# Patient Record
Sex: FEMALE | Race: WHITE | NOT HISPANIC OR LATINO | Employment: OTHER | URBAN - NONMETROPOLITAN AREA
[De-identification: names, ages, dates, MRNs, and addresses within clinical notes are randomized per-mention and may not be internally consistent; named-entity substitution may affect disease eponyms.]

---

## 2018-08-17 ENCOUNTER — HOSPITAL ENCOUNTER (OUTPATIENT)
Dept: CT IMAGING | Facility: HOSPITAL | Age: 68
Discharge: HOME | End: 2018-08-17
Attending: EMERGENCY MEDICINE | Admitting: EMERGENCY MEDICINE

## 2018-08-17 ENCOUNTER — TRANSCRIBE ORDERS (OUTPATIENT)
Dept: ADMINISTRATIVE | Facility: HOSPITAL | Age: 68
End: 2018-08-17

## 2018-08-17 DIAGNOSIS — R10.9 ABDOMINAL PAIN, UNSPECIFIED ABDOMINAL LOCATION: Primary | ICD-10-CM

## 2018-08-17 DIAGNOSIS — R10.9 ABDOMINAL PAIN, UNSPECIFIED ABDOMINAL LOCATION: ICD-10-CM

## 2018-08-17 LAB — CREAT BLDA-MCNC: 1.3 MG/DL (ref 0.6–1.3)

## 2018-08-17 PROCEDURE — 74177 CT ABD & PELVIS W/CONTRAST: CPT

## 2018-08-17 PROCEDURE — 25010000002 IOPAMIDOL 61 % SOLUTION: Performed by: EMERGENCY MEDICINE

## 2018-08-17 PROCEDURE — 82565 ASSAY OF CREATININE: CPT

## 2018-08-17 PROCEDURE — 0 IOHEXOL 300 MG/ML SOLUTION: Performed by: EMERGENCY MEDICINE

## 2018-08-17 RX ADMIN — IOPAMIDOL 100 ML: 612 INJECTION, SOLUTION INTRAVENOUS at 13:41

## 2018-08-17 RX ADMIN — IOHEXOL 50 ML: 300 INJECTION, SOLUTION INTRAVENOUS at 13:41

## 2019-01-02 ENCOUNTER — TRANSCRIBE ORDERS (OUTPATIENT)
Dept: GENERAL RADIOLOGY | Facility: HOSPITAL | Age: 69
End: 2019-01-02

## 2019-01-02 ENCOUNTER — HOSPITAL ENCOUNTER (OUTPATIENT)
Dept: GENERAL RADIOLOGY | Facility: HOSPITAL | Age: 69
Discharge: HOME | End: 2019-01-02
Admitting: NURSE PRACTITIONER

## 2019-01-02 DIAGNOSIS — Z13.820 SCREENING FOR OSTEOPOROSIS: ICD-10-CM

## 2019-01-02 DIAGNOSIS — Z00.00 ROUTINE GENERAL MEDICAL EXAMINATION AT A HEALTH CARE FACILITY: ICD-10-CM

## 2019-01-02 DIAGNOSIS — Z78.0 POST-MENOPAUSAL: ICD-10-CM

## 2019-01-02 DIAGNOSIS — Z78.0 POST-MENOPAUSAL: Primary | ICD-10-CM

## 2019-01-02 PROCEDURE — 77080 DXA BONE DENSITY AXIAL: CPT

## 2019-07-09 ENCOUNTER — HOSPITAL ENCOUNTER (OUTPATIENT)
Dept: GENERAL RADIOLOGY | Facility: HOSPITAL | Age: 69
Discharge: HOME | End: 2019-07-09
Admitting: NURSE PRACTITIONER

## 2019-07-09 ENCOUNTER — APPOINTMENT (OUTPATIENT)
Dept: GENERAL RADIOLOGY | Facility: HOSPITAL | Age: 69
End: 2019-07-09

## 2019-07-09 ENCOUNTER — TRANSCRIBE ORDERS (OUTPATIENT)
Dept: GENERAL RADIOLOGY | Facility: HOSPITAL | Age: 69
End: 2019-07-09

## 2019-07-09 DIAGNOSIS — M25.531 RIGHT WRIST PAIN: ICD-10-CM

## 2019-07-09 DIAGNOSIS — M25.531 RIGHT WRIST PAIN: Primary | ICD-10-CM

## 2019-07-09 PROCEDURE — 73110 X-RAY EXAM OF WRIST: CPT

## 2019-07-11 ENCOUNTER — TRANSCRIBE ORDERS (OUTPATIENT)
Dept: ADMINISTRATIVE | Facility: HOSPITAL | Age: 69
End: 2019-07-11

## 2019-07-11 ENCOUNTER — HOSPITAL ENCOUNTER (OUTPATIENT)
Dept: ULTRASOUND IMAGING | Facility: HOSPITAL | Age: 69
Discharge: HOME | End: 2019-07-11
Admitting: NURSE PRACTITIONER

## 2019-07-11 DIAGNOSIS — R22.1 NECK NODULE: Primary | ICD-10-CM

## 2019-07-11 DIAGNOSIS — R22.1 NECK NODULE: ICD-10-CM

## 2019-07-11 PROCEDURE — 76536 US EXAM OF HEAD AND NECK: CPT

## 2019-11-26 ENCOUNTER — TRANSCRIBE ORDERS (OUTPATIENT)
Dept: GENERAL RADIOLOGY | Facility: HOSPITAL | Age: 69
End: 2019-11-26

## 2019-11-26 ENCOUNTER — HOSPITAL ENCOUNTER (OUTPATIENT)
Dept: GENERAL RADIOLOGY | Facility: HOSPITAL | Age: 69
Discharge: HOME | End: 2019-11-26
Admitting: NURSE PRACTITIONER

## 2019-11-26 DIAGNOSIS — M54.50 LOW BACK PAIN WITHOUT SCIATICA, UNSPECIFIED BACK PAIN LATERALITY, UNSPECIFIED CHRONICITY: ICD-10-CM

## 2019-11-26 DIAGNOSIS — M54.50 LOW BACK PAIN WITHOUT SCIATICA, UNSPECIFIED BACK PAIN LATERALITY, UNSPECIFIED CHRONICITY: Primary | ICD-10-CM

## 2019-11-26 PROCEDURE — 72110 X-RAY EXAM L-2 SPINE 4/>VWS: CPT

## 2020-01-15 ENCOUNTER — TRANSCRIBE ORDERS (OUTPATIENT)
Dept: ADMINISTRATIVE | Facility: HOSPITAL | Age: 70
End: 2020-01-15

## 2020-01-15 DIAGNOSIS — M54.50 CHRONIC LOW BACK PAIN, UNSPECIFIED BACK PAIN LATERALITY, UNSPECIFIED WHETHER SCIATICA PRESENT: Primary | ICD-10-CM

## 2020-01-15 DIAGNOSIS — G89.29 CHRONIC LOW BACK PAIN, UNSPECIFIED BACK PAIN LATERALITY, UNSPECIFIED WHETHER SCIATICA PRESENT: Primary | ICD-10-CM

## 2020-01-16 ENCOUNTER — TRANSCRIBE ORDERS (OUTPATIENT)
Dept: GENERAL RADIOLOGY | Facility: HOSPITAL | Age: 70
End: 2020-01-16

## 2020-01-16 ENCOUNTER — HOSPITAL ENCOUNTER (OUTPATIENT)
Dept: GENERAL RADIOLOGY | Facility: HOSPITAL | Age: 70
Discharge: HOME | End: 2020-01-16
Admitting: NURSE PRACTITIONER

## 2020-01-16 ENCOUNTER — TRANSCRIBE ORDERS (OUTPATIENT)
Dept: ADMINISTRATIVE | Facility: HOSPITAL | Age: 70
End: 2020-01-16

## 2020-01-16 DIAGNOSIS — M81.0 OSTEOPOROSIS OF VERTEBRA: ICD-10-CM

## 2020-01-16 DIAGNOSIS — F17.200 SMOKING: Primary | ICD-10-CM

## 2020-01-16 DIAGNOSIS — M54.50 CHRONIC LOW BACK PAIN, UNSPECIFIED BACK PAIN LATERALITY, UNSPECIFIED WHETHER SCIATICA PRESENT: ICD-10-CM

## 2020-01-16 DIAGNOSIS — R06.02 SHORTNESS OF BREATH: ICD-10-CM

## 2020-01-16 DIAGNOSIS — R20.2 PARESTHESIA: ICD-10-CM

## 2020-01-16 DIAGNOSIS — R06.02 SHORTNESS OF BREATH: Primary | ICD-10-CM

## 2020-01-16 DIAGNOSIS — Z98.890 HISTORY OF LUMBAR SURGERY: ICD-10-CM

## 2020-01-16 DIAGNOSIS — G89.29 CHRONIC LOW BACK PAIN, UNSPECIFIED BACK PAIN LATERALITY, UNSPECIFIED WHETHER SCIATICA PRESENT: ICD-10-CM

## 2020-01-16 PROCEDURE — 71046 X-RAY EXAM CHEST 2 VIEWS: CPT

## 2020-01-17 ENCOUNTER — APPOINTMENT (OUTPATIENT)
Dept: MRI IMAGING | Facility: HOSPITAL | Age: 70
End: 2020-01-17

## 2020-01-20 ENCOUNTER — HOSPITAL ENCOUNTER (OUTPATIENT)
Dept: MRI IMAGING | Facility: HOSPITAL | Age: 70
Discharge: HOME | End: 2020-01-20

## 2020-01-20 ENCOUNTER — HOSPITAL ENCOUNTER (OUTPATIENT)
Dept: PULMONOLOGY | Facility: HOSPITAL | Age: 70
Discharge: HOME | End: 2020-01-20

## 2020-01-20 ENCOUNTER — HOSPITAL ENCOUNTER (OUTPATIENT)
Dept: MRI IMAGING | Facility: HOSPITAL | Age: 70
Discharge: HOME | End: 2020-01-20
Admitting: NURSE PRACTITIONER

## 2020-01-20 DIAGNOSIS — M54.50 CHRONIC LOW BACK PAIN, UNSPECIFIED BACK PAIN LATERALITY, UNSPECIFIED WHETHER SCIATICA PRESENT: ICD-10-CM

## 2020-01-20 DIAGNOSIS — G89.29 CHRONIC LOW BACK PAIN, UNSPECIFIED BACK PAIN LATERALITY, UNSPECIFIED WHETHER SCIATICA PRESENT: ICD-10-CM

## 2020-01-20 LAB — CREAT BLDA-MCNC: 1.3 MG/DL (ref 0.6–1.3)

## 2020-01-20 PROCEDURE — 72146 MRI CHEST SPINE W/O DYE: CPT

## 2020-01-20 PROCEDURE — 94010 BREATHING CAPACITY TEST: CPT

## 2020-01-20 PROCEDURE — 0 GADOBENATE DIMEGLUMINE 529 MG/ML SOLUTION: Performed by: NURSE PRACTITIONER

## 2020-01-20 PROCEDURE — 94010 BREATHING CAPACITY TEST: CPT | Performed by: INTERNAL MEDICINE

## 2020-01-20 PROCEDURE — 94726 PLETHYSMOGRAPHY LUNG VOLUMES: CPT

## 2020-01-20 PROCEDURE — A9577 INJ MULTIHANCE: HCPCS | Performed by: NURSE PRACTITIONER

## 2020-01-20 PROCEDURE — 94729 DIFFUSING CAPACITY: CPT | Performed by: INTERNAL MEDICINE

## 2020-01-20 PROCEDURE — 94729 DIFFUSING CAPACITY: CPT

## 2020-01-20 PROCEDURE — 72158 MRI LUMBAR SPINE W/O & W/DYE: CPT

## 2020-01-20 PROCEDURE — 82565 ASSAY OF CREATININE: CPT

## 2020-01-20 PROCEDURE — 94726 PLETHYSMOGRAPHY LUNG VOLUMES: CPT | Performed by: INTERNAL MEDICINE

## 2020-01-20 RX ORDER — ALBUTEROL SULFATE 2.5 MG/3ML
2.5 SOLUTION RESPIRATORY (INHALATION) ONCE
Status: DISCONTINUED | OUTPATIENT
Start: 2020-01-20 | End: 2020-01-20

## 2020-01-20 RX ADMIN — GADOBENATE DIMEGLUMINE 17 ML: 529 INJECTION, SOLUTION INTRAVENOUS at 11:47

## 2020-07-17 ENCOUNTER — TRANSCRIBE ORDERS (OUTPATIENT)
Dept: GENERAL RADIOLOGY | Facility: HOSPITAL | Age: 70
End: 2020-07-17

## 2020-07-17 ENCOUNTER — HOSPITAL ENCOUNTER (OUTPATIENT)
Dept: GENERAL RADIOLOGY | Facility: HOSPITAL | Age: 70
Discharge: HOME | End: 2020-07-17
Admitting: NURSE PRACTITIONER

## 2020-07-17 DIAGNOSIS — R06.02 SHORTNESS OF BREATH: Primary | ICD-10-CM

## 2020-07-17 DIAGNOSIS — R06.02 SHORTNESS OF BREATH: ICD-10-CM

## 2020-07-17 PROCEDURE — 71046 X-RAY EXAM CHEST 2 VIEWS: CPT

## 2020-07-29 ENCOUNTER — TRANSCRIBE ORDERS (OUTPATIENT)
Dept: ADMINISTRATIVE | Facility: HOSPITAL | Age: 70
End: 2020-07-29

## 2020-07-29 DIAGNOSIS — D72.829 LEUKOCYTOSIS, UNSPECIFIED TYPE: ICD-10-CM

## 2020-07-29 DIAGNOSIS — E87.1 HYPOSMOLALITY SYNDROME: ICD-10-CM

## 2020-07-29 DIAGNOSIS — N18.1 CHRONIC KIDNEY DISEASE, STAGE I: Primary | ICD-10-CM

## 2020-07-29 DIAGNOSIS — D64.9 ANEMIA, UNSPECIFIED TYPE: ICD-10-CM

## 2020-07-30 ENCOUNTER — LAB (OUTPATIENT)
Dept: LAB | Facility: HOSPITAL | Age: 70
End: 2020-07-30

## 2020-07-30 ENCOUNTER — TRANSCRIBE ORDERS (OUTPATIENT)
Dept: ADMINISTRATIVE | Facility: HOSPITAL | Age: 70
End: 2020-07-30

## 2020-07-30 DIAGNOSIS — E87.1 HYPOSMOLALITY SYNDROME: ICD-10-CM

## 2020-07-30 DIAGNOSIS — D72.829 LEUKOCYTOSIS, UNSPECIFIED TYPE: ICD-10-CM

## 2020-07-30 DIAGNOSIS — D72.829 LEUKOCYTOSIS, UNSPECIFIED TYPE: Primary | ICD-10-CM

## 2020-07-30 DIAGNOSIS — N18.1 CHRONIC KIDNEY DISEASE, STAGE I: ICD-10-CM

## 2020-07-30 DIAGNOSIS — I10 ESSENTIAL HYPERTENSION, MALIGNANT: ICD-10-CM

## 2020-07-30 LAB
ALBUMIN SERPL-MCNC: 3.8 G/DL (ref 3.5–5.2)
ALBUMIN/GLOB SERPL: 1 G/DL
ALP SERPL-CCNC: 96 U/L (ref 39–117)
ALT SERPL W P-5'-P-CCNC: 6 U/L (ref 1–33)
ANION GAP SERPL CALCULATED.3IONS-SCNC: 16 MMOL/L (ref 5–15)
AST SERPL-CCNC: 13 U/L (ref 1–32)
BASOPHILS # BLD AUTO: 0.08 10*3/MM3 (ref 0–0.2)
BASOPHILS NFR BLD AUTO: 0.4 % (ref 0–1.5)
BILIRUB SERPL-MCNC: 0.2 MG/DL (ref 0–1.2)
BUN SERPL-MCNC: 14 MG/DL (ref 8–23)
BUN/CREAT SERPL: 9.9 (ref 7–25)
CALCIUM SPEC-SCNC: 9.5 MG/DL (ref 8.6–10.5)
CHLORIDE SERPL-SCNC: 101 MMOL/L (ref 98–107)
CHOLEST SERPL-MCNC: 135 MG/DL (ref 0–200)
CO2 SERPL-SCNC: 23 MMOL/L (ref 22–29)
CREAT SERPL-MCNC: 1.41 MG/DL (ref 0.57–1)
DEPRECATED RDW RBC AUTO: 47.6 FL (ref 37–54)
EOSINOPHIL # BLD AUTO: 0.13 10*3/MM3 (ref 0–0.4)
EOSINOPHIL NFR BLD AUTO: 0.7 % (ref 0.3–6.2)
ERYTHROCYTE [DISTWIDTH] IN BLOOD BY AUTOMATED COUNT: 15.7 % (ref 12.3–15.4)
GFR SERPL CREATININE-BSD FRML MDRD: 37 ML/MIN/1.73
GLOBULIN UR ELPH-MCNC: 3.8 GM/DL
GLUCOSE SERPL-MCNC: 102 MG/DL (ref 65–99)
HCT VFR BLD AUTO: 33.4 % (ref 34–46.6)
HDLC SERPL-MCNC: 32 MG/DL (ref 40–60)
HGB BLD-MCNC: 9.8 G/DL (ref 12–15.9)
IMM GRANULOCYTES # BLD AUTO: 0.13 10*3/MM3 (ref 0–0.05)
IMM GRANULOCYTES NFR BLD AUTO: 0.7 % (ref 0–0.5)
LDLC SERPL CALC-MCNC: 80 MG/DL (ref 0–100)
LDLC/HDLC SERPL: 2.49 {RATIO}
LYMPHOCYTES # BLD AUTO: 2.65 10*3/MM3 (ref 0.7–3.1)
LYMPHOCYTES NFR BLD AUTO: 14.8 % (ref 19.6–45.3)
MCH RBC QN AUTO: 24.6 PG (ref 26.6–33)
MCHC RBC AUTO-ENTMCNC: 29.3 G/DL (ref 31.5–35.7)
MCV RBC AUTO: 83.7 FL (ref 79–97)
MONOCYTES # BLD AUTO: 1.32 10*3/MM3 (ref 0.1–0.9)
MONOCYTES NFR BLD AUTO: 7.4 % (ref 5–12)
NEUTROPHILS NFR BLD AUTO: 13.57 10*3/MM3 (ref 1.7–7)
NEUTROPHILS NFR BLD AUTO: 76 % (ref 42.7–76)
NRBC BLD AUTO-RTO: 0 /100 WBC (ref 0–0.2)
PLATELET # BLD AUTO: 644 10*3/MM3 (ref 140–450)
PMV BLD AUTO: 10.3 FL (ref 6–12)
POTASSIUM SERPL-SCNC: 4.2 MMOL/L (ref 3.5–5.2)
PROT SERPL-MCNC: 7.6 G/DL (ref 6–8.5)
RBC # BLD AUTO: 3.99 10*6/MM3 (ref 3.77–5.28)
SODIUM SERPL-SCNC: 140 MMOL/L (ref 136–145)
T4 FREE SERPL-MCNC: 1.38 NG/DL (ref 0.93–1.7)
TRIGL SERPL-MCNC: 117 MG/DL (ref 0–150)
TSH SERPL DL<=0.05 MIU/L-ACNC: 0.82 UIU/ML (ref 0.27–4.2)
VLDLC SERPL-MCNC: 23.4 MG/DL
WBC # BLD AUTO: 17.88 10*3/MM3 (ref 3.4–10.8)

## 2020-07-30 PROCEDURE — 36415 COLL VENOUS BLD VENIPUNCTURE: CPT

## 2020-07-30 PROCEDURE — 85025 COMPLETE CBC W/AUTO DIFF WBC: CPT | Performed by: NURSE ANESTHETIST, CERTIFIED REGISTERED

## 2020-07-30 PROCEDURE — 80053 COMPREHEN METABOLIC PANEL: CPT | Performed by: NURSE PRACTITIONER

## 2020-07-30 PROCEDURE — 84443 ASSAY THYROID STIM HORMONE: CPT | Performed by: NURSE PRACTITIONER

## 2020-07-30 PROCEDURE — 84439 ASSAY OF FREE THYROXINE: CPT | Performed by: NURSE PRACTITIONER

## 2020-07-30 PROCEDURE — 80061 LIPID PANEL: CPT | Performed by: NURSE PRACTITIONER

## 2021-04-23 ENCOUNTER — APPOINTMENT (OUTPATIENT)
Dept: CT IMAGING | Facility: HOSPITAL | Age: 71
End: 2021-04-23

## 2021-04-23 ENCOUNTER — HOSPITAL ENCOUNTER (EMERGENCY)
Facility: HOSPITAL | Age: 71
Discharge: ANOTHER HOSPITAL | End: 2021-04-23
Admitting: EMERGENCY MEDICINE

## 2021-04-23 ENCOUNTER — APPOINTMENT (OUTPATIENT)
Dept: GENERAL RADIOLOGY | Facility: HOSPITAL | Age: 71
End: 2021-04-23

## 2021-04-23 VITALS
TEMPERATURE: 99.6 F | OXYGEN SATURATION: 92 % | SYSTOLIC BLOOD PRESSURE: 119 MMHG | HEIGHT: 63 IN | HEART RATE: 90 BPM | DIASTOLIC BLOOD PRESSURE: 76 MMHG | BODY MASS INDEX: 33.31 KG/M2 | RESPIRATION RATE: 18 BRPM | WEIGHT: 188 LBS

## 2021-04-23 DIAGNOSIS — I71.9 PSEUDOANEURYSM OF AORTA (HCC): Primary | ICD-10-CM

## 2021-04-23 DIAGNOSIS — I31.39 PERICARDIAL EFFUSION: ICD-10-CM

## 2021-04-23 LAB
ALBUMIN SERPL-MCNC: 2.8 G/DL (ref 3.5–5.2)
ALBUMIN/GLOB SERPL: 0.6 G/DL
ALP SERPL-CCNC: 139 U/L (ref 39–117)
ALT SERPL W P-5'-P-CCNC: 6 U/L (ref 1–33)
ANION GAP SERPL CALCULATED.3IONS-SCNC: 15 MMOL/L (ref 5–15)
APTT PPP: 34 SECONDS (ref 24.1–35)
AST SERPL-CCNC: 16 U/L (ref 1–32)
BASOPHILS # BLD AUTO: 0.11 10*3/MM3 (ref 0–0.2)
BASOPHILS NFR BLD AUTO: 0.5 % (ref 0–1.5)
BILIRUB SERPL-MCNC: 0.2 MG/DL (ref 0–1.2)
BUN SERPL-MCNC: 21 MG/DL (ref 8–23)
BUN/CREAT SERPL: 13.1 (ref 7–25)
CALCIUM SPEC-SCNC: 9 MG/DL (ref 8.6–10.5)
CHLORIDE SERPL-SCNC: 100 MMOL/L (ref 98–107)
CO2 SERPL-SCNC: 21 MMOL/L (ref 22–29)
CREAT SERPL-MCNC: 1.6 MG/DL (ref 0.57–1)
DEPRECATED RDW RBC AUTO: 54 FL (ref 37–54)
EOSINOPHIL # BLD AUTO: 0.02 10*3/MM3 (ref 0–0.4)
EOSINOPHIL NFR BLD AUTO: 0.1 % (ref 0.3–6.2)
ERYTHROCYTE [DISTWIDTH] IN BLOOD BY AUTOMATED COUNT: 17.6 % (ref 12.3–15.4)
GFR SERPL CREATININE-BSD FRML MDRD: 32 ML/MIN/1.73
GLOBULIN UR ELPH-MCNC: 4.8 GM/DL
GLUCOSE SERPL-MCNC: 97 MG/DL (ref 65–99)
HCT VFR BLD AUTO: 30.3 % (ref 34–46.6)
HGB BLD-MCNC: 8.9 G/DL (ref 12–15.9)
IMM GRANULOCYTES # BLD AUTO: 0.36 10*3/MM3 (ref 0–0.05)
IMM GRANULOCYTES NFR BLD AUTO: 1.8 % (ref 0–0.5)
INR PPP: 1.39 (ref 0.91–1.09)
LYMPHOCYTES # BLD AUTO: 2 10*3/MM3 (ref 0.7–3.1)
LYMPHOCYTES NFR BLD AUTO: 10 % (ref 19.6–45.3)
MCH RBC QN AUTO: 24.9 PG (ref 26.6–33)
MCHC RBC AUTO-ENTMCNC: 29.4 G/DL (ref 31.5–35.7)
MCV RBC AUTO: 84.9 FL (ref 79–97)
MONOCYTES # BLD AUTO: 1.71 10*3/MM3 (ref 0.1–0.9)
MONOCYTES NFR BLD AUTO: 8.5 % (ref 5–12)
NEUTROPHILS NFR BLD AUTO: 15.87 10*3/MM3 (ref 1.7–7)
NEUTROPHILS NFR BLD AUTO: 79.1 % (ref 42.7–76)
NRBC BLD AUTO-RTO: 0.2 /100 WBC (ref 0–0.2)
PLATELET # BLD AUTO: 918 10*3/MM3 (ref 140–450)
PMV BLD AUTO: 9.9 FL (ref 6–12)
POTASSIUM SERPL-SCNC: 4.2 MMOL/L (ref 3.5–5.2)
PROT SERPL-MCNC: 7.6 G/DL (ref 6–8.5)
PROTHROMBIN TIME: 16 SECONDS (ref 11.5–13.4)
RBC # BLD AUTO: 3.57 10*6/MM3 (ref 3.77–5.28)
SARS-COV-2 RDRP RESP QL NAA+PROBE: NORMAL
SODIUM SERPL-SCNC: 136 MMOL/L (ref 136–145)
TROPONIN T SERPL-MCNC: 0.01 NG/ML (ref 0–0.03)
WBC # BLD AUTO: 20.07 10*3/MM3 (ref 3.4–10.8)

## 2021-04-23 PROCEDURE — 96365 THER/PROPH/DIAG IV INF INIT: CPT

## 2021-04-23 PROCEDURE — 85025 COMPLETE CBC W/AUTO DIFF WBC: CPT | Performed by: PHYSICIAN ASSISTANT

## 2021-04-23 PROCEDURE — 85610 PROTHROMBIN TIME: CPT | Performed by: PHYSICIAN ASSISTANT

## 2021-04-23 PROCEDURE — 84484 ASSAY OF TROPONIN QUANT: CPT | Performed by: PHYSICIAN ASSISTANT

## 2021-04-23 PROCEDURE — 71275 CT ANGIOGRAPHY CHEST: CPT

## 2021-04-23 PROCEDURE — 87635 SARS-COV-2 COVID-19 AMP PRB: CPT | Performed by: PHYSICIAN ASSISTANT

## 2021-04-23 PROCEDURE — 71045 X-RAY EXAM CHEST 1 VIEW: CPT

## 2021-04-23 PROCEDURE — 85730 THROMBOPLASTIN TIME PARTIAL: CPT | Performed by: PHYSICIAN ASSISTANT

## 2021-04-23 PROCEDURE — 80053 COMPREHEN METABOLIC PANEL: CPT | Performed by: PHYSICIAN ASSISTANT

## 2021-04-23 PROCEDURE — 99284 EMERGENCY DEPT VISIT MOD MDM: CPT

## 2021-04-23 PROCEDURE — 93005 ELECTROCARDIOGRAM TRACING: CPT | Performed by: PHYSICIAN ASSISTANT

## 2021-04-23 PROCEDURE — 0 IOPAMIDOL PER 1 ML: Performed by: PHYSICIAN ASSISTANT

## 2021-04-23 PROCEDURE — 93010 ELECTROCARDIOGRAM REPORT: CPT | Performed by: EMERGENCY MEDICINE

## 2021-04-23 RX ORDER — CEPHALEXIN 500 MG/1
500 CAPSULE ORAL 4 TIMES DAILY
Status: ON HOLD | COMMUNITY
End: 2021-06-12

## 2021-04-23 RX ORDER — ESMOLOL HYDROCHLORIDE 10 MG/ML
50-300 INJECTION, SOLUTION INTRAVENOUS
Status: DISCONTINUED | OUTPATIENT
Start: 2021-04-23 | End: 2021-04-23 | Stop reason: HOSPADM

## 2021-04-23 RX ORDER — METOPROLOL SUCCINATE 50 MG/1
25 TABLET, EXTENDED RELEASE ORAL DAILY
COMMUNITY
End: 2022-09-27 | Stop reason: DRUGHIGH

## 2021-04-23 RX ORDER — SODIUM CHLORIDE 0.9 % (FLUSH) 0.9 %
10 SYRINGE (ML) INJECTION AS NEEDED
Status: DISCONTINUED | OUTPATIENT
Start: 2021-04-23 | End: 2021-04-23 | Stop reason: HOSPADM

## 2021-04-23 RX ORDER — ALPRAZOLAM 0.5 MG/1
0.5 TABLET ORAL 3 TIMES DAILY PRN
Status: ON HOLD | COMMUNITY
End: 2022-05-05

## 2021-04-23 RX ORDER — CALCIUM CARBONATE 200(500)MG
1 TABLET,CHEWABLE ORAL DAILY
COMMUNITY

## 2021-04-23 RX ORDER — MELATONIN
1000 DAILY
Status: ON HOLD | COMMUNITY
End: 2021-06-12

## 2021-04-23 RX ORDER — ASPIRIN 81 MG/1
81 TABLET, CHEWABLE ORAL DAILY
COMMUNITY

## 2021-04-23 RX ORDER — CYCLOBENZAPRINE HCL 10 MG
10 TABLET ORAL 3 TIMES DAILY PRN
Status: ON HOLD | COMMUNITY
End: 2022-05-05

## 2021-04-23 RX ORDER — OXYCODONE AND ACETAMINOPHEN 7.5; 325 MG/1; MG/1
1 TABLET ORAL EVERY 4 HOURS PRN
Status: ON HOLD | COMMUNITY
End: 2022-05-05

## 2021-04-23 RX ORDER — GABAPENTIN 600 MG/1
600 TABLET ORAL 2 TIMES DAILY
COMMUNITY
End: 2023-03-14

## 2021-04-23 RX ADMIN — IOPAMIDOL 80 ML: 755 INJECTION, SOLUTION INTRAVENOUS at 18:44

## 2021-04-23 RX ADMIN — ESMOLOL HYDROCHLORIDE 50 MCG/KG/MIN: 10 INJECTION INTRAVENOUS at 19:40

## 2021-04-23 NOTE — ED PROVIDER NOTES
"Subjective   History of Present Illness    Patient is a pleasant 71-year-old female who presents to ED with daughter.  Daughter is the predominant historian.  The patient's primary care provider, CALE Haque, also contacted this provider and spoke to me directly at the patient's case.    Chief complaint is the patient has an abnormal CTA chest that was completed at [facility] revealing that she has a \"pseudoaneurysmal formation anteriorly to the arch proximal to the takeoff of the right brachiocephalic artery.\"    The patient and her family members went on vacation 2 weeks ago down to [place].  While in [place], the patient experienced sudden chest pain and worsening shortness of breath.  Patient does have COPD and uses an inhaler on occasion.  She does not wear oxygen on a regular basis.  The pain became so intense that she went to their ER.  She describes pain in the right side of her chest described as \"pain.\"  She is complain worsening shortness of breath with exertion but not with rest.  Patient evaluation showed elevated white blood cell count as well as D-dimer.  She did have a CTA chest completed showing \"abnormal aorta which appears to be aneurysmal at the arch.  Probable pseudoaneurysm formation anteriorly just proximal to the takeoff of the right brachiocephalic artery.  The aorta appears ratty in its course into the descending aorta which areas of ulceration into what appears to be mural thrombus.  These findings could be secondary to vasculitis or possible previous infection.  The patient is also noted to have multiple calcifications in the spleen which are a previous granulomatous disease.\"  In the ED, patient did receive normal saline, Zofran, morphine, and Zofran.  Cardiothoracic surgery was consulted.  Patient did complete an echocardiogram and received heparin subcutaneously twice daily.  On the discharge diagnosis notation, the patient's respiratory failure did improve and " she was on room air prior to discharge.  She was prescribed Keflex.  She does have evidence of chronic elevated leukocytosis with improved white blood cell count.  Patient does have chronic kidney disease stage III.  In regards to her aortic aneurysm, she was advised to follow-up at a tertiary care center upon returning back to [place].  She was taken off her Norvasc and started on Toprol.  CT surgery did clear at this time for no further inpatient work-up.    When the patient left [place] several days later, she was quite sleepy and fatigued.  She slept more frequently.  Her daughter noticed that she has had increased shortness of breath in the past 11 days worse in the past few days.  This is noted with exertion but not with rest.  Patient lie supine with several pillows and that remains unchanged.  She occasionally has been experiencing right-sided chest pain with last episode couple days ago.  She reports her last episode was couple days ago it is more intense than it had been before.  She does complain of occasional nausea without vomiting.  She does complain of diaphoresis.  She does have a history of congestive heart failure, hypertension, but denies any other cardiac abnormality.    Patient did go see her primary care provider today who then spoke with Dr. Franco, cardiothoracic surgeon here in [facility].  He advised her to complete a CTA chest and to further discuss this with him.  Because her primary care provider cannot complete that today, she was advised tp come to the ER and get that completed today for further evaluation since patient has worsening symptoms.    Review of Systems   Constitutional: Positive for activity change, appetite change, chills and fatigue.   HENT: Negative.    Respiratory: Positive for chest tightness and shortness of breath.    Cardiovascular: Positive for chest pain.   Gastrointestinal: Negative.    Genitourinary: Negative.    Musculoskeletal: Negative.    Skin:  "Negative.    Neurological: Negative.    Psychiatric/Behavioral: Negative.        Past Medical History:   Diagnosis Date   • Arthritis    • Asthma    • Chronic kidney disease (CKD)    • COPD (chronic obstructive pulmonary disease) (CMS/HCC)    • Hypertension        Allergies   Allergen Reactions   • Levaquin [Levofloxacin] Hives   • Naprosyn [Naproxen] Hives   • Sulfa Antibiotics Hives       Past Surgical History:   Procedure Laterality Date   • HYSTERECTOMY     • LOWER LEG SOFT TISSUE TUMOR EXCISION     • LYMPH NODE DISSECTION     • WRIST FRACTURE SURGERY Right        No family history on file.    Social History     Socioeconomic History   • Marital status:      Spouse name: Not on file   • Number of children: Not on file   • Years of education: Not on file   • Highest education level: Not on file       Prior to Admission medications    Not on File       Medications   sodium chloride 0.9 % flush 10 mL (has no administration in time range)   esmolol (BREVIBLOC) 2500 mg in 250 mL NS infusion (50 mcg/kg/min × 85.3 kg Intravenous New Bag 4/23/21 1940)   iopamidol (ISOVUE-370) 76 % injection 100 mL (80 mL Intravenous Given 4/23/21 1844)       /76   Pulse 90   Temp 99.6 °F (37.6 °C) (Oral)   Resp 18   Ht 160 cm (63\")   Wt 85.3 kg (188 lb)   SpO2 96%   BMI 33.30 kg/m²       Objective   Physical Exam  Vitals and nursing note reviewed.   Constitutional:       General: She is not in acute distress.     Appearance: She is well-developed. She is not diaphoretic.   HENT:      Head: Normocephalic and atraumatic.   Eyes:      Conjunctiva/sclera: Conjunctivae normal.      Pupils: Pupils are equal, round, and reactive to light.   Neck:      Trachea: No tracheal deviation.   Cardiovascular:      Rate and Rhythm: Normal rate and regular rhythm.      Heart sounds: Normal heart sounds. No murmur heard.     Pulmonary:      Effort: Pulmonary effort is normal.      Breath sounds: No decreased breath sounds, wheezing, " rhonchi or rales.   Abdominal:      General: Bowel sounds are normal. There is no distension.      Palpations: Abdomen is soft. There is no mass.      Tenderness: There is no abdominal tenderness. There is no guarding or rebound.   Musculoskeletal:         General: Normal range of motion.      Cervical back: Normal range of motion and neck supple.   Skin:     General: Skin is warm and dry.      Capillary Refill: Capillary refill takes less than 2 seconds.   Neurological:      Mental Status: She is alert and oriented to person, place, and time.      Deep Tendon Reflexes: Reflexes are normal and symmetric.   Psychiatric:         Mood and Affect: Mood normal.         Behavior: Behavior normal.         Thought Content: Thought content normal.         Judgment: Judgment normal.         Procedures         Lab Results (last 24 hours)     Procedure Component Value Units Date/Time    CBC & Differential [[id number]]  (Abnormal) Collected: 04/23/21 1748    Specimen: Blood Updated: 04/23/21 1825    Narrative:      The following orders were created for panel order CBC & Differential.  Procedure                               Abnormality         Status                     ---------                               -----------         ------                     CBC Auto Differential[[id number]]        Abnormal            Final result                 Please view results for these tests on the individual orders.    Comprehensive Metabolic Panel [[id number]]  (Abnormal) Collected: 04/23/21 1748    Specimen: Blood Updated: 04/23/21 1815     Glucose 97 mg/dL      BUN 21 mg/dL      Creatinine 1.60 mg/dL      Sodium 136 mmol/L      Potassium 4.2 mmol/L      Chloride 100 mmol/L      CO2 21.0 mmol/L      Calcium 9.0 mg/dL      Total Protein 7.6 g/dL      Albumin 2.80 g/dL      ALT (SGPT) 6 U/L      AST (SGOT) 16 U/L      Alkaline Phosphatase 139 U/L      Total Bilirubin 0.2 mg/dL      eGFR Non African Amer 32 mL/min/1.73      Globulin 4.8 gm/dL       A/G Ratio 0.6 g/dL      BUN/Creatinine Ratio 13.1     Anion Gap 15.0 mmol/L     Narrative:      GFR Normal >60  Chronic Kidney Disease <60  Kidney Failure <15      Protime-INR [[id number]]  (Abnormal) Collected: 04/23/21 1748    Specimen: Blood Updated: 04/23/21 1806     Protime 16.0 Seconds      INR 1.39    aPTT [[id number]]  (Normal) Collected: 04/23/21 1748    Specimen: Blood Updated: 04/23/21 1806     PTT 34.0 seconds     CBC Auto Differential [[id number]]  (Abnormal) Collected: 04/23/21 1748    Specimen: Blood Updated: 04/23/21 1825     WBC 20.07 10*3/mm3      RBC 3.57 10*6/mm3      Hemoglobin 8.9 g/dL      Hematocrit 30.3 %      MCV 84.9 fL      MCH 24.9 pg      MCHC 29.4 g/dL      RDW 17.6 %      RDW-SD 54.0 fl      MPV 9.9 fL      Platelets 918 10*3/mm3      Neutrophil % 79.1 %      Lymphocyte % 10.0 %      Monocyte % 8.5 %      Eosinophil % 0.1 %      Basophil % 0.5 %      Immature Grans % 1.8 %      Neutrophils, Absolute 15.87 10*3/mm3      Lymphocytes, Absolute 2.00 10*3/mm3      Monocytes, Absolute 1.71 10*3/mm3      Eosinophils, Absolute 0.02 10*3/mm3      Basophils, Absolute 0.11 10*3/mm3      Immature Grans, Absolute 0.36 10*3/mm3      nRBC 0.2 /100 WBC     Troponin [[id number]]  (Normal) Collected: 04/23/21 1748    Specimen: Blood Updated: 04/23/21 1855     Troponin T 0.010 ng/mL     Narrative:      Troponin T Reference Range:  <= 0.03 ng/mL-   Negative for AMI  >0.03 ng/mL-     Abnormal for myocardial necrosis.  Clinicians would have to utilize clinical acumen, EKG, Troponin and serial changes to determine if it is an Acute Myocardial Infarction or myocardial injury due to an underlying chronic condition.       Results may be falsely decreased if patient taking Biotin.      COVID-19, [facility]OTT IN-HOUSE,NASAL Swab (NO TRANSPORT MEDIA) 2 HR TAT - Swab, Nasopharynx [[id number]]  (Normal) Collected: 04/23/21 1937    Specimen: Swab from Nasopharynx Updated: 04/23/21 2004     COVID19 Presumptive Negative     Narrative:      Fact sheet for providers: https://www.fda.gov/media/314670/download     Fact sheet for patients: https://www.fda.gov/media/757745/download    Test performed by PCR.  If inconsistent with clinical signs and symptoms patient should be tested with different authorized molecular test.          XR Chest 1 View    Result Date: 4/23/2021  Narrative: EXAM: XR CHEST 1 VW-  INDICATION: Chest pain, shortness of breath  COMPARISON: 07/17/2020  FINDINGS:  Cardiac silhouette is enlarged, which is a change from prior exams although patient is rotated to the RIGHT which could account for this finding. A small LEFT pleural effusion and LEFT lower lobe airspace opacity is suspected. RIGHT lung and pleural space appear clear. No visible pneumothorax. No acute osseous finding. Spinal stimulator lead is noted.      Impression:  1.  Enlarged cardiac silhouette. Uncertain whether this is related to patient rotation/positioning. 2.  Suspected small LEFT pleural effusion and LEFT lower lobe consolidation. This report was finalized on 04/23/2021 17:19 by Dr. Jose De Jesus Tucker MD.    CT Angiogram Chest    Result Date: 4/23/2021  Narrative: EXAM: CT ANGIOGRAM CHEST-  INDICATION: Chest pain, shortness of breath, possible pseudoaneurysm  COMPARISON: None  DOSE LENGTH PRODUCT: 365 mGy cm. Automated exposure control was also utilized to decrease patient radiation dose.  FINDINGS:  Large pericardial effusion. Density pericardial fluid is 10-20 Hounsfield units. Aortic root and ascending aorta. Unremarkable other than for mild atherosclerotic calcification. There is extensive atherosclerotic plaque and ulceration the aortic arch extending into the brachiocephalic artery which is ectatic and contains extensive fibrofatty plaque and ulceration. The subclavian artery and RIGHT common carotid artery appear widely patent. The LEFT subclavian artery and LEFT common carotid artery appear widely patent. Along the descending thoracic aorta above  the diaphragmatic hiatus, there is a 3 cm arterial pseudoaneurysm. No pulmonary embolus identified. Main pulmonary artery is upper limits of normal in caliber.  Central airways are clear. Small bilateral pleural effusions. Severe emphysema. Small areas of scarring in the bilateral lung bases. No suspicious pulmonary nodule. No enlarged axillary or supraclavicular lymph nodes. A few borderline prominent mediastinal lymph nodes are present.  Uniform thyroid. No acute chest wall soft tissue normality. Multiple LEFT renal cyst. Partially imaged ectatic abdominal aorta measures 2.5 cm diameter. No aggressive osseous lesion. Spinal stimulator device noted. No acute osseous finding. Large hiatal hernia.      Impression:  1.  Large pericardial effusion, predominantly low density. 2.  Extensive atherosclerotic plaque and ulceration in the aortic arch with extension into the brachiocephalic artery which is ectatic. Arch branch origins remain patent. 3.  3 cm pseudoaneurysm along the distal descending thoracic aorta. 4.  Large hiatal hernia.   --- These findings were discussed with THUTRISH BELL on 4/23/2021 7:00 PM CDT by Dr. Jose De Jesus Tucker. This report was finalized on 04/23/2021 19:03 by Dr. Jose De Jesus Tucker MD.      ED Course  ED Course as of Apr 23 2031 Fri Apr 23, 2021 1939 I have spoken to Dr. Franco, cardiothoracic surgeon, immediately soon as the radiologist discussed the abnormal CTA chest results with me.  Dr. Franco informs me that he had spoken with Dr. Garces, his cardiothoracic surgeon partner and they do not have the equipment or capacity to repair the patient's emergent issues in her aorta.  He recommends immediate transfer to tertiary care facility.  While this was going on, we did call out to Dr. Jain's, vascular surgeon on call.  Dr. Nuñez did speak with Dr. Da Sliva about the abnormal case increases needs tertiary care facility evaluation.Patient's family wants to go to [facility].  [facility]  transfer care facility was contacted and I spoke with Soniya, the nurse associated with the thoracic vascular team.  She is speaking to her physicians for approval.    [TK]   1944 I did speak with , cardiothoracic surgeon at [facility].  He does approve the patient's transfer there but we are waiting for the approval  given that [facility] is full.    [TK]   2001 Soniya did call back with Dr. Haider cardiothoracic surgeon on the line. He is gracious to accept the patient to their care and the patient be transferred to [facility] at this time.  All the conversations have been relayed to the patient and her family members.  They voiced understanding on findings and possibilities of surgery.    [TK]      ED Course User Index  [TK] Piper Muniz PA          [facility]    Final diagnoses:   Pseudoaneurysm of aorta (CMS/HCC)   Pericardial effusion          Piper Muniz PA  04/23/21 2031

## 2021-04-24 NOTE — ED NOTES
Pt has been accepted to [facility] ED by Dr. Massimo Byrnes, RN at transfer center.     Sandra Arce BETHANY  04/23/21 2010

## 2021-04-24 NOTE — ED NOTES
[facility] NOTIFIED FOR TRANSPORT TO [facility], BUT UNABLE TO ACCEPT UNLESS NURSE IS PROVIDED DUE TO ALS REQUIREMENTS.        Lenard Urena, RN  04/23/21 2022

## 2021-04-24 NOTE — ED NOTES
[facility] NOTIFIED AND ACCEPTED TRANSPORT TO [facility].      Lenard Urena, RN  04/23/21 2021       Lenard Urena, KATHERINE  04/23/21 2054

## 2021-04-26 LAB
QT INTERVAL: 388 MS
QTC INTERVAL: 485 MS

## 2021-05-26 ENCOUNTER — TRANSCRIBE ORDERS (OUTPATIENT)
Dept: ADMINISTRATIVE | Facility: HOSPITAL | Age: 71
End: 2021-05-26

## 2021-05-26 DIAGNOSIS — R00.2 PALPITATIONS: Primary | ICD-10-CM

## 2021-05-28 ENCOUNTER — HOSPITAL ENCOUNTER (OUTPATIENT)
Dept: CARDIOLOGY | Facility: HOSPITAL | Age: 71
Discharge: HOME | End: 2021-05-28
Admitting: NURSE PRACTITIONER

## 2021-05-28 DIAGNOSIS — R00.2 PALPITATIONS: ICD-10-CM

## 2021-05-28 PROCEDURE — 93005 ELECTROCARDIOGRAM TRACING: CPT | Performed by: NURSE PRACTITIONER

## 2021-05-28 PROCEDURE — 93010 ELECTROCARDIOGRAM REPORT: CPT | Performed by: INTERNAL MEDICINE

## 2021-05-31 LAB
QT INTERVAL: 440 MS
QTC INTERVAL: 453 MS

## 2021-06-09 ENCOUNTER — APPOINTMENT (OUTPATIENT)
Dept: GENERAL RADIOLOGY | Facility: HOSPITAL | Age: 71
End: 2021-06-09

## 2021-06-09 ENCOUNTER — HOSPITAL ENCOUNTER (EMERGENCY)
Facility: HOSPITAL | Age: 71
Discharge: HOME | End: 2021-06-09
Attending: EMERGENCY MEDICINE | Admitting: EMERGENCY MEDICINE

## 2021-06-09 ENCOUNTER — APPOINTMENT (OUTPATIENT)
Dept: CT IMAGING | Facility: HOSPITAL | Age: 71
End: 2021-06-09

## 2021-06-09 VITALS
HEIGHT: 63 IN | HEART RATE: 77 BPM | OXYGEN SATURATION: 95 % | DIASTOLIC BLOOD PRESSURE: 86 MMHG | SYSTOLIC BLOOD PRESSURE: 131 MMHG | WEIGHT: 185 LBS | TEMPERATURE: 99.3 F | RESPIRATION RATE: 18 BRPM | BODY MASS INDEX: 32.78 KG/M2

## 2021-06-09 DIAGNOSIS — K76.0 FATTY INFILTRATION OF LIVER: ICD-10-CM

## 2021-06-09 DIAGNOSIS — I10 ESSENTIAL HYPERTENSION: ICD-10-CM

## 2021-06-09 DIAGNOSIS — I70.90 ATHEROSCLEROSIS: ICD-10-CM

## 2021-06-09 DIAGNOSIS — I77.9 AORTIC DISEASE (HCC): ICD-10-CM

## 2021-06-09 DIAGNOSIS — I71.20 THORACIC AORTIC ANEURYSM WITHOUT RUPTURE (HCC): ICD-10-CM

## 2021-06-09 DIAGNOSIS — R07.9 CHEST PAIN, UNSPECIFIED TYPE: Primary | ICD-10-CM

## 2021-06-09 DIAGNOSIS — N28.1 RENAL CYST: ICD-10-CM

## 2021-06-09 DIAGNOSIS — I77.1 SUBCLAVIAN ARTERIAL STENOSIS (HCC): ICD-10-CM

## 2021-06-09 DIAGNOSIS — I25.84 CORONARY ARTERY CALCIFICATION: ICD-10-CM

## 2021-06-09 DIAGNOSIS — I25.10 CORONARY ARTERY CALCIFICATION: ICD-10-CM

## 2021-06-09 LAB
ALBUMIN SERPL-MCNC: 3.4 G/DL (ref 3.5–5.2)
ALBUMIN/GLOB SERPL: 0.8 G/DL
ALP SERPL-CCNC: 145 U/L (ref 39–117)
ALT SERPL W P-5'-P-CCNC: 8 U/L (ref 1–33)
ANION GAP SERPL CALCULATED.3IONS-SCNC: 12 MMOL/L (ref 5–15)
AST SERPL-CCNC: 14 U/L (ref 1–32)
BASOPHILS # BLD AUTO: 0.06 10*3/MM3 (ref 0–0.2)
BASOPHILS NFR BLD AUTO: 0.5 % (ref 0–1.5)
BILIRUB SERPL-MCNC: 0.3 MG/DL (ref 0–1.2)
BUN SERPL-MCNC: 13 MG/DL (ref 8–23)
BUN/CREAT SERPL: 10.7 (ref 7–25)
CALCIUM SPEC-SCNC: 9.5 MG/DL (ref 8.6–10.5)
CHLORIDE SERPL-SCNC: 103 MMOL/L (ref 98–107)
CO2 SERPL-SCNC: 24 MMOL/L (ref 22–29)
CREAT SERPL-MCNC: 1.21 MG/DL (ref 0.57–1)
DEPRECATED RDW RBC AUTO: 59.4 FL (ref 37–54)
EOSINOPHIL # BLD AUTO: 0.39 10*3/MM3 (ref 0–0.4)
EOSINOPHIL NFR BLD AUTO: 3.2 % (ref 0.3–6.2)
ERYTHROCYTE [DISTWIDTH] IN BLOOD BY AUTOMATED COUNT: 17.5 % (ref 12.3–15.4)
GFR SERPL CREATININE-BSD FRML MDRD: 44 ML/MIN/1.73
GLOBULIN UR ELPH-MCNC: 4.2 GM/DL
GLUCOSE SERPL-MCNC: 105 MG/DL (ref 65–99)
HCT VFR BLD AUTO: 39.7 % (ref 34–46.6)
HGB BLD-MCNC: 11.8 G/DL (ref 12–15.9)
HOLD SPECIMEN: NORMAL
HOLD SPECIMEN: NORMAL
IMM GRANULOCYTES # BLD AUTO: 0.05 10*3/MM3 (ref 0–0.05)
IMM GRANULOCYTES NFR BLD AUTO: 0.4 % (ref 0–0.5)
LYMPHOCYTES # BLD AUTO: 2.53 10*3/MM3 (ref 0.7–3.1)
LYMPHOCYTES NFR BLD AUTO: 20.7 % (ref 19.6–45.3)
MCH RBC QN AUTO: 27.5 PG (ref 26.6–33)
MCHC RBC AUTO-ENTMCNC: 29.7 G/DL (ref 31.5–35.7)
MCV RBC AUTO: 92.5 FL (ref 79–97)
MONOCYTES # BLD AUTO: 0.92 10*3/MM3 (ref 0.1–0.9)
MONOCYTES NFR BLD AUTO: 7.5 % (ref 5–12)
NEUTROPHILS NFR BLD AUTO: 67.7 % (ref 42.7–76)
NEUTROPHILS NFR BLD AUTO: 8.29 10*3/MM3 (ref 1.7–7)
NRBC BLD AUTO-RTO: 0 /100 WBC (ref 0–0.2)
PLATELET # BLD AUTO: 442 10*3/MM3 (ref 140–450)
PMV BLD AUTO: 11.1 FL (ref 6–12)
POTASSIUM SERPL-SCNC: 3.9 MMOL/L (ref 3.5–5.2)
PROT SERPL-MCNC: 7.6 G/DL (ref 6–8.5)
RBC # BLD AUTO: 4.29 10*6/MM3 (ref 3.77–5.28)
SODIUM SERPL-SCNC: 139 MMOL/L (ref 136–145)
TROPONIN T SERPL-MCNC: 0.02 NG/ML (ref 0–0.03)
TROPONIN T SERPL-MCNC: 0.03 NG/ML (ref 0–0.03)
WBC # BLD AUTO: 12.24 10*3/MM3 (ref 3.4–10.8)
WHOLE BLOOD HOLD SPECIMEN: NORMAL
WHOLE BLOOD HOLD SPECIMEN: NORMAL

## 2021-06-09 PROCEDURE — 96376 TX/PRO/DX INJ SAME DRUG ADON: CPT

## 2021-06-09 PROCEDURE — 93005 ELECTROCARDIOGRAM TRACING: CPT | Performed by: EMERGENCY MEDICINE

## 2021-06-09 PROCEDURE — 25010000002 IOPAMIDOL 61 % SOLUTION: Performed by: EMERGENCY MEDICINE

## 2021-06-09 PROCEDURE — 84484 ASSAY OF TROPONIN QUANT: CPT | Performed by: EMERGENCY MEDICINE

## 2021-06-09 PROCEDURE — 71045 X-RAY EXAM CHEST 1 VIEW: CPT

## 2021-06-09 PROCEDURE — 99283 EMERGENCY DEPT VISIT LOW MDM: CPT

## 2021-06-09 PROCEDURE — 93010 ELECTROCARDIOGRAM REPORT: CPT | Performed by: INTERNAL MEDICINE

## 2021-06-09 PROCEDURE — 96374 THER/PROPH/DIAG INJ IV PUSH: CPT

## 2021-06-09 PROCEDURE — 71270 CT THORAX DX C-/C+: CPT

## 2021-06-09 PROCEDURE — 85025 COMPLETE CBC W/AUTO DIFF WBC: CPT | Performed by: EMERGENCY MEDICINE

## 2021-06-09 PROCEDURE — 80053 COMPREHEN METABOLIC PANEL: CPT | Performed by: EMERGENCY MEDICINE

## 2021-06-09 RX ORDER — ENALAPRILAT 2.5 MG/2ML
1.25 INJECTION INTRAVENOUS ONCE
Status: COMPLETED | OUTPATIENT
Start: 2021-06-09 | End: 2021-06-09

## 2021-06-09 RX ORDER — SODIUM CHLORIDE 0.9 % (FLUSH) 0.9 %
10 SYRINGE (ML) INJECTION AS NEEDED
Status: DISCONTINUED | OUTPATIENT
Start: 2021-06-09 | End: 2021-06-09 | Stop reason: HOSPADM

## 2021-06-09 RX ADMIN — ENALAPRILAT 1.25 MG: 2.5 INJECTION INTRAVENOUS at 21:17

## 2021-06-09 RX ADMIN — IOPAMIDOL 70 ML: 612 INJECTION, SOLUTION INTRAVENOUS at 17:09

## 2021-06-09 RX ADMIN — ENALAPRILAT 1.25 MG: 2.5 INJECTION INTRAVENOUS at 16:45

## 2021-06-09 NOTE — ED PROVIDER NOTES
Subjective   I month ho chest pain       Chest Pain  Pain location:  Substernal area  Pain quality: aching and sharp    Pain radiates to:  Does not radiate  Onset quality:  Gradual  Timing:  Constant  Progression:  Waxing and waning  Chronicity:  Chronic  Context: not breathing, not drug use, not eating, not intercourse, not lifting, not movement, not raising an arm, not at rest and not stress    Relieved by:  Nothing  Worsened by:  Nothing  Ineffective treatments:  None tried  Associated symptoms: no abdominal pain, no AICD problem, no anorexia, no anxiety, no back pain, no claudication, no cough, no dysphagia, no fatigue, no fever, no headache, no lower extremity edema, no nausea, no orthopnea, no palpitations, no shortness of breath, no syncope and no vomiting    Risk factors: aortic disease and smoking    Risk factors: not male and not pregnant        Review of Systems   Constitutional: Negative.  Negative for fatigue and fever.   HENT: Negative.  Negative for trouble swallowing.    Eyes: Negative.    Respiratory: Negative.  Negative for cough and shortness of breath.    Cardiovascular: Positive for chest pain. Negative for palpitations, orthopnea, claudication and syncope.   Gastrointestinal: Negative.  Negative for abdominal pain, anorexia, nausea and vomiting.   Musculoskeletal: Negative.  Negative for back pain and neck pain.   Skin: Negative.    Neurological: Negative.  Negative for headaches.   All other systems reviewed and are negative.      Past Medical History:   Diagnosis Date   • Arthritis    • Asthma    • Chronic kidney disease (CKD)    • COPD (chronic obstructive pulmonary disease) (CMS/HCC)    • Hypertension        Allergies   Allergen Reactions   • Levaquin [Levofloxacin] Hives   • Naprosyn [Naproxen] Hives   • Sulfa Antibiotics Hives       Past Surgical History:   Procedure Laterality Date   • HYSTERECTOMY     • LOWER LEG SOFT TISSUE TUMOR EXCISION     • LYMPH NODE DISSECTION     • WRIST  FRACTURE SURGERY Right        History reviewed. No pertinent family history.    Social History     Socioeconomic History   • Marital status:      Spouse name: Not on file   • Number of children: Not on file   • Years of education: Not on file   • Highest education level: Not on file           Objective   Physical Exam  Vitals and nursing note reviewed. Exam conducted with a chaperone present.   Constitutional:       General: She is not in acute distress.     Appearance: Normal appearance. She is well-developed. She is not toxic-appearing or diaphoretic.   HENT:      Head: Normocephalic and atraumatic.      Right Ear: External ear normal.      Nose: Nose normal.      Mouth/Throat:      Mouth: Mucous membranes are moist.   Eyes:      Conjunctiva/sclera: Conjunctivae normal.      Pupils: Pupils are equal, round, and reactive to light.   Cardiovascular:      Rate and Rhythm: Normal rate and regular rhythm.      Chest Wall: PMI is not displaced.      Pulses: Normal pulses. No decreased pulses.      Heart sounds: Normal heart sounds. No murmur heard.     Pulmonary:      Effort: Pulmonary effort is normal. No tachypnea, accessory muscle usage or respiratory distress.      Breath sounds: Normal breath sounds. No stridor. No decreased breath sounds, wheezing, rhonchi or rales.   Chest:      Chest wall: No tenderness or crepitus.   Abdominal:      General: Bowel sounds are normal. There is no distension.      Palpations: Abdomen is soft.      Tenderness: There is no abdominal tenderness.   Musculoskeletal:         General: No swelling or tenderness. Normal range of motion.      Cervical back: Normal range of motion and neck supple. No rigidity.      Comments: Lower extremity exam bilaterally is unremarkable.  There is no right or left calf tenderness .  There is no palpable venous cord.  No obvious difference in the size of the legs.  No pitting edema.  The dorsalis pedis and posterior tibial femoral and popliteal  pulses are palpable and +2 bilaterally.  Homans sign is negative   Skin:     General: Skin is warm.      Capillary Refill: Capillary refill takes less than 2 seconds.      Coloration: Skin is not jaundiced.      Findings: No erythema or rash.   Neurological:      General: No focal deficit present.      Mental Status: She is alert and oriented to person, place, and time. Mental status is at baseline.      Cranial Nerves: No cranial nerve deficit.      Motor: No weakness.      Coordination: Coordination normal.      Deep Tendon Reflexes: Reflexes are normal and symmetric. Reflexes normal.   Psychiatric:         Mood and Affect: Mood normal.         Procedures           ED Course  ED Course as of Jun 09 2059 Wed Jun 09, 2021   1552    1.  Large pericardial effusion, predominantly low density.  2.  Extensive atherosclerotic plaque and ulceration in the aortic arch  with extension into the brachiocephalic artery which is ectatic. Arch  branch origins remain patent.  3.  3 cm pseudoaneurysm along the distal descending thoracic aorta.   4.  Large hiatal hernia.    [TS]   2054 . Extensive aortic disease, described fully above. No evidence of acute  dissection. No significant change compared to 8/17/2018. There is also  stenosis involving the proximal left subclavian artery. Coronary artery  calcification is also noted.  2. Small pericardial effusion is decreased in size compared to the prior  study.  3. Moderate to large hiatal hernia.  4. Bilateral renal cysts. Fatty infiltration of the liver. Small splenic  cyst with multiple splenic calcified granulomas.  Case discussed this is her CT scan today    [TS]   2055 This patient was transferred to [place] at that time we decided to medically treat and then discharged home with follow-up.  She is over here with a chest pain which is nonspecific going on for the past 1 month.  I have tried to explain to her that we will need to send her back to [place] for her aortic  abnormalities.  The patient absolutely does not want to do that.  I have offered her that we get admitted to our hospital here for chest pain protocol rule out and get an echo in the morning and then decide where to go.  The patient does not want to do that either she had decided she wants to go home the possibility of increased morbidity and mortality has been explained the patient is awake alert understands I completely and wants to go home and follow-up as scheduled with the CT surgeon and cardiologist that were provided to her through [facility].  She had been strongly encouraged to return the ER for any worsening symptoms    [TS]      ED Course User Index  [TS] Immanuel Bentley MD                                           [facility]    Final diagnoses:   Chest pain, unspecified type   Aortic disease (CMS/HCC)   Subclavian arterial stenosis (CMS/HCC)   Atherosclerosis   Thoracic aortic aneurysm without rupture (CMS/HCC)   Coronary artery calcification   Fatty infiltration of liver   Renal cyst   Essential hypertension       ED Disposition  ED Disposition     ED Disposition Condition Comment    Discharge Stable           No follow-up provider specified.       Medication List      No changes were made to your prescriptions during this visit.          Immanuel Bentley MD  06/09/21 3340

## 2021-06-11 ENCOUNTER — APPOINTMENT (OUTPATIENT)
Dept: GENERAL RADIOLOGY | Facility: HOSPITAL | Age: 71
End: 2021-06-11

## 2021-06-11 ENCOUNTER — APPOINTMENT (OUTPATIENT)
Dept: CARDIOLOGY | Facility: HOSPITAL | Age: 71
End: 2021-06-11

## 2021-06-11 ENCOUNTER — HOSPITAL ENCOUNTER (OUTPATIENT)
Facility: HOSPITAL | Age: 71
Setting detail: OBSERVATION
Discharge: HOME | End: 2021-06-13
Attending: FAMILY MEDICINE | Admitting: EMERGENCY MEDICINE

## 2021-06-11 DIAGNOSIS — R07.9 CHEST PAIN, UNSPECIFIED TYPE: Primary | ICD-10-CM

## 2021-06-11 LAB
ALBUMIN SERPL-MCNC: 3.4 G/DL (ref 3.5–5.2)
ALBUMIN/GLOB SERPL: 0.8 G/DL
ALP SERPL-CCNC: 134 U/L (ref 39–117)
ALT SERPL W P-5'-P-CCNC: 8 U/L (ref 1–33)
ANION GAP SERPL CALCULATED.3IONS-SCNC: 11 MMOL/L (ref 5–15)
AST SERPL-CCNC: 15 U/L (ref 1–32)
BASOPHILS # BLD AUTO: 0.08 10*3/MM3 (ref 0–0.2)
BASOPHILS NFR BLD AUTO: 0.7 % (ref 0–1.5)
BH CV ECHO MEAS - AO MAX PG (FULL): 7.5 MMHG
BH CV ECHO MEAS - AO MAX PG: 16 MMHG
BH CV ECHO MEAS - AO MEAN PG (FULL): 2 MMHG
BH CV ECHO MEAS - AO MEAN PG: 7 MMHG
BH CV ECHO MEAS - AO ROOT AREA (BSA CORRECTED): 2
BH CV ECHO MEAS - AO ROOT AREA: 10.2 CM^2
BH CV ECHO MEAS - AO ROOT DIAM: 3.6 CM
BH CV ECHO MEAS - AO V2 MAX: 200 CM/SEC
BH CV ECHO MEAS - AO V2 MEAN: 117 CM/SEC
BH CV ECHO MEAS - AO V2 VTI: 43.8 CM
BH CV ECHO MEAS - ASC AORTA: 3.5 CM
BH CV ECHO MEAS - AVA(I,A): 3.6 CM^2
BH CV ECHO MEAS - AVA(I,D): 3.6 CM^2
BH CV ECHO MEAS - AVA(V,A): 3.3 CM^2
BH CV ECHO MEAS - AVA(V,D): 3.3 CM^2
BH CV ECHO MEAS - BSA(HAYCOCK): 1.9 M^2
BH CV ECHO MEAS - BSA: 1.8 M^2
BH CV ECHO MEAS - BZI_BMI: 30.8 KILOGRAMS/M^2
BH CV ECHO MEAS - BZI_METRIC_HEIGHT: 160 CM
BH CV ECHO MEAS - BZI_METRIC_WEIGHT: 78.9 KG
BH CV ECHO MEAS - EDV(MOD-SP2): 96.6 ML
BH CV ECHO MEAS - EDV(MOD-SP4): 96.9 ML
BH CV ECHO MEAS - EF(MOD-SP2): 62.6 %
BH CV ECHO MEAS - EF(MOD-SP4): 63.5 %
BH CV ECHO MEAS - ESV(MOD-SP2): 36.1 ML
BH CV ECHO MEAS - ESV(MOD-SP4): 35.4 ML
BH CV ECHO MEAS - LAT PEAK E' VEL: 6.6 CM/SEC
BH CV ECHO MEAS - LV DIASTOLIC VOL/BSA (35-75): 53.2 ML/M^2
BH CV ECHO MEAS - LV MAX PG: 8.5 MMHG
BH CV ECHO MEAS - LV MEAN PG: 5 MMHG
BH CV ECHO MEAS - LV SYSTOLIC VOL/BSA (12-30): 19.4 ML/M^2
BH CV ECHO MEAS - LV V1 MAX: 146 CM/SEC
BH CV ECHO MEAS - LV V1 MEAN: 103 CM/SEC
BH CV ECHO MEAS - LV V1 VTI: 34.6 CM
BH CV ECHO MEAS - LVLD AP2: 8.4 CM
BH CV ECHO MEAS - LVLD AP4: 7.9 CM
BH CV ECHO MEAS - LVLS AP2: 6.1 CM
BH CV ECHO MEAS - LVLS AP4: 6 CM
BH CV ECHO MEAS - LVOT AREA (M): 4.5 CM^2
BH CV ECHO MEAS - LVOT AREA: 4.5 CM^2
BH CV ECHO MEAS - LVOT DIAM: 2.4 CM
BH CV ECHO MEAS - MED PEAK E' VEL: 5.3 CM/SEC
BH CV ECHO MEAS - MV A MAX VEL: 132 CM/SEC
BH CV ECHO MEAS - MV DEC SLOPE: 266.5 CM/SEC^2
BH CV ECHO MEAS - MV DEC TIME: 331 SEC
BH CV ECHO MEAS - MV E MAX VEL: 86.8 CM/SEC
BH CV ECHO MEAS - MV E/A: 0.66
BH CV ECHO MEAS - MV MAX PG: 9.5 MMHG
BH CV ECHO MEAS - MV MEAN PG: 3 MMHG
BH CV ECHO MEAS - MV P1/2T MAX VEL: 84.3 CM/SEC
BH CV ECHO MEAS - MV P1/2T: 92.6 MSEC
BH CV ECHO MEAS - MV V2 MAX: 154 CM/SEC
BH CV ECHO MEAS - MV V2 MEAN: 80.1 CM/SEC
BH CV ECHO MEAS - MV V2 VTI: 37.7 CM
BH CV ECHO MEAS - MVA P1/2T LCG: 2.6 CM^2
BH CV ECHO MEAS - MVA(P1/2T): 2.4 CM^2
BH CV ECHO MEAS - MVA(VTI): 4.2 CM^2
BH CV ECHO MEAS - RAP SYSTOLE: 5 MMHG
BH CV ECHO MEAS - RVSP: 24 MMHG
BH CV ECHO MEAS - SI(AO): 244.6 ML/M^2
BH CV ECHO MEAS - SI(LVOT): 85.9 ML/M^2
BH CV ECHO MEAS - SI(MOD-SP2): 33.2 ML/M^2
BH CV ECHO MEAS - SI(MOD-SP4): 33.7 ML/M^2
BH CV ECHO MEAS - SV(AO): 445.8 ML
BH CV ECHO MEAS - SV(LVOT): 156.5 ML
BH CV ECHO MEAS - SV(MOD-SP2): 60.5 ML
BH CV ECHO MEAS - SV(MOD-SP4): 61.5 ML
BH CV ECHO MEAS - TAPSE (>1.6): 1.8 CM
BH CV ECHO MEAS - TR MAX VEL: 222 CM/SEC
BH CV ECHO MEASUREMENTS AVERAGE E/E' RATIO: 14.59
BH CV XLRA - TDI S': 9.4 CM/SEC
BILIRUB SERPL-MCNC: 0.2 MG/DL (ref 0–1.2)
BUN SERPL-MCNC: 15 MG/DL (ref 8–23)
BUN/CREAT SERPL: 11.7 (ref 7–25)
CALCIUM SPEC-SCNC: 10.1 MG/DL (ref 8.6–10.5)
CHLORIDE SERPL-SCNC: 104 MMOL/L (ref 98–107)
CO2 SERPL-SCNC: 23 MMOL/L (ref 22–29)
CREAT SERPL-MCNC: 1.28 MG/DL (ref 0.57–1)
DEPRECATED RDW RBC AUTO: 58.5 FL (ref 37–54)
EOSINOPHIL # BLD AUTO: 0.33 10*3/MM3 (ref 0–0.4)
EOSINOPHIL NFR BLD AUTO: 3 % (ref 0.3–6.2)
ERYTHROCYTE [DISTWIDTH] IN BLOOD BY AUTOMATED COUNT: 17.3 % (ref 12.3–15.4)
GFR SERPL CREATININE-BSD FRML MDRD: 41 ML/MIN/1.73
GLOBULIN UR ELPH-MCNC: 4.1 GM/DL
GLUCOSE SERPL-MCNC: 93 MG/DL (ref 65–99)
HCT VFR BLD AUTO: 39.8 % (ref 34–46.6)
HGB BLD-MCNC: 12 G/DL (ref 12–15.9)
HOLD SPECIMEN: NORMAL
HOLD SPECIMEN: NORMAL
IMM GRANULOCYTES # BLD AUTO: 0.04 10*3/MM3 (ref 0–0.05)
IMM GRANULOCYTES NFR BLD AUTO: 0.4 % (ref 0–0.5)
LYMPHOCYTES # BLD AUTO: 2.27 10*3/MM3 (ref 0.7–3.1)
LYMPHOCYTES NFR BLD AUTO: 20.5 % (ref 19.6–45.3)
MAXIMAL PREDICTED HEART RATE: 151 BPM
MCH RBC QN AUTO: 27.9 PG (ref 26.6–33)
MCHC RBC AUTO-ENTMCNC: 30.2 G/DL (ref 31.5–35.7)
MCV RBC AUTO: 92.6 FL (ref 79–97)
MONOCYTES # BLD AUTO: 0.88 10*3/MM3 (ref 0.1–0.9)
MONOCYTES NFR BLD AUTO: 7.9 % (ref 5–12)
NEUTROPHILS NFR BLD AUTO: 67.5 % (ref 42.7–76)
NEUTROPHILS NFR BLD AUTO: 7.5 10*3/MM3 (ref 1.7–7)
NRBC BLD AUTO-RTO: 0 /100 WBC (ref 0–0.2)
PLATELET # BLD AUTO: 422 10*3/MM3 (ref 140–450)
PMV BLD AUTO: 11 FL (ref 6–12)
POTASSIUM SERPL-SCNC: 4.1 MMOL/L (ref 3.5–5.2)
PROT SERPL-MCNC: 7.5 G/DL (ref 6–8.5)
QT INTERVAL: 402 MS
QT INTERVAL: 438 MS
QTC INTERVAL: 424 MS
QTC INTERVAL: 475 MS
RBC # BLD AUTO: 4.3 10*6/MM3 (ref 3.77–5.28)
SARS-COV-2 RNA PNL SPEC NAA+PROBE: NOT DETECTED
SODIUM SERPL-SCNC: 138 MMOL/L (ref 136–145)
STRESS TARGET HR: 128 BPM
TROPONIN T SERPL-MCNC: 0.03 NG/ML (ref 0–0.03)
TROPONIN T SERPL-MCNC: 0.03 NG/ML (ref 0–0.03)
WBC # BLD AUTO: 11.1 10*3/MM3 (ref 3.4–10.8)
WHOLE BLOOD HOLD SPECIMEN: NORMAL

## 2021-06-11 PROCEDURE — 71045 X-RAY EXAM CHEST 1 VIEW: CPT

## 2021-06-11 PROCEDURE — 93005 ELECTROCARDIOGRAM TRACING: CPT

## 2021-06-11 PROCEDURE — 93005 ELECTROCARDIOGRAM TRACING: CPT | Performed by: FAMILY MEDICINE

## 2021-06-11 PROCEDURE — 87635 SARS-COV-2 COVID-19 AMP PRB: CPT | Performed by: FAMILY MEDICINE

## 2021-06-11 PROCEDURE — C9803 HOPD COVID-19 SPEC COLLECT: HCPCS

## 2021-06-11 PROCEDURE — G0378 HOSPITAL OBSERVATION PER HR: HCPCS

## 2021-06-11 PROCEDURE — 93010 ELECTROCARDIOGRAM REPORT: CPT | Performed by: EMERGENCY MEDICINE

## 2021-06-11 PROCEDURE — 84484 ASSAY OF TROPONIN QUANT: CPT | Performed by: FAMILY MEDICINE

## 2021-06-11 PROCEDURE — 93306 TTE W/DOPPLER COMPLETE: CPT | Performed by: EMERGENCY MEDICINE

## 2021-06-11 PROCEDURE — 25010000002 MORPHINE PER 10 MG: Performed by: FAMILY MEDICINE

## 2021-06-11 PROCEDURE — 85025 COMPLETE CBC W/AUTO DIFF WBC: CPT | Performed by: FAMILY MEDICINE

## 2021-06-11 PROCEDURE — 96374 THER/PROPH/DIAG INJ IV PUSH: CPT

## 2021-06-11 PROCEDURE — 80053 COMPREHEN METABOLIC PANEL: CPT | Performed by: FAMILY MEDICINE

## 2021-06-11 PROCEDURE — 99284 EMERGENCY DEPT VISIT MOD MDM: CPT

## 2021-06-11 PROCEDURE — 93306 TTE W/DOPPLER COMPLETE: CPT

## 2021-06-11 PROCEDURE — 36415 COLL VENOUS BLD VENIPUNCTURE: CPT

## 2021-06-11 RX ORDER — ATORVASTATIN CALCIUM 40 MG/1
80 TABLET, FILM COATED ORAL ONCE
Status: COMPLETED | OUTPATIENT
Start: 2021-06-12 | End: 2021-06-12

## 2021-06-11 RX ORDER — FOLIC ACID 1 MG/1
1 TABLET ORAL DAILY
COMMUNITY

## 2021-06-11 RX ORDER — CLONIDINE HYDROCHLORIDE 0.1 MG/1
0.1 TABLET ORAL EVERY 6 HOURS PRN
Status: DISCONTINUED | OUTPATIENT
Start: 2021-06-11 | End: 2021-06-13 | Stop reason: HOSPADM

## 2021-06-11 RX ORDER — SODIUM CHLORIDE 0.9 % (FLUSH) 0.9 %
10 SYRINGE (ML) INJECTION AS NEEDED
Status: DISCONTINUED | OUTPATIENT
Start: 2021-06-11 | End: 2021-06-13 | Stop reason: HOSPADM

## 2021-06-11 RX ORDER — DICYCLOMINE HYDROCHLORIDE 10 MG/1
10 CAPSULE ORAL 3 TIMES DAILY PRN
COMMUNITY

## 2021-06-11 RX ORDER — GABAPENTIN 300 MG/1
600 CAPSULE ORAL ONCE
Status: COMPLETED | OUTPATIENT
Start: 2021-06-12 | End: 2021-06-12

## 2021-06-11 RX ORDER — HYDRALAZINE HYDROCHLORIDE 20 MG/ML
10 INJECTION INTRAMUSCULAR; INTRAVENOUS ONCE
Status: DISCONTINUED | OUTPATIENT
Start: 2021-06-11 | End: 2021-06-13 | Stop reason: HOSPADM

## 2021-06-11 RX ORDER — LISINOPRIL 5 MG/1
5 TABLET ORAL DAILY
Status: ON HOLD | COMMUNITY
End: 2022-05-05

## 2021-06-11 RX ORDER — LABETALOL HYDROCHLORIDE 5 MG/ML
10 INJECTION, SOLUTION INTRAVENOUS ONCE
Status: DISCONTINUED | OUTPATIENT
Start: 2021-06-11 | End: 2021-06-11

## 2021-06-11 RX ORDER — METOPROLOL TARTRATE 50 MG/1
50 TABLET, FILM COATED ORAL DAILY
Status: ON HOLD | COMMUNITY
End: 2021-06-12

## 2021-06-11 RX ORDER — ATORVASTATIN CALCIUM 80 MG/1
80 TABLET, FILM COATED ORAL DAILY
COMMUNITY

## 2021-06-11 RX ORDER — NITROGLYCERIN 0.4 MG/1
0.4 TABLET SUBLINGUAL
Status: DISCONTINUED | OUTPATIENT
Start: 2021-06-11 | End: 2021-06-13 | Stop reason: HOSPADM

## 2021-06-11 RX ORDER — FAMOTIDINE 20 MG/1
20 TABLET, FILM COATED ORAL NIGHTLY PRN
COMMUNITY

## 2021-06-11 RX ADMIN — MORPHINE SULFATE 4 MG: 4 INJECTION, SOLUTION INTRAMUSCULAR; INTRAVENOUS at 21:02

## 2021-06-11 NOTE — ED TRIAGE NOTES
PATIENT PRESENTS WITH COMPLAINTS OF HYPERTENSION AND CHEST PAIN. PATIENT REPORTS SHE WAS SEEN HERE ON Wednesday FOR THIS SAME COMPLAINT. PATIENT WAS DISCHARGED AFTER BEING OFFERED POSSIBLE ADMISSION BUT REQUESTED TO GO HOME. PATIENT REPORTS SHE WAS SEEN BY HOME HEALTH TODAY AND WHEN THEY TOOK HER BP THEY TOLD HER SHE NEEDED TO THE ER. PATIENT REPORTED SHE DECIDED TO GO TO DR KRAMER INSTEAD. PATIENT WAS TOLD BY DR KRAMER TO COME TO ER FOR FURTHER EVALUATION. PATIENT REPORTS HER CHEST PAIN IS ACTUALLY BETTER THAN WHEN SHE WAS HERE ON Wednesday BUT HER BLOOD PRESSURE IS BACK UP. CHEST PAIN IS LOCATED UNDER HER LEFT BREAST THAT IS DESCRIBED AS ACHING AND NON RADIATING. PATIENT REPORTS TAKING TWO 81 MG ASA PTA BUT DENIES NITRO OR ED MEDICATIONS.

## 2021-06-12 ENCOUNTER — APPOINTMENT (OUTPATIENT)
Dept: CARDIOLOGY | Facility: HOSPITAL | Age: 71
End: 2021-06-12

## 2021-06-12 LAB
ANION GAP SERPL CALCULATED.3IONS-SCNC: 10 MMOL/L (ref 5–15)
BH CV REST NUCLEAR ISOTOPE DOSE: 10.4 MCI
BH CV STRESS BP STAGE 1: NORMAL
BH CV STRESS COMMENTS STAGE 1: NORMAL
BH CV STRESS DOSE REGADENOSON STAGE 1: 0.4
BH CV STRESS DURATION MIN STAGE 1: 0
BH CV STRESS DURATION SEC STAGE 1: 10
BH CV STRESS HR STAGE 1: 81
BH CV STRESS NUCLEAR ISOTOPE DOSE: 33.9 MCI
BH CV STRESS PROTOCOL 1: NORMAL
BH CV STRESS RECOVERY BP: NORMAL MMHG
BH CV STRESS RECOVERY HR: 75 BPM
BH CV STRESS STAGE 1: 1
BUN SERPL-MCNC: 15 MG/DL (ref 8–23)
BUN/CREAT SERPL: 13.8 (ref 7–25)
CALCIUM SPEC-SCNC: 9.2 MG/DL (ref 8.6–10.5)
CHLORIDE SERPL-SCNC: 104 MMOL/L (ref 98–107)
CO2 SERPL-SCNC: 23 MMOL/L (ref 22–29)
CREAT SERPL-MCNC: 1.09 MG/DL (ref 0.57–1)
DEPRECATED RDW RBC AUTO: 58.8 FL (ref 37–54)
ERYTHROCYTE [DISTWIDTH] IN BLOOD BY AUTOMATED COUNT: 17.3 % (ref 12.3–15.4)
GFR SERPL CREATININE-BSD FRML MDRD: 50 ML/MIN/1.73
GLUCOSE SERPL-MCNC: 86 MG/DL (ref 65–99)
HCT VFR BLD AUTO: 37.4 % (ref 34–46.6)
HGB BLD-MCNC: 11.3 G/DL (ref 12–15.9)
MAXIMAL PREDICTED HEART RATE: 151 BPM
MCH RBC QN AUTO: 27.8 PG (ref 26.6–33)
MCHC RBC AUTO-ENTMCNC: 30.2 G/DL (ref 31.5–35.7)
MCV RBC AUTO: 92.1 FL (ref 79–97)
PERCENT MAX PREDICTED HR: 53.64 %
PLATELET # BLD AUTO: 410 10*3/MM3 (ref 140–450)
PMV BLD AUTO: 11.4 FL (ref 6–12)
POTASSIUM SERPL-SCNC: 3.7 MMOL/L (ref 3.5–5.2)
QT INTERVAL: 434 MS
QT INTERVAL: 452 MS
QTC INTERVAL: 461 MS
QTC INTERVAL: 473 MS
RBC # BLD AUTO: 4.06 10*6/MM3 (ref 3.77–5.28)
SODIUM SERPL-SCNC: 137 MMOL/L (ref 136–145)
STRESS BASELINE BP: NORMAL MMHG
STRESS BASELINE HR: 69 BPM
STRESS PERCENT HR: 63 %
STRESS POST PEAK BP: NORMAL MMHG
STRESS POST PEAK HR: 81 BPM
STRESS TARGET HR: 128 BPM
TROPONIN T SERPL-MCNC: 0.03 NG/ML (ref 0–0.03)
WBC # BLD AUTO: 11.05 10*3/MM3 (ref 3.4–10.8)

## 2021-06-12 PROCEDURE — 93017 CV STRESS TEST TRACING ONLY: CPT

## 2021-06-12 PROCEDURE — 0 TECHNETIUM SESTAMIBI: Performed by: EMERGENCY MEDICINE

## 2021-06-12 PROCEDURE — 99220 PR INITIAL OBSERVATION CARE/DAY 70 MINUTES: CPT | Performed by: PHYSICIAN ASSISTANT

## 2021-06-12 PROCEDURE — G0378 HOSPITAL OBSERVATION PER HR: HCPCS

## 2021-06-12 PROCEDURE — 93010 ELECTROCARDIOGRAM REPORT: CPT | Performed by: INTERNAL MEDICINE

## 2021-06-12 PROCEDURE — 25010000002 ENOXAPARIN PER 10 MG: Performed by: PHYSICIAN ASSISTANT

## 2021-06-12 PROCEDURE — 25010000002 REGADENOSON 0.4 MG/5ML SOLUTION: Performed by: EMERGENCY MEDICINE

## 2021-06-12 PROCEDURE — 96376 TX/PRO/DX INJ SAME DRUG ADON: CPT

## 2021-06-12 PROCEDURE — 93018 CV STRESS TEST I&R ONLY: CPT | Performed by: EMERGENCY MEDICINE

## 2021-06-12 PROCEDURE — 93005 ELECTROCARDIOGRAM TRACING: CPT | Performed by: EMERGENCY MEDICINE

## 2021-06-12 PROCEDURE — 80048 BASIC METABOLIC PNL TOTAL CA: CPT | Performed by: EMERGENCY MEDICINE

## 2021-06-12 PROCEDURE — 78452 HT MUSCLE IMAGE SPECT MULT: CPT

## 2021-06-12 PROCEDURE — 96372 THER/PROPH/DIAG INJ SC/IM: CPT

## 2021-06-12 PROCEDURE — 84484 ASSAY OF TROPONIN QUANT: CPT | Performed by: EMERGENCY MEDICINE

## 2021-06-12 PROCEDURE — 25010000002 MORPHINE SULFATE (PF) 2 MG/ML SOLUTION: Performed by: PHYSICIAN ASSISTANT

## 2021-06-12 PROCEDURE — A9500 TC99M SESTAMIBI: HCPCS | Performed by: EMERGENCY MEDICINE

## 2021-06-12 PROCEDURE — 78452 HT MUSCLE IMAGE SPECT MULT: CPT | Performed by: EMERGENCY MEDICINE

## 2021-06-12 PROCEDURE — 85027 COMPLETE CBC AUTOMATED: CPT | Performed by: EMERGENCY MEDICINE

## 2021-06-12 RX ORDER — ACETAMINOPHEN 325 MG/1
650 TABLET ORAL EVERY 6 HOURS PRN
Status: DISCONTINUED | OUTPATIENT
Start: 2021-06-12 | End: 2021-06-13 | Stop reason: HOSPADM

## 2021-06-12 RX ORDER — GABAPENTIN 300 MG/1
300 CAPSULE ORAL 2 TIMES DAILY
Status: DISCONTINUED | OUTPATIENT
Start: 2021-06-12 | End: 2021-06-12

## 2021-06-12 RX ORDER — ALPRAZOLAM 0.5 MG/1
0.5 TABLET ORAL 3 TIMES DAILY PRN
Status: DISCONTINUED | OUTPATIENT
Start: 2021-06-12 | End: 2021-06-13 | Stop reason: HOSPADM

## 2021-06-12 RX ORDER — DICYCLOMINE HYDROCHLORIDE 10 MG/1
10 CAPSULE ORAL 3 TIMES DAILY PRN
Status: DISCONTINUED | OUTPATIENT
Start: 2021-06-12 | End: 2021-06-13 | Stop reason: HOSPADM

## 2021-06-12 RX ORDER — METOPROLOL SUCCINATE 25 MG/1
25 TABLET, EXTENDED RELEASE ORAL DAILY
Status: DISCONTINUED | OUTPATIENT
Start: 2021-06-12 | End: 2021-06-13 | Stop reason: HOSPADM

## 2021-06-12 RX ORDER — ASPIRIN 81 MG/1
81 TABLET, CHEWABLE ORAL DAILY
Status: DISCONTINUED | OUTPATIENT
Start: 2021-06-12 | End: 2021-06-13 | Stop reason: HOSPADM

## 2021-06-12 RX ORDER — LISINOPRIL 5 MG/1
5 TABLET ORAL DAILY
Status: DISCONTINUED | OUTPATIENT
Start: 2021-06-12 | End: 2021-06-13 | Stop reason: HOSPADM

## 2021-06-12 RX ORDER — LORAZEPAM 2 MG/ML
1 INJECTION INTRAMUSCULAR ONCE AS NEEDED
Status: COMPLETED | OUTPATIENT
Start: 2021-06-12 | End: 2021-06-13

## 2021-06-12 RX ORDER — FOLIC ACID 1 MG/1
1 TABLET ORAL DAILY
Status: DISCONTINUED | OUTPATIENT
Start: 2021-06-12 | End: 2021-06-13 | Stop reason: HOSPADM

## 2021-06-12 RX ORDER — FAMOTIDINE 20 MG/1
20 TABLET, FILM COATED ORAL DAILY
Status: DISCONTINUED | OUTPATIENT
Start: 2021-06-12 | End: 2021-06-13 | Stop reason: HOSPADM

## 2021-06-12 RX ORDER — CYCLOBENZAPRINE HCL 10 MG
10 TABLET ORAL 3 TIMES DAILY PRN
Status: DISCONTINUED | OUTPATIENT
Start: 2021-06-12 | End: 2021-06-13 | Stop reason: HOSPADM

## 2021-06-12 RX ORDER — OXYCODONE AND ACETAMINOPHEN 7.5; 325 MG/1; MG/1
1 TABLET ORAL EVERY 4 HOURS PRN
Status: DISCONTINUED | OUTPATIENT
Start: 2021-06-12 | End: 2021-06-13 | Stop reason: HOSPADM

## 2021-06-12 RX ORDER — CALCIUM CARBONATE 200(500)MG
1 TABLET,CHEWABLE ORAL DAILY
Status: DISCONTINUED | OUTPATIENT
Start: 2021-06-12 | End: 2021-06-13 | Stop reason: HOSPADM

## 2021-06-12 RX ORDER — MORPHINE SULFATE 2 MG/ML
1 INJECTION, SOLUTION INTRAMUSCULAR; INTRAVENOUS ONCE
Status: COMPLETED | OUTPATIENT
Start: 2021-06-12 | End: 2021-06-12

## 2021-06-12 RX ORDER — GABAPENTIN 300 MG/1
600 CAPSULE ORAL 2 TIMES DAILY
Status: DISCONTINUED | OUTPATIENT
Start: 2021-06-12 | End: 2021-06-13 | Stop reason: HOSPADM

## 2021-06-12 RX ORDER — ATORVASTATIN CALCIUM 40 MG/1
80 TABLET, FILM COATED ORAL NIGHTLY
Status: DISCONTINUED | OUTPATIENT
Start: 2021-06-12 | End: 2021-06-13 | Stop reason: HOSPADM

## 2021-06-12 RX ADMIN — ASPIRIN 81 MG: 81 TABLET, CHEWABLE ORAL at 11:18

## 2021-06-12 RX ADMIN — METOPROLOL SUCCINATE 25 MG: 25 TABLET, EXTENDED RELEASE ORAL at 11:18

## 2021-06-12 RX ADMIN — OXYCODONE HYDROCHLORIDE AND ACETAMINOPHEN 1 TABLET: 7.5; 325 TABLET ORAL at 20:04

## 2021-06-12 RX ADMIN — ACETAMINOPHEN 650 MG: 325 TABLET, FILM COATED ORAL at 01:25

## 2021-06-12 RX ADMIN — OXYCODONE HYDROCHLORIDE AND ACETAMINOPHEN 1 TABLET: 7.5; 325 TABLET ORAL at 15:17

## 2021-06-12 RX ADMIN — TECHNETIUM TC 99M SESTAMIBI 1 DOSE: 1 INJECTION INTRAVENOUS at 01:15

## 2021-06-12 RX ADMIN — ENOXAPARIN SODIUM 40 MG: 40 INJECTION SUBCUTANEOUS at 11:18

## 2021-06-12 RX ADMIN — ANTACID TABLETS 1 TABLET: 500 TABLET, CHEWABLE ORAL at 11:18

## 2021-06-12 RX ADMIN — CYCLOBENZAPRINE 10 MG: 10 TABLET, FILM COATED ORAL at 11:18

## 2021-06-12 RX ADMIN — MORPHINE SULFATE 1 MG: 2 INJECTION, SOLUTION INTRAMUSCULAR; INTRAVENOUS at 09:33

## 2021-06-12 RX ADMIN — FAMOTIDINE 20 MG: 20 TABLET, FILM COATED ORAL at 11:18

## 2021-06-12 RX ADMIN — ATORVASTATIN CALCIUM 80 MG: 40 TABLET, FILM COATED ORAL at 00:12

## 2021-06-12 RX ADMIN — GABAPENTIN 600 MG: 300 CAPSULE ORAL at 00:12

## 2021-06-12 RX ADMIN — ATORVASTATIN CALCIUM 80 MG: 40 TABLET, FILM COATED ORAL at 21:06

## 2021-06-12 RX ADMIN — FOLIC ACID 1 MG: 1 TABLET ORAL at 11:18

## 2021-06-12 RX ADMIN — OXYCODONE HYDROCHLORIDE AND ACETAMINOPHEN 1 TABLET: 7.5; 325 TABLET ORAL at 11:18

## 2021-06-12 RX ADMIN — TECHNETIUM TC 99M SESTAMIBI 1 DOSE: 1 INJECTION INTRAVENOUS at 11:35

## 2021-06-12 RX ADMIN — LISINOPRIL 5 MG: 5 TABLET ORAL at 11:18

## 2021-06-12 RX ADMIN — REGADENOSON 0.4 MG: 0.08 INJECTION, SOLUTION INTRAVENOUS at 13:18

## 2021-06-12 NOTE — H&P
[facility], [facility] History and Physical      Patient Care Team:  Den James DO as PCP - General  Den James DO as PCP - Family Medicine    Chief complaint CP    Subjective     Patient is a 71 y.o. female without previous heart hx.  She has been experiencing atypical CP since April.  She was originally seen while on vacation in [place].  She was found to have a thoracic aortic aneurysm.  She continued to have pain and was seen by here PCP here and was ultimately transferred to [facility] where she was seen by multiple specialties and had testing.  The details are unclear to me, and I do not have records to review at this time.  She presents to ER last night bc her BP was elevated.  There were no other symptoms then; however, she is now having CP that is somewhat both reproducible and relieved when pressure is applied under left breast.  She denies any radiation of the pain. She denies exertional pain and trauma or injury to left chest.  She denies SOB.  Of note, she had recent CT of her chest with multiple calcifications peripherally and in in coronary arteries.    Review of Systems  A 10-point review of systems is obtained and negative except for otherwise mentioned above.    History  Past Medical History:   Diagnosis Date   • Arthritis    • Asthma    • Chronic kidney disease (CKD)    • COPD (chronic obstructive pulmonary disease) (CMS/[facility])    • Heart murmur    • Hypertension      Past Surgical History:   Procedure Laterality Date   • BACK SURGERY     • EYE SURGERY Bilateral    • FOOT FASCIOTOMY Bilateral    • HYSTERECTOMY     • LOWER LEG SOFT TISSUE TUMOR EXCISION     • LUMBAR NERVE STIMLATOR INSERTION Right    • LYMPH NODE DISSECTION     • WRIST FRACTURE SURGERY Right     x2     Family History   Problem Relation Age of Onset   • Hypertension Mother      Social History     Tobacco Use   • Smoking status: Former Smoker     Quit date:      Years since quittin.4   •  Smokeless tobacco: Never Used   Vaping Use   • Vaping Use: Never used   Substance Use Topics   • Alcohol use: Never   • Drug use: Never     Medications Prior to Admission   Medication Sig Dispense Refill Last Dose   • ALPRAZolam (XANAX) 0.5 MG tablet Take 0.5 mg by mouth 3 (Three) Times a Day As Needed for Anxiety.   Past Month at Unknown time   • aspirin 81 MG chewable tablet Chew 81 mg Daily.   6/11/2021 at Unknown time   • atorvastatin (LIPITOR) 80 MG tablet Take 80 mg by mouth Daily.   6/10/2021 at Unknown time   • calcium carbonate (TUMS) 500 MG chewable tablet Chew 1 tablet Daily.   Past Month at Unknown time   • cyclobenzaprine (FLEXERIL) 10 MG tablet Take 10 mg by mouth 3 (Three) Times a Day As Needed for Muscle Spasms.   Past Month at Unknown time   • dicyclomine (BENTYL) 10 MG capsule Take 10 mg by mouth 3 (Three) Times a Day As Needed.   Past Month at Unknown time   • famotidine (PEPCID) 20 MG tablet Take 20 mg by mouth Daily.   6/11/2021 at Unknown time   • folic acid (FOLVITE) 1 MG tablet Take 1 mg by mouth Daily.   6/11/2021 at Unknown time   • gabapentin (NEURONTIN) 600 MG tablet Take 600 mg by mouth 3 (Three) Times a Day.   6/10/2021 at Unknown time   • lisinopril (PRINIVIL,ZESTRIL) 5 MG tablet Take 5 mg by mouth Daily.   6/11/2021 at Unknown time   • metoprolol succinate XL (TOPROL-XL) 25 MG 24 hr tablet Take 25 mg by mouth Daily.   6/11/2021 at Unknown time   • oxyCODONE-acetaminophen (PERCOCET) 7.5-325 MG per tablet Take 1 tablet by mouth Every 4 (Four) Hours As Needed.   Past Week at Unknown time     Allergies:  Levaquin [levofloxacin], Naprosyn [naproxen], and Sulfa antibiotics    Objective     Vital Signs  Temp:  [97.8 °F (36.6 °C)-98.8 °F (37.1 °C)] 98.2 °F (36.8 °C)  Heart Rate:  [50-77] 64  Resp:  [18] 18  BP: ()/(45-83) 93/45    Physical Exam:     General Appearance:    Alert, cooperative.  She is in apparent pain.   Head:    Normocephalic, without obvious abnormality, atraumatic    Eyes:            Lids and lashes normal, conjunctivae and sclerae normal, no   icterus, PERRLA, EOMI   Throat:   Oral mucosa pink and moist   Neck:   No adenopathy, supple, trachea midline, no thyromegaly, no   carotid bruit, no JVD   Lungs:     Clear to auscultation bilaterally,respirations regular, even     and unlabored    Heart:    Regular rhythm and normal rate, normal S1 and S2, no            murmur, no gallop, no rub, no click   Chest Wall:    No abnormalities observed   Abdomen:     Normal bowel sounds present in all four quadrants, no       masses, no organomegaly, soft non-tender, non-distended    Extremities:   No edema, no cyanosis, no clubbing   Pulses:   Pulses palpable and equal bilaterally   Skin:   No bleeding, bruising or rash   Psychiatric:   Displays appropriate mood and affect     Results Review:    I reviewed the patient's new clinical results.    Results from last 7 days   Lab Units 06/12/21  0348   WBC 10*3/mm3 11.05*   HEMOGLOBIN g/dL 11.3*   HEMATOCRIT % 37.4   PLATELETS 10*3/mm3 410     Results from last 7 days   Lab Units 06/12/21  0348   SODIUM mmol/L 137   POTASSIUM mmol/L 3.7   CHLORIDE mmol/L 104   CO2 mmol/L 23.0   BUN mg/dL 15   CREATININE mg/dL 1.09*   GLUCOSE mg/dL 86   CALCIUM mg/dL 9.2     Results from last 7 days   Lab Units 06/12/21  0348 06/11/21  1732   SODIUM mmol/L 137 138   POTASSIUM mmol/L 3.7 4.1   CHLORIDE mmol/L 104 104   CO2 mmol/L 23.0 23.0   BUN mg/dL 15 15   CREATININE mg/dL 1.09* 1.28*   CALCIUM mg/dL 9.2 10.1   BILIRUBIN mg/dL  --  0.2   ALK PHOS U/L  --  134*   ALT (SGPT) U/L  --  8   AST (SGOT) U/L  --  15   GLUCOSE mg/dL 86 93     Results from last 7 days   Lab Units 06/12/21  0348 06/11/21  1927 06/11/21  1732   TROPONIN T ng/mL 0.028 0.028 0.032*     Assessment/Plan       Chest pain  HTN  Minimally elevated troponin  HL  COPD  Former tobacco    Patient has atypical CP in the setting of elevated BP and slight troponin elevation.  However, she has multiple  calcifications peripherally and in her coronary arteries noted on CT of her chest and has multiple risk factors for CAD.  Because of this, I will check a Lexiscan.  It seems, however, she may have something else causing her CP.  I have requested records from [place] and will review those.  There were no rib fractures on CXR and nothing notable on physical exam.  She will continue her home meds.  Will will provide medicine for pain control.  Further recommendations to follow from Dr. Dunaway.    I discussed the patients findings and my recommendations with patient, family and nursing staff.     Tasia Brennan PA-C  06/12/21  10:46 CDT

## 2021-06-12 NOTE — ED PROVIDER NOTES
Subjective   Ms. Franco is a 71-year-old female without a specific history of coronary artery disease but with a history of abnormal aorta with a transfer to [place] back in April for possible surgery to the aorta.  At that time she also had a moderate pericardial effusion.  In fact no surgery was done and she was simply observed.  She was here just a few days ago and again it was advised that she be transferred to [place] but she declined.  She presents now with left-sided lower anterior chest wall pain.  She describes a pain that is worse with palpation and relieved by putting pressure on with her hand.  The pain has been present for 5 days          Review of Systems   Cardiovascular: Positive for chest pain.   All other systems reviewed and are negative.      Past Medical History:   Diagnosis Date   • Arthritis    • Asthma    • Chronic kidney disease (CKD)    • COPD (chronic obstructive pulmonary disease) (CMS/[facility])    • Hypertension        Allergies   Allergen Reactions   • Levaquin [Levofloxacin] Hives   • Naprosyn [Naproxen] Hives   • Sulfa Antibiotics Hives       Past Surgical History:   Procedure Laterality Date   • HYSTERECTOMY     • LOWER LEG SOFT TISSUE TUMOR EXCISION     • LYMPH NODE DISSECTION     • WRIST FRACTURE SURGERY Right        History reviewed. No pertinent family history.    Social History     Socioeconomic History   • Marital status:      Spouse name: Not on file   • Number of children: Not on file   • Years of education: Not on file   • Highest education level: Not on file           Objective   Physical Exam  Vitals and nursing note reviewed.   Constitutional:       Appearance: She is well-developed.   HENT:      Head: Normocephalic and atraumatic.      Right Ear: External ear normal.      Left Ear: External ear normal.      Nose: Nose normal.      Mouth/Throat:      Mouth: Mucous membranes are moist.      Pharynx: Oropharynx is clear.   Eyes:      Conjunctiva/sclera: Conjunctivae  normal.   Cardiovascular:      Rate and Rhythm: Normal rate and regular rhythm.      Heart sounds: Normal heart sounds.   Pulmonary:      Effort: Pulmonary effort is normal.      Breath sounds: Normal breath sounds.   Abdominal:      General: Bowel sounds are normal.      Palpations: Abdomen is soft.   Musculoskeletal:         General: Normal range of motion.      Cervical back: Normal range of motion and neck supple.   Skin:     General: Skin is warm and dry.      Capillary Refill: Capillary refill takes less than 2 seconds.   Neurological:      Mental Status: She is alert and oriented to person, place, and time.   Psychiatric:         Behavior: Behavior normal.         Thought Content: Thought content normal.         Judgment: Judgment normal.         Procedures           ED Course                                         HEART Score (for prediction of 6-week risk of major adverse cardiac event) reviewed and/or performed as part of the patient evaluation and treatment planning process.  The result associated with this review/performance is: 4       MDM  Number of Diagnoses or Management Options     Amount and/or Complexity of Data Reviewed  Clinical lab tests: ordered and reviewed  Tests in the radiology section of CPT®: ordered and reviewed  Decide to obtain previous medical records or to obtain history from someone other than the patient: yes    Patient Progress  Patient progress: stable      Final diagnoses:   Chest pain, unspecified type       ED Disposition  ED Disposition     ED Disposition Condition Comment    Decision to Admit  Level of Care: Telemetry [5]   Diagnosis: Chest pain, unspecified type [[id number]]   Admitting Physician: DANY DUNAWAY [[id number]]   Attending Physician: DANY DUNAWAY [[id number]]            No follow-up provider specified.       Medication List      No changes were made to your prescriptions during this visit.       Discussed the case with Dr. Dunaway and although  the pain is quite atypical she does have a high heart score and a marginally positive troponin here in the ED.  We agreed that it would be safer to admit her overnight, continue to trend her troponins and be evaluated directly by cardiology.  The patient is agreeable to this and is currently stable in the ED     Thom Lomeli MD  06/11/21 3354

## 2021-06-12 NOTE — PLAN OF CARE
Goal Outcome Evaluation:              Outcome Summary: RD nutrition assessment completed.  Pt's recorded po intake is 75% of 1 snack thus far.  Will offer supplements and follow for acceptance as well as further d/c needs

## 2021-06-12 NOTE — PLAN OF CARE
Goal Outcome Evaluation:  Plan of Care Reviewed With: patient        Progress: no change  Outcome Summary: Patient to be evaluated by cardiology this am.  patient c/o pain, relief with tylenol.  Chest pain left, under breast, intermittent x days.  Patient has NERVE STIMULATOR implanted to right mid back for back pain.  Patient has hx of aortic aneuysm per patient reporting.  Echo WNL.  Blood pressure elevated on arrival floor, but improved once patient more settled.  BP meds available prn if BP elevated.

## 2021-06-13 ENCOUNTER — APPOINTMENT (OUTPATIENT)
Dept: MRI IMAGING | Facility: HOSPITAL | Age: 71
End: 2021-06-13

## 2021-06-13 VITALS
BODY MASS INDEX: 31.08 KG/M2 | TEMPERATURE: 97.3 F | DIASTOLIC BLOOD PRESSURE: 51 MMHG | HEART RATE: 71 BPM | OXYGEN SATURATION: 94 % | RESPIRATION RATE: 16 BRPM | WEIGHT: 175.4 LBS | HEIGHT: 63 IN | SYSTOLIC BLOOD PRESSURE: 109 MMHG

## 2021-06-13 PROCEDURE — A9577 INJ MULTIHANCE: HCPCS | Performed by: EMERGENCY MEDICINE

## 2021-06-13 PROCEDURE — 25010000002 ENOXAPARIN PER 10 MG: Performed by: PHYSICIAN ASSISTANT

## 2021-06-13 PROCEDURE — 25010000002 LORAZEPAM PER 2 MG: Performed by: EMERGENCY MEDICINE

## 2021-06-13 PROCEDURE — 70553 MRI BRAIN STEM W/O & W/DYE: CPT

## 2021-06-13 PROCEDURE — 0 GADOBENATE DIMEGLUMINE 529 MG/ML SOLUTION: Performed by: EMERGENCY MEDICINE

## 2021-06-13 PROCEDURE — 99217 PR OBSERVATION CARE DISCHARGE MANAGEMENT: CPT | Performed by: EMERGENCY MEDICINE

## 2021-06-13 PROCEDURE — G0378 HOSPITAL OBSERVATION PER HR: HCPCS

## 2021-06-13 PROCEDURE — 96372 THER/PROPH/DIAG INJ SC/IM: CPT

## 2021-06-13 RX ORDER — CLONIDINE HYDROCHLORIDE 0.1 MG/1
0.1 TABLET ORAL EVERY 6 HOURS PRN
Qty: 30 TABLET | Refills: 1 | Status: SHIPPED | OUTPATIENT
Start: 2021-06-13 | End: 2022-05-07 | Stop reason: HOSPADM

## 2021-06-13 RX ADMIN — ENOXAPARIN SODIUM 40 MG: 40 INJECTION SUBCUTANEOUS at 12:26

## 2021-06-13 RX ADMIN — LORAZEPAM 1 MG: 2 INJECTION INTRAMUSCULAR; INTRAVENOUS at 15:58

## 2021-06-13 RX ADMIN — GABAPENTIN 600 MG: 300 CAPSULE ORAL at 08:09

## 2021-06-13 RX ADMIN — OXYCODONE HYDROCHLORIDE AND ACETAMINOPHEN 1 TABLET: 7.5; 325 TABLET ORAL at 18:05

## 2021-06-13 RX ADMIN — GABAPENTIN 600 MG: 300 CAPSULE ORAL at 00:34

## 2021-06-13 RX ADMIN — ANTACID TABLETS 1 TABLET: 500 TABLET, CHEWABLE ORAL at 08:10

## 2021-06-13 RX ADMIN — FAMOTIDINE 20 MG: 20 TABLET, FILM COATED ORAL at 08:09

## 2021-06-13 RX ADMIN — OXYCODONE HYDROCHLORIDE AND ACETAMINOPHEN 1 TABLET: 7.5; 325 TABLET ORAL at 00:34

## 2021-06-13 RX ADMIN — OXYCODONE HYDROCHLORIDE AND ACETAMINOPHEN 1 TABLET: 7.5; 325 TABLET ORAL at 08:12

## 2021-06-13 RX ADMIN — FOLIC ACID 1 MG: 1 TABLET ORAL at 08:09

## 2021-06-13 RX ADMIN — LISINOPRIL 5 MG: 5 TABLET ORAL at 08:09

## 2021-06-13 RX ADMIN — ASPIRIN 81 MG: 81 TABLET, CHEWABLE ORAL at 08:09

## 2021-06-13 RX ADMIN — OXYCODONE HYDROCHLORIDE AND ACETAMINOPHEN 1 TABLET: 7.5; 325 TABLET ORAL at 13:03

## 2021-06-13 RX ADMIN — METOPROLOL SUCCINATE 25 MG: 25 TABLET, EXTENDED RELEASE ORAL at 08:09

## 2021-06-13 RX ADMIN — GADOBENATE DIMEGLUMINE 15 ML: 529 INJECTION, SOLUTION INTRAVENOUS at 16:38

## 2021-06-13 NOTE — PLAN OF CARE
Goal Outcome Evaluation:  Plan of Care Reviewed With: patient        Progress: improving  Outcome Summary: Patient to have MRI head today.  No falls noted.  Patient walking better to BR.  Up with asst x 1.  C/o pain covered with prn pain medication.  Good O2 sat overnight. Elevated BP, not up to 170 systolic, no treatment.  BP much improved by 0400.

## 2021-06-13 NOTE — PROGRESS NOTES
"[facility], [facility] Progress Note     LOS: 0 days   Patient Care Team:  Den James DO as PCP - General  Den James DO as PCP - Family Medicine    Chief Complaint:  CP    Subjective   No further CP.  Her back hurts which is chronic.    Review of Systems:   A 10-point review of systems is obtained and negative except for otherwise mentioned above.    Objective     Vital Sign Min/Max for last 24 hours  Temp  Min: 97.3 °F (36.3 °C)  Max: 98.2 °F (36.8 °C)   BP  Min: 104/53  Max: 166/73   Pulse  Min: 71  Max: 81   Resp  Min: 16  Max: 18   SpO2  Min: 93 %  Max: 95 %   No data recorded   Weight  Min: 79.6 kg (175 lb 6.4 oz)  Max: 79.6 kg (175 lb 6.4 oz)     Flowsheet Rows      First Filed Value   Admission Height  160 cm (63\") Documented at 06/11/2021 1549   Admission Weight  78.9 kg (174 lb) Documented at 06/11/2021 1549          Physical Exam:   General Appearance: NAD  Lungs: clear  Heart: RRR     Results Review:     I reviewed the patient's new clinical results.    Results from last 7 days   Lab Units 06/12/21  0348   WBC 10*3/mm3 11.05*   HEMOGLOBIN g/dL 11.3*   HEMATOCRIT % 37.4   PLATELETS 10*3/mm3 410     Results from last 7 days   Lab Units 06/12/21  0348   SODIUM mmol/L 137   POTASSIUM mmol/L 3.7   CHLORIDE mmol/L 104   CO2 mmol/L 23.0   BUN mg/dL 15   CREATININE mg/dL 1.09*   GLUCOSE mg/dL 86   CALCIUM mg/dL 9.2     Results from last 7 days   Lab Units 06/12/21  0348 06/11/21  1732   SODIUM mmol/L 137 138   POTASSIUM mmol/L 3.7 4.1   CHLORIDE mmol/L 104 104   CO2 mmol/L 23.0 23.0   BUN mg/dL 15 15   CREATININE mg/dL 1.09* 1.28*   CALCIUM mg/dL 9.2 10.1   BILIRUBIN mg/dL  --  0.2   ALK PHOS U/L  --  134*   ALT (SGPT) U/L  --  8   AST (SGOT) U/L  --  15   GLUCOSE mg/dL 86 93     Results from last 7 days   Lab Units 06/12/21  0348 06/11/21  1927 06/11/21  1732   TROPONIN T ng/mL 0.028 0.028 0.032*               Medication Review: yes    Assessment/Plan       Chest " pain  HTN  HL  COPD    Reviewed records from [place].  Awaiting MRI.  Continue same rx.    Tasia Brennan PA-C  06/13/21  16:41 CDT

## 2021-06-14 ENCOUNTER — TELEPHONE (OUTPATIENT)
Dept: VASCULAR SURGERY | Facility: CLINIC | Age: 71
End: 2021-06-14

## 2021-06-14 LAB
QT INTERVAL: 394 MS
QTC INTERVAL: 431 MS

## 2021-06-14 NOTE — DISCHARGE SUMMARY
[facility] DISCHARGE    Date of Discharge:  6/16/2021    Discharge Diagnosis: Chest pain, hypertension, hyperlipidemia, COPD    Presenting Problem/History of Present Illness  Chest pain, unspecified type [R07.9]        Hospital Course  Patient is a 71 y.o. female without previous heart hx.  She has been experiencing atypical CP since April.  She was originally seen while on vacation in [place].  She was found to have a thoracic aortic aneurysm.  She continued to have pain and was seen by here PCP here and was ultimately transferred to [facility] where she was seen by multiple specialties and had testing.  The details are unclear to me, and I do not have records to review at this time.  She presents to ER last night bc her BP was elevated.  There were no other symptoms then; however, she is now having CP that is somewhat both reproducible and relieved when pressure is applied under left breast.  She denies any radiation of the pain. She denies exertional pain and trauma or injury to left chest.  She denies SOB.  Of note, she had recent CT of her chest with multiple calcifications peripherally and in in coronary arteries.    She was monitored in the hospital and her symptoms significantly improved on day of discharge.  She underwent an MRI of the brain which did not show any acute pathology.  She said that since she felt so much better she wished to go home and she would follow-up in the clinic for continued work-up regarding her symptoms.    Due to the possibility of a vasculitis based on the review of records from [place], we will refer the patient to vascular surgery, Dr. Nuñez to establish care and further work-up.         Condition on Discharge: Stable    Physical Exam at Discharge    Vital Signs       Physical Exam:  Vitals and nursing note reviewed.   Constitutional:       Appearance: Normal and healthy appearance. Well-developed and not in distress.   Eyes:      Extraocular Movements:  Extraocular movements intact.      Pupils: Pupils are equal, round, and reactive to light.   HENT:      Head: Normocephalic and atraumatic.    Mouth/Throat:      Pharynx: Oropharynx is clear.   Neck:      Vascular: JVD normal.      Trachea: Trachea normal.   Pulmonary:      Effort: Pulmonary effort is normal.      Breath sounds: Normal breath sounds. No wheezing. No rhonchi. No rales.   Cardiovascular:      PMI at left midclavicular line. Normal rate. Regular rhythm. Normal S1. Normal S2.      Murmurs: There is a grade 2/6 systolic murmur.      No gallop. No click. No rub.   Pulses:     Dorsalis pedis: 2+ bilaterally.     Posterior tibial: 2+ bilaterally.  Abdominal:      General: Bowel sounds are normal.      Palpations: Abdomen is soft.      Tenderness: There is no abdominal tenderness.   Musculoskeletal: Normal range of motion.      Cervical back: Normal range of motion and neck supple. Skin:     General: Skin is warm and dry.      Capillary Refill: Capillary refill takes less than 2 seconds.   Feet:      Right foot:      Skin integrity: Skin integrity normal.      Left foot:      Skin integrity: Skin integrity normal.   Neurological:      Mental Status: Alert and oriented to person, place and time.      Cranial Nerves: Cranial nerves are intact.      Sensory: Sensation is intact.      Motor: Motor function is intact.      Coordination: Coordination is intact.   Psychiatric:         Speech: Speech normal.         Behavior: Behavior is cooperative.         Discharge Disposition  Home or Self Care    Discharge Medications     Discharge Medications      New Medications      Instructions Start Date   cloNIDine 0.1 MG tablet  Commonly known as: CATAPRES   0.1 mg, Oral, Every 6 Hours PRN         Continue These Medications      Instructions Start Date   ALPRAZolam 0.5 MG tablet  Commonly known as: XANAX   0.5 mg, Oral, 3 Times Daily PRN      aspirin 81 MG chewable tablet   81 mg, Oral, Daily      atorvastatin 80 MG  tablet  Commonly known as: LIPITOR   80 mg, Oral, Daily      calcium carbonate 500 MG chewable tablet  Commonly known as: TUMS   1 tablet, Oral, Daily      cyclobenzaprine 10 MG tablet  Commonly known as: FLEXERIL   10 mg, Oral, 3 Times Daily PRN      dicyclomine 10 MG capsule  Commonly known as: BENTYL   10 mg, Oral, 3 Times Daily PRN      famotidine 20 MG tablet  Commonly known as: PEPCID   20 mg, Oral, Daily      folic acid 1 MG tablet  Commonly known as: FOLVITE   1 mg, Oral, Daily      gabapentin 600 MG tablet  Commonly known as: NEURONTIN   600 mg, Oral, 3 Times Daily, Takes twice daily      lisinopril 5 MG tablet  Commonly known as: PRINIVIL,ZESTRIL   5 mg, Oral, Daily      metoprolol succinate XL 25 MG 24 hr tablet  Commonly known as: TOPROL-XL   25 mg, Oral, Daily      oxyCODONE-acetaminophen 7.5-325 MG per tablet  Commonly known as: PERCOCET   1 tablet, Oral, Every 4 Hours PRN             Discharge Diet: Heart healthy    Activity at Discharge: As tolerated    Follow-up Appointments  Future Appointments   Date Time Provider Department Center   6/16/2021  3:30 PM Den Nuñez MD [facility]   7/12/2021 11:15 AM Etienne Dunaway DO [facility] CD [facility]                Etienne Dunaway DO   Interventional cardiology  [facility]  06/16/21  14:26 CDT

## 2021-06-16 ENCOUNTER — OFFICE VISIT (OUTPATIENT)
Dept: VASCULAR SURGERY | Facility: CLINIC | Age: 71
End: 2021-06-16

## 2021-06-16 VITALS
SYSTOLIC BLOOD PRESSURE: 124 MMHG | BODY MASS INDEX: 30.79 KG/M2 | DIASTOLIC BLOOD PRESSURE: 60 MMHG | OXYGEN SATURATION: 96 % | HEIGHT: 63 IN | HEART RATE: 83 BPM | WEIGHT: 173.8 LBS

## 2021-06-16 DIAGNOSIS — I71.20 THORACIC AORTIC ANEURYSM WITHOUT RUPTURE (HCC): ICD-10-CM

## 2021-06-16 DIAGNOSIS — E78.2 MIXED HYPERLIPIDEMIA: ICD-10-CM

## 2021-06-16 DIAGNOSIS — I71.40 ABDOMINAL AORTIC ANEURYSM (AAA) WITHOUT RUPTURE (HCC): ICD-10-CM

## 2021-06-16 DIAGNOSIS — I50.9 CHRONIC CONGESTIVE HEART FAILURE, UNSPECIFIED HEART FAILURE TYPE (HCC): ICD-10-CM

## 2021-06-16 DIAGNOSIS — R41.0 CONFUSION: Primary | ICD-10-CM

## 2021-06-16 DIAGNOSIS — I10 ESSENTIAL HYPERTENSION: ICD-10-CM

## 2021-06-16 PROCEDURE — 99204 OFFICE O/P NEW MOD 45 MIN: CPT | Performed by: SURGERY

## 2021-06-16 RX ORDER — DEXAMETHASONE 1 MG
TABLET ORAL
Status: ON HOLD | COMMUNITY
Start: 2021-06-16 | End: 2022-05-05

## 2021-06-16 RX ORDER — COLCHICINE 0.6 MG/1
0.3 TABLET ORAL DAILY
Status: ON HOLD | COMMUNITY
Start: 2021-04-30 | End: 2022-05-05

## 2021-06-17 NOTE — PROGRESS NOTES
06/16/2021      No referring provider defined for this encounter.    Cheryl Taylor  1950    Chief Complaint   Patient presents with   • Establish Care     referring dr guzman/vasculitis- pt states she was in hospital friday night and kept until sunday. Cardiologist referred Could not determine what was actually going on. Just saw Rohan today, he recoomended that she be followed from vasculars point of view- pt denies stroke symptoms- pt pain under left breast and lower back 10/10   • smoking status     quit 2019   • Med Management     verbally reviewed meds with pt        Dear No ref. provider found:      HPI  I had the pleasure of seeing your patient Cheryl Taylor in the office today.  Thank you kindly for this consultation.  As you recall, Cheryl Taylor is a 71 y.o.  female who you are currently following for general medical care.  She is referred to the vascular surgery office today for evaluation of history of thoracic and abdominal aortic aneurysm.  She otherwise has a history of CHF, COPD, hypertension, and hyperlipidemia.  She also has chronic back pain and has a spinal cord stimulator in place due to this.  She is minimally ambulatory and spends most of her time in a chair.  She has a complicated history.  Initially appears to have started earlier this year when she was in [place] in April.  While there she noted to have some chest pain and some shortness of breath.  She was seen by an ER there and a CTA of the chest was done which showed significant atherosclerotic disease of the aortic arch and descending thoracic aorta.  The aortic pathology she was seen by CT surgery while there who recommended conservative management and surveillance.  She subsequently came back here to [place] was seen by her primary care provider who recommended that given the findings of the aortic pathology when in [place] she come to the ER here for further evaluation.  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  CT surgery here was consulted by the ER and they discussed the case and felt she was better suited to be transferred to a higher level of care.  On that CT of the chest again she was also noted to have atherosclerotic disease of the descending thoracic aorta with multiple areas of saccular aneurysmal formation/penetrating ulcers.  Ultimately she was transferred to [place] and seen there by both the cardiac surgery and vascular surgery teams as well as rheumatology.  She was noted to have a moderate to large pericardial effusion.  Both vascular and CT surgery did not feel that surgery was warranted and recommended continued surveillance of her arch and thoracic aorta.  She also had CTA of the abdomen/pelvis also showed evidence of aneurysmal change in the abdominal aorta both in the visceral segment and infrarenally although by report nothing greater than about 3.5 cm.  In some of the notes there is question of possible vasculitis per rheumatology but on further review notes it was felt that this was likely changes due to atherosclerotic disease and so steroids were not initiated.  There was talk of possible temporal artery biopsy although this was deferred.  She was ultimately discharged from [place] and was supposed to follow-up with vascular surgery in 6 months as well as rheumatology.  However she did present here to our emergency room again on 6/11 with left anterior chest wall pain that was worse with palpation of an area under her left breast where with deep inspiration.  Given chest pain she was admitted under the cardiology service and had a stress test which did not show any evidence of any significant ischemia.  During that admission she had also been complaining of some episodes of confusion and forgetfulness so an MRI of the brain was done which  showed no acute findings.  Her chest pain improved and she was really discharge.  Given her vascular issues she was referred here by cardiology.  When seen in the office today she is awake and alert.  She reports some intermittent pain under her left breast which was worse with palpation of that area but otherwise denies any further chest pain.  She does have baseline shortness of breath with exertion.  She denies any headaches or visual changes.  She otherwise has no further significant acute complaints.  She is accompanied by her daughter at today's visit.    Past Medical History:   Diagnosis Date   • AAA (abdominal aortic aneurysm) (CMS/[facility]ly [facility])    • Arthritis    • Asthma    • Chronic kidney disease (CKD)    • COPD (chronic obstructive pulmonary disease) (CMS/[facility]ly [facility])    • Heart murmur    • Hypertension    • Thoracic aortic aneurysm (CMS/Formerly [facility])        Past Surgical History:   Procedure Laterality Date   • BACK SURGERY     • EYE SURGERY Bilateral    • FOOT FASCIOTOMY Bilateral    • HYSTERECTOMY     • LOWER LEG SOFT TISSUE TUMOR EXCISION     • LUMBAR NERVE STIMLATOR INSERTION Right    • LYMPH NODE DISSECTION     • WRIST FRACTURE SURGERY Right     x2       Family History   Problem Relation Age of Onset   • Hypertension Mother        Social History     Socioeconomic History   • Marital status:      Spouse name: Not on file   • Number of children: Not on file   • Years of education: Not on file   • Highest education level: Not on file   Tobacco Use   • Smoking status: Former Smoker     Quit date: 2019     Years since quittin.4   • Smokeless tobacco: Never Used   Vaping Use   • Vaping Use: Never used   Substance and Sexual Activity   • Alcohol use: Never   • Drug use: Never   • Sexual activity: Defer       Allergies   Allergen Reactions   • Levaquin [Levofloxacin] Hives   • Naprosyn [Naproxen] Hives   • Sulfa Antibiotics Hives       Prior to Admission medications    Medication Sig Start Date End Date Taking?  Authorizing Provider   ALPRAZolam (XANAX) 0.5 MG tablet Take 0.5 mg by mouth 3 (Three) Times a Day As Needed for Anxiety.   Yes Minnie Paige MD   aspirin 81 MG chewable tablet Chew 81 mg Daily.   Yes Minnie Paige MD   atorvastatin (LIPITOR) 80 MG tablet Take 80 mg by mouth Daily.   Yes Minnie Paige MD   calcium carbonate (TUMS) 500 MG chewable tablet Chew 1 tablet Daily.   Yes Minnie Paige MD   cloNIDine (CATAPRES) 0.1 MG tablet Take 1 tablet by mouth Every 6 (Six) Hours As Needed for High Blood Pressure (for BP systolic over 170). 6/13/21  Yes Etienne Dunaway DO   colchicine 0.6 MG tablet Take 0.3 mg by mouth Daily. 4/30/21  Yes Minnie Paige MD   cyclobenzaprine (FLEXERIL) 10 MG tablet Take 10 mg by mouth 3 (Three) Times a Day As Needed for Muscle Spasms.   Yes Minnie Paige MD   dexamethasone (DECADRON) 1 MG tablet  6/16/21  Yes Minnie Paige MD   dicyclomine (BENTYL) 10 MG capsule Take 10 mg by mouth 3 (Three) Times a Day As Needed.   Yes Minnie Paige MD   famotidine (PEPCID) 20 MG tablet Take 20 mg by mouth Daily.   Yes Minnie Paige MD   folic acid (FOLVITE) 1 MG tablet Take 1 mg by mouth Daily.   Yes Minnie Paige MD   gabapentin (NEURONTIN) 600 MG tablet Take 600 mg by mouth 3 (Three) Times a Day. Takes twice daily   Yes Minnie Paige MD   lisinopril (PRINIVIL,ZESTRIL) 5 MG tablet Take 5 mg by mouth Daily.   Yes Minnie Paige MD   metoprolol succinate XL (TOPROL-XL) 25 MG 24 hr tablet Take 25 mg by mouth Daily.   Yes Minnie Paige MD   oxyCODONE-acetaminophen (PERCOCET) 7.5-325 MG per tablet Take 1 tablet by mouth Every 4 (Four) Hours As Needed.   Yes Minnie Paige MD       Review of Systems   Constitutional: Positive for fatigue. Negative for activity change, appetite change, chills, diaphoresis and fever.        Intermittent difficulty with memory.   HENT: Negative.  Negative for  "congestion, sneezing, sore throat and trouble swallowing.    Eyes: Negative.  Negative for visual disturbance.   Respiratory: Negative.  Negative for chest tightness and shortness of breath.    Cardiovascular: Negative.  Negative for chest pain, palpitations and leg swelling.   Gastrointestinal: Negative.  Negative for abdominal distention, abdominal pain, nausea and vomiting.   Endocrine: Negative.    Genitourinary: Negative.    Musculoskeletal: Negative.    Skin: Negative.    Allergic/Immunologic: Negative.    Neurological: Negative.    Hematological: Negative.    Psychiatric/Behavioral: Negative.        /60 (BP Location: Right arm, Patient Position: Sitting, Cuff Size: Adult)   Pulse 83   Ht 160 cm (63\")   Wt 78.8 kg (173 lb 12.8 oz)   SpO2 96%   BMI 30.79 kg/m²   Physical Exam  Vitals reviewed.   Constitutional:       Appearance: Normal appearance.   HENT:      Head: Normocephalic and atraumatic.      Nose: Nose normal.      Mouth/Throat:      Mouth: Mucous membranes are moist.   Eyes:      Extraocular Movements: Extraocular movements intact.      Pupils: Pupils are equal, round, and reactive to light.   Cardiovascular:      Rate and Rhythm: Normal rate and regular rhythm.      Pulses:           Carotid pulses are 2+ on the right side and 2+ on the left side.       Radial pulses are 2+ on the right side and 2+ on the left side.        Femoral pulses are 2+ on the right side and 2+ on the left side.       Popliteal pulses are 2+ on the right side and 2+ on the left side.        Dorsalis pedis pulses are 2+ on the right side and 2+ on the left side.        Posterior tibial pulses are 1+ on the right side and 1+ on the left side.      Comments: Does have palpable pulses throughout the bilateral lower extremities.  Capillary refill is maintained.  She has no wounds.  She does have edema of the bilateral lower extremities.  Pulmonary:      Effort: Pulmonary effort is normal. No respiratory distress. "   Abdominal:      General: There is no distension.      Palpations: Abdomen is soft. There is no mass.      Tenderness: There is no abdominal tenderness.   Musculoskeletal:      Cervical back: Normal range of motion and neck supple.      Right lower leg: Edema present.      Left lower leg: Edema present.   Skin:     General: Skin is warm and dry.      Capillary Refill: Capillary refill takes less than 2 seconds.   Neurological:      General: No focal deficit present.      Mental Status: She is alert and oriented to person, place, and time.   Psychiatric:         Mood and Affect: Mood normal.         Behavior: Behavior normal.         Thought Content: Thought content normal.         Judgment: Judgment normal.         Adult Transthoracic Echo Complete w/ Color, Spectral and Contrast if Necessary Per Protocol    Result Date: 6/11/2021  Narrative: · Left ventricular ejection fraction appears to be 61 - 65%. Left ventricular systolic function is normal. · Left ventricular diastolic function is consistent with (grade I) impaired relaxation. · Mild to moderate aortic valve regurgitation is present. Mild aortic valve stenosis is present. · No mitral valve regurgitation or significant stenosis is present. Mitral annular calcification is present. · No evidence of pulmonary hypertension is present.      CT Chest With & Without Contrast Diagnostic    Result Date: 6/9/2021  Narrative: EXAMINATION:  CT CHEST WITHOUT AND WITH CONTRAST DIAGNOSTIC-  6/9/2021 4:52 PM CDT  HISTORY: Aortic disease, nontraumatic.  COMPARISON : 4/23/2021.  DLP: 521 mGy-cm. Automated dosage control was utilized.  TECHNIQUE: CT was performed of the chest without and with contrast. Sagittal and coronal images were reconstructed.  MEDIASTINUM, HEART AND VASCULAR STRUCTURES: There is atheromatous calcification of the thoracic aorta and coronary arteries. There is extensive atherosclerotic plaque and ulcerative plaque involving the aortic arch extending into  an ectatic appearing brachiocephalic artery measuring 4.1 cm in transverse diameter and stable compared to the prior study. The visualized right common carotid and right subclavian arteries are patent. The left common carotid artery is normal in caliber. There is some ulcerative plaque at the origin of the left subclavian artery and there is a fairly high-grade stenosis of the proximal left subclavian artery with luminal diameter through the area of stenosis measuring 4.8 mm in the artery more distally measuring 10.4 mm. Therefore, there is a round 50-60% diameter narrowing. There is a descending thoracic aorta aneurysm or pseudoaneurysm seen best on the coronal images measuring about 2.8 cm cephalocaudal and stable compared to the prior study. There is no evidence of aortic dissection. Heart size is within normal limits. There is a small pericardial effusion, decreased compared to the prior study. There is a moderate to large hiatal hernia.  LUNGS: There is severe emphysema. There is no airspace consolidation. There are linear areas of scarring in the right lung. There is dependent appearing atelectasis in the left lower lobe. There may be a small 6 mm subpleural nodule in the left lower lobe on image 109 of series 3. This area is obscured by pleural effusion and atelectasis on the prior study.  UPPER ABDOMEN: There are bilateral renal cysts noted. There is fatty infiltration of the liver. There is a small probable cyst in the spleen with multiple calcified splenic granulomas.  BONES: There are degenerative changes of the spine. Dorsal column stimulator wires are noted.      Impression: 1. Extensive aortic disease, described fully above. No evidence of acute dissection. No significant change compared to 8/17/2018. There is also stenosis involving the proximal left subclavian artery. Coronary artery calcification is also noted. 2. Small pericardial effusion is decreased in size compared to the prior study. 3.  Moderate to large hiatal hernia. 4. Bilateral renal cysts. Fatty infiltration of the liver. Small splenic cyst with multiple splenic calcified granulomas.  The full report of this exam was immediately signed and available to the emergency room. The patient is currently in the emergency room.    This report was finalized on 06/09/2021 18:10 by Dr. Syed Lam MD.    MRI Brain With & Without Contrast    Result Date: 6/13/2021  Narrative: EXAMINATION:  MRI BRAIN W WO CONTRAST-  6/13/2021 3:55 PM CDT  HISTORY: Transient ischemic attack (TIA)  COMPARISON: Head CT dated 7/8/2016  TECHNIQUE: Multiplanar MR imaging the brain was performed. Gadolinium-enhanced imaging obtained.  FINDINGS:  There is no diffusion signal abnormality indicating acute ischemic event/infarction.  There are punctate and confluent periventricular and subcortical FLAIR signal hyperintensities compatible with chronic ischemic change.  There is no abnormal gadolinium contrast enhancement.  There is no abnormal intra-axial or extra-axial blood products.  Normal flow voids and intravascular gadolinium contrast enhancement observed.  There is central and cortical atrophy.  There is no mass effect or midline shift.  There is no hydrocephalus.  Globes and intraorbital structures are imaged symmetrically.  Pituitary gland is not enlarged.  Cervical spine imaged in part is unremarkable      Impression: 1. No MR evidence of an acute intracranial process. 2. Central and cortical atrophy. Chronic ischemic changes. This report was finalized on 06/13/2021 17:00 by Dr. Michael Markham MD.    XR Chest 1 View    Result Date: 6/11/2021  Narrative: HISTORY: Chest pain  CXR: Frontal view the chest obtained  COMPARISON: 6/9/2021  FINDINGS: Stable cardiomegaly. Moderate size hiatal hernia. Thoracic aortic calcification. Left mid lung granuloma. No acute consolidation or edema. No pleural effusion or pneumothorax. Dorsal column stimulator suspected. No acute bony  pathology.      Impression: 1. Stable enlargement of the cardiac silhouette representing cardiomegaly with a small pericardial effusion based on CT chest 6/9/2021. 2. Moderate size hiatal hernia. 3. Thoracic aortic calcification. This report was finalized on 06/11/2021 17:48 by Dr. Lucy Mccloud MD.    XR Chest 1 View    Result Date: 6/9/2021  Narrative: EXAM: XR CHEST 1 VW- - 6/9/2021 4:00 PM CDT  HISTORY: Chest Pain Triage Protocol   COMPARISON: 4/23/2021.  TECHNIQUE:  1 images.  Frontal view of the chest.  FINDINGS:  No pneumothorax, pleural effusion or focal consolidation. Calcified aortic atherosclerosis. Prominent cardiac silhouette. Upper mediastinum within normal limits. No acute bony finding. Spinal stimulator leads again visible.       Impression: 1. Similar prominent cardiac silhouette. This report was finalized on 06/09/2021 16:31 by Dr Earlene Chatman MD.    Stress Test With Myocardial Perfusion One Day    Addendum Date: 6/12/2021 Addendum:   · No ECG evidence of myocardial ischemia.Negative clinical evidence of myocardial ischemia. Findings consistent with a normal ECG stress test · No evidence of any perfusion defects · No significant wall motion abnormalities · Left ventricle is hyperdynamic, ejection fraction greater than 70% · Transischemic dilatation ratio of 1.2 · Despite not having any significant perfusion defects, with the elevated TID ratio, this is an intermediate risk study.      Result Date: 6/12/2021  Narrative: · No ECG evidence of myocardial ischemia.Negative clinical evidence of myocardial ischemia. Findings consistent with a normal ECG stress test · No evidence of any perfusion defects · No significant wall motion abnormalities · Left ventricle is hyperdynamic, ejection fraction greater than 70% · Overall, this is a low risk test for ischemia.        Patient Active Problem List   Diagnosis   • Chest pain         ICD-10-CM ICD-9-CM   1. Confusion  R41.0 298.9   2. Thoracic aortic  aneurysm without rupture (CMS/HCC)  I71.2 441.2   3. Mixed hyperlipidemia  E78.2 272.2   4. Essential hypertension  I10 401.9   5. Chronic congestive heart failure, unspecified heart failure type (CMS/HCC)  I50.9 428.0   6. Abdominal aortic aneurysm (AAA) without rupture (CMS/HCC)  I71.4 441.4       Lab Frequency Next Occurrence   CT angiogram chest w wo contrast Once 12/13/2021       Plan: After thoroughly evaluating Cheryl Taylor, I believe the best course of action is to actually remain conservative from a vascular standpoint.  Patient has a complex past medical history including COPD, CHF, hypertension, and hyperlipidemia.  She also has significant atherosclerotic disease of the aortic arch, thoracic aorta, and abdominal aorta.  She was recently seen in April for chest pain and ultimately transferred to [place] where she was noted to have pericardial effusion.  Both vascular and cardiac surgery teams are evaluated her and felt that she did not require surgical intervention of her aortic arch or thoracic aortic pathology.  She was also seen by rheumatology and there was question of possible vasculitis although in reviewing their notes it appears they felt it was more likely atherosclerotic disease causing the areas of aneurysm/pseudoaneurysm in the aortic arch and descending thoracic and abdominal aorta.  Steroids were not started.  More recently she reports some episodes of confusion and forgetfulness and possibly some discoordination.  Such at this point I think she would benefit from neurology evaluation and I have made that referral.  I also think she should establish with rheumatology for follow-up as there was plan for rheumatology follow-up through [place] but she reports that those physicians are not in her network.  As such I made a referral for rheumatology evaluation here in [place].  At this point I do not think temporal artery biopsy is indicated she has no headache or vision changes.  She will  need continued surveillance of her thoracic and abdominal aorta and so I have ordered a CTA of the chest as well as abdomen/pelvis to be repeated in 6 months.  We will reexamine her films at that time depending on the results she may also require further evaluation by CT surgery if needed.  The patient can continue taking their current medication regimen as previously planned. I did discuss vascular risk factors as they pertain to the progression of vascular disease including controlling hypertension, and hyperlipidemia. These factors remain stable. Patient's Body mass index is 30.79 kg/m². indicating that she is obese (BMI >30). Obesity-related health conditions include the following: hypertension, dyslipidemias and peripheral vascular disease. Obesity is unchanged. BMI is is above average; BMI management plan is completed. We discussed portion control and increasing exercise. This was all discussed in full with complete understanding.    Thank you for allowing me to participate in the care of your patient.  Please do not hesitate with any questions or concerns.  I will keep you aware of any further encounters with Cheryl Taylor.        Sincerely yours,         Den Nuñez MD

## 2021-06-17 NOTE — PATIENT INSTRUCTIONS
"BMI for Adults  What is BMI?  Body mass index (BMI) is a number that is calculated from a person's weight and height. BMI can help estimate how much of a person's weight is composed of fat. BMI does not measure body fat directly. Rather, it is an alternative to procedures that directly measure body fat, which can be difficult and expensive.  BMI can help identify people who may be at higher risk for certain medical problems.  What are BMI measurements used for?  BMI is used as a screening tool to identify possible weight problems. It helps determine whether a person is obese, overweight, a healthy weight, or underweight.  BMI is useful for:  · Identifying a weight problem that may be related to a medical condition or may increase the risk for medical problems.  · Promoting changes, such as changes in diet and exercise, to help reach a healthy weight. BMI screening can be repeated to see if these changes are working.  How is BMI calculated?  BMI involves measuring your weight in relation to your height. Both height and weight are measured, and the BMI is calculated from those numbers. This can be done either in English ([place] or metric measurements. Note that charts and online BMI calculators are available to help you find your BMI quickly and easily without having to do these calculations yourself.  To calculate your BMI in English ([place] measurements:    1. Measure your weight in pounds (lb).  2. Multiply the number of pounds by 703.  ? For example, for a person who weighs 180 lb, multiply that number by 703, which equals 126,540.  3. Measure your height in inches. Then multiply that number by itself to get a measurement called \"inches squared.\"  ? For example, for a person who is 70 inches tall, the \"inches squared\" measurement is 70 inches x 70 inches, which equals 4,900 inches squared.  4. Divide the total from step 2 (number of lb x 703) by the total from step 3 (inches squared): 126,540 ÷ 4,900 = 25.8. This is " "your BMI.  To calculate your BMI in metric measurements:  1. Measure your weight in kilograms (kg).  2. Measure your height in meters (m). Then multiply that number by itself to get a measurement called \"meters squared.\"  ? For example, for a person who is 1.75 m tall, the \"meters squared\" measurement is 1.75 m x 1.75 m, which is equal to 3.1 meters squared.  3. Divide the number of kilograms (your weight) by the meters squared number. In this example: 70 ÷ 3.1 = 22.6. This is your BMI.  What do the results mean?  BMI charts are used to identify whether you are underweight, normal weight, overweight, or obese. The following guidelines will be used:  · Underweight: BMI less than 18.5.  · Normal weight: BMI between 18.5 and 24.9.  · Overweight: BMI between 25 and 29.9.  · Obese: BMI of 30 or above.  Keep these notes in mind:  · Weight includes both fat and muscle, so someone with a muscular build, such as an athlete, may have a BMI that is higher than 24.9. In cases like these, BMI is not an accurate measure of body fat.  · To determine if excess body fat is the cause of a BMI of 25 or higher, further assessments may need to be done by a health care provider.  · BMI is usually interpreted in the same way for men and women.  Where to find more information  For more information about BMI, including tools to quickly calculate your BMI, go to these websites:  · Centers for Disease Control and Prevention: www.cdc.gov  · American Heart Association: www.heart.org  · National Heart, Lung, and [employer] [place]: www.nhlbi.nih.gov  Summary  · Body mass index (BMI) is a number that is calculated from a person's weight and height.  · BMI may help estimate how much of a person's weight is composed of fat. BMI can help identify those who may be at higher risk for certain medical problems.  · BMI can be measured using English measurements or metric measurements.  · BMI charts are used to identify whether you are underweight, normal " weight, overweight, or obese.  This information is not intended to replace advice given to you by your health care provider. Make sure you discuss any questions you have with your health care provider.  Document Revised: 09/09/2020 Document Reviewed: 07/17/2020  Elsevier Patient Education © 2021 [employer]

## 2021-07-12 ENCOUNTER — OFFICE VISIT (OUTPATIENT)
Dept: CARDIOLOGY | Facility: CLINIC | Age: 71
End: 2021-07-12

## 2021-07-12 VITALS
WEIGHT: 172 LBS | BODY MASS INDEX: 30.48 KG/M2 | HEART RATE: 67 BPM | DIASTOLIC BLOOD PRESSURE: 60 MMHG | OXYGEN SATURATION: 98 % | HEIGHT: 63 IN | SYSTOLIC BLOOD PRESSURE: 102 MMHG

## 2021-07-12 DIAGNOSIS — R41.0 CONFUSION: Primary | ICD-10-CM

## 2021-07-12 DIAGNOSIS — M19.90 ARTHRITIS: ICD-10-CM

## 2021-07-12 DIAGNOSIS — R07.89 OTHER CHEST PAIN: ICD-10-CM

## 2021-07-12 DIAGNOSIS — I77.6 INFLAMMATION OF ARTERIES (HCC): ICD-10-CM

## 2021-07-12 PROCEDURE — 99213 OFFICE O/P EST LOW 20 MIN: CPT | Performed by: EMERGENCY MEDICINE

## 2021-07-13 PROBLEM — I77.6: Status: ACTIVE | Noted: 2021-07-13

## 2021-07-13 PROBLEM — M19.90 ARTHRITIS: Status: ACTIVE | Noted: 2021-07-13

## 2021-07-13 PROBLEM — R41.0 CONFUSION: Status: ACTIVE | Noted: 2021-07-13

## 2021-07-13 NOTE — PROGRESS NOTES
Subjective:     Encounter Date:07/12/2021      Patient ID: Cheryl Taylor is a 71 y.o. female.    Chief Complaint:  History of Present Illness    Patient is a follow-up today from the hospital.  She states that Dr. James has started her on steroids recently which significantly improved her symptoms.    Review of Systems   Constitutional: Negative for diaphoresis, fever and malaise/fatigue.   HENT: Negative for congestion.    Eyes: Negative for vision loss in left eye and vision loss in right eye.   Cardiovascular: Positive for chest pain. Negative for claudication, dyspnea on exertion, irregular heartbeat, leg swelling, orthopnea, palpitations and syncope.   Respiratory: Negative for cough, shortness of breath and wheezing.    Hematologic/Lymphatic: Negative for adenopathy.   Skin: Negative for rash.   Musculoskeletal: Positive for arthritis and joint pain. Negative for joint swelling.   Gastrointestinal: Negative for abdominal pain, diarrhea, nausea and vomiting.   Neurological: Negative for excessive daytime sleepiness, dizziness, focal weakness, light-headedness, numbness and weakness.   Psychiatric/Behavioral: Negative for depression. The patient does not have insomnia.            Current Outpatient Medications:   •  ALPRAZolam (XANAX) 0.5 MG tablet, Take 0.5 mg by mouth 3 (Three) Times a Day As Needed for Anxiety., Disp: , Rfl:   •  aspirin 81 MG chewable tablet, Chew 81 mg Daily., Disp: , Rfl:   •  atorvastatin (LIPITOR) 80 MG tablet, Take 80 mg by mouth Daily., Disp: , Rfl:   •  calcium carbonate (TUMS) 500 MG chewable tablet, Chew 1 tablet Daily., Disp: , Rfl:   •  cloNIDine (CATAPRES) 0.1 MG tablet, Take 1 tablet by mouth Every 6 (Six) Hours As Needed for High Blood Pressure (for BP systolic over 170)., Disp: 30 tablet, Rfl: 1  •  colchicine 0.6 MG tablet, Take 0.3 mg by mouth Daily., Disp: , Rfl:   •  cyclobenzaprine (FLEXERIL) 10 MG tablet, Take 10 mg by mouth 3 (Three) Times a Day As Needed for Muscle  Spasms., Disp: , Rfl:   •  dexamethasone (DECADRON) 1 MG tablet, , Disp: , Rfl:   •  dicyclomine (BENTYL) 10 MG capsule, Take 10 mg by mouth 3 (Three) Times a Day As Needed., Disp: , Rfl:   •  famotidine (PEPCID) 20 MG tablet, Take 20 mg by mouth Daily., Disp: , Rfl:   •  folic acid (FOLVITE) 1 MG tablet, Take 1 mg by mouth Daily., Disp: , Rfl:   •  gabapentin (NEURONTIN) 600 MG tablet, Take 600 mg by mouth 3 (Three) Times a Day. Takes twice daily, Disp: , Rfl:   •  lisinopril (PRINIVIL,ZESTRIL) 5 MG tablet, Take 5 mg by mouth Daily., Disp: , Rfl:   •  metoprolol succinate XL (TOPROL-XL) 25 MG 24 hr tablet, Take 25 mg by mouth Daily., Disp: , Rfl:   •  oxyCODONE-acetaminophen (PERCOCET) 7.5-325 MG per tablet, Take 1 tablet by mouth Every 4 (Four) Hours As Needed., Disp: , Rfl:        Objective:      Vitals:    07/12/21 1122   BP: 102/60   Pulse: 67   SpO2: 98%     Vitals and nursing note reviewed.   Constitutional:       Appearance: Normal and healthy appearance. Well-developed and not in distress.   Eyes:      Extraocular Movements: Extraocular movements intact.      Pupils: Pupils are equal, round, and reactive to light.   HENT:      Head: Normocephalic and atraumatic.    Mouth/Throat:      Pharynx: Oropharynx is clear.   Neck:      Vascular: JVD normal.      Trachea: Trachea normal.   Pulmonary:      Effort: Pulmonary effort is normal.      Breath sounds: Normal breath sounds. No wheezing. No rhonchi. No rales.   Cardiovascular:      PMI at left midclavicular line. Normal rate. Regular rhythm. Normal S1. Normal S2.      Murmurs: There is a grade 2/6 systolic murmur.      No gallop. No click. No rub.   Pulses:     Dorsalis pedis: 2+ bilaterally.     Posterior tibial: 2+ bilaterally.  Abdominal:      General: Bowel sounds are normal.      Palpations: Abdomen is soft.      Tenderness: There is no abdominal tenderness.   Musculoskeletal: Normal range of motion.      Cervical back: Normal range of motion and neck  supple. Skin:     General: Skin is warm and dry.      Capillary Refill: Capillary refill takes less than 2 seconds.   Feet:      Right foot:      Skin integrity: Skin integrity normal.      Left foot:      Skin integrity: Skin integrity normal.   Neurological:      Mental Status: Alert and oriented to person, place and time.      Cranial Nerves: Cranial nerves are intact.      Sensory: Sensation is intact.      Motor: Motor function is intact.      Coordination: Coordination is intact.   Psychiatric:         Speech: Speech normal.         Behavior: Behavior is cooperative.         Lab Review:       Procedures      Assessment/Plan:     Problem List Items Addressed This Visit        Cardiac and Vasculature    Chest pain       Multi-system (Lupus, Sarcoid...)    Inflammation of arteries (CMS/HCC)    Relevant Orders    Ambulatory Referral to Rheumatology    Rheumatoid Factor, Quant    Cyclic Citrul Peptide Antibody, IgG / IgA    Sedimentation Rate    C-reactive Protein       Musculoskeletal and Injuries    Arthritis    Relevant Orders    Rheumatoid Factor, Quant    Cyclic Citrul Peptide Antibody, IgG / IgA    Sedimentation Rate    C-reactive Protein       Neuro    Confusion - Primary    Relevant Orders    Ambulatory Referral to Neurology            Recommendations/plans:    71-year-old female presents for hospital follow-up today.  I am concerned that all of her symptoms may be secondary to inflammation/a rheumatological disease.  She says that Dr. James recently started her on steroid therapy which significantly improved her symptoms.  I am going to start a preliminary rheumatological work-up at this time.  Order ESR, CRP, CCP and RF levels.  Referral to neurology and referral to rheumatology today.    Follow-up with cardiology in approximately 1 month        Etienne Dunaway DO   Interventional cardiology  [facility]  07/12/2021  18:35 CDT

## 2021-08-16 ENCOUNTER — TELEPHONE (OUTPATIENT)
Dept: VASCULAR SURGERY | Facility: CLINIC | Age: 71
End: 2021-08-16

## 2021-08-16 NOTE — TELEPHONE ENCOUNTER
I spoke with patient to see if she had been scheduled with Dr. Blunt. Patient has not. I called Dr. Blunt office and was told they have not been able to get in contact with the patient.    I called patient and gave them their number to call the office.

## 2021-09-15 ENCOUNTER — OFFICE VISIT (OUTPATIENT)
Dept: CARDIOLOGY | Facility: CLINIC | Age: 71
End: 2021-09-15

## 2021-09-15 VITALS
BODY MASS INDEX: 30.83 KG/M2 | OXYGEN SATURATION: 98 % | WEIGHT: 174 LBS | HEIGHT: 63 IN | HEART RATE: 70 BPM | SYSTOLIC BLOOD PRESSURE: 118 MMHG | DIASTOLIC BLOOD PRESSURE: 78 MMHG

## 2021-09-15 DIAGNOSIS — I10 ESSENTIAL HYPERTENSION: ICD-10-CM

## 2021-09-15 DIAGNOSIS — R41.0 CONFUSION: ICD-10-CM

## 2021-09-15 DIAGNOSIS — R07.89 OTHER CHEST PAIN: Primary | ICD-10-CM

## 2021-09-15 DIAGNOSIS — I77.6 INFLAMMATION OF ARTERIES (HCC): ICD-10-CM

## 2021-09-15 DIAGNOSIS — M19.90 ARTHRITIS: ICD-10-CM

## 2021-09-15 PROCEDURE — 99213 OFFICE O/P EST LOW 20 MIN: CPT | Performed by: EMERGENCY MEDICINE

## 2021-09-15 RX ORDER — FUROSEMIDE 20 MG/1
20 TABLET ORAL DAILY
Qty: 30 TABLET | Refills: 11 | Status: SHIPPED | OUTPATIENT
Start: 2021-09-15 | End: 2022-07-11

## 2021-09-15 NOTE — PROGRESS NOTES
Subjective:     Encounter Date:09/15/2021      Patient ID: Cheryl Taylor is a 71 y.o. female.    Chief Complaint:  History of Present Illness    Patient is a 2-month follow-up today. Today she says she is feeling okay. No significant chest pain or shortness of breath episodes recently. She does have increased swelling in her lower extremities. She has been evaluated by neurology and rheumatology. She says that the rheumatologist has a big work-up planned for her but she has not been able to get to the lab to give blood yet.    Review of Systems   Constitutional: Negative for diaphoresis, fever and malaise/fatigue.   HENT: Negative for congestion.    Eyes: Negative for vision loss in left eye and vision loss in right eye.   Cardiovascular: Negative for chest pain, claudication, dyspnea on exertion, irregular heartbeat, leg swelling, orthopnea, palpitations and syncope.   Respiratory: Negative for cough, shortness of breath and wheezing.    Hematologic/Lymphatic: Negative for adenopathy.   Skin: Negative for rash.   Musculoskeletal: Negative for joint pain and joint swelling.   Gastrointestinal: Negative for abdominal pain, diarrhea, nausea and vomiting.   Neurological: Negative for excessive daytime sleepiness, dizziness, focal weakness, light-headedness, numbness and weakness.   Psychiatric/Behavioral: Negative for depression. The patient does not have insomnia.            Current Outpatient Medications:   •  ALPRAZolam (XANAX) 0.5 MG tablet, Take 0.5 mg by mouth 3 (Three) Times a Day As Needed for Anxiety., Disp: , Rfl:   •  aspirin 81 MG chewable tablet, Chew 81 mg Daily., Disp: , Rfl:   •  atorvastatin (LIPITOR) 80 MG tablet, Take 80 mg by mouth Daily., Disp: , Rfl:   •  calcium carbonate (TUMS) 500 MG chewable tablet, Chew 1 tablet Daily., Disp: , Rfl:   •  cloNIDine (CATAPRES) 0.1 MG tablet, Take 1 tablet by mouth Every 6 (Six) Hours As Needed for High Blood Pressure (for BP systolic over 170)., Disp: 30  tablet, Rfl: 1  •  colchicine 0.6 MG tablet, Take 0.3 mg by mouth Daily., Disp: , Rfl:   •  cyclobenzaprine (FLEXERIL) 10 MG tablet, Take 10 mg by mouth 3 (Three) Times a Day As Needed for Muscle Spasms., Disp: , Rfl:   •  dexamethasone (DECADRON) 1 MG tablet, , Disp: , Rfl:   •  dicyclomine (BENTYL) 10 MG capsule, Take 10 mg by mouth 3 (Three) Times a Day As Needed., Disp: , Rfl:   •  famotidine (PEPCID) 20 MG tablet, Take 20 mg by mouth Daily., Disp: , Rfl:   •  folic acid (FOLVITE) 1 MG tablet, Take 1 mg by mouth Daily., Disp: , Rfl:   •  gabapentin (NEURONTIN) 600 MG tablet, Take 600 mg by mouth 3 (Three) Times a Day. Takes twice daily, Disp: , Rfl:   •  lisinopril (PRINIVIL,ZESTRIL) 5 MG tablet, Take 5 mg by mouth Daily., Disp: , Rfl:   •  metoprolol succinate XL (TOPROL-XL) 25 MG 24 hr tablet, Take 25 mg by mouth Daily., Disp: , Rfl:   •  oxyCODONE-acetaminophen (PERCOCET) 7.5-325 MG per tablet, Take 1 tablet by mouth Every 4 (Four) Hours As Needed., Disp: , Rfl:   •  furosemide (LASIX) 20 MG tablet, Take 1 tablet by mouth Daily., Disp: 30 tablet, Rfl: 11       Objective:      Vitals:    09/15/21 1101   BP: 118/78   Pulse: 70   SpO2: 98%     Vitals and nursing note reviewed.   Constitutional:       Appearance: Normal and healthy appearance. Well-developed and not in distress.   Eyes:      Extraocular Movements: Extraocular movements intact.      Pupils: Pupils are equal, round, and reactive to light.   HENT:      Head: Normocephalic and atraumatic.    Mouth/Throat:      Pharynx: Oropharynx is clear.   Neck:      Vascular: JVD normal.      Trachea: Trachea normal.   Pulmonary:      Effort: Pulmonary effort is normal.      Breath sounds: Normal breath sounds. No wheezing. No rhonchi. No rales.   Cardiovascular:      PMI at left midclavicular line. Normal rate. Regular rhythm. Normal S1. Normal S2.      Murmurs: There is a grade 2/6 systolic murmur.      No gallop. No click. No rub.   Pulses:     Dorsalis pedis:  2+ bilaterally.     Posterior tibial: 2+ bilaterally.  Abdominal:      General: Bowel sounds are normal.      Palpations: Abdomen is soft.      Tenderness: There is no abdominal tenderness.   Musculoskeletal: Normal range of motion.      Cervical back: Normal range of motion and neck supple. Skin:     General: Skin is warm and dry.      Capillary Refill: Capillary refill takes less than 2 seconds.   Feet:      Right foot:      Skin integrity: Skin integrity normal.      Left foot:      Skin integrity: Skin integrity normal.   Neurological:      Mental Status: Alert and oriented to person, place and time.      Cranial Nerves: Cranial nerves are intact.      Sensory: Sensation is intact.      Motor: Motor function is intact.      Coordination: Coordination is intact.   Psychiatric:         Speech: Speech normal.         Behavior: Behavior is cooperative.         Lab Review:             Assessment/Plan:     Problem List Items Addressed This Visit        Cardiac and Vasculature    Chest pain - Primary       Multi-system (Lupus, Sarcoid...)    Inflammation of arteries (CMS/HCC)       Musculoskeletal and Injuries    Arthritis       Neuro    Confusion      Other Visit Diagnoses     Essential hypertension        Relevant Medications    furosemide (LASIX) 20 MG tablet            Recommendations/plans:    We will start the patient on low-dose Lasix therapy to help with her lower extremity edema. Her echocardiogram performed a couple of months ago showed normal systolic function but she did have grade 1 diastolic dysfunction.    Follow-up with cardiology in 3 months    Etienne Dunaway DO   Interventional cardiology  [facility]  09/15/2021  15:06 CDT

## 2021-09-21 ENCOUNTER — TELEPHONE (OUTPATIENT)
Dept: GASTROENTEROLOGY | Facility: CLINIC | Age: 71
End: 2021-09-21

## 2021-09-21 NOTE — TELEPHONE ENCOUNTER
SPOKE WITH PT ABOUT BEING DUE FOR A REPEAT COLONOSCOPY. THEY ARE NOT READY TO MAKE AN APPT. AT THIS TIME.     I EXPLAINED TO THE PATIENT THE IMPORTANCE OF HAVING A REPEAT SCOPE DONE FOR THEIR HEALTH.     SHE STATED THAT SHE IS DONE WITH HAVING THEM.    LETTER SENT TO PCP.

## 2022-02-04 ENCOUNTER — APPOINTMENT (OUTPATIENT)
Dept: CT IMAGING | Facility: HOSPITAL | Age: 72
End: 2022-02-04

## 2022-02-04 ENCOUNTER — HOSPITAL ENCOUNTER (OUTPATIENT)
Dept: CT IMAGING | Facility: HOSPITAL | Age: 72
End: 2022-02-04

## 2022-02-24 ENCOUNTER — TELEPHONE (OUTPATIENT)
Dept: VASCULAR SURGERY | Facility: CLINIC | Age: 72
End: 2022-02-24

## 2022-02-24 NOTE — TELEPHONE ENCOUNTER
Tried to call the patient to see if she was ready to reschedule her appts for testing and to see Dr. Nuñez.  Her VM wasn't set up, so I just sent her a letter.

## 2022-03-23 ENCOUNTER — TRANSCRIBE ORDERS (OUTPATIENT)
Dept: GENERAL RADIOLOGY | Facility: HOSPITAL | Age: 72
End: 2022-03-23

## 2022-03-23 ENCOUNTER — HOSPITAL ENCOUNTER (OUTPATIENT)
Dept: BONE DENSITY | Facility: HOSPITAL | Age: 72
Discharge: HOME | End: 2022-03-23
Admitting: INTERNAL MEDICINE

## 2022-03-23 DIAGNOSIS — Z78.0 POST-MENOPAUSAL: ICD-10-CM

## 2022-03-23 DIAGNOSIS — Z78.0 POST-MENOPAUSAL: Primary | ICD-10-CM

## 2022-03-23 PROCEDURE — 77080 DXA BONE DENSITY AXIAL: CPT

## 2022-05-04 ENCOUNTER — APPOINTMENT (OUTPATIENT)
Dept: GENERAL RADIOLOGY | Facility: HOSPITAL | Age: 72
End: 2022-05-04

## 2022-05-04 ENCOUNTER — HOSPITAL ENCOUNTER (OUTPATIENT)
Facility: HOSPITAL | Age: 72
Discharge: HOME | End: 2022-05-07
Attending: FAMILY MEDICINE | Admitting: INTERNAL MEDICINE

## 2022-05-04 ENCOUNTER — APPOINTMENT (OUTPATIENT)
Dept: CT IMAGING | Facility: HOSPITAL | Age: 72
End: 2022-05-04

## 2022-05-04 DIAGNOSIS — I10 PRIMARY HYPERTENSION: ICD-10-CM

## 2022-05-04 DIAGNOSIS — R07.9 CHEST PAIN, UNSPECIFIED TYPE: Primary | ICD-10-CM

## 2022-05-04 DIAGNOSIS — Z86.79 HX OF ANEURYSM: ICD-10-CM

## 2022-05-04 DIAGNOSIS — I21.4 NSTEMI (NON-ST ELEVATED MYOCARDIAL INFARCTION): ICD-10-CM

## 2022-05-04 PROBLEM — R07.89 ATYPICAL CHEST PAIN: Status: ACTIVE | Noted: 2022-05-04

## 2022-05-04 LAB
ALBUMIN SERPL-MCNC: 3.7 G/DL (ref 3.5–5.2)
ALBUMIN/GLOB SERPL: 1 G/DL
ALP SERPL-CCNC: 132 U/L (ref 39–117)
ALT SERPL W P-5'-P-CCNC: 10 U/L (ref 1–33)
ANION GAP SERPL CALCULATED.3IONS-SCNC: 15 MMOL/L (ref 5–15)
AST SERPL-CCNC: 17 U/L (ref 1–32)
BACTERIA UR QL AUTO: ABNORMAL /HPF
BASOPHILS # BLD AUTO: 0.06 10*3/MM3 (ref 0–0.2)
BASOPHILS NFR BLD AUTO: 0.4 % (ref 0–1.5)
BILIRUB SERPL-MCNC: 0.4 MG/DL (ref 0–1.2)
BILIRUB UR QL STRIP: NEGATIVE
BUN SERPL-MCNC: 16 MG/DL (ref 8–23)
BUN/CREAT SERPL: 14.4 (ref 7–25)
CALCIUM SPEC-SCNC: 9.5 MG/DL (ref 8.6–10.5)
CHLORIDE SERPL-SCNC: 101 MMOL/L (ref 98–107)
CLARITY UR: CLEAR
CO2 SERPL-SCNC: 21 MMOL/L (ref 22–29)
COLOR UR: YELLOW
CREAT SERPL-MCNC: 1.11 MG/DL (ref 0.57–1)
DEPRECATED RDW RBC AUTO: 49.5 FL (ref 37–54)
EGFRCR SERPLBLD CKD-EPI 2021: 53.6 ML/MIN/1.73
EOSINOPHIL # BLD AUTO: 0.06 10*3/MM3 (ref 0–0.4)
EOSINOPHIL NFR BLD AUTO: 0.4 % (ref 0.3–6.2)
ERYTHROCYTE [DISTWIDTH] IN BLOOD BY AUTOMATED COUNT: 15.8 % (ref 12.3–15.4)
GLOBULIN UR ELPH-MCNC: 3.8 GM/DL
GLUCOSE SERPL-MCNC: 90 MG/DL (ref 65–99)
GLUCOSE UR STRIP-MCNC: NEGATIVE MG/DL
HCT VFR BLD AUTO: 37.4 % (ref 34–46.6)
HGB BLD-MCNC: 11.1 G/DL (ref 12–15.9)
HGB UR QL STRIP.AUTO: NEGATIVE
HOLD SPECIMEN: NORMAL
HOLD SPECIMEN: NORMAL
HYALINE CASTS UR QL AUTO: ABNORMAL /LPF
IMM GRANULOCYTES # BLD AUTO: 0.06 10*3/MM3 (ref 0–0.05)
IMM GRANULOCYTES NFR BLD AUTO: 0.4 % (ref 0–0.5)
INR PPP: 1.01 (ref 0.91–1.09)
KETONES UR QL STRIP: NEGATIVE
LEUKOCYTE ESTERASE UR QL STRIP.AUTO: ABNORMAL
LIPASE SERPL-CCNC: 25 U/L (ref 13–60)
LYMPHOCYTES # BLD AUTO: 2.58 10*3/MM3 (ref 0.7–3.1)
LYMPHOCYTES NFR BLD AUTO: 17.5 % (ref 19.6–45.3)
MCH RBC QN AUTO: 26.2 PG (ref 26.6–33)
MCHC RBC AUTO-ENTMCNC: 29.7 G/DL (ref 31.5–35.7)
MCV RBC AUTO: 88.2 FL (ref 79–97)
MONOCYTES # BLD AUTO: 1.28 10*3/MM3 (ref 0.1–0.9)
MONOCYTES NFR BLD AUTO: 8.7 % (ref 5–12)
NEUTROPHILS NFR BLD AUTO: 10.73 10*3/MM3 (ref 1.7–7)
NEUTROPHILS NFR BLD AUTO: 72.6 % (ref 42.7–76)
NITRITE UR QL STRIP: NEGATIVE
NRBC BLD AUTO-RTO: 0 /100 WBC (ref 0–0.2)
NT-PROBNP SERPL-MCNC: 2183 PG/ML (ref 0–900)
PH UR STRIP.AUTO: 7 [PH] (ref 5–8)
PLATELET # BLD AUTO: 454 10*3/MM3 (ref 140–450)
PMV BLD AUTO: 10.4 FL (ref 6–12)
POTASSIUM SERPL-SCNC: 3.7 MMOL/L (ref 3.5–5.2)
PROT SERPL-MCNC: 7.5 G/DL (ref 6–8.5)
PROT UR QL STRIP: NEGATIVE
PROTHROMBIN TIME: 12.9 SECONDS (ref 11.9–14.6)
RBC # BLD AUTO: 4.24 10*6/MM3 (ref 3.77–5.28)
RBC # UR STRIP: ABNORMAL /HPF
REF LAB TEST METHOD: ABNORMAL
SARS-COV-2 RNA PNL SPEC NAA+PROBE: NOT DETECTED
SODIUM SERPL-SCNC: 137 MMOL/L (ref 136–145)
SP GR UR STRIP: 1.02 (ref 1–1.03)
SQUAMOUS #/AREA URNS HPF: ABNORMAL /HPF
TROPONIN T SERPL-MCNC: 0.02 NG/ML (ref 0–0.03)
TROPONIN T SERPL-MCNC: 0.02 NG/ML (ref 0–0.03)
UROBILINOGEN UR QL STRIP: ABNORMAL
WBC # UR STRIP: ABNORMAL /HPF
WBC NRBC COR # BLD: 14.77 10*3/MM3 (ref 3.4–10.8)
WHOLE BLOOD HOLD SPECIMEN: NORMAL
WHOLE BLOOD HOLD SPECIMEN: NORMAL

## 2022-05-04 PROCEDURE — 96372 THER/PROPH/DIAG INJ SC/IM: CPT

## 2022-05-04 PROCEDURE — 96374 THER/PROPH/DIAG INJ IV PUSH: CPT

## 2022-05-04 PROCEDURE — 74174 CTA ABD&PLVS W/CONTRAST: CPT

## 2022-05-04 PROCEDURE — 36415 COLL VENOUS BLD VENIPUNCTURE: CPT | Performed by: FAMILY MEDICINE

## 2022-05-04 PROCEDURE — G0378 HOSPITAL OBSERVATION PER HR: HCPCS

## 2022-05-04 PROCEDURE — 85610 PROTHROMBIN TIME: CPT | Performed by: NURSE PRACTITIONER

## 2022-05-04 PROCEDURE — 96375 TX/PRO/DX INJ NEW DRUG ADDON: CPT

## 2022-05-04 PROCEDURE — 99284 EMERGENCY DEPT VISIT MOD MDM: CPT

## 2022-05-04 PROCEDURE — 81001 URINALYSIS AUTO W/SCOPE: CPT | Performed by: NURSE PRACTITIONER

## 2022-05-04 PROCEDURE — 25010000002 ONDANSETRON PER 1 MG: Performed by: NURSE PRACTITIONER

## 2022-05-04 PROCEDURE — 93005 ELECTROCARDIOGRAM TRACING: CPT

## 2022-05-04 PROCEDURE — 84484 ASSAY OF TROPONIN QUANT: CPT

## 2022-05-04 PROCEDURE — 93010 ELECTROCARDIOGRAM REPORT: CPT | Performed by: EMERGENCY MEDICINE

## 2022-05-04 PROCEDURE — 93005 ELECTROCARDIOGRAM TRACING: CPT | Performed by: FAMILY MEDICINE

## 2022-05-04 PROCEDURE — 83690 ASSAY OF LIPASE: CPT | Performed by: NURSE PRACTITIONER

## 2022-05-04 PROCEDURE — 84484 ASSAY OF TROPONIN QUANT: CPT | Performed by: FAMILY MEDICINE

## 2022-05-04 PROCEDURE — 87086 URINE CULTURE/COLONY COUNT: CPT | Performed by: NURSE PRACTITIONER

## 2022-05-04 PROCEDURE — 80053 COMPREHEN METABOLIC PANEL: CPT

## 2022-05-04 PROCEDURE — 83880 ASSAY OF NATRIURETIC PEPTIDE: CPT | Performed by: NURSE PRACTITIONER

## 2022-05-04 PROCEDURE — 87635 SARS-COV-2 COVID-19 AMP PRB: CPT | Performed by: NURSE PRACTITIONER

## 2022-05-04 PROCEDURE — 71275 CT ANGIOGRAPHY CHEST: CPT

## 2022-05-04 PROCEDURE — 71045 X-RAY EXAM CHEST 1 VIEW: CPT

## 2022-05-04 PROCEDURE — 25010000002 ENOXAPARIN PER 10 MG: Performed by: FAMILY MEDICINE

## 2022-05-04 PROCEDURE — 0 IOPAMIDOL PER 1 ML: Performed by: NURSE PRACTITIONER

## 2022-05-04 PROCEDURE — 85025 COMPLETE CBC W/AUTO DIFF WBC: CPT

## 2022-05-04 RX ORDER — ENOXAPARIN SODIUM 100 MG/ML
40 INJECTION SUBCUTANEOUS EVERY 24 HOURS
Status: DISCONTINUED | OUTPATIENT
Start: 2022-05-04 | End: 2022-05-07 | Stop reason: HOSPADM

## 2022-05-04 RX ORDER — ONDANSETRON 2 MG/ML
4 INJECTION INTRAMUSCULAR; INTRAVENOUS EVERY 6 HOURS PRN
Status: DISCONTINUED | OUTPATIENT
Start: 2022-05-04 | End: 2022-05-07 | Stop reason: HOSPADM

## 2022-05-04 RX ORDER — ALUMINA, MAGNESIA, AND SIMETHICONE 2400; 2400; 240 MG/30ML; MG/30ML; MG/30ML
15 SUSPENSION ORAL EVERY 6 HOURS PRN
Status: DISCONTINUED | OUTPATIENT
Start: 2022-05-04 | End: 2022-05-07 | Stop reason: HOSPADM

## 2022-05-04 RX ORDER — GABAPENTIN 300 MG/1
600 CAPSULE ORAL EVERY 8 HOURS SCHEDULED
Status: DISCONTINUED | OUTPATIENT
Start: 2022-05-04 | End: 2022-05-07 | Stop reason: HOSPADM

## 2022-05-04 RX ORDER — ASPIRIN 81 MG/1
324 TABLET, CHEWABLE ORAL ONCE
Status: COMPLETED | OUTPATIENT
Start: 2022-05-04 | End: 2022-05-04

## 2022-05-04 RX ORDER — COLCHICINE 0.6 MG/1
0.3 TABLET ORAL DAILY
Status: DISCONTINUED | OUTPATIENT
Start: 2022-05-05 | End: 2022-05-05

## 2022-05-04 RX ORDER — METOPROLOL SUCCINATE 25 MG/1
25 TABLET, EXTENDED RELEASE ORAL DAILY
Status: DISCONTINUED | OUTPATIENT
Start: 2022-05-05 | End: 2022-05-07 | Stop reason: HOSPADM

## 2022-05-04 RX ORDER — ONDANSETRON 2 MG/ML
4 INJECTION INTRAMUSCULAR; INTRAVENOUS ONCE
Status: COMPLETED | OUTPATIENT
Start: 2022-05-04 | End: 2022-05-04

## 2022-05-04 RX ORDER — SODIUM CHLORIDE 0.9 % (FLUSH) 0.9 %
10 SYRINGE (ML) INJECTION AS NEEDED
Status: DISCONTINUED | OUTPATIENT
Start: 2022-05-04 | End: 2022-05-07 | Stop reason: HOSPADM

## 2022-05-04 RX ORDER — SODIUM CHLORIDE 0.9 % (FLUSH) 0.9 %
10 SYRINGE (ML) INJECTION AS NEEDED
Status: DISCONTINUED | OUTPATIENT
Start: 2022-05-04 | End: 2022-05-05 | Stop reason: SDUPTHER

## 2022-05-04 RX ORDER — FAMOTIDINE 10 MG/ML
20 INJECTION, SOLUTION INTRAVENOUS ONCE
Status: COMPLETED | OUTPATIENT
Start: 2022-05-04 | End: 2022-05-04

## 2022-05-04 RX ORDER — ASPIRIN 81 MG/1
81 TABLET, CHEWABLE ORAL DAILY
Status: DISCONTINUED | OUTPATIENT
Start: 2022-05-05 | End: 2022-05-07 | Stop reason: HOSPADM

## 2022-05-04 RX ORDER — SODIUM CHLORIDE 0.9 % (FLUSH) 0.9 %
10 SYRINGE (ML) INJECTION EVERY 12 HOURS SCHEDULED
Status: DISCONTINUED | OUTPATIENT
Start: 2022-05-04 | End: 2022-05-07 | Stop reason: HOSPADM

## 2022-05-04 RX ORDER — FAMOTIDINE 20 MG/1
20 TABLET, FILM COATED ORAL DAILY
Status: DISCONTINUED | OUTPATIENT
Start: 2022-05-05 | End: 2022-05-07 | Stop reason: HOSPADM

## 2022-05-04 RX ORDER — ATORVASTATIN CALCIUM 40 MG/1
80 TABLET, FILM COATED ORAL NIGHTLY
Status: DISCONTINUED | OUTPATIENT
Start: 2022-05-04 | End: 2022-05-07 | Stop reason: HOSPADM

## 2022-05-04 RX ORDER — ACETAMINOPHEN 325 MG/1
650 TABLET ORAL EVERY 4 HOURS PRN
Status: DISCONTINUED | OUTPATIENT
Start: 2022-05-04 | End: 2022-05-07 | Stop reason: HOSPADM

## 2022-05-04 RX ORDER — ALPRAZOLAM 0.5 MG/1
0.5 TABLET ORAL 3 TIMES DAILY PRN
Status: DISCONTINUED | OUTPATIENT
Start: 2022-05-04 | End: 2022-05-07 | Stop reason: HOSPADM

## 2022-05-04 RX ORDER — FUROSEMIDE 20 MG/1
20 TABLET ORAL DAILY
Status: DISCONTINUED | OUTPATIENT
Start: 2022-05-05 | End: 2022-05-05

## 2022-05-04 RX ORDER — OXYCODONE AND ACETAMINOPHEN 7.5; 325 MG/1; MG/1
1 TABLET ORAL EVERY 4 HOURS PRN
Status: DISCONTINUED | OUTPATIENT
Start: 2022-05-04 | End: 2022-05-05

## 2022-05-04 RX ORDER — LISINOPRIL 5 MG/1
5 TABLET ORAL DAILY
Status: DISCONTINUED | OUTPATIENT
Start: 2022-05-05 | End: 2022-05-07 | Stop reason: HOSPADM

## 2022-05-04 RX ORDER — ATORVASTATIN CALCIUM 40 MG/1
80 TABLET, FILM COATED ORAL DAILY
Status: DISCONTINUED | OUTPATIENT
Start: 2022-05-05 | End: 2022-05-04

## 2022-05-04 RX ADMIN — FAMOTIDINE 20 MG: 10 INJECTION INTRAVENOUS at 19:48

## 2022-05-04 RX ADMIN — ATORVASTATIN CALCIUM 80 MG: 40 TABLET, FILM COATED ORAL at 23:39

## 2022-05-04 RX ADMIN — HYDROMORPHONE HYDROCHLORIDE 1 MG: 1 INJECTION, SOLUTION INTRAMUSCULAR; INTRAVENOUS; SUBCUTANEOUS at 16:49

## 2022-05-04 RX ADMIN — IOPAMIDOL 100 ML: 755 INJECTION, SOLUTION INTRAVENOUS at 16:19

## 2022-05-04 RX ADMIN — ASPIRIN 243 MG: 81 TABLET, CHEWABLE ORAL at 16:51

## 2022-05-04 RX ADMIN — ENOXAPARIN SODIUM 40 MG: 40 INJECTION SUBCUTANEOUS at 20:20

## 2022-05-04 RX ADMIN — GABAPENTIN 600 MG: 300 CAPSULE ORAL at 23:39

## 2022-05-04 RX ADMIN — ONDANSETRON 4 MG: 2 INJECTION INTRAMUSCULAR; INTRAVENOUS at 16:47

## 2022-05-04 NOTE — ED PROVIDER NOTES
"Subjective   Patient is a 72-year-old white female presents the emergency department per private vehicle with complaints of substernal chest pain and shortness of breath that started yesterday.  Asked the patient to elaborate on her symptoms and she keeps saying \"I do not know, I do not know, I do not know.\"  She states she went to see Dr. James for an appointment today and he sent her over here to the emergency department to be evaluated because she was having chest pain.  She is very anxious.  She is a poor historian although she does mention that she was life flighted last year to [employer] because of a AAA.  She denies any nausea.  She states the pain has been constant since last night.  She denies any nausea or vomiting.  She states that \"I cannot get comfortable at all.\"  She is very restless on the bed.      History provided by:  Patient   used: No        Review of Systems   Constitutional: Negative.    HENT: Negative.    Eyes: Negative.    Respiratory: Negative.    Cardiovascular:        Patient is a 72-year-old white female presents the emergency department per private vehicle with complaints of substernal chest pain and shortness of breath that started yesterday.  Asked the patient to elaborate on her symptoms and she keeps saying \"I do not know, I do not know, I do not know.\"  She states she went to see Dr. James for an appointment today and he sent her over here to the emergency department to be evaluated because she was having chest pain.  She is very anxious.  She is a poor historian although she does mention that she was life flighted last year to [employer] because of a AAA.  She denies any nausea.  She states the pain has been constant since last night.  She denies any nausea or vomiting.  She states that \"I cannot get comfortable at all.\"  She is very restless on the bed.     Gastrointestinal: Negative.    Endocrine: Negative.    Genitourinary: Negative.    Musculoskeletal: " Negative.    Skin: Negative.    Allergic/Immunologic: Negative.    Neurological: Negative.    Hematological: Negative.    Psychiatric/Behavioral: Negative.    All other systems reviewed and are negative.      Past Medical History:   Diagnosis Date   • AAA (abdominal aortic aneurysm) (HCC)    • Arthritis    • Asthma    • Chronic kidney disease (CKD)    • Confusion 7/13/2021   • COPD (chronic obstructive pulmonary disease) (HCC)    • Gastroesophageal reflux disease with esophagitis without hemorrhage    • Heart murmur    • Hiatal hernia    • Hypertension    • Inflammation of arteries (HCC) 7/13/2021   • Thoracic aortic aneurysm (HCC)        Allergies   Allergen Reactions   • Levaquin [Levofloxacin] Hives   • Naprosyn [Naproxen] Hives   • Sulfa Antibiotics Hives       Past Surgical History:   Procedure Laterality Date   • BACK SURGERY     • EYE SURGERY Bilateral    • FOOT FASCIOTOMY Bilateral    • HYSTERECTOMY     • LOWER LEG SOFT TISSUE TUMOR EXCISION     • LUMBAR NERVE STIMLATOR INSERTION Right    • LYMPH NODE DISSECTION     • WRIST FRACTURE SURGERY Right     x2       Family History   Problem Relation Age of Onset   • Hypertension Mother        Social History     Socioeconomic History   • Marital status:    Tobacco Use   • Smoking status: Former Smoker     Years: 55.00     Quit date: 2019     Years since quitting: 3.3   • Smokeless tobacco: Never Used   Vaping Use   • Vaping Use: Never used   Substance and Sexual Activity   • Alcohol use: Never   • Drug use: Never   • Sexual activity: Defer       Prior to Admission medications    Medication Sig Start Date End Date Taking? Authorizing Provider   ALPRAZolam (XANAX) 0.5 MG tablet Take 0.5 mg by mouth 3 (Three) Times a Day As Needed for Anxiety.    Minnie Paige MD   aspirin 81 MG chewable tablet Chew 81 mg Daily.    ProviderMinnie MD   atorvastatin (LIPITOR) 80 MG tablet Take 80 mg by mouth Daily.    Minnie Paige MD   calcium carbonate  "(TUMS) 500 MG chewable tablet Chew 1 tablet Daily.    Minnie Paige MD   cloNIDine (CATAPRES) 0.1 MG tablet Take 1 tablet by mouth Every 6 (Six) Hours As Needed for High Blood Pressure (for BP systolic over 170). 6/13/21   Etienne Dunaway DO   colchicine 0.6 MG tablet Take 0.3 mg by mouth Daily. 4/30/21   Minnie Paige MD   cyclobenzaprine (FLEXERIL) 10 MG tablet Take 10 mg by mouth 3 (Three) Times a Day As Needed for Muscle Spasms.    Minnie Paige MD   dexamethasone (DECADRON) 1 MG tablet  6/16/21   Minnie Paige MD   dicyclomine (BENTYL) 10 MG capsule Take 10 mg by mouth 3 (Three) Times a Day As Needed.    Minnie Paige MD   famotidine (PEPCID) 20 MG tablet Take 20 mg by mouth Daily.    Minnie Paige MD   folic acid (FOLVITE) 1 MG tablet Take 1 mg by mouth Daily.    Minnie Paige MD   furosemide (LASIX) 20 MG tablet Take 1 tablet by mouth Daily. 9/15/21   Etienne Dunaway DO   gabapentin (NEURONTIN) 600 MG tablet Take 600 mg by mouth 3 (Three) Times a Day. Takes twice daily    Minnie Paige MD   lisinopril (PRINIVIL,ZESTRIL) 5 MG tablet Take 5 mg by mouth Daily.    Minnie Paige MD   metoprolol succinate XL (TOPROL-XL) 25 MG 24 hr tablet Take 25 mg by mouth Daily.    Minnie Paige MD   oxyCODONE-acetaminophen (PERCOCET) 7.5-325 MG per tablet Take 1 tablet by mouth Every 4 (Four) Hours As Needed.    Minnie Paige MD       /73   Pulse 71   Resp (!) 35   Ht 160 cm (63\")   Wt 85.7 kg (189 lb)   SpO2 100%   BMI 33.48 kg/m²     Objective   Physical Exam  Vitals and nursing note reviewed.   Constitutional:       Appearance: She is well-developed.      Comments: Patient is very anxious gripping her chest.  States \"I have to have something for this pain.\"   HENT:      Head: Normocephalic and atraumatic.   Eyes:      Conjunctiva/sclera: Conjunctivae normal.      Pupils: Pupils are equal, round, and " reactive to light.   Neck:      Thyroid: No thyromegaly.      Trachea: No tracheal deviation.   Cardiovascular:      Rate and Rhythm: Normal rate and regular rhythm.      Heart sounds: Normal heart sounds.   Pulmonary:      Effort: Pulmonary effort is normal. No respiratory distress.      Breath sounds: Normal breath sounds. No wheezing or rales.   Chest:      Chest wall: No tenderness.   Abdominal:      General: Bowel sounds are normal.      Palpations: Abdomen is soft.      Comments: Abd soft, non distended. Tenderness midepigastic area. No guarding or rebound    Musculoskeletal:         General: Normal range of motion.      Cervical back: Normal range of motion and neck supple.      Comments: Pedal pulses palp    Skin:     General: Skin is warm and dry.      Comments: Mild pallor   Neurological:      Mental Status: She is alert and oriented to person, place, and time.      Cranial Nerves: No cranial nerve deficit.      Deep Tendon Reflexes: Reflexes are normal and symmetric.   Psychiatric:         Behavior: Behavior normal.         Thought Content: Thought content normal.         Judgment: Judgment normal.         Procedures         Lab Results (last 24 hours)     Procedure Component Value Units Date/Time    CBC & Differential [[id number]]  (Abnormal) Collected: 05/04/22 1700    Specimen: Blood Updated: 05/04/22 8622    Narrative:      The following orders were created for panel order CBC & Differential.  Procedure                               Abnormality         Status                     ---------                               -----------         ------                     CBC Auto Differential[[id number]]        Abnormal            Final result                 Please view results for these tests on the individual orders.    Comprehensive Metabolic Panel [[id number]]  (Abnormal) Collected: 05/04/22 1700    Specimen: Blood Updated: 05/04/22 1802     Glucose 90 mg/dL      BUN 16 mg/dL      Creatinine 1.11 mg/dL       Sodium 137 mmol/L      Potassium 3.7 mmol/L      Chloride 101 mmol/L      CO2 21.0 mmol/L      Calcium 9.5 mg/dL      Total Protein 7.5 g/dL      Albumin 3.70 g/dL      ALT (SGPT) 10 U/L      AST (SGOT) 17 U/L      Alkaline Phosphatase 132 U/L      Total Bilirubin 0.4 mg/dL      Globulin 3.8 gm/dL      A/G Ratio 1.0 g/dL      BUN/Creatinine Ratio 14.4     Anion Gap 15.0 mmol/L      eGFR 53.6 mL/min/1.73      Comment: National Kidney Foundation and American Society of Nephrology (ASN) Task Force recommended calculation based on the Chronic Kidney Disease Epidemiology Collaboration (CKD-EPI) equation refit without adjustment for race.       Narrative:      GFR Normal >60  Chronic Kidney Disease <60  Kidney Failure <15      Troponin [[id number]]  (Normal) Collected: 05/04/22 1700    Specimen: Blood Updated: 05/04/22 1800     Troponin T 0.020 ng/mL     Narrative:      Troponin T Reference Range:  <= 0.03 ng/mL-   Negative for AMI  >0.03 ng/mL-     Abnormal for myocardial necrosis.  Clinicians would have to utilize clinical acumen, EKG, Troponin and serial changes to determine if it is an Acute Myocardial Infarction or myocardial injury due to an underlying chronic condition.       Results may be falsely decreased if patient taking Biotin.      CBC Auto Differential [[id number]]  (Abnormal) Collected: 05/04/22 1700    Specimen: Blood Updated: 05/04/22 1719     WBC 14.77 10*3/mm3      RBC 4.24 10*6/mm3      Hemoglobin 11.1 g/dL      Hematocrit 37.4 %      MCV 88.2 fL      MCH 26.2 pg      MCHC 29.7 g/dL      RDW 15.8 %      RDW-SD 49.5 fl      MPV 10.4 fL      Platelets 454 10*3/mm3      Neutrophil % 72.6 %      Lymphocyte % 17.5 %      Monocyte % 8.7 %      Eosinophil % 0.4 %      Basophil % 0.4 %      Immature Grans % 0.4 %      Neutrophils, Absolute 10.73 10*3/mm3      Lymphocytes, Absolute 2.58 10*3/mm3      Monocytes, Absolute 1.28 10*3/mm3      Eosinophils, Absolute 0.06 10*3/mm3      Basophils, Absolute 0.06  10*3/mm3      Immature Grans, Absolute 0.06 10*3/mm3      nRBC 0.0 /100 WBC     Protime-INR [[id number]]  (Normal) Collected: 05/04/22 1700    Specimen: Blood from Arm, Left Updated: 05/04/22 1726     Protime 12.9 Seconds      INR 1.01    Lipase [[id number]]  (Normal) Collected: 05/04/22 1700    Specimen: Blood Updated: 05/04/22 1757     Lipase 25 U/L     Urinalysis With Culture If Indicated - Urine, Clean Catch [[id number]]  (Abnormal) Collected: 05/04/22 1700    Specimen: Urine, Clean Catch Updated: 05/04/22 1728     Color, UA Yellow     Appearance, UA Clear     pH, UA 7.0     Specific Gravity, UA 1.016     Glucose, UA Negative     Ketones, UA Negative     Bilirubin, UA Negative     Blood, UA Negative     Protein, UA Negative     Leuk Esterase, UA Moderate (2+)     Nitrite, UA Negative     Urobilinogen, UA 0.2 E.U./dL    BNP [[id number]]  (Abnormal) Collected: 05/04/22 1700    Specimen: Blood Updated: 05/04/22 1759     proBNP 2,183.0 pg/mL     Narrative:      Among patients with dyspnea, NT-proBNP is highly sensitive for the detection of acute congestive heart failure. In addition NT-proBNP of <300 pg/ml effectively rules out acute congestive heart failure with 99% negative predictive value.    Results may be falsely decreased if patient taking Biotin.      Urinalysis, Microscopic Only - Urine, Clean Catch [[id number]]  (Abnormal) Collected: 05/04/22 1700    Specimen: Urine, Clean Catch Updated: 05/04/22 1728     RBC, UA 0-2 /HPF      WBC, UA 6-12 /HPF      Bacteria, UA None Seen /HPF      Squamous Epithelial Cells, UA 3-6 /HPF      Hyaline Casts, UA None Seen /LPF      Methodology Automated Microscopy    Urine Culture - Urine, Urine, Clean Catch [[id number]] Collected: 05/04/22 1700    Specimen: Urine, Clean Catch Updated: 05/04/22 1728          CT Angiogram Chest   Final Result   1. Stable aneurysms involving the common origin of the right   brachiocephalic/Left common carotid arteries, left subclavian, and  "  descending thoracic aorta with scattered regions of ulcerative plaque.   Patient with history of underlying vasculitis. No discrete dissection.   2. No pulmonary emboli.   3. Moderate coronary artery  calcification. Cardiomegaly.   4. Large hiatal hernia   5. Moderate emphysema with no acute consolidation or suspicious   nodularity.    This report was finalized on 05/04/2022 16:47 by Dr. Lucy Mccloud MD.      CT Angiogram Abdomen Pelvis   Final Result      XR Chest 1 View   Final Result          ED Course  ED Course as of 05/04/22 1857   Wed May 04, 2022   1525 Pt has hx of AAA and thoracic aneurysm- pt is here with \" crushing chest pain and shortness of breath\"pt is very restless.  have ordered cta chest and cta abd to r/o dissection.  [CW]   1742 Pt states that her pain is much better at this time. Reviewed results of cta of chest and cta abd/pelvis with pt and pt daughter. Troponin 0.020. will speak with hospitalist at this time for further.  [CW]   1825 Spoke with Dr. Mathew- hospitalist on call- has accepted for admission. Pt in agreement with care plan. No dissection noted on cta chest and cta abd. Pt states that she was supposed to see Dr. Nuñez several months ago but had covid and has not resched.  [CW]      ED Course User Index  [CW] Oneyda Horvath, CALE          MDM  Number of Diagnoses or Management Options  Chest pain, unspecified type: new and requires workup  Hx of aneurysm: minor  Primary hypertension: minor     Amount and/or Complexity of Data Reviewed  Clinical lab tests: ordered and reviewed  Tests in the radiology section of CPT®: ordered and reviewed  Decide to obtain previous medical records or to obtain history from someone other than the patient: yes    Patient Progress  Patient progress: stable      Final diagnoses:   Chest pain, unspecified type   Hx of aneurysm   Primary hypertension          Oneyda Horvath, APRN  05/04/22 1857    "

## 2022-05-04 NOTE — H&P
[facility] Medicine Services  HISTORY AND PHYSICAL    Date of Admission: 5/4/2022  Primary Care Physician: Den James DO    Subjective     Chief Complaint:   Chest pain, nausea, epigastric discomfort    History of Present Illness    This 72-year-old female was sent to the emergency department by her primary care physician because of her complaints of chest pain, nausea and epigastric discomfort.  The patient states that she has a 1 to 2-day history of the symptoms.  Symptoms have been stable without worsening.  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  She also has a large hiatal hernia as well as gastroesophageal reflux disease and COPD.  Chest discomfort was described as very sharp but not tearing in nature and not associated with activity or exertion.  She had a stress test done just last year in June which was negative for ischemia.    Work-up in the emergency department shows stable aneurysms, large hiatal hernia and moderate emphysema.  CT angiogram of the abdomen and pelvis show infrarenal aortic aneurysm which is increased in size compared to August 2018 measuring 35 x 33 mm with a previous measurement of 30 mm.  Chest x-ray showed no acute disease.  Initial troponin in the ED was negative.  Creatinine 1.11, white blood cell count 14,800 with hemoglobin 11.1.  proBNP elevated at 2183.  Labs are otherwise unremarkable.    Review of Systems   Constitutional: Negative.    HENT: Negative.    Eyes: Negative.    Respiratory: Negative.    Cardiovascular: Positive for chest pain.   Gastrointestinal: Positive for abdominal pain and nausea.   Endocrine: Negative.    Genitourinary: Negative.    Musculoskeletal: Positive for arthralgias.   Skin: Negative.    Allergic/Immunologic: Negative.    Neurological: Negative.    Hematological: Negative.    Psychiatric/Behavioral: Negative.          Otherwise complete ROS reviewed and negative except as mentioned in the HPI.      Past Medical History:     Past Medical History:   Diagnosis Date   • AAA (abdominal aortic aneurysm) (HCC)    • Arthritis    • Asthma    • Chronic kidney disease (CKD)    • Confusion 7/13/2021   • COPD (chronic obstructive pulmonary disease) (HCC)    • Gastroesophageal reflux disease with esophagitis without hemorrhage    • Heart murmur    • Hiatal hernia    • Hypertension    • Inflammation of arteries (HCC) 7/13/2021   • Thoracic aortic aneurysm (HCC)        Past Surgical History:  Past Surgical History:   Procedure Laterality Date   • BACK SURGERY     • EYE SURGERY Bilateral    • FOOT FASCIOTOMY Bilateral    • HYSTERECTOMY     • LOWER LEG SOFT TISSUE TUMOR EXCISION     • LUMBAR NERVE STIMLATOR INSERTION Right    • LYMPH NODE DISSECTION     • WRIST FRACTURE SURGERY Right     x2       Family History:   family history includes Hypertension in her mother.    Social History:    reports that she quit smoking about 3 years ago. She quit after 55.00 years of use. She has never used smokeless tobacco. She reports that she does not drink alcohol and does not use drugs.    Allergies:  Allergies   Allergen Reactions   • Levaquin [Levofloxacin] Hives   • Naprosyn [Naproxen] Hives   • Sulfa Antibiotics Hives       Medications:  Prior to Admission medications    Medication Sig Start Date End Date Taking? Authorizing Provider   ALPRAZolam (XANAX) 0.5 MG tablet Take 0.5 mg by mouth 3 (Three) Times a Day As Needed for Anxiety.    ProviderMinnie MD   aspirin 81 MG chewable tablet Chew 81 mg Daily.    ProviderMinnie MD   atorvastatin (LIPITOR) 80 MG tablet Take 80 mg by mouth Daily.    ProviderMinnie MD   calcium carbonate (TUMS) 500 MG chewable tablet Chew 1 tablet Daily.    ProviderMinnie MD   cloNIDine (CATAPRES) 0.1 MG tablet Take 1 tablet by mouth Every 6 (Six) Hours As Needed for High Blood Pressure (for BP systolic  "over 170). 6/13/21   Etienne Dunaway DO   colchicine 0.6 MG tablet Take 0.3 mg by mouth Daily. 4/30/21   Minnie Paige MD   cyclobenzaprine (FLEXERIL) 10 MG tablet Take 10 mg by mouth 3 (Three) Times a Day As Needed for Muscle Spasms.    Minnie Paige MD   dexamethasone (DECADRON) 1 MG tablet  6/16/21   Minnie Paige MD   dicyclomine (BENTYL) 10 MG capsule Take 10 mg by mouth 3 (Three) Times a Day As Needed.    Minnie Paige MD   famotidine (PEPCID) 20 MG tablet Take 20 mg by mouth Daily.    Minnie Paige MD   folic acid (FOLVITE) 1 MG tablet Take 1 mg by mouth Daily.    Minnie Paige MD   furosemide (LASIX) 20 MG tablet Take 1 tablet by mouth Daily. 9/15/21   Etienne Dunaway DO   gabapentin (NEURONTIN) 600 MG tablet Take 600 mg by mouth 3 (Three) Times a Day. Takes twice daily    Minnie Paige MD   lisinopril (PRINIVIL,ZESTRIL) 5 MG tablet Take 5 mg by mouth Daily.    Minnie Paige MD   metoprolol succinate XL (TOPROL-XL) 25 MG 24 hr tablet Take 25 mg by mouth Daily.    Minnie Paige MD   oxyCODONE-acetaminophen (PERCOCET) 7.5-325 MG per tablet Take 1 tablet by mouth Every 4 (Four) Hours As Needed.    Minnie Paige MD I have utilized all available immediate resources to obtain, update, and review the patient's current medications.    Objective     Vital Signs:   /73   Pulse 71   Resp (!) 35   Ht 160 cm (63\")   Wt 85.7 kg (189 lb)   SpO2 100%   BMI 33.48 kg/m²     Physical Exam  Constitutional:       General: She is not in acute distress.     Appearance: Normal appearance. She is normal weight.   HENT:      Head: Normocephalic and atraumatic.      Right Ear: External ear normal.      Left Ear: External ear normal.      Nose: Nose normal.      Mouth/Throat:      Mouth: Mucous membranes are moist.      Pharynx: Oropharynx is clear.   Eyes:      General: No scleral icterus.     Extraocular Movements: " Extraocular movements intact.      Conjunctiva/sclera: Conjunctivae normal.      Pupils: Pupils are equal, round, and reactive to light.   Cardiovascular:      Rate and Rhythm: Normal rate and regular rhythm.      Pulses: Normal pulses.      Heart sounds: Normal heart sounds.   Pulmonary:      Effort: Pulmonary effort is normal.      Breath sounds: Decreased breath sounds present.   Abdominal:      General: Abdomen is flat. Bowel sounds are normal.      Palpations: Abdomen is soft.      Tenderness: There is abdominal tenderness in the epigastric area.   Musculoskeletal:         General: Normal range of motion.      Right lower leg: No edema.      Left lower leg: No edema.   Skin:     General: Skin is warm and dry.      Coloration: Skin is not pale.   Neurological:      General: No focal deficit present.      Mental Status: She is alert and oriented to person, place, and time. Mental status is at baseline.      Cranial Nerves: No cranial nerve deficit.   Psychiatric:         Mood and Affect: Mood normal.         Judgment: Judgment normal.       Results Reviewed:  Lab Results (last 24 hours)     Procedure Component Value Units Date/Time    [place] Draw [[id number]] Collected: 05/04/22 1700    Specimen: Blood Updated: 05/04/22 1802    Narrative:      The following orders were created for panel order [place] Draw.  Procedure                               Abnormality         Status                     ---------                               -----------         ------                     Green Top (Gel)[[id number]]                                  Final result               Lavender Top[[id number]]                                     Final result               Red Top[[id number]]                                          Final result               Light Blue Top[[id number]]                                   Final result                 Please view results for these tests on the individual orders.    Red Top [[id number]]  Collected: 05/04/22 1700    Specimen: Blood Updated: 05/04/22 1802     Extra Tube Hold for add-ons.     Comment: Auto resulted.       Green Top (Gel) [[id number]] Collected: 05/04/22 1700    Specimen: Blood Updated: 05/04/22 1802     Extra Tube Hold for add-ons.     Comment: Auto resulted.       Lavender Top [[id number]] Collected: 05/04/22 1700    Specimen: Blood Updated: 05/04/22 1802     Extra Tube hold for add-on     Comment: Auto resulted       Light Blue Top [[id number]] Collected: 05/04/22 1700    Specimen: Blood from Arm, Left Updated: 05/04/22 1802     Extra Tube hold for add-on     Comment: Auto resulted       Comprehensive Metabolic Panel [[id number]]  (Abnormal) Collected: 05/04/22 1700    Specimen: Blood Updated: 05/04/22 1802     Glucose 90 mg/dL      BUN 16 mg/dL      Creatinine 1.11 mg/dL      Sodium 137 mmol/L      Potassium 3.7 mmol/L      Chloride 101 mmol/L      CO2 21.0 mmol/L      Calcium 9.5 mg/dL      Total Protein 7.5 g/dL      Albumin 3.70 g/dL      ALT (SGPT) 10 U/L      AST (SGOT) 17 U/L      Alkaline Phosphatase 132 U/L      Total Bilirubin 0.4 mg/dL      Globulin 3.8 gm/dL      A/G Ratio 1.0 g/dL      BUN/Creatinine Ratio 14.4     Anion Gap 15.0 mmol/L      eGFR 53.6 mL/min/1.73      Comment: National Kidney Foundation and American Society of Nephrology (ASN) Task Force recommended calculation based on the Chronic Kidney Disease Epidemiology Collaboration (CKD-EPI) equation refit without adjustment for race.       Narrative:      GFR Normal >60  Chronic Kidney Disease <60  Kidney Failure <15      Troponin [[id number]]  (Normal) Collected: 05/04/22 1700    Specimen: Blood Updated: 05/04/22 1800     Troponin T 0.020 ng/mL     Narrative:      Troponin T Reference Range:  <= 0.03 ng/mL-   Negative for AMI  >0.03 ng/mL-     Abnormal for myocardial necrosis.  Clinicians would have to utilize clinical acumen, EKG, Troponin and serial changes to determine if it is an Acute Myocardial Infarction or  myocardial injury due to an underlying chronic condition.       Results may be falsely decreased if patient taking Biotin.      BNP [[id number]]  (Abnormal) Collected: 05/04/22 1700    Specimen: Blood Updated: 05/04/22 1759     proBNP 2,183.0 pg/mL     Narrative:      Among patients with dyspnea, NT-proBNP is highly sensitive for the detection of acute congestive heart failure. In addition NT-proBNP of <300 pg/ml effectively rules out acute congestive heart failure with 99% negative predictive value.    Results may be falsely decreased if patient taking Biotin.      Lipase [[id number]]  (Normal) Collected: 05/04/22 1700    Specimen: Blood Updated: 05/04/22 1757     Lipase 25 U/L     Urinalysis With Culture If Indicated - Urine, Clean Catch [[id number]]  (Abnormal) Collected: 05/04/22 1700    Specimen: Urine, Clean Catch Updated: 05/04/22 1728     Color, UA Yellow     Appearance, UA Clear     pH, UA 7.0     Specific Gravity, UA 1.016     Glucose, UA Negative     Ketones, UA Negative     Bilirubin, UA Negative     Blood, UA Negative     Protein, UA Negative     Leuk Esterase, UA Moderate (2+)     Nitrite, UA Negative     Urobilinogen, UA 0.2 E.U./dL    Urinalysis, Microscopic Only - Urine, Clean Catch [[id number]]  (Abnormal) Collected: 05/04/22 1700    Specimen: Urine, Clean Catch Updated: 05/04/22 1728     RBC, UA 0-2 /HPF      WBC, UA 6-12 /HPF      Bacteria, UA None Seen /HPF      Squamous Epithelial Cells, UA 3-6 /HPF      Hyaline Casts, UA None Seen /LPF      Methodology Automated Microscopy    Urine Culture - Urine, Urine, Clean Catch [[id number]] Collected: 05/04/22 1700    Specimen: Urine, Clean Catch Updated: 05/04/22 1728    Protime-INR [[id number]]  (Normal) Collected: 05/04/22 1700    Specimen: Blood from Arm, Left Updated: 05/04/22 1726     Protime 12.9 Seconds      INR 1.01    CBC & Differential [[id number]]  (Abnormal) Collected: 05/04/22 1700    Specimen: Blood Updated: 05/04/22 1719    Narrative:      The  following orders were created for panel order CBC & Differential.  Procedure                               Abnormality         Status                     ---------                               -----------         ------                     CBC Auto Differential[[id number]]        Abnormal            Final result                 Please view results for these tests on the individual orders.    CBC Auto Differential [[id number]]  (Abnormal) Collected: 05/04/22 1700    Specimen: Blood Updated: 05/04/22 1719     WBC 14.77 10*3/mm3      RBC 4.24 10*6/mm3      Hemoglobin 11.1 g/dL      Hematocrit 37.4 %      MCV 88.2 fL      MCH 26.2 pg      MCHC 29.7 g/dL      RDW 15.8 %      RDW-SD 49.5 fl      MPV 10.4 fL      Platelets 454 10*3/mm3      Neutrophil % 72.6 %      Lymphocyte % 17.5 %      Monocyte % 8.7 %      Eosinophil % 0.4 %      Basophil % 0.4 %      Immature Grans % 0.4 %      Neutrophils, Absolute 10.73 10*3/mm3      Lymphocytes, Absolute 2.58 10*3/mm3      Monocytes, Absolute 1.28 10*3/mm3      Eosinophils, Absolute 0.06 10*3/mm3      Basophils, Absolute 0.06 10*3/mm3      Immature Grans, Absolute 0.06 10*3/mm3      nRBC 0.0 /100 WBC           CT Angiogram Abdomen Pelvis    Result Date: 5/4/2022  Narrative: EXAMINATION: CT ANGIOGRAM ABDOMEN PELVIS-   5/4/2022 3:55 PM CDT  HISTORY: chest pain/ sob/ hx aneurysm/ r/o dissection  In order to have a CT radiation dose as low as reasonably achievable Automated Exposure Control was utilized for adjustment of the mA and/or KV according to patient size.  DLP in mGycm= 467.  CT angiogram abdomen/pelvis. CT angiography protocol. CT imaging with bolus IV contrast injection. Under concurrent supervision axial, sagittal, coronal, and three-dimensional data sets were constructed.  Cardiomegaly. Large hiatal hernia.  Aortic calcification. Infrarenal aneurysm with maximum diameter = 35 x 33 mm. No dissection. No retroperitoneal hemorrhage.  Normal liver, gallbladder, pancreas, and  adrenal glands. The spleen is of normal size. There are multiple calcified granulomas within the spleen. Left greater than right bilateral renal cysts. The largest cyst is at the lower pole of the left kidney and measures 31 mm.  No bowel dilation. No free fluid.  No pelvic mass.  Extensive degenerative lumbar spine change.  Summary: 1. Infrarenal aortic aneurysm has increased in size compared with August 17, 2018 (previously approximately 30 mm). 2. There is no dissection or retroperitoneal hemorrhage.            This report was finalized on 05/04/2022 16:33 by Dr. Bonilla Olvera MD.    XR Chest 1 View    Result Date: 5/4/2022  Narrative: EXAMINATION: XR CHEST 1 VW-  5/4/2022 3:40 PM CDT  HISTORY: Chest Pain Triage Protocol  1 view chest x-ray.  Comparison is made with June 11, 2021.  Cardiomegaly. Aortic arch calcification. Thoracic stimulator leads.  Chronic interstitial lung disease with scattered granulomas.  Mild hyperexpansion.  Hiatal hernia noted.  Summary: 1. Stable chronic lung changes. This report was finalized on 05/04/2022 15:46 by Dr. Bonilla Olvera MD.    CT Angiogram Chest    Result Date: 5/4/2022  Narrative: CT ANGIOGRAM CHEST- 5/4/2022 3:55 PM CDT  HISTORY: chest pain/ hx aortic aneurysm/ r/o dissection  COMPARISON: 6/9/2021  DOSE LENGTH PRODUCT: 467 mGy cm. Automated exposure control was also utilized to decrease patient radiation dose.  TECHNIQUE: Axial images of the chest are obtained following IV contrast. 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 There is aneurysmal distention of the common vessel origin from the arch of the aorta measuring 4 cm containing similar ulcerative plaque. There is aneurysm of the proximal left subclavian artery containing ulcerative plaque measuring 1.8 cm. The ascending root aorta measures 3.7 cm.  Ascending aorta similar in size measuring approximately 4 cm. The ascending thoracic aorta with moderate partially calcified plaque measuring 3.4 cm. There is a small saccular aneurysm from the descending thoracic aorta measuring 3.8 cm in width/2.7 cm in length. No discrete intimal flap of dissection identified. Cardiomegaly. Scattered vascular calcification involving the coronary arteries. Large hiatal hernia. No pericardial or pleural effusion. No pathologic intrathoracic or axillary lymphadenopathy.  Moderate emphysema. Calcified granuloma left lower lobe. No consolidation. No pneumothorax.  Dorsal column stimulator device.  Moderate degenerative change thoracolumbar spine. No focal destructive osseous lesions.      Impression: 1. Stable aneurysms involving the common origin of the right brachiocephalic/Left common carotid arteries, left subclavian, and descending thoracic aorta with scattered regions of ulcerative plaque. Patient with history of underlying vasculitis. No discrete dissection. 2. No pulmonary emboli. 3. Moderate coronary artery  calcification. Cardiomegaly. 4. Large hiatal hernia 5. Moderate emphysema with no acute consolidation or suspicious nodularity. This report was finalized on 05/04/2022 16:47 by Dr. Lucy Mccloud MD.     I have personally reviewed and interpreted the radiology studies and ECG obtained at time of admission.     Assessment / Plan      Assessment & Plan  Active Hospital Problems    Diagnosis    • **Atypical chest pain    • Hypertension    • Hiatal hernia    • Gastroesophageal reflux disease with esophagitis without hemorrhage        PLAN:   Admit to the telemetry floor  Stress echo in a.m.  Serial troponins  Repeat CMP and CBC in a.m.  Restart home medications    Code Status:   Full code  Surrogate decision makers the patient's daughter     I discussed the patient's findings and my recommendations with: The patient and her daughter    Estimated length of stay: 1  day    Electronically signed by Neymar Mathew DO, 05/04/22, 18:42 CDT.

## 2022-05-05 PROBLEM — N18.31 STAGE 3A CHRONIC KIDNEY DISEASE: Status: ACTIVE | Noted: 2022-05-05

## 2022-05-05 PROBLEM — F17.201 TOBACCO ABUSE, IN REMISSION: Status: ACTIVE | Noted: 2022-05-05

## 2022-05-05 PROBLEM — I72.9 ANEURYSM (HCC): Status: ACTIVE | Noted: 2022-05-05

## 2022-05-05 PROBLEM — E66.9 OBESITY (BMI 30-39.9): Status: ACTIVE | Noted: 2022-05-05

## 2022-05-05 PROBLEM — I21.4 NSTEMI (NON-ST ELEVATED MYOCARDIAL INFARCTION): Status: ACTIVE | Noted: 2022-05-05

## 2022-05-05 PROBLEM — I50.32 CHRONIC DIASTOLIC CHF (CONGESTIVE HEART FAILURE): Status: ACTIVE | Noted: 2022-05-05

## 2022-05-05 LAB
ANION GAP SERPL CALCULATED.3IONS-SCNC: 8 MMOL/L (ref 5–15)
ANISOCYTOSIS BLD QL: ABNORMAL
BACTERIA SPEC AEROBE CULT: NO GROWTH
BUN SERPL-MCNC: 17 MG/DL (ref 8–23)
BUN/CREAT SERPL: 13.2 (ref 7–25)
CALCIUM SPEC-SCNC: 9.3 MG/DL (ref 8.6–10.5)
CHLORIDE SERPL-SCNC: 105 MMOL/L (ref 98–107)
CHOLEST SERPL-MCNC: 97 MG/DL (ref 0–200)
CO2 SERPL-SCNC: 27 MMOL/L (ref 22–29)
CREAT SERPL-MCNC: 1.29 MG/DL (ref 0.57–1)
DEPRECATED RDW RBC AUTO: 53 FL (ref 37–54)
EGFRCR SERPLBLD CKD-EPI 2021: 44.7 ML/MIN/1.73
EOSINOPHIL # BLD MANUAL: 0.29 10*3/MM3 (ref 0–0.4)
EOSINOPHIL NFR BLD MANUAL: 2 % (ref 0.3–6.2)
ERYTHROCYTE [DISTWIDTH] IN BLOOD BY AUTOMATED COUNT: 16.1 % (ref 12.3–15.4)
GLUCOSE SERPL-MCNC: 146 MG/DL (ref 65–99)
HCT VFR BLD AUTO: 36.4 % (ref 34–46.6)
HDLC SERPL-MCNC: 40 MG/DL (ref 40–60)
HGB BLD-MCNC: 10.3 G/DL (ref 12–15.9)
LDLC SERPL CALC-MCNC: 40 MG/DL (ref 0–100)
LDLC/HDLC SERPL: 1 {RATIO}
LYMPHOCYTES # BLD MANUAL: 3.84 10*3/MM3 (ref 0.7–3.1)
LYMPHOCYTES NFR BLD MANUAL: 6.1 % (ref 5–12)
MCH RBC QN AUTO: 26.1 PG (ref 26.6–33)
MCHC RBC AUTO-ENTMCNC: 28.3 G/DL (ref 31.5–35.7)
MCV RBC AUTO: 92.2 FL (ref 79–97)
MICROCYTES BLD QL: ABNORMAL
MONOCYTES # BLD: 0.89 10*3/MM3 (ref 0.1–0.9)
NEUTROPHILS # BLD AUTO: 9.6 10*3/MM3 (ref 1.7–7)
NEUTROPHILS NFR BLD MANUAL: 65.7 % (ref 42.7–76)
PLAT MORPH BLD: NORMAL
PLATELET # BLD AUTO: 417 10*3/MM3 (ref 140–450)
PMV BLD AUTO: 10.7 FL (ref 6–12)
POLYCHROMASIA BLD QL SMEAR: ABNORMAL
POTASSIUM SERPL-SCNC: 4 MMOL/L (ref 3.5–5.2)
QT INTERVAL: 404 MS
QT INTERVAL: 430 MS
QTC INTERVAL: 463 MS
QTC INTERVAL: 483 MS
RBC # BLD AUTO: 3.95 10*6/MM3 (ref 3.77–5.28)
SODIUM SERPL-SCNC: 140 MMOL/L (ref 136–145)
TRIGL SERPL-MCNC: 86 MG/DL (ref 0–150)
TROPONIN T SERPL-MCNC: 0.03 NG/ML (ref 0–0.03)
TROPONIN T SERPL-MCNC: 0.05 NG/ML (ref 0–0.03)
TROPONIN T SERPL-MCNC: 0.05 NG/ML (ref 0–0.03)
VARIANT LYMPHS NFR BLD MANUAL: 26.3 % (ref 19.6–45.3)
VLDLC SERPL-MCNC: 17 MG/DL (ref 5–40)
WBC MORPH BLD: NORMAL
WBC NRBC COR # BLD: 14.61 10*3/MM3 (ref 3.4–10.8)

## 2022-05-05 PROCEDURE — 84484 ASSAY OF TROPONIN QUANT: CPT | Performed by: FAMILY MEDICINE

## 2022-05-05 PROCEDURE — 80061 LIPID PANEL: CPT | Performed by: INTERNAL MEDICINE

## 2022-05-05 PROCEDURE — 80048 BASIC METABOLIC PNL TOTAL CA: CPT | Performed by: FAMILY MEDICINE

## 2022-05-05 PROCEDURE — G0378 HOSPITAL OBSERVATION PER HR: HCPCS

## 2022-05-05 PROCEDURE — 96361 HYDRATE IV INFUSION ADD-ON: CPT

## 2022-05-05 PROCEDURE — 85025 COMPLETE CBC W/AUTO DIFF WBC: CPT | Performed by: FAMILY MEDICINE

## 2022-05-05 PROCEDURE — 93005 ELECTROCARDIOGRAM TRACING: CPT | Performed by: INTERNAL MEDICINE

## 2022-05-05 PROCEDURE — 85007 BL SMEAR W/DIFF WBC COUNT: CPT | Performed by: FAMILY MEDICINE

## 2022-05-05 PROCEDURE — 93010 ELECTROCARDIOGRAM REPORT: CPT | Performed by: INTERNAL MEDICINE

## 2022-05-05 PROCEDURE — 96372 THER/PROPH/DIAG INJ SC/IM: CPT

## 2022-05-05 PROCEDURE — 99214 OFFICE O/P EST MOD 30 MIN: CPT | Performed by: INTERNAL MEDICINE

## 2022-05-05 PROCEDURE — 84484 ASSAY OF TROPONIN QUANT: CPT | Performed by: INTERNAL MEDICINE

## 2022-05-05 PROCEDURE — 25010000002 ENOXAPARIN PER 10 MG: Performed by: FAMILY MEDICINE

## 2022-05-05 RX ORDER — DULOXETIN HYDROCHLORIDE 30 MG/1
30 CAPSULE, DELAYED RELEASE ORAL 2 TIMES DAILY
COMMUNITY

## 2022-05-05 RX ORDER — HYDROCODONE BITARTRATE AND ACETAMINOPHEN 10; 325 MG/1; MG/1
1 TABLET ORAL EVERY 6 HOURS PRN
Status: DISCONTINUED | OUTPATIENT
Start: 2022-05-05 | End: 2022-05-07 | Stop reason: HOSPADM

## 2022-05-05 RX ORDER — DULOXETIN HYDROCHLORIDE 30 MG/1
30 CAPSULE, DELAYED RELEASE ORAL 2 TIMES DAILY
Status: DISCONTINUED | OUTPATIENT
Start: 2022-05-05 | End: 2022-05-07 | Stop reason: HOSPADM

## 2022-05-05 RX ORDER — HYDROCODONE BITARTRATE AND ACETAMINOPHEN 10; 325 MG/1; MG/1
1 TABLET ORAL EVERY 6 HOURS PRN
COMMUNITY
End: 2023-03-14

## 2022-05-05 RX ORDER — DICYCLOMINE HYDROCHLORIDE 10 MG/1
10 CAPSULE ORAL 3 TIMES DAILY PRN
Status: DISCONTINUED | OUTPATIENT
Start: 2022-05-05 | End: 2022-05-07 | Stop reason: HOSPADM

## 2022-05-05 RX ORDER — SODIUM CHLORIDE 9 MG/ML
100 INJECTION, SOLUTION INTRAVENOUS CONTINUOUS
Status: DISCONTINUED | OUTPATIENT
Start: 2022-05-05 | End: 2022-05-07 | Stop reason: HOSPADM

## 2022-05-05 RX ADMIN — FAMOTIDINE 20 MG: 20 TABLET, FILM COATED ORAL at 09:15

## 2022-05-05 RX ADMIN — LISINOPRIL 5 MG: 5 TABLET ORAL at 09:15

## 2022-05-05 RX ADMIN — OXYCODONE HYDROCHLORIDE AND ACETAMINOPHEN 1 TABLET: 7.5; 325 TABLET ORAL at 09:22

## 2022-05-05 RX ADMIN — DULOXETINE HYDROCHLORIDE 30 MG: 30 CAPSULE, DELAYED RELEASE ORAL at 11:48

## 2022-05-05 RX ADMIN — Medication 10 ML: at 09:15

## 2022-05-05 RX ADMIN — METOPROLOL SUCCINATE 25 MG: 25 TABLET, EXTENDED RELEASE ORAL at 09:15

## 2022-05-05 RX ADMIN — GABAPENTIN 600 MG: 300 CAPSULE ORAL at 20:08

## 2022-05-05 RX ADMIN — FUROSEMIDE 20 MG: 20 TABLET ORAL at 09:15

## 2022-05-05 RX ADMIN — SODIUM CHLORIDE 100 ML/HR: 9 INJECTION, SOLUTION INTRAVENOUS at 20:15

## 2022-05-05 RX ADMIN — ENOXAPARIN SODIUM 40 MG: 40 INJECTION SUBCUTANEOUS at 20:08

## 2022-05-05 RX ADMIN — ATORVASTATIN CALCIUM 80 MG: 40 TABLET, FILM COATED ORAL at 20:08

## 2022-05-05 RX ADMIN — GABAPENTIN 600 MG: 300 CAPSULE ORAL at 06:36

## 2022-05-05 RX ADMIN — ASPIRIN 81 MG: 81 TABLET, CHEWABLE ORAL at 09:15

## 2022-05-05 RX ADMIN — Medication 10 ML: at 20:09

## 2022-05-05 RX ADMIN — HYDROCODONE BITARTRATE AND ACETAMINOPHEN 1 TABLET: 10; 325 TABLET ORAL at 20:12

## 2022-05-05 RX ADMIN — DICYCLOMINE HYDROCHLORIDE 10 MG: 10 CAPSULE ORAL at 23:22

## 2022-05-05 RX ADMIN — DULOXETINE HYDROCHLORIDE 30 MG: 30 CAPSULE, DELAYED RELEASE ORAL at 20:08

## 2022-05-05 RX ADMIN — GABAPENTIN 600 MG: 300 CAPSULE ORAL at 13:51

## 2022-05-05 NOTE — CONSULTS
Referring Provider: No Known Provider    Reason for Consultation: Chest pain; elevated troponin    Chief complaint:   Chief Complaint   Patient presents with   • Shortness of Breath   • Syncope       Subjective .     History of present illness:  Cheryl Taylor is a 72 y.o. female with hypertension, hyperlipidemia history of vasculitis, known aneurysms of the right brachiocephalic, left common carotid, left subclavian, and descending thoracic aorta as well as the abdominal aorta, chronic kidney disease with baseline 1.3 and COPD.   Patient was admitted 6/2021 with chest pain associated with hypertension. She had mild elevation of troponin during that admission up to 0.032. She had a nuclear stress test done 6/2021 that was read as intermediate risk with no significant perfusion defects. She reports mild chest pain that has occurred chronically that lasts minutes and then resolves on its own. This chest pain occurs randomly.   Patient presented to ER on 5/4/2022 complaining of substernal chest pain and shortness of breath that started the evening prior. ER workup revealed BNP 2,183, WBC 14, Cr 1.29, Hgb 10.3. Initial troponin was 0.020 and has continued to rise slowly; 0.022, 0.034, 0.047, 0.053.   CTA chest revealed stable aneurysms, no pulmonary emboli, large hiatal hernia, and cardiomegaly. CTA abdomen/pelvis revealed infrarenal aortic aneurysm that has increased in size from prior study done in 8/2018. CXR showed stable chronic lung changes.  BP was elevated on presentation 170-190/70's. Has rusty more controlled today.   She reported that the pain had been constant and started the evening prior. Patient doesn't recall what she was doing when chest pain started. She reports that it continued on and off. Pain was located substernally and she denies any radiation. She reports that she did have some nausea with it. She denies any vomiting or diaphoresis. She reports that pain was severe and rated it a 6-7/10. She  reports that the chest pain occurs at rest and sometimes at exertion. She does note when it occurs with exertion it improves with rest. She reports that it continued on and off after admission until she was given some dilaudid. She reports that she is currently chest pain free. She reports that she had some shortness of breath with pain. She states that she was also very anxious due to knowing that she has aneurysms. Patient reports that she monitors her weight on and off and it has been stable; she denies any recent weight gain. She reports that she hasnt needed to take the lasix in months. She denies any leg swelling, orthopnea or PND. She reports some heart racing during her initial episode of chest pain but none since.     History  Past Medical History:   Diagnosis Date   • AAA (abdominal aortic aneurysm) ([facility])    • Arthritis    • Asthma    • Chronic kidney disease (CKD)    • Confusion 7/13/2021   • COPD (chronic obstructive pulmonary disease) ([facility])    • Gastroesophageal reflux disease with esophagitis without hemorrhage    • Heart murmur    • Hiatal hernia    • Hypertension    • Inflammation of arteries ([facility]) 7/13/2021   • Thoracic aortic aneurysm (HCC)    ,   Past Surgical History:   Procedure Laterality Date   • BACK SURGERY     • EYE SURGERY Bilateral    • FOOT FASCIOTOMY Bilateral    • HYSTERECTOMY     • LOWER LEG SOFT TISSUE TUMOR EXCISION     • LUMBAR NERVE STIMLATOR INSERTION Right    • LYMPH NODE DISSECTION     • WRIST FRACTURE SURGERY Right     x2   ,   Family History   Problem Relation Age of Onset   • Hypertension Mother    ,   Social History     Tobacco Use   • Smoking status: Former Smoker     Years: 55.00     Quit date: 2019     Years since quitting: 3.3   • Smokeless tobacco: Never Used   Vaping Use   • Vaping Use: Never used   Substance Use Topics   • Alcohol use: Never   • Drug use: Never   ,     Medications    Prior to Admission medications    Medication Sig Start Date End Date Taking?  Authorizing Provider   aspirin 81 MG chewable tablet Chew 81 mg Daily.   Yes Minnie Paige MD   calcium carbonate (TUMS) 500 MG chewable tablet Chew 1 tablet Daily.   Yes Minnie Paige MD   dicyclomine (BENTYL) 10 MG capsule Take 10 mg by mouth 3 (Three) Times a Day As Needed.   Yes Minnie Paige MD   DULoxetine (CYMBALTA) 30 MG capsule Take 30 mg by mouth 2 (Two) Times a Day.   Yes Minnie Paige MD   famotidine (PEPCID) 20 MG tablet Take 20 mg by mouth At Night As Needed for Indigestion or Heartburn.   Yes Minnie Paige MD   folic acid (FOLVITE) 1 MG tablet Take 1 mg by mouth Daily.   Yes Minnie Paige MD   furosemide (LASIX) 20 MG tablet Take 1 tablet by mouth Daily. 9/15/21  Yes Etienne Dunaway DO   gabapentin (NEURONTIN) 600 MG tablet Take 600 mg by mouth 2 (Two) Times a Day.   Yes Minnie Paige MD   HYDROcodone-acetaminophen (NORCO)  MG per tablet Take 1 tablet by mouth Every 6 (Six) Hours As Needed for Moderate Pain .   Yes Minnie Paige MD   lisinopril (PRINIVIL,ZESTRIL) 5 MG tablet Take 5 mg by mouth Daily.   Yes Minnie Paige MD   metoprolol succinate XL (TOPROL-XL) 50 MG 24 hr tablet Take 50 mg by mouth Daily.   Yes Minnie Paige MD   ALPRAZolam (XANAX) 0.5 MG tablet Take 0.5 mg by mouth 3 (Three) Times a Day As Needed for Anxiety.    Minnie Paige MD   atorvastatin (LIPITOR) 80 MG tablet Take 80 mg by mouth Daily.    Minnie Paige MD   cloNIDine (CATAPRES) 0.1 MG tablet Take 1 tablet by mouth Every 6 (Six) Hours As Needed for High Blood Pressure (for BP systolic over 170). 6/13/21   Etienne Dunaway DO   colchicine 0.6 MG tablet Take 0.3 mg by mouth Daily. 4/30/21   Minnie Paige MD   cyclobenzaprine (FLEXERIL) 10 MG tablet Take 10 mg by mouth 3 (Three) Times a Day As Needed for Muscle Spasms.  5/5/22  Minnie Paige MD   dexamethasone (DECADRON) 1 MG tablet  6/16/21  5/5/22  Minnie Paige MD   oxyCODONE-acetaminophen (PERCOCET) 7.5-325 MG per tablet Take 1 tablet by mouth Every 4 (Four) Hours As Needed.  5/5/22  Minnie Paige MD       Current Facility-Administered Medications   Medication Dose Route Frequency Provider Last Rate Last Admin   • acetaminophen (TYLENOL) tablet 650 mg  650 mg Oral Q4H PRN Neymar Mathew DO       • ALPRAZolam (XANAX) tablet 0.5 mg  0.5 mg Oral TID PRN Neymar Mathew DO       • aluminum-magnesium hydroxide-simethicone (MAALOX MAX) 400-400-40 MG/5ML suspension 15 mL  15 mL Oral Q6H PRN Neymar Mathew DO       • aspirin chewable tablet 81 mg  81 mg Oral Daily Neymar Mathew DO   81 mg at 05/05/22 0915   • atorvastatin (LIPITOR) tablet 80 mg  80 mg Oral Nightly Neymar Mathew DO   80 mg at 05/04/22 2339   • DULoxetine (CYMBALTA) DR capsule 30 mg  30 mg Oral BID Bam Ellis DO   30 mg at 05/05/22 1148   • Enoxaparin Sodium (LOVENOX) syringe 40 mg  40 mg Subcutaneous Q24H Neymar Mathew DO   40 mg at 05/04/22 2020   • famotidine (PEPCID) tablet 20 mg  20 mg Oral Daily Neymar Mathew DO   20 mg at 05/05/22 0915   • furosemide (LASIX) tablet 20 mg  20 mg Oral Daily Neymar Mathew DO   20 mg at 05/05/22 0915   • gabapentin (NEURONTIN) capsule 600 mg  600 mg Oral Q8H Neymar Mathew DO   600 mg at 05/05/22 0636   • HYDROcodone-acetaminophen (NORCO)  MG per tablet 1 tablet  1 tablet Oral Q6H PRN Bam Ellis DO       • lisinopril (PRINIVIL,ZESTRIL) tablet 5 mg  5 mg Oral Daily Neymar Mathew DO   5 mg at 05/05/22 0915   • metoprolol succinate XL (TOPROL-XL) 24 hr tablet 25 mg  25 mg Oral Daily Neymar Mathew DO   25 mg at 05/05/22 0915   • ondansetron (ZOFRAN) injection 4 mg  4 mg Intravenous Q6H PRN Neymar Mathew S, DO       • sodium chloride 0.9 % flush 10 mL  10 mL Intravenous PRN Neymar Mathew, DO       • sodium chloride 0.9 % flush 10 mL  10 mL  "Intravenous Q12H Neymra Mathew DO   10 mL at 05/05/22 0915   • sodium chloride 0.9 % flush 10 mL  10 mL Intravenous PRN Neymar Mathew DO           Allergies:  Levaquin [levofloxacin], Naprosyn [naproxen], and Sulfa antibiotics    Review of Systems  Review of Systems   Cardiovascular: Positive for chest pain. Negative for dyspnea on exertion, irregular heartbeat, leg swelling, near-syncope, orthopnea, palpitations, paroxysmal nocturnal dyspnea and syncope.   Gastrointestinal: Positive for nausea.        Epigastric discomfort     All other systems reviewed and are negative.      Objective     Physical Exam:  Patient Vitals for the past 24 hrs:   BP Temp Temp src Pulse Resp SpO2 Height Weight   05/05/22 1116 96/62 98.8 °F (37.1 °C) Oral 95 16 100 % -- --   05/05/22 0801 123/51 98.8 °F (37.1 °C) Oral 69 16 98 % -- --   05/05/22 0300 142/50 98.2 °F (36.8 °C) Oral 71 18 100 % -- --   05/05/22 0014 154/73 98.5 °F (36.9 °C) Oral 79 18 100 % -- --   05/04/22 2139 152/58 98.2 °F (36.8 °C) Oral 79 22 -- -- --   05/04/22 2122 -- 98.4 °F (36.9 °C) Oral -- -- -- 160 cm (62.99\") 82.3 kg (181 lb 6.4 oz)   05/04/22 2113 -- -- -- 88 -- 97 % -- --   05/04/22 2103 122/72 -- -- 86 -- 93 % -- --   05/04/22 2031 137/67 -- -- 80 20 95 % -- --   05/04/22 1931 171/79 -- -- 85 16 96 % -- --   05/04/22 1546 179/73 -- -- 71 -- 100 % -- --   05/04/22 1545 -- -- -- 75 -- 100 % -- --   05/04/22 1531 -- -- -- 77 -- 100 % -- --   05/04/22 1525 -- -- -- 71 -- 100 % -- --   05/04/22 1516 (!) 196/76 -- -- 73 -- 100 % -- --   05/04/22 1507 -- -- -- 75 -- 100 % -- --   05/04/22 1501 173/67 -- -- 76 (!) 35 100 % 160 cm (63\") 85.7 kg (189 lb)   05/04/22 1459 -- -- -- 76 -- 97 % -- --       Intake/Output Summary (Last 24 hours) at 5/5/2022 1210  Last data filed at 5/5/2022 0600  Gross per 24 hour   Intake --   Output 550 ml   Net -550 ml     Telemetry: sinus rhythm rate of 86    Vitals reviewed.   Constitutional:       General: Not in acute " distress.     Appearance: Normal appearance. Well-developed.   Eyes:      Pupils: Pupils are equal, round, and reactive to light.   HENT:      Head: Normocephalic and atraumatic.      Nose: Nose normal.   Neck:      Vascular: No carotid bruit.   Pulmonary:      Effort: Pulmonary effort is normal. No respiratory distress.      Breath sounds: Normal breath sounds. No wheezing. No rales.   Cardiovascular:      Normal rate. Regular rhythm.      Murmurs: There is no murmur.   Edema:     Peripheral edema absent.   Abdominal:      General: There is no distension.      Palpations: Abdomen is soft.   Musculoskeletal: Normal range of motion.      Cervical back: Normal range of motion and neck supple. Skin:     General: Skin is warm.      Findings: No erythema or rash.   Neurological:      General: No focal deficit present.      Mental Status: Alert and oriented to person, place, and time.   Psychiatric:         Attention and Perception: Attention normal.         Mood and Affect: Mood normal.         Speech: Speech normal.         Behavior: Behavior normal.         Thought Content: Thought content normal.         Judgment: Judgment normal.         Results Review:   I reviewed the patient's new clinical results.    Lab Results (last 72 hours)     Procedure Component Value Units Date/Time    Manual Differential [[id number]]  (Abnormal) Collected: 05/05/22 0559    Specimen: Blood Updated: 05/05/22 0638     Neutrophil % 65.7 %      Lymphocyte % 26.3 %      Monocyte % 6.1 %      Eosinophil % 2.0 %      Neutrophils Absolute 9.60 10*3/mm3      Lymphocytes Absolute 3.84 10*3/mm3      Monocytes Absolute 0.89 10*3/mm3      Eosinophils Absolute 0.29 10*3/mm3      Anisocytosis Slight/1+     Microcytes Slight/1+     Polychromasia Slight/1+     WBC Morphology Normal     Platelet Morphology Normal    CBC Auto Differential [[id number]]  (Abnormal) Collected: 05/05/22 0559    Specimen: Blood Updated: 05/05/22 0638     WBC 14.61 10*3/mm3      RBC  3.95 10*6/mm3      Hemoglobin 10.3 g/dL      Hematocrit 36.4 %      MCV 92.2 fL      MCH 26.1 pg      MCHC 28.3 g/dL      RDW 16.1 %      RDW-SD 53.0 fl      MPV 10.7 fL      Platelets 417 10*3/mm3     Narrative:      The previously reported component NRBC is no longer being reported. Previous result was 0.1 /100 WBC (Reference Range: 0.0-0.2 /100 WBC) on 5/5/2022 at 0616 T.    Troponin [[id number]]  (Abnormal) Collected: 05/05/22 0559    Specimen: Blood Updated: 05/05/22 0634     Troponin T 0.047 ng/mL     Narrative:      Troponin T Reference Range:  <= 0.03 ng/mL-   Negative for AMI  >0.03 ng/mL-     Abnormal for myocardial necrosis.  Clinicians would have to utilize clinical acumen, EKG, Troponin and serial changes to determine if it is an Acute Myocardial Infarction or myocardial injury due to an underlying chronic condition.       Results may be falsely decreased if patient taking Biotin.      Basic Metabolic Panel [[id number]]  (Abnormal) Collected: 05/05/22 0559    Specimen: Blood Updated: 05/05/22 0629     Glucose 146 mg/dL      BUN 17 mg/dL      Creatinine 1.29 mg/dL      Sodium 140 mmol/L      Potassium 4.0 mmol/L      Chloride 105 mmol/L      CO2 27.0 mmol/L      Calcium 9.3 mg/dL      BUN/Creatinine Ratio 13.2     Anion Gap 8.0 mmol/L      eGFR 44.7 mL/min/1.73      Comment: National Kidney Foundation and American Society of Nephrology (ASN) Task Force recommended calculation based on the Chronic Kidney Disease Epidemiology Collaboration (CKD-EPI) equation refit without adjustment for race.       Narrative:      GFR Normal >60  Chronic Kidney Disease <60  Kidney Failure <15      Troponin [[id number]]  (Abnormal) Collected: 05/04/22 2340    Specimen: Blood Updated: 05/05/22 0008     Troponin T 0.034 ng/mL     Narrative:      Troponin T Reference Range:  <= 0.03 ng/mL-   Negative for AMI  >0.03 ng/mL-     Abnormal for myocardial necrosis.  Clinicians would have to utilize clinical acumen, EKG, Troponin and  serial changes to determine if it is an Acute Myocardial Infarction or myocardial injury due to an underlying chronic condition.       Results may be falsely decreased if patient taking Biotin.      Troponin [[id number]]  (Normal) Collected: 05/04/22 1958    Specimen: Blood Updated: 05/04/22 2026     Troponin T 0.022 ng/mL     Narrative:      Troponin T Reference Range:  <= 0.03 ng/mL-   Negative for AMI  >0.03 ng/mL-     Abnormal for myocardial necrosis.  Clinicians would have to utilize clinical acumen, EKG, Troponin and serial changes to determine if it is an Acute Myocardial Infarction or myocardial injury due to an underlying chronic condition.       Results may be falsely decreased if patient taking Biotin.      COVID PRE-OP / PRE-PROCEDURE SCREENING ORDER (NO ISOLATION) - Swab, Nasal Cavity [[id number]]  (Normal) Collected: 05/04/22 1940    Specimen: Swab from Nasal Cavity Updated: 05/04/22 2021    Narrative:      The following orders were created for panel order COVID PRE-OP / PRE-PROCEDURE SCREENING ORDER (NO ISOLATION) - Swab, Nasal Cavity.  Procedure                               Abnormality         Status                     ---------                               -----------         ------                     COVID-19,[facility] IN-REE...[[id number]]  Normal              Final result                 Please view results for these tests on the individual orders.    COVID-19,[facility] IN-HOUSE,Nasal Swab No Transport Media 3-4 HR TAT - Swab, Nasal Cavity [[id number]]  (Normal) Collected: 05/04/22 1940    Specimen: Swab from Nasal Cavity Updated: 05/04/22 2021     COVID19 Not Detected    Narrative:      Fact sheet for providers: https://www.fda.gov/media/113236/download     Fact sheet for patients: https://www.fda.gov/media/468975/download    Test performed by PCR.    Consider negative results in combination with clinical observations, patient history, and epidemiological information.    [place] Draw [[id number]]  Collected: 05/04/22 1700    Specimen: Blood Updated: 05/04/22 1802    Narrative:      The following orders were created for panel order [place] Draw.  Procedure                               Abnormality         Status                     ---------                               -----------         ------                     Green Top (Gel)[[id number]]                                  Final result               Lavender Top[[id number]]                                     Final result               Red Top[[id number]]                                          Final result               Light Blue Top[[id number]]                                   Final result                 Please view results for these tests on the individual orders.    Red Top [[id number]] Collected: 05/04/22 1700    Specimen: Blood Updated: 05/04/22 1802     Extra Tube Hold for add-ons.     Comment: Auto resulted.       Green Top (Gel) [[id number]] Collected: 05/04/22 1700    Specimen: Blood Updated: 05/04/22 1802     Extra Tube Hold for add-ons.     Comment: Auto resulted.       Lavender Top [[id number]] Collected: 05/04/22 1700    Specimen: Blood Updated: 05/04/22 1802     Extra Tube hold for add-on     Comment: Auto resulted       Light Blue Top [[id number]] Collected: 05/04/22 1700    Specimen: Blood from Arm, Left Updated: 05/04/22 1802     Extra Tube hold for add-on     Comment: Auto resulted       Comprehensive Metabolic Panel [[id number]]  (Abnormal) Collected: 05/04/22 1700    Specimen: Blood Updated: 05/04/22 1802     Glucose 90 mg/dL      BUN 16 mg/dL      Creatinine 1.11 mg/dL      Sodium 137 mmol/L      Potassium 3.7 mmol/L      Chloride 101 mmol/L      CO2 21.0 mmol/L      Calcium 9.5 mg/dL      Total Protein 7.5 g/dL      Albumin 3.70 g/dL      ALT (SGPT) 10 U/L      AST (SGOT) 17 U/L      Alkaline Phosphatase 132 U/L      Total Bilirubin 0.4 mg/dL      Globulin 3.8 gm/dL      A/G Ratio 1.0 g/dL      BUN/Creatinine Ratio 14.4     Anion Gap 15.0  mmol/L      eGFR 53.6 mL/min/1.73      Comment: National Kidney Foundation and American Society of Nephrology (ASN) Task Force recommended calculation based on the Chronic Kidney Disease Epidemiology Collaboration (CKD-EPI) equation refit without adjustment for race.       Narrative:      GFR Normal >60  Chronic Kidney Disease <60  Kidney Failure <15      Troponin [[id number]]  (Normal) Collected: 05/04/22 1700    Specimen: Blood Updated: 05/04/22 1800     Troponin T 0.020 ng/mL     Narrative:      Troponin T Reference Range:  <= 0.03 ng/mL-   Negative for AMI  >0.03 ng/mL-     Abnormal for myocardial necrosis.  Clinicians would have to utilize clinical acumen, EKG, Troponin and serial changes to determine if it is an Acute Myocardial Infarction or myocardial injury due to an underlying chronic condition.       Results may be falsely decreased if patient taking Biotin.      BNP [[id number]]  (Abnormal) Collected: 05/04/22 1700    Specimen: Blood Updated: 05/04/22 1759     proBNP 2,183.0 pg/mL     Narrative:      Among patients with dyspnea, NT-proBNP is highly sensitive for the detection of acute congestive heart failure. In addition NT-proBNP of <300 pg/ml effectively rules out acute congestive heart failure with 99% negative predictive value.    Results may be falsely decreased if patient taking Biotin.      Lipase [[id number]]  (Normal) Collected: 05/04/22 1700    Specimen: Blood Updated: 05/04/22 1757     Lipase 25 U/L     Urinalysis With Culture If Indicated - Urine, Clean Catch [[id number]]  (Abnormal) Collected: 05/04/22 1700    Specimen: Urine, Clean Catch Updated: 05/04/22 1728     Color, UA Yellow     Appearance, UA Clear     pH, UA 7.0     Specific Gravity, UA 1.016     Glucose, UA Negative     Ketones, UA Negative     Bilirubin, UA Negative     Blood, UA Negative     Protein, UA Negative     Leuk Esterase, UA Moderate (2+)     Nitrite, UA Negative     Urobilinogen, UA 0.2 E.U./dL    Urinalysis, Microscopic  Only - Urine, Clean Catch [[id number]]  (Abnormal) Collected: 05/04/22 1700    Specimen: Urine, Clean Catch Updated: 05/04/22 1728     RBC, UA 0-2 /HPF      WBC, UA 6-12 /HPF      Bacteria, UA None Seen /HPF      Squamous Epithelial Cells, UA 3-6 /HPF      Hyaline Casts, UA None Seen /LPF      Methodology Automated Microscopy    Urine Culture - Urine, Urine, Clean Catch [[id number]] Collected: 05/04/22 1700    Specimen: Urine, Clean Catch Updated: 05/04/22 1728    Protime-INR [[id number]]  (Normal) Collected: 05/04/22 1700    Specimen: Blood from Arm, Left Updated: 05/04/22 1726     Protime 12.9 Seconds      INR 1.01    CBC & Differential [[id number]]  (Abnormal) Collected: 05/04/22 1700    Specimen: Blood Updated: 05/04/22 1719    Narrative:      The following orders were created for panel order CBC & Differential.  Procedure                               Abnormality         Status                     ---------                               -----------         ------                     CBC Auto Differential[[id number]]        Abnormal            Final result                 Please view results for these tests on the individual orders.    CBC Auto Differential [[id number]]  (Abnormal) Collected: 05/04/22 1700    Specimen: Blood Updated: 05/04/22 1719     WBC 14.77 10*3/mm3      RBC 4.24 10*6/mm3      Hemoglobin 11.1 g/dL      Hematocrit 37.4 %      MCV 88.2 fL      MCH 26.2 pg      MCHC 29.7 g/dL      RDW 15.8 %      RDW-SD 49.5 fl      MPV 10.4 fL      Platelets 454 10*3/mm3      Neutrophil % 72.6 %      Lymphocyte % 17.5 %      Monocyte % 8.7 %      Eosinophil % 0.4 %      Basophil % 0.4 %      Immature Grans % 0.4 %      Neutrophils, Absolute 10.73 10*3/mm3      Lymphocytes, Absolute 2.58 10*3/mm3      Monocytes, Absolute 1.28 10*3/mm3      Eosinophils, Absolute 0.06 10*3/mm3      Basophils, Absolute 0.06 10*3/mm3      Immature Grans, Absolute 0.06 10*3/mm3      nRBC 0.0 /100 WBC           No results found for:  ECHOEFEST    Imaging Results (Last 72 Hours)     Procedure Component Value Units Date/Time    CT Angiogram Chest [[id number]] Collected: 05/04/22 1628     Updated: 05/04/22 1650    Narrative:      CT ANGIOGRAM CHEST- 5/4/2022 3:55 PM CDT     HISTORY: chest pain/ hx aortic aneurysm/ r/o dissection      COMPARISON: 6/9/2021     DOSE LENGTH PRODUCT: 467 mGy cm. Automated exposure control was also  utilized to decrease patient radiation dose.     TECHNIQUE: Axial images of the chest are obtained following IV contrast.  2-D and maximal intensity projection images reconstructed and reviewed     FINDINGS:  There is a bovine variant to the arch of aorta with a common  origin of the left common carotid right brachiocephalic arteries. There  is aneurysmal distention of the common vessel origin from the arch of  the aorta measuring 4 cm containing similar ulcerative plaque. 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 The ascending root aorta measures 3.7 cm.  Ascending aorta similar in size measuring approximately 4 cm. The  ascending thoracic aorta with moderate partially calcified plaque  measuring 3.4 cm. There is a small saccular aneurysm from the descending  thoracic aorta measuring 3.8 cm in width/2.7 cm in length. No discrete  intimal flap of dissection identified. Cardiomegaly. Scattered vascular  calcification involving the coronary arteries. Large hiatal hernia. No  pericardial or pleural effusion. No pathologic intrathoracic or axillary  lymphadenopathy.     Moderate emphysema. Calcified granuloma left lower lobe. No  consolidation. No pneumothorax.     Dorsal column stimulator device.     Moderate degenerative change thoracolumbar spine. No focal destructive  osseous lesions.       Impression:      1. Stable aneurysms involving the common origin of the right  brachiocephalic/Left common carotid arteries, left subclavian, and  descending thoracic aorta  with scattered regions of ulcerative plaque.  Patient with history of underlying vasculitis. No discrete dissection.  2. No pulmonary emboli.  3. Moderate coronary artery  calcification. Cardiomegaly.  4. Large hiatal hernia  5. Moderate emphysema with no acute consolidation or suspicious  nodularity.   This report was finalized on 05/04/2022 16:47 by Dr. Lucy Mccloud MD.    CT Angiogram Abdomen Pelvis [[id number]] Collected: 05/04/22 1629     Updated: 05/04/22 1636    Narrative:      EXAMINATION: CT ANGIOGRAM ABDOMEN PELVIS-      5/4/2022 3:55 PM CDT     HISTORY: chest pain/ sob/ hx aneurysm/ r/o dissection     In order to have a CT radiation dose as low as reasonably achievable  Automated Exposure Control was utilized for adjustment of the mA and/or  KV according to patient size.     DLP in mGycm= 467.     CT angiogram abdomen/pelvis.  CT angiography protocol.   CT imaging with bolus IV contrast injection.   Under concurrent supervision axial, sagittal, coronal, and  three-dimensional data sets were constructed.     Cardiomegaly.  Large hiatal hernia.     Aortic calcification.  Infrarenal aneurysm with maximum diameter = 35 x 33 mm.  No dissection.  No retroperitoneal hemorrhage.     Normal liver, gallbladder, pancreas, and adrenal glands.  The spleen is of normal size. There are multiple calcified granulomas  within the spleen.  Left greater than right bilateral renal cysts. The largest cyst is at  the lower pole of the left kidney and measures 31 mm.     No bowel dilation.  No free fluid.     No pelvic mass.     Extensive degenerative lumbar spine change.     Summary:  1. Infrarenal aortic aneurysm has increased in size compared with August 17, 2018 (previously approximately 30 mm).  2. There is no dissection or retroperitoneal hemorrhage.                                   This report was finalized on 05/04/2022 16:33 by Dr. Bonilla Olvera MD.    XR Chest 1 View [[id number]] Collected: 05/04/22 1549      Updated: 05/04/22 1549    Narrative:      EXAMINATION: XR CHEST 1 VW-     5/4/2022 3:40 PM CDT     HISTORY: Chest Pain Triage Protocol     1 view chest x-ray.     Comparison is made with June 11, 2021.     Cardiomegaly.  Aortic arch calcification.  Thoracic stimulator leads.     Chronic interstitial lung disease with scattered granulomas.     Mild hyperexpansion.     Hiatal hernia noted.     Summary:  1. Stable chronic lung changes.  This report was finalized on 05/04/2022 15:46 by Dr. Bonilla Olvera MD.        Assessment/Plan     Chest pain: Patient has had chronic chest pain but reports this episode has been more severe. She reports that the chest pain occurs at rest and sometimes at exertion. She does note when it occurs with exertion it improves with rest. She had some on and off yesterday after admission but denies any chest pain this am.     Elevated Troponin: Initial troponin was 0.020 and has continued to rise slowly; 0.022, 0.034, 0.047, 0.053    Hypertension: was elevated on presentation to ER yesterday but has been more controlled today     Hyperlipidemia: lipid panel demonstrates good control. Continue atorvastatin    Vasculitis/known aneurysms of the right brachiocephalic, left common carotid, left subclavian, and descending thoracic aorta as well as the abdominal aorta: All stable on CTA this admission. Has seen Dr Nuñez in the past     CKD: stable. Baseline Cr 1.3. Today 1.29    Plan:  -will keep NPO and discuss possible LHC potentially today. Patient has requested [facility]  -continue atorvastatin, aspirin, lisinopril and Toprol XL  -continue Telemetry    Further orders per Dr Eastman    Thank you for asking us to follow this patient with you.     Please note this cardiology consultation note is the result of a face to face consultation with the patient, in addition to reviewing medical records at length by myself, CALE Alfaro.    Electronically signed by CALE Alfaro,  05/05/22, 10:16 AM CDT.

## 2022-05-05 NOTE — PROGRESS NOTES
[facility]  INPATIENT PROGRESS NOTE    Patient Name: Cheryl Taylor  Date of Admission: 5/4/2022  Today's Date: 05/05/22  Length of Stay: 0  Primary Care Physician: Den James DO    Subjective   Chief Complaint: Chest pain, elevated troponin.   HPI   Troponin has gone from normal to abnormal and a new value is pending results with currently being active in the lab.  Lab Results   Lab Value Date/Time    TROPONINT 0.047 (C) 05/05/2022 0559    TROPONINT 0.034 (C) 05/04/2022 2340    TROPONINT 0.022 05/04/2022 1958    TROPONINT 0.020 05/04/2022 1700     Repeat EKG.  Consult cardiology.    She states that she is not having any pain at present, but has had some episodes on and off since she has been admitted.  Her worst episode was yesterday and was rated a 6-7/10.     She denies problems with orthopnea or increased lower extremity edema.  She states that she typically only takes her Lasix on an as-needed basis as an outpatient.  She was given this today.    Review of Systems   All pertinent negatives and positives are as above. All other systems have been reviewed and are negative unless otherwise stated.     Objective    Temp:  [98.2 °F (36.8 °C)-98.8 °F (37.1 °C)] 98.8 °F (37.1 °C)  Heart Rate:  [69-88] 69  Resp:  [16-35] 16  BP: (122-196)/(50-79) 123/51  Physical Exam  Constitutional:       Appearance: She is well-developed.      Comments: Up in bed, no distress. Discussed with her nurse, Mayra.    HENT:      Head: Normocephalic and atraumatic.   Eyes:      Conjunctiva/sclera: Conjunctivae normal.      Pupils: Pupils are equal, round, and reactive to light.   Neck:      Vascular: No JVD.   Cardiovascular:      Rate and Rhythm: Normal rate and regular rhythm.      Heart sounds: Normal heart sounds. No murmur heard.    No friction rub. No gallop.   Pulmonary:      Effort: Pulmonary effort is normal. No respiratory distress.      Breath sounds: Normal breath  sounds. No wheezing or rales.   Chest:      Chest wall: No tenderness.   Abdominal:      General: Bowel sounds are normal. There is no distension.      Palpations: Abdomen is soft.      Tenderness: There is no abdominal tenderness. There is no guarding or rebound.   Musculoskeletal:         General: No tenderness or deformity. Normal range of motion.      Cervical back: Neck supple.   Skin:     General: Skin is warm and dry.      Findings: No rash.   Neurological:      Mental Status: She is alert and oriented to person, place, and time.      Cranial Nerves: No cranial nerve deficit.      Motor: No abnormal muscle tone.      Deep Tendon Reflexes: Reflexes normal.   Psychiatric:         Behavior: Behavior normal.         Thought Content: Thought content normal.         Judgment: Judgment normal.       Results Review:  I have reviewed the labs, radiology results, and diagnostic studies.    Laboratory Data:   Results from last 7 days   Lab Units 05/05/22  0559 05/04/22  1700   WBC 10*3/mm3 14.61* 14.77*   HEMOGLOBIN g/dL 10.3* 11.1*   HEMATOCRIT % 36.4 37.4   PLATELETS 10*3/mm3 417 454*     Results from last 7 days   Lab Units 05/05/22  0559 05/04/22  1700   SODIUM mmol/L 140 137   POTASSIUM mmol/L 4.0 3.7   CHLORIDE mmol/L 105 101   CO2 mmol/L 27.0 21.0*   BUN mg/dL 17 16   CREATININE mg/dL 1.29* 1.11*   CALCIUM mg/dL 9.3 9.5   BILIRUBIN mg/dL  --  0.4   ALK PHOS U/L  --  132*   ALT (SGPT) U/L  --  10   AST (SGOT) U/L  --  17   GLUCOSE mg/dL 146* 90     Radiology Data:   Imaging Results (Last 24 Hours)     Procedure Component Value Units Date/Time    CT Angiogram Chest [[id number]] Collected: 05/04/22 1628     Updated: 05/04/22 1650    Narrative:      CT ANGIOGRAM CHEST- 5/4/2022 3:55 PM CDT     HISTORY: chest pain/ hx aortic aneurysm/ r/o dissection      COMPARISON: 6/9/2021     DOSE LENGTH PRODUCT: 467 mGy cm. Automated exposure control was also  utilized to decrease patient radiation dose.     TECHNIQUE: Axial images  of the chest are obtained following IV contrast.  2-D and maximal intensity projection images reconstructed and reviewed     FINDINGS:  There is a bovine variant to the arch of aorta with a common  origin of the left common carotid right brachiocephalic arteries. There  is aneurysmal distention of the common vessel origin from the arch of  the aorta measuring 4 cm containing similar ulcerative plaque. 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 The ascending root aorta measures 3.7 cm.  Ascending aorta similar in size measuring approximately 4 cm. The  ascending thoracic aorta with moderate partially calcified plaque  measuring 3.4 cm. There is a small saccular aneurysm from the descending  thoracic aorta measuring 3.8 cm in width/2.7 cm in length. No discrete  intimal flap of dissection identified. Cardiomegaly. Scattered vascular  calcification involving the coronary arteries. Large hiatal hernia. No  pericardial or pleural effusion. No pathologic intrathoracic or axillary  lymphadenopathy.     Moderate emphysema. Calcified granuloma left lower lobe. No  consolidation. No pneumothorax.     Dorsal column stimulator device.     Moderate degenerative change thoracolumbar spine. No focal destructive  osseous lesions.       Impression:      1. Stable aneurysms involving the common origin of the right  brachiocephalic/Left common carotid arteries, left subclavian, and  descending thoracic aorta with scattered regions of ulcerative plaque.  Patient with history of underlying vasculitis. No discrete dissection.  2. No pulmonary emboli.  3. Moderate coronary artery  calcification. Cardiomegaly.  4. Large hiatal hernia  5. Moderate emphysema with no acute consolidation or suspicious  nodularity.   This report was finalized on 05/04/2022 16:47 by Dr. Lucy Mccloud MD.    CT Angiogram Abdomen Pelvis [[id number]] Collected: 05/04/22 1629     Updated: 05/04/22 1636     Narrative:      EXAMINATION: CT ANGIOGRAM ABDOMEN PELVIS-      5/4/2022 3:55 PM CDT     HISTORY: chest pain/ sob/ hx aneurysm/ r/o dissection     In order to have a CT radiation dose as low as reasonably achievable  Automated Exposure Control was utilized for adjustment of the mA and/or  KV according to patient size.     DLP in mGycm= 467.     CT angiogram abdomen/pelvis.  CT angiography protocol.   CT imaging with bolus IV contrast injection.   Under concurrent supervision axial, sagittal, coronal, and  three-dimensional data sets were constructed.     Cardiomegaly.  Large hiatal hernia.     Aortic calcification.  Infrarenal aneurysm with maximum diameter = 35 x 33 mm.  No dissection.  No retroperitoneal hemorrhage.     Normal liver, gallbladder, pancreas, and adrenal glands.  The spleen is of normal size. There are multiple calcified granulomas  within the spleen.  Left greater than right bilateral renal cysts. The largest cyst is at  the lower pole of the left kidney and measures 31 mm.     No bowel dilation.  No free fluid.     No pelvic mass.     Extensive degenerative lumbar spine change.     Summary:  1. Infrarenal aortic aneurysm has increased in size compared with August 17, 2018 (previously approximately 30 mm).  2. There is no dissection or retroperitoneal hemorrhage.                                   This report was finalized on 05/04/2022 16:33 by Dr. Bonilla Olvera MD.    XR Chest 1 View [[id number]] Collected: 05/04/22 1546     Updated: 05/04/22 1549    Narrative:      EXAMINATION: XR CHEST 1 VW-     5/4/2022 3:40 PM CDT     HISTORY: Chest Pain Triage Protocol     1 view chest x-ray.     Comparison is made with June 11, 2021.     Cardiomegaly.  Aortic arch calcification.  Thoracic stimulator leads.     Chronic interstitial lung disease with scattered granulomas.     Mild hyperexpansion.     Hiatal hernia noted.     Summary:  1. Stable chronic lung changes.  This report was finalized on 05/04/2022  15:46 by Dr. Bonilla Olvera MD.        I have reviewed the patient's current medications.     Assessment/Plan     Active Hospital Problems    Diagnosis    • **NSTEMI (non-ST elevated myocardial infarction) ([facility])    • Atypical chest pain    • Chronic diastolic CHF (congestive heart failure) ([facility])    • Hypertensive urgency    • 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     • Tobacco abuse, in remission    • Stage 3a chronic kidney disease (HCC)    • Obesity (BMI 30-39.9)    • Hiatal hernia    • Gastroesophageal reflux disease with esophagitis without hemorrhage      Plan:  The patient was admitted on 5/4 by Dr. Mathew.  She presented to the emergency department with complaints of chest discomfort accompanied by nausea and also some degree of epigastric discomfort.  She can develop this discomfort at any time; rest/exertion.  She was admitted for rule out.    She does have a history of vasculitis and has known aneurysms of the right brachiocephalic, left common carotid, left subclavian, and descending thoracic aorta as well as the abdominal aorta.  These are all stable on CTA.  She has been evaluated for this in the past by Dr. Nuñez.    Her initial troponin was normal.  Subsequent troponins have increased. She underwent Lexiscan in June 2021 which was read as an intermediate risk study by Dr. Dunaway.  She followed up with him again in the office in September 2021, but has canceled appointments in December 2021, January 2022. Consult cardiology for an opinion. Does she just need a catheterization at this point?  She last had a 2D echocardiogram in June 2021 that showed preserved ejection fraction and diastolic dysfunction for which she is felt to have chronic diastolic heart failure.    Continue aspirin.    Need to assess a lipid panel.  Continue atorvastatin.    She was very hypertensive at presentation yesterday.  This has improved.  Most recent blood pressure 123/51.    She has a history of CKD, stage  IIIa.  Baseline serum creatinine on the order of 1.3.    Reconciled and resumed other home medications as appropriate.    Lovenox for DVT prophylaxis.    Discharge Planning: Indeterminate at present.  Pending cardiology evaluation.    Electronically signed by Bam Ellis DO, 05/05/22, 10:28 CDT.    Lab Results   Lab Value Date/Time    TROPONINT 0.053 (C) 05/05/2022 1005    TROPONINT 0.047 (C) 05/05/2022 0559    TROPONINT 0.034 (C) 05/04/2022 2340    TROPONINT 0.022 05/04/2022 1958    TROPONINT 0.020 05/04/2022 1700       Electronically signed by Bam Ellis DO, 05/05/22, 10:52 AM CDT.

## 2022-05-05 NOTE — CASE MANAGEMENT/SOCIAL WORK
Discharge Planning Assessment  [facility]     Patient Name: Cheryl Taylor  MRN: [id number]  Today's Date: 5/5/2022    Admit Date: 5/4/2022     Discharge Needs Assessment     Row Name 05/05/22 0956       Living Environment    People in Home alone    Current Living Arrangements home    Primary Care Provided by self    Provides Primary Care For no one    Quality of Family Relationships helpful;involved;supportive    Able to Return to Prior Arrangements yes       Resource/Environmental Concerns    Resource/Environmental Concerns none       Transition Planning    Patient/Family Anticipates Transition to home    Transportation Anticipated family or friend will provide       Discharge Needs Assessment    Readmission Within the Last 30 Days no previous admission in last 30 days    Equipment Currently Used at Home cane, quad;walker, rolling;wheelchair;shower chair;oxygen  Pt has home 02 that she wears at night from [employer].    Concerns to be Addressed no discharge needs identified    Anticipated Changes Related to Illness none    Equipment Needed After Discharge none    Current Discharge Risk lives alone    Discharge Coordination/Progress Pt resides alone.  Pt has PCP, RX coverage and can afford medications.  Pt states she is independent and plans to return home.  She denies any dc needs and does not want  services.               Discharge Plan    No documentation.               Continued Care and Services - Admitted Since 5/4/2022    Coordination has not been started for this encounter.          Demographic Summary    No documentation.                Functional Status    No documentation.                Psychosocial    No documentation.                Abuse/Neglect    No documentation.                Legal    No documentation.                Substance Abuse    No documentation.                Patient Forms    No documentation.                   Keya Hanley, [email or web address]

## 2022-05-05 NOTE — PLAN OF CARE
Goal Outcome Evaluation:  Plan of Care Reviewed With: patient, daughter, family           Outcome Evaluation: Dr Ellis discontinued stress test orders this morning and consulted cardiology related to elevated troponins. Dr Eastman will perform cardiac cath in a.m unless patient becomes symptomatic earlier. NPO at midnight. Sinus 69-99, PACs on tele. Pharmacy completed med rec today.

## 2022-05-05 NOTE — PLAN OF CARE
Goal Outcome Evaluation:  Plan of Care Reviewed With: patient, daughter        Progress: improving  Outcome Evaluation: Patient came to 4b from ED last night. Upon arrival to floor patient did not c/o cp nor soa. Sinus 69-90 with pvc/pac per telemetry. Patient's trop went up to 0.034 with no symptoms, Dr. Cox notified. VSS. Oxygen on 2L at night time (home dose.), and on room air during the day. Npo for stress today. Continue monitoring.

## 2022-05-06 ENCOUNTER — APPOINTMENT (OUTPATIENT)
Dept: CARDIOLOGY | Facility: HOSPITAL | Age: 72
End: 2022-05-06

## 2022-05-06 PROBLEM — I21.A1 TYPE 2 MYOCARDIAL INFARCTION DUE TO HYPERTENSION: Status: ACTIVE | Noted: 2022-05-05

## 2022-05-06 PROBLEM — I10 TYPE 2 MYOCARDIAL INFARCTION DUE TO HYPERTENSION: Status: ACTIVE | Noted: 2022-05-05

## 2022-05-06 LAB
ANION GAP SERPL CALCULATED.3IONS-SCNC: 12 MMOL/L (ref 5–15)
BH CV ECHO MEAS - AO MAX PG: 37.7 MMHG
BH CV ECHO MEAS - AO MEAN PG: 13.5 MMHG
BH CV ECHO MEAS - AO ROOT DIAM: 3.7 CM
BH CV ECHO MEAS - AO V2 MAX: 307 CM/SEC
BH CV ECHO MEAS - AO V2 VTI: 55.5 CM
BH CV ECHO MEAS - AVA(I,D): 2.37 CM2
BH CV ECHO MEAS - EDV(CUBED): 65.5 ML
BH CV ECHO MEAS - EDV(MOD-SP4): 99.1 ML
BH CV ECHO MEAS - EF(MOD-SP4): 64.8 %
BH CV ECHO MEAS - ESV(CUBED): 18.8 ML
BH CV ECHO MEAS - ESV(MOD-SP4): 34.9 ML
BH CV ECHO MEAS - FS: 34 %
BH CV ECHO MEAS - IVS/LVPW: 1.01 CM
BH CV ECHO MEAS - IVSD: 1.08 CM
BH CV ECHO MEAS - LA DIMENSION: 3.9 CM
BH CV ECHO MEAS - LAT PEAK E' VEL: 5.4 CM/SEC
BH CV ECHO MEAS - LV MASS(C)D: 142.5 GRAMS
BH CV ECHO MEAS - LV MAX PG: 13 MMHG
BH CV ECHO MEAS - LV MEAN PG: 7 MMHG
BH CV ECHO MEAS - LV V1 MAX: 180 CM/SEC
BH CV ECHO MEAS - LV V1 VTI: 41.8 CM
BH CV ECHO MEAS - LVIDD: 4 CM
BH CV ECHO MEAS - LVIDS: 2.7 CM
BH CV ECHO MEAS - LVOT AREA: 3.1 CM2
BH CV ECHO MEAS - LVOT DIAM: 2 CM
BH CV ECHO MEAS - LVPWD: 1.07 CM
BH CV ECHO MEAS - MED PEAK E' VEL: 3.6 CM/SEC
BH CV ECHO MEAS - MR MAX PG: 106.1 MMHG
BH CV ECHO MEAS - MR MAX VEL: 515 CM/SEC
BH CV ECHO MEAS - MR MEAN PG: 62 MMHG
BH CV ECHO MEAS - MR MEAN VEL: 373 CM/SEC
BH CV ECHO MEAS - MR VTI: 147 CM
BH CV ECHO MEAS - MV A MAX VEL: 146 CM/SEC
BH CV ECHO MEAS - MV DEC SLOPE: 549 CM/SEC2
BH CV ECHO MEAS - MV DEC TIME: 0.21 MSEC
BH CV ECHO MEAS - MV E MAX VEL: 123 CM/SEC
BH CV ECHO MEAS - MV E/A: 0.84
BH CV ECHO MEAS - MV P1/2T: 74.7 MSEC
BH CV ECHO MEAS - MVA(P1/2T): 2.9 CM2
BH CV ECHO MEAS - PA V2 MAX: 115 CM/SEC
BH CV ECHO MEAS - RAP SYSTOLE: 5 MMHG
BH CV ECHO MEAS - RVSP: 48.8 MMHG
BH CV ECHO MEAS - SV(LVOT): 131.3 ML
BH CV ECHO MEAS - SV(MOD-SP4): 64.2 ML
BH CV ECHO MEAS - TR MAX PG: 43.8 MMHG
BH CV ECHO MEAS - TR MAX VEL: 331 CM/SEC
BH CV ECHO MEASUREMENTS AVERAGE E/E' RATIO: 27.33
BUN SERPL-MCNC: 19 MG/DL (ref 8–23)
BUN/CREAT SERPL: 15 (ref 7–25)
CALCIUM SPEC-SCNC: 8.7 MG/DL (ref 8.6–10.5)
CHLORIDE SERPL-SCNC: 105 MMOL/L (ref 98–107)
CO2 SERPL-SCNC: 24 MMOL/L (ref 22–29)
CREAT SERPL-MCNC: 1.27 MG/DL (ref 0.57–1)
EGFRCR SERPLBLD CKD-EPI 2021: 45.6 ML/MIN/1.73
GLUCOSE SERPL-MCNC: 110 MG/DL (ref 65–99)
LEFT ATRIUM VOLUME INDEX: 62.2 ML/M2
LEFT ATRIUM VOLUME: 154 CM3
MAXIMAL PREDICTED HEART RATE: 150 BPM
POTASSIUM SERPL-SCNC: 4.1 MMOL/L (ref 3.5–5.2)
QT INTERVAL: 418 MS
QTC INTERVAL: 470 MS
SODIUM SERPL-SCNC: 141 MMOL/L (ref 136–145)
STRESS TARGET HR: 128 BPM
TROPONIN T SERPL-MCNC: 0.03 NG/ML (ref 0–0.03)

## 2022-05-06 PROCEDURE — 93306 TTE W/DOPPLER COMPLETE: CPT

## 2022-05-06 PROCEDURE — 25010000002 FENTANYL CITRATE (PF) 100 MCG/2ML SOLUTION: Performed by: INTERNAL MEDICINE

## 2022-05-06 PROCEDURE — 63710000001 DULOXETINE 30 MG CAPSULE DELAYED-RELEASE PARTICLES: Performed by: INTERNAL MEDICINE

## 2022-05-06 PROCEDURE — G0378 HOSPITAL OBSERVATION PER HR: HCPCS

## 2022-05-06 PROCEDURE — 25010000002 IOPAMIDOL 61 % SOLUTION: Performed by: INTERNAL MEDICINE

## 2022-05-06 PROCEDURE — 25010000002 DIPHENHYDRAMINE PER 50 MG: Performed by: INTERNAL MEDICINE

## 2022-05-06 PROCEDURE — 80048 BASIC METABOLIC PNL TOTAL CA: CPT | Performed by: INTERNAL MEDICINE

## 2022-05-06 PROCEDURE — C1894 INTRO/SHEATH, NON-LASER: HCPCS | Performed by: INTERNAL MEDICINE

## 2022-05-06 PROCEDURE — 93306 TTE W/DOPPLER COMPLETE: CPT | Performed by: INTERNAL MEDICINE

## 2022-05-06 PROCEDURE — A9270 NON-COVERED ITEM OR SERVICE: HCPCS | Performed by: INTERNAL MEDICINE

## 2022-05-06 PROCEDURE — 93454 CORONARY ARTERY ANGIO S&I: CPT | Performed by: INTERNAL MEDICINE

## 2022-05-06 PROCEDURE — 96361 HYDRATE IV INFUSION ADD-ON: CPT

## 2022-05-06 PROCEDURE — 84484 ASSAY OF TROPONIN QUANT: CPT | Performed by: INTERNAL MEDICINE

## 2022-05-06 PROCEDURE — 99152 MOD SED SAME PHYS/QHP 5/>YRS: CPT | Performed by: INTERNAL MEDICINE

## 2022-05-06 PROCEDURE — 25010000002 HEPARIN (PORCINE) PER 1000 UNITS: Performed by: INTERNAL MEDICINE

## 2022-05-06 PROCEDURE — 25010000002 HEPARIN (PORCINE) 2000-0.9 UNIT/L-% SOLUTION: Performed by: INTERNAL MEDICINE

## 2022-05-06 PROCEDURE — 63710000001 ATORVASTATIN 40 MG TABLET: Performed by: INTERNAL MEDICINE

## 2022-05-06 PROCEDURE — 63710000001 HYDROCODONE-ACETAMINOPHEN 10-325 MG TABLET: Performed by: INTERNAL MEDICINE

## 2022-05-06 PROCEDURE — 63710000001 GABAPENTIN 300 MG CAPSULE: Performed by: INTERNAL MEDICINE

## 2022-05-06 PROCEDURE — 25010000002 ENOXAPARIN PER 10 MG: Performed by: INTERNAL MEDICINE

## 2022-05-06 PROCEDURE — 25010000002 HEPARIN (PORCINE) 1000-0.9 UT/500ML-% SOLUTION: Performed by: INTERNAL MEDICINE

## 2022-05-06 PROCEDURE — 25010000002 MIDAZOLAM PER 1 MG: Performed by: INTERNAL MEDICINE

## 2022-05-06 PROCEDURE — C1769 GUIDE WIRE: HCPCS | Performed by: INTERNAL MEDICINE

## 2022-05-06 RX ORDER — MIDAZOLAM HYDROCHLORIDE 1 MG/ML
INJECTION INTRAMUSCULAR; INTRAVENOUS AS NEEDED
Status: DISCONTINUED | OUTPATIENT
Start: 2022-05-06 | End: 2022-05-06 | Stop reason: HOSPADM

## 2022-05-06 RX ORDER — FENTANYL CITRATE 50 UG/ML
INJECTION, SOLUTION INTRAMUSCULAR; INTRAVENOUS AS NEEDED
Status: DISCONTINUED | OUTPATIENT
Start: 2022-05-06 | End: 2022-05-06 | Stop reason: HOSPADM

## 2022-05-06 RX ORDER — HEPARIN SODIUM 200 [USP'U]/100ML
INJECTION, SOLUTION INTRAVENOUS AS NEEDED
Status: DISCONTINUED | OUTPATIENT
Start: 2022-05-06 | End: 2022-05-06 | Stop reason: HOSPADM

## 2022-05-06 RX ORDER — DIPHENHYDRAMINE HYDROCHLORIDE 50 MG/ML
INJECTION INTRAMUSCULAR; INTRAVENOUS AS NEEDED
Status: DISCONTINUED | OUTPATIENT
Start: 2022-05-06 | End: 2022-05-06 | Stop reason: HOSPADM

## 2022-05-06 RX ADMIN — HYDROCODONE BITARTRATE AND ACETAMINOPHEN 1 TABLET: 10; 325 TABLET ORAL at 06:00

## 2022-05-06 RX ADMIN — ATORVASTATIN CALCIUM 80 MG: 40 TABLET, FILM COATED ORAL at 22:57

## 2022-05-06 RX ADMIN — DULOXETINE HYDROCHLORIDE 30 MG: 30 CAPSULE, DELAYED RELEASE ORAL at 09:39

## 2022-05-06 RX ADMIN — SODIUM CHLORIDE 100 ML/HR: 9 INJECTION, SOLUTION INTRAVENOUS at 05:56

## 2022-05-06 RX ADMIN — LISINOPRIL 5 MG: 5 TABLET ORAL at 09:40

## 2022-05-06 RX ADMIN — ASPIRIN 81 MG: 81 TABLET, CHEWABLE ORAL at 09:40

## 2022-05-06 RX ADMIN — ENOXAPARIN SODIUM 40 MG: 40 INJECTION SUBCUTANEOUS at 22:57

## 2022-05-06 RX ADMIN — Medication 10 ML: at 22:58

## 2022-05-06 RX ADMIN — HYDROCODONE BITARTRATE AND ACETAMINOPHEN 1 TABLET: 10; 325 TABLET ORAL at 22:58

## 2022-05-06 RX ADMIN — FAMOTIDINE 20 MG: 20 TABLET, FILM COATED ORAL at 09:40

## 2022-05-06 RX ADMIN — GABAPENTIN 600 MG: 300 CAPSULE ORAL at 06:00

## 2022-05-06 RX ADMIN — GABAPENTIN 600 MG: 300 CAPSULE ORAL at 22:57

## 2022-05-06 RX ADMIN — DULOXETINE HYDROCHLORIDE 30 MG: 30 CAPSULE, DELAYED RELEASE ORAL at 22:57

## 2022-05-06 RX ADMIN — METOPROLOL SUCCINATE 25 MG: 25 TABLET, EXTENDED RELEASE ORAL at 09:40

## 2022-05-06 NOTE — PLAN OF CARE
Goal Outcome Evaluation:  Plan of Care Reviewed With: patient, family           Outcome Evaluation: Back from cath lab at 1300, no interventions. Right radial site intact, pulses +2, palpable. TR band removed per policy and patient educated on site care. Drowsy from cath lab, probable dc in the a.m., family agreeable. Sinus 68-79 on tele.

## 2022-05-06 NOTE — PROGRESS NOTES
[facility] Services  INPATIENT PROGRESS NOTE    Patient Name: Cheryl Taylor  Date of Admission: 5/4/2022  Today's Date: 05/06/22  Length of Stay: 0  Primary Care Physician: Den James DO    Subjective   Chief Complaint: Chest pain, elevated troponin.   HPI   Troponin declined this morning.  She had another similar episode of discomfort late yesterday afternoon.  She is planned for a left heart catheterization with Dr. Eastman today.  She is not very excited about having this done.  She is hoping that things will be normal and she can go home later today.    She does not have any other new complaints other than being hungry and thirsty since being n.p.o. after midnight.    Review of Systems  All pertinent negatives and positives are as above. All other systems have been reviewed and are negative unless otherwise stated.     Objective    Temp:  [97.8 °F (36.6 °C)-99.2 °F (37.3 °C)] 98 °F (36.7 °C)  Heart Rate:  [66-95] 73  Resp:  [16] 16  BP: ()/(49-85) 107/85  Physical Exam  Constitutional:       Appearance: She is well-developed.      Comments: Up in bed, no distress.  Seen and discussed with her daughters.  Discussed with her nurse, Mayra.    HENT:      Head: Normocephalic and atraumatic.   Eyes:      Conjunctiva/sclera: Conjunctivae normal.      Pupils: Pupils are equal, round, and reactive to light.   Neck:      Vascular: No JVD.   Cardiovascular:      Rate and Rhythm: Normal rate and regular rhythm.      Heart sounds: Normal heart sounds. No murmur heard.    No friction rub. No gallop.   Pulmonary:      Effort: Pulmonary effort is normal. No respiratory distress.      Breath sounds: Normal breath sounds. No wheezing or rales.   Chest:      Chest wall: No tenderness.   Abdominal:      General: Bowel sounds are normal.      Palpations: Abdomen is soft.      Tenderness: There is no abdominal tenderness.   Musculoskeletal:         General: No tenderness or  deformity. Normal range of motion.      Cervical back: Neck supple.   Skin:     General: Skin is warm and dry.      Findings: No rash.   Neurological:      Mental Status: She is alert and oriented to person, place, and time.      Cranial Nerves: No cranial nerve deficit.      Motor: No abnormal muscle tone.      Deep Tendon Reflexes: Reflexes normal.   Psychiatric:         Behavior: Behavior normal.         Thought Content: Thought content normal.         Judgment: Judgment normal.      Comments: Conversational, witty.       Results Review:  I have reviewed the labs, radiology results, and diagnostic studies.    Laboratory Data:   Results from last 7 days   Lab Units 05/06/22  0450 05/05/22  0559 05/04/22  1700   SODIUM mmol/L 141 140 137   POTASSIUM mmol/L 4.1 4.0 3.7   CHLORIDE mmol/L 105 105 101   CO2 mmol/L 24.0 27.0 21.0*   BUN mg/dL 19 17 16   CREATININE mg/dL 1.27* 1.29* 1.11*   CALCIUM mg/dL 8.7 9.3 9.5   BILIRUBIN mg/dL  --   --  0.4   ALK PHOS U/L  --   --  132*   ALT (SGPT) U/L  --   --  10   AST (SGOT) U/L  --   --  17   GLUCOSE mg/dL 110* 146* 90     Lab Results   Lab Value Date/Time    TROPONINT 0.031 (C) 05/06/2022 0450    TROPONINT 0.053 (C) 05/05/2022 1005    TROPONINT 0.047 (C) 05/05/2022 0559    TROPONINT 0.034 (C) 05/04/2022 2340    TROPONINT 0.022 05/04/2022 1958    TROPONINT 0.020 05/04/2022 1700     I have reviewed the patient's current medications.     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 Mathew.  She  presented to the emergency department with complaints of chest discomfort accompanied by nausea and also some degree of epigastric discomfort.  She can develop this discomfort at any time; rest/exertion.  She was admitted for rule out.    She does have a history of vasculitis and has known aneurysms of the right brachiocephalic, left common carotid, left subclavian, and descending thoracic aorta as well as the abdominal aorta.  These are all stable on CTA.  She has been evaluated for this in the past by Dr. Nuñez.    Her initial troponin was normal.  Subsequent troponins increased. She underwent Lexiscan in June 2021 which was read as an intermediate risk study by Dr. Dunaway.  She followed up with him again in the office in September 2021, but has canceled appointments in December 2021, January 2022. Consulted cardiology for an opinion. For left heart catheterization today.     She last had a 2D echocardiogram in June 2021 that showed preserved ejection fraction and diastolic dysfunction for which she is felt to have chronic diastolic heart failure.    Continue aspirin.    LDL cholesterol 40.  Continue atorvastatin; already takes 80 mg.    She was very hypertensive at presentation.  This has improved.  Most recent blood pressure 107/85.    She has a history of CKD, stage IIIa.  Baseline serum creatinine on the order of 1.3.  Creatinine remained stable at 1.2 today.    Reconciled and resumed other home medications as appropriate.    Lovenox for DVT prophylaxis.    Discharge Planning: Indeterminate at present.  Pending cardiology evaluation.    Electronically signed by Bam Ellis DO, 05/06/22, 10:34 CDT.

## 2022-05-06 NOTE — PLAN OF CARE
Goal Outcome Evaluation:  Plan of Care Reviewed With: patient        Progress: no change  Outcome Evaluation: C/o back pain last night, relief from Norco 10 mg.  No c/o chest pain overnight.  No falls noted.  Up ad satish to BR.  Patient to have heart cath this am.  Educated patient on labs, cath, troponin, NSTEMI, patient verbalized understanding.  NPO since MN. Call light in reach.

## 2022-05-07 ENCOUNTER — READMISSION MANAGEMENT (OUTPATIENT)
Dept: CALL CENTER | Facility: HOSPITAL | Age: 72
End: 2022-05-07

## 2022-05-07 VITALS
SYSTOLIC BLOOD PRESSURE: 137 MMHG | RESPIRATION RATE: 18 BRPM | OXYGEN SATURATION: 92 % | HEART RATE: 75 BPM | DIASTOLIC BLOOD PRESSURE: 52 MMHG | TEMPERATURE: 98 F | HEIGHT: 63 IN | WEIGHT: 181.13 LBS | BODY MASS INDEX: 32.09 KG/M2

## 2022-05-07 PROCEDURE — 63710000001 ASPIRIN 81 MG CHEWABLE TABLET: Performed by: INTERNAL MEDICINE

## 2022-05-07 PROCEDURE — A9270 NON-COVERED ITEM OR SERVICE: HCPCS | Performed by: INTERNAL MEDICINE

## 2022-05-07 PROCEDURE — 63710000001 HYDROCODONE-ACETAMINOPHEN 10-325 MG TABLET: Performed by: INTERNAL MEDICINE

## 2022-05-07 PROCEDURE — 63710000001 METOPROLOL SUCCINATE XL 25 MG TABLET SUSTAINED-RELEASE 24 HOUR: Performed by: INTERNAL MEDICINE

## 2022-05-07 PROCEDURE — 63710000001 FAMOTIDINE 20 MG TABLET: Performed by: INTERNAL MEDICINE

## 2022-05-07 PROCEDURE — 63710000001 DICYCLOMINE 10 MG CAPSULE: Performed by: INTERNAL MEDICINE

## 2022-05-07 PROCEDURE — 63710000001 DULOXETINE 30 MG CAPSULE DELAYED-RELEASE PARTICLES: Performed by: INTERNAL MEDICINE

## 2022-05-07 PROCEDURE — 63710000001 GABAPENTIN 300 MG CAPSULE: Performed by: INTERNAL MEDICINE

## 2022-05-07 PROCEDURE — 99213 OFFICE O/P EST LOW 20 MIN: CPT | Performed by: INTERNAL MEDICINE

## 2022-05-07 PROCEDURE — G0378 HOSPITAL OBSERVATION PER HR: HCPCS

## 2022-05-07 PROCEDURE — 63710000001 LISINOPRIL 5 MG TABLET: Performed by: INTERNAL MEDICINE

## 2022-05-07 RX ORDER — METOPROLOL SUCCINATE 25 MG/1
25 TABLET, EXTENDED RELEASE ORAL DAILY
Qty: 30 TABLET | Refills: 1 | Status: SHIPPED | OUTPATIENT
Start: 2022-05-07 | End: 2022-05-07 | Stop reason: HOSPADM

## 2022-05-07 RX ORDER — PANTOPRAZOLE SODIUM 40 MG/1
40 TABLET, DELAYED RELEASE ORAL DAILY
Qty: 30 TABLET | Refills: 1 | Status: SHIPPED | OUTPATIENT
Start: 2022-05-07

## 2022-05-07 RX ORDER — LISINOPRIL 10 MG/1
10 TABLET ORAL DAILY
Qty: 30 TABLET | Refills: 1 | Status: SHIPPED | OUTPATIENT
Start: 2022-05-07 | End: 2023-03-14

## 2022-05-07 RX ORDER — LISINOPRIL 5 MG/1
2.5 TABLET ORAL DAILY
Qty: 30 TABLET | Refills: 1 | Status: SHIPPED | OUTPATIENT
Start: 2022-05-07 | End: 2022-05-07 | Stop reason: SDUPTHER

## 2022-05-07 RX ADMIN — GABAPENTIN 600 MG: 300 CAPSULE ORAL at 06:14

## 2022-05-07 RX ADMIN — FAMOTIDINE 20 MG: 20 TABLET, FILM COATED ORAL at 09:46

## 2022-05-07 RX ADMIN — METOPROLOL SUCCINATE 25 MG: 25 TABLET, EXTENDED RELEASE ORAL at 09:46

## 2022-05-07 RX ADMIN — DICYCLOMINE HYDROCHLORIDE 10 MG: 10 CAPSULE ORAL at 01:33

## 2022-05-07 RX ADMIN — DICYCLOMINE HYDROCHLORIDE 10 MG: 10 CAPSULE ORAL at 10:42

## 2022-05-07 RX ADMIN — ASPIRIN 81 MG: 81 TABLET, CHEWABLE ORAL at 10:42

## 2022-05-07 RX ADMIN — DULOXETINE HYDROCHLORIDE 30 MG: 30 CAPSULE, DELAYED RELEASE ORAL at 09:45

## 2022-05-07 RX ADMIN — LISINOPRIL 5 MG: 5 TABLET ORAL at 09:46

## 2022-05-07 RX ADMIN — HYDROCODONE BITARTRATE AND ACETAMINOPHEN 1 TABLET: 10; 325 TABLET ORAL at 10:42

## 2022-05-07 NOTE — PLAN OF CARE
Goal Outcome Evaluation:  Plan of Care Reviewed With: patient        Progress: improving  Outcome Evaluation: Patient had no c/o pain overnight.  UP with asst x 1 to BR.  Good UOP noted.  Left wrist CDI, tender.  No bleeding noted.  Good pulses palpable. No falls noted.  Patient needs walker, states she has one at home.  No SOA reported.  Patient ready to go home later today.     NSR 76-86 PAC

## 2022-05-07 NOTE — DISCHARGE SUMMARY
[facility] Medicine Services  DISCHARGE SUMMARY       Date of Admission: 5/4/2022  Date of Discharge:  5/7/2022  Primary Care Physician: Den James DO    Presenting Problem/History of Present Illness:  Chest pain.     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 Eastman with cardiology.     Procedures Performed: LHC on 5/6 showed mild nonobstructive disease only.     Pertinent Test Results:   Results for orders placed during the hospital encounter of 05/04/22    Adult Transthoracic Echo Complete W/ Cont if Necessary Per Protocol    Interpretation Summary  · Left ventricular ejection fraction appears to be 61 - 65%. Left ventricular systolic function is normal.  · Left ventricular diastolic function is consistent with (grade II w/high LAP) pseudonormalization.  · Normal right ventricular cavity size and systolic function noted  · Mild aortic valve stenosis is present.  · Mild aortic valve regurgitation is present.  · Estimated right ventricular systolic pressure from tricuspid regurgitation is normal (<35 mmHg).      Imaging Results (All)     Procedure Component Value Units Date/Time    CT Angiogram Chest [[id number]] Collected: 05/04/22 1628     Updated: 05/04/22 1650    Narrative:      CT ANGIOGRAM CHEST- 5/4/2022 3:55 PM CDT     HISTORY: chest pain/ hx aortic aneurysm/ r/o dissection      COMPARISON: 6/9/2021     DOSE LENGTH PRODUCT: 467 mGy cm. Automated exposure control was also  utilized to decrease patient radiation  dose.     TECHNIQUE: Axial images of the chest are obtained following IV contrast.  2-D and maximal intensity projection images reconstructed and reviewed     FINDINGS:  There is a bovine variant to the arch of aorta with a common  origin of the left common carotid right brachiocephalic arteries. There  is aneurysmal distention of the common vessel origin from the arch of  the aorta measuring 4 cm containing similar ulcerative plaque. 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 The ascending root aorta measures 3.7 cm.  Ascending aorta similar in size measuring approximately 4 cm. The  ascending thoracic aorta with moderate partially calcified plaque  measuring 3.4 cm. There is a small saccular aneurysm from the descending  thoracic aorta measuring 3.8 cm in width/2.7 cm in length. No discrete  intimal flap of dissection identified. Cardiomegaly. Scattered vascular  calcification involving the coronary arteries. Large hiatal hernia. No  pericardial or pleural effusion. No pathologic intrathoracic or axillary  lymphadenopathy.     Moderate emphysema. Calcified granuloma left lower lobe. No  consolidation. No pneumothorax.     Dorsal column stimulator device.     Moderate degenerative change thoracolumbar spine. No focal destructive  osseous lesions.       Impression:      1. Stable aneurysms involving the common origin of the right  brachiocephalic/Left common carotid arteries, left subclavian, and  descending thoracic aorta with scattered regions of ulcerative plaque.  Patient with history of underlying vasculitis. No discrete dissection.  2. No pulmonary emboli.  3. Moderate coronary artery  calcification. Cardiomegaly.  4. Large hiatal hernia  5. Moderate emphysema with no acute consolidation or suspicious  nodularity.   This report was finalized on 05/04/2022 16:47 by Dr. Lucy Mccloud MD.    CT Angiogram Abdomen Pelvis [[id number]] Collected:  05/04/22 1629     Updated: 05/04/22 1636    Narrative:      EXAMINATION: CT ANGIOGRAM ABDOMEN PELVIS-      5/4/2022 3:55 PM CDT     HISTORY: chest pain/ sob/ hx aneurysm/ r/o dissection     In order to have a CT radiation dose as low as reasonably achievable  Automated Exposure Control was utilized for adjustment of the mA and/or  KV according to patient size.     DLP in mGycm= 467.     CT angiogram abdomen/pelvis.  CT angiography protocol.   CT imaging with bolus IV contrast injection.   Under concurrent supervision axial, sagittal, coronal, and  three-dimensional data sets were constructed.     Cardiomegaly.  Large hiatal hernia.     Aortic calcification.  Infrarenal aneurysm with maximum diameter = 35 x 33 mm.  No dissection.  No retroperitoneal hemorrhage.     Normal liver, gallbladder, pancreas, and adrenal glands.  The spleen is of normal size. There are multiple calcified granulomas  within the spleen.  Left greater than right bilateral renal cysts. The largest cyst is at  the lower pole of the left kidney and measures 31 mm.     No bowel dilation.  No free fluid.     No pelvic mass.     Extensive degenerative lumbar spine change.     Summary:  1. Infrarenal aortic aneurysm has increased in size compared with August 17, 2018 (previously approximately 30 mm).  2. There is no dissection or retroperitoneal hemorrhage.         This report was finalized on 05/04/2022 16:33 by Dr. Bonilla Olvera MD.    XR Chest 1 View [[id number]] Collected: 05/04/22 1546     Updated: 05/04/22 1549    Narrative:      EXAMINATION: XR CHEST 1 VW-     5/4/2022 3:40 PM CDT     HISTORY: Chest Pain Triage Protocol     1 view chest x-ray.     Comparison is made with June 11, 2021.     Cardiomegaly.  Aortic arch calcification.  Thoracic stimulator leads.     Chronic interstitial lung disease with scattered granulomas.     Mild hyperexpansion.     Hiatal hernia noted.     Summary:  1. Stable chronic lung changes.  This report was finalized  on 05/04/2022 15:46 by Dr. Bonilla Olvera MD.        LAB RESULTS:      Lab 05/05/22  0559 05/04/22  1700   WBC 14.61* 14.77*   HEMOGLOBIN 10.3* 11.1*   HEMATOCRIT 36.4 37.4   PLATELETS 417 454*   NEUTROS ABS 9.60* 10.73*   IMMATURE GRANS (ABS)  --  0.06*   LYMPHS ABS  --  2.58   MONOS ABS  --  1.28*   EOS ABS 0.29 0.06   MCV 92.2 88.2   PROTIME  --  12.9         Lab 05/06/22  0450 05/05/22  0559 05/04/22  1700   SODIUM 141 140 137   POTASSIUM 4.1 4.0 3.7   CHLORIDE 105 105 101   CO2 24.0 27.0 21.0*   ANION GAP 12.0 8.0 15.0   BUN 19 17 16   CREATININE 1.27* 1.29* 1.11*   EGFR 45.6* 44.7* 53.6*   GLUCOSE 110* 146* 90   CALCIUM 8.7 9.3 9.5         Lab 05/04/22  1700   TOTAL PROTEIN 7.5   ALBUMIN 3.70   GLOBULIN 3.8   ALT (SGPT) 10   AST (SGOT) 17   BILIRUBIN 0.4   ALK PHOS 132*   LIPASE 25         Lab 05/06/22  0450 05/05/22  1005 05/05/22  0559 05/04/22  2340 05/04/22 1958 05/04/22  1700   PROBNP  --   --   --   --   --  2,183.0*   TROPONIN T 0.031* 0.053* 0.047* 0.034* 0.022 0.020   PROTIME  --   --   --   --   --  12.9   INR  --   --   --   --   --  1.01         Lab 05/05/22  1005   CHOLESTEROL 97   LDL CHOL 40   HDL CHOL 40   TRIGLYCERIDES 86             Brief Urine Lab Results  (Last result in the past 365 days)      Color   Clarity   Blood   Leuk Est   Nitrite   Protein   CREAT   Urine HCG        05/04/22 1700 Yellow   Clear   Negative   Moderate (2+)   Negative   Negative               Microbiology Results (last 10 days)     Procedure Component Value - Date/Time    COVID PRE-OP / PRE-PROCEDURE SCREENING ORDER (NO ISOLATION) - Swab, Nasal Cavity [[id number]]  (Normal) Collected: 05/04/22 1940    Lab Status: Final result Specimen: Swab from Nasal Cavity Updated: 05/04/22 2021    Narrative:      The following orders were created for panel order COVID PRE-OP / PRE-PROCEDURE SCREENING ORDER (NO ISOLATION) - Swab, Nasal Cavity.  Procedure                               Abnormality         Status                      ---------                               -----------         ------                     COVID-19,[facility] IN-REE...[[id number]]  Normal              Final result                 Please view results for these tests on the individual orders.    COVID-19,[facility] IN-HOUSE,Nasal Swab No Transport Media 3-4 HR TAT - Swab, Nasal Cavity [[id number]]  (Normal) Collected: 05/04/22 1940    Lab Status: Final result Specimen: Swab from Nasal Cavity Updated: 05/04/22 2021     COVID19 Not Detected    Narrative:      Fact sheet for providers: https://www.fda.gov/media/849544/download     Fact sheet for patients: https://www.[place].gov/media/038040/download    Test performed by PCR.    Consider negative results in combination with clinical observations, patient history, and epidemiological information.    Urine Culture - Urine, Urine, Clean Catch [[id number]]  (Normal) Collected: 05/04/22 1700    Lab Status: Final result Specimen: Urine, Clean Catch Updated: 05/05/22 1738     Urine Culture No growth        Hospital Course:   The patient was admitted on 5/4 by Dr. Mathew.  She presented to the emergency department with complaints of chest discomfort accompanied by nausea and also some degree of epigastric discomfort.  She can develop this discomfort at any time; rest/exertion. She was admitted for rule out.     She does have a history of vasculitis and has known aneurysms of the right brachiocephalic, left common carotid, left subclavian, and descending thoracic aorta as well as the abdominal aorta.  These are all stable on CTA.  She has been evaluated for this in the past by Dr. Nuñez.     Her initial troponin was normal.  Subsequent troponins increased. She underwent Lexiscan in June 2021 which was read as an intermediate risk study by Dr. Dunaway.  She followed up with him again in the office in September 2021, but has canceled appointments in December 2021, January 2022. Consulted cardiology for an opinion. Left heart  "catheterization  on 5/6 showed mild nonobstructive disease.      She last had a 2D echocardiogram in June 2021 that showed preserved ejection fraction and diastolic dysfunction for which she is felt to have chronic diastolic heart failure.  This was repeated and her echocardiogram is largely unchanged.  I am     Continue aspirin.     LDL cholesterol 40.  Continue atorvastatin; already takes 80 mg.     She was very hypertensive at presentation.  This has improved.  Most recent blood pressure 137/52.  She will be on 10 mg of lisinopril and 50 mg of Toprol-XL at discharge.     She has a history of CKD, stage IIIa.  Baseline serum creatinine on the order of 1.3.  Creatinine remained stable at 1.2 yesterday.     Reconciled and resumed other home medications as appropriate.     Lovenox was used for DVT prophylaxis.    She is felt suitable for discharge today.  She can follow with Dr. James in 1 week.  She may need outpatient GI investigation of her noncardiac chest pain.  She does have a history of a hiatal hernia and gastroesophageal reflux disease.  She takes Tums occasionally at home and I have prescribed Protonix for now as well.    Physical Exam on Discharge:  /52 (BP Location: Left arm, Patient Position: Lying)   Pulse 75   Temp 98 °F (36.7 °C) (Oral)   Resp 18   Ht 160 cm (62.99\")   Wt 82.2 kg (181 lb 2 oz)   SpO2 92%   BMI 32.09 kg/m²   Physical Exam  Constitutional:       Appearance: She is well-developed.      Comments: Up in bed, no distress. No family currently present.  Discussed with her nurse, Cindy.    HENT:      Head: Normocephalic and atraumatic.   Eyes:      Conjunctiva/sclera: Conjunctivae normal.      Pupils: Pupils are equal, round, and reactive to light.   Neck:      Vascular: No JVD.   Cardiovascular:      Rate and Rhythm: Normal rate and regular rhythm.      Heart sounds: Normal heart sounds. No murmur heard.    No friction rub. No gallop.   Pulmonary:      Effort: Pulmonary effort " is normal. No respiratory distress.      Breath sounds: Normal breath sounds. No wheezing or rales.   Chest:      Chest wall: No tenderness.   Abdominal:      General: Bowel sounds are normal.      Palpations: Abdomen is soft.      Tenderness: There is no abdominal tenderness.   Musculoskeletal:         General: No tenderness or deformity. Normal range of motion.      Cervical back: Neck supple.   Skin:     General: Skin is warm and dry.      Findings: No rash.   Neurological:      Mental Status: She is alert and oriented to person, place, and time.      Cranial Nerves: No cranial nerve deficit.      Motor: No abnormal muscle tone.      Deep Tendon Reflexes: Reflexes normal.   Psychiatric:         Behavior: Behavior normal.         Thought Content: Thought content normal.         Judgment: Judgment normal.      Comments: Conversational. Anxious to go home.     Condition on Discharge: Good.    Discharge Disposition:  Home or Self Care    Discharge Medications:     Discharge Medications      Changes to Medications      Instructions Start Date   lisinopril 10 MG tablet  Commonly known as: ALEXX OWENSSTRIL  What changed:   · medication strength  · how much to take   10 mg, Oral, Daily         Continue These Medications      Instructions Start Date   aspirin 81 MG chewable tablet   81 mg, Oral, Daily      atorvastatin 80 MG tablet  Commonly known as: LIPITOR   80 mg, Oral, Daily      calcium carbonate 500 MG chewable tablet  Commonly known as: TUMS   1 tablet, Oral, Daily      dicyclomine 10 MG capsule  Commonly known as: BENTYL   10 mg, Oral, 3 Times Daily PRN      DULoxetine 30 MG capsule  Commonly known as: CYMBALTA   30 mg, Oral, 2 Times Daily      famotidine 20 MG tablet  Commonly known as: PEPCID   20 mg, Oral, Nightly PRN      folic acid 1 MG tablet  Commonly known as: FOLVITE   1 mg, Oral, Daily      furosemide 20 MG tablet  Commonly known as: LASIX   20 mg, Oral, Daily      gabapentin 600 MG tablet  Commonly  known as: NEURONTIN   600 mg, Oral, 2 Times Daily      HYDROcodone-acetaminophen  MG per tablet  Commonly known as: NORCO   1 tablet, Oral, Every 6 Hours PRN      metoprolol succinate XL 50 MG 24 hr tablet  Commonly known as: TOPROL-XL   50 mg, Oral, Daily         Stop These Medications    cloNIDine 0.1 MG tablet  Commonly known as: CATAPRES          Discharge Diet:   Diet Instructions     Diet: Cardiac; Thin      Discharge Diet: Cardiac    Fluid Consistency: Thin        Activity at Discharge:   Activity Instructions     Activity as Tolerated          Follow-up Appointments:   Dr. James in 1 week.     Test Results Pending at Discharge: None.     Electronically signed by Bam Ellis DO, 05/07/22, 07:45 CDT.    Time: 25 minutes.

## 2022-05-07 NOTE — PROGRESS NOTES
"    RE:  Cheryl Taylor  :  1950    CC: Chest pain         Subjective: Patient reports she is feeling well today.  She denies any current chest pain.  She does not remember having a heart catheterization yesterday.  She feels ready to go home.    ROS: Pertinent positives and negatives listed above.  All other systems reviewed and negative.    Objective:   /52 (BP Location: Left arm, Patient Position: Lying)   Pulse 75   Temp 98 °F (36.7 °C) (Oral)   Resp 18   Ht 160 cm (62.99\")   Wt 82.2 kg (181 lb 2 oz)   SpO2 92%   BMI 32.09 kg/m²   Temp:  [97.2 °F (36.2 °C)-98.1 °F (36.7 °C)] 98 °F (36.7 °C)  Heart Rate:  [67-75] 75  Resp:  [16-18] 18  BP: (137-156)/(52-70) 137/52    Intake/Output Summary (Last 24 hours) at 2022 1149  Last data filed at 2022 0900  Gross per 24 hour   Intake 3090 ml   Output 1000 ml   Net 2090 ml       Current Facility-Administered Medications:   •  acetaminophen (TYLENOL) tablet 650 mg, 650 mg, Oral, Q4H PRN, Lenard Eastman MD  •  ALPRAZolam (XANAX) tablet 0.5 mg, 0.5 mg, Oral, TID PRN, Lenard Eastman MD  •  aluminum-magnesium hydroxide-simethicone (MAALOX MAX) 400-400-40 MG/5ML suspension 15 mL, 15 mL, Oral, Q6H PRN, Lenard Eastman MD  •  aspirin chewable tablet 81 mg, 81 mg, Oral, Daily, Lenard Eastman MD, 81 mg at 22 104  •  atorvastatin (LIPITOR) tablet 80 mg, 80 mg, Oral, Nightly, Lenard Eastman MD, 80 mg at 22  •  dicyclomine (BENTYL) capsule 10 mg, 10 mg, Oral, TID PRN, Lenard Eastman MD, 10 mg at 22 104  •  DULoxetine (CYMBALTA) DR capsule 30 mg, 30 mg, Oral, BID, Lenard Eastman MD, 30 mg at 22 0945  •  Enoxaparin Sodium (LOVENOX) syringe 40 mg, 40 mg, Subcutaneous, Q24H, Lenard Eastman MD, 40 mg at 22 2253  •  famotidine (PEPCID) tablet 20 mg, 20 mg, Oral, Daily, Lenard Eastman MD, 20 mg at 22 0946  •  gabapentin (NEURONTIN) capsule 600 mg, 600 mg, Oral, Q8H, Lenard Eastman MD, 600 mg at 22 " 0614  •  HYDROcodone-acetaminophen (NORCO)  MG per tablet 1 tablet, 1 tablet, Oral, Q6H PRN, Lenard Eastman MD, 1 tablet at 05/07/22 1042  •  lisinopril (PRINIVIL,ZESTRIL) tablet 5 mg, 5 mg, Oral, Daily, Lenard Eastman MD, 5 mg at 05/07/22 0946  •  metoprolol succinate XL (TOPROL-XL) 24 hr tablet 25 mg, 25 mg, Oral, Daily, Lenard Eastman MD, 25 mg at 05/07/22 0946  •  ondansetron (ZOFRAN) injection 4 mg, 4 mg, Intravenous, Q6H PRN, Lenard Eastman MD  •  sodium chloride 0.9 % flush 10 mL, 10 mL, Intravenous, Q12H, Lenard Eastman MD, 10 mL at 05/06/22 2258  •  sodium chloride 0.9 % flush 10 mL, 10 mL, Intravenous, PRN, Lenard Eastman MD  •  sodium chloride 0.9 % infusion, 100 mL/hr, Intravenous, Continuous, Lenard Eastman MD, Stopped at 05/07/22 0100    Physical Exam:   Gen: Alert and oriented x3, no acute distress  Heart: Regular rate and rhythm, grade 1 systolic murmur  Chest: Lungs clear to auscultation bilaterally, normal respiratory effort  Abd: Soft, non tender, obese bowel sounds are positive  Ext: No clubbing, cyanosis, or edema;h     Lab Results (last 24 hours)     ** No results found for the last 24 hours. **        Imaging Results (Last 24 Hours)     ** No results found for the last 24 hours. **        -Nuclear stress (6/12/2021) intermediate restudy, 3 times daily of 1.2, EF 70%, normal perfusion with no defects, normal ECG   -Echo (6/11/2021) EF 61-65%, grade 1 diastolic dysfunction, mild to moderate AI, mild aortic stenosis,   -LHC (5/6/2022) mild nonobstructive disease only up to 10-20% RCA  -Echo (5/4/2022) EF 61-65%, grade 2 diastolic dysfunction, normal RV size and function, mild aortic stenosis, mild AI, RVSP normal      Assessment:   1.  Troponin elevation: Mild elevation up to 0.053.  Likely a type II elevation due to myocardial strain.  Only mild nonobstructive disease on LHC.  2.  Essential hypertension  3.  Hyperlipidemia  4.  Peripheral vascular disease  5.  History of  vasculitis  6.  Chronic kidney disease stage III   7.  Mild aortic stenosis  8.  Mild aortic regurgitation      Plan:   -Patient being discharged home today.  -Continue aspirin, atorvastatin, lisinopril, and metoprolol  -Follow-up in cardiology clinic in 2 months after discharge.

## 2022-05-08 NOTE — OUTREACH NOTE
Prep Survey    Flowsheet Row Responses   [facility] facility patient discharged from? [place]   Is LACE score < 7 ? No   Emergency Room discharge w/ pulse ox? No   Eligibility Readm Mgmt   Discharge diagnosis Atypical chest pain   Does the patient have one of the following disease processes/diagnoses(primary or secondary)? Other   Does the patient have Home health ordered? No   Is there a DME ordered? No   Prep survey completed? Yes          BRADEN BURLESON - Registered Nurse

## 2022-05-09 ENCOUNTER — TELEPHONE (OUTPATIENT)
Dept: CARDIOLOGY | Facility: CLINIC | Age: 72
End: 2022-05-09

## 2022-05-09 NOTE — TELEPHONE ENCOUNTER
Dr Eastman seen pt in hospital. Her daughter and patient felt more comfortable with  discussing her care.

## 2022-05-09 NOTE — TELEPHONE ENCOUNTER
This pt daughter Soniya returned my call. Her mother wants to switch to Dr Eastman from Dr Dunaway. Please advise

## 2022-05-09 NOTE — TELEPHONE ENCOUNTER
----- Message from Lenard Eastman MD sent at 5/7/2022 11:57 AM CDT -----  Patient being discharged home this weekend.  She will need 2-month cardiology follow-up.  Thank you.

## 2022-05-11 ENCOUNTER — READMISSION MANAGEMENT (OUTPATIENT)
Dept: CALL CENTER | Facility: HOSPITAL | Age: 72
End: 2022-05-11

## 2022-05-11 NOTE — OUTREACH NOTE
Medical Week 1 Survey    Flowsheet Row Responses   [facility] patient discharged from? [place]   Does the patient have one of the following disease processes/diagnoses(primary or secondary)? Other   Week 1 attempt successful? Yes   Call start time 1324   Call end time 1326   Discharge diagnosis Atypical chest pain   Meds reviewed with patient/caregiver? Yes   Is the patient having any side effects they believe may be caused by any medication additions or changes? No   Does the patient have all medications ordered at discharge? Yes   Is the patient taking all medications as directed (includes completed medication regime)? Yes   Does the patient have a primary care provider?  Yes   Does the patient have an appointment with their PCP within 7 days of discharge? Yes   Comments regarding PCP 5/11/22   Has the patient kept scheduled appointments due by today? N/A   Has home health visited the patient within 72 hours of discharge? N/A   Psychosocial issues? No   Did the patient receive a copy of their discharge instructions? Yes   Nursing interventions Reviewed instructions with patient   What is the patient's perception of their health status since discharge? Improving   Is the patient/caregiver able to teach back signs and symptoms related to disease process for when to call PCP? Yes   Is the patient/caregiver able to teach back signs and symptoms related to disease process for when to call 911? Yes   Is the patient/caregiver able to teach back the hierarchy of who to call/visit for symptoms/problems? PCP, Specialist, Home health nurse, Urgent Care, ED, 911 Yes   Week 1 call completed? Yes   Wrap up additional comments Pt reports she is doing well. No chest pain. Pt has and appt with PCP today.           ELIZABETH GONZALEZ - Registered Nurse

## 2022-05-20 ENCOUNTER — READMISSION MANAGEMENT (OUTPATIENT)
Dept: CALL CENTER | Facility: HOSPITAL | Age: 72
End: 2022-05-20

## 2022-05-20 NOTE — OUTREACH NOTE
Medical Week 2 Survey    Flowsheet Row Responses   [facility] facility patient discharged from? [place]   Does the patient have one of the following disease processes/diagnoses(primary or secondary)? Other   Week 2 attempt successful? No   Unsuccessful attempts Attempt 1          JOSE E Eduardo Registered Nurse

## 2022-05-25 ENCOUNTER — READMISSION MANAGEMENT (OUTPATIENT)
Dept: CALL CENTER | Facility: HOSPITAL | Age: 72
End: 2022-05-25

## 2022-05-25 NOTE — OUTREACH NOTE
"Medical Week 2 Survey    Flowsheet Row Responses   [facility] patient discharged from? [place]   Does the patient have one of the following disease processes/diagnoses(primary or secondary)? Other   Week 2 attempt successful? Yes   Call start time 1431   Discharge diagnosis Atypical chest pain   Call end time 1433   Meds reviewed with patient/caregiver? Yes   Is the patient taking all medications as directed (includes completed medication regime)? Yes   Has the patient kept scheduled appointments due by today? Yes   What is the patient's perception of their health status since discharge? Improving   If the patient is a current smoker, are they able to teach back resources for cessation? --  [Pt states, \"I'm inbetween\". She's started smoking a pack a week. ]   Week 2 Call Completed? Yes   Is the patient interested in additional calls from an ambulatory ?  NOTE:  applies to high risk patients requiring additional follow-up. No   Revoked No further contact(revokes)-requires comment   Graduated/Revoked comments Four weeks post discharge          NORTH BRUMFIELD - Registered Nurse  "

## 2022-07-11 ENCOUNTER — OFFICE VISIT (OUTPATIENT)
Dept: CARDIOLOGY | Facility: CLINIC | Age: 72
End: 2022-07-11

## 2022-07-11 VITALS
BODY MASS INDEX: 32.78 KG/M2 | SYSTOLIC BLOOD PRESSURE: 116 MMHG | DIASTOLIC BLOOD PRESSURE: 58 MMHG | WEIGHT: 185 LBS | OXYGEN SATURATION: 98 % | HEART RATE: 80 BPM | HEIGHT: 63 IN

## 2022-07-11 DIAGNOSIS — I50.32 CHRONIC DIASTOLIC CHF (CONGESTIVE HEART FAILURE): ICD-10-CM

## 2022-07-11 DIAGNOSIS — I10 ESSENTIAL HYPERTENSION: ICD-10-CM

## 2022-07-11 DIAGNOSIS — I35.0 AORTIC STENOSIS, MILD: ICD-10-CM

## 2022-07-11 DIAGNOSIS — I35.1 NONRHEUMATIC AORTIC VALVE INSUFFICIENCY: ICD-10-CM

## 2022-07-11 DIAGNOSIS — R07.9 CHEST PAIN, UNSPECIFIED TYPE: Primary | ICD-10-CM

## 2022-07-11 DIAGNOSIS — I72.9 ANEURYSM: ICD-10-CM

## 2022-07-11 PROCEDURE — 93000 ELECTROCARDIOGRAM COMPLETE: CPT | Performed by: INTERNAL MEDICINE

## 2022-07-11 PROCEDURE — 99214 OFFICE O/P EST MOD 30 MIN: CPT | Performed by: INTERNAL MEDICINE

## 2022-07-11 RX ORDER — CYCLOBENZAPRINE HCL 10 MG
10 TABLET ORAL 3 TIMES DAILY PRN
COMMUNITY
Start: 2022-06-29

## 2022-07-11 NOTE — PROGRESS NOTES
Reason for Visit: cardiovascular follow up.    HPI:  Cheryl Taylor is a 72 y.o. female is here today for hospital follow-up.  She was admitted in June with an elevated troponin was felt to be a type II elevation due to myocardial strain as she had only mild nonobstructive disease on cardiac catheterization from 5/6/2022.  She is doing well from a cardiac standpoint.  She denies any chest pain, palpitations, syncope, PND, or orthopnea.  She walks with a walker but gets weak easily and gets dizzy at times.     Previous Cardiac Testing and Procedures:  -Nuclear stress (6/12/2021) intermediate restudy, 3 times daily of 1.2, EF 70%, normal perfusion with no defects, normal ECG   -Echo (6/11/2021) EF 61-65%, grade 1 diastolic dysfunction, mild to moderate AI, mild aortic stenosis  -Echo (5/4/2022) EF 61-65%, grade 2 diastolic dysfunction, normal RV size and function, mild aortic stenosis, mild AI, RVSP normal  -Lipid panel (5/5/2022) total cholesterol 97, HDL 40, LDL 40, triglycerides 86  -LHC (5/6/2022) mild nonobstructive disease only up to 10-20% RCA      Patient Active Problem List   Diagnosis   • Atypical chest pain   • Hiatal hernia   • Gastroesophageal reflux disease with esophagitis without hemorrhage   • Essential hypertension   • 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    • Tobacco abuse, in remission   • Stage 3a chronic kidney disease (HCC)   • Obesity (BMI 30-39.9)   • Chronic diastolic CHF (congestive heart failure) ([facility])   • Aortic stenosis, mild   • Nonrheumatic aortic valve insufficiency       Social History     Tobacco Use   • Smoking status: Former Smoker     Years: 55.00     Quit date: 2019     Years since quitting: 3.5   • Smokeless tobacco: Never Used   Vaping Use   • Vaping Use: Never used   Substance Use Topics   • Alcohol use: Never   • Drug use: Never       Family History   Problem Relation Age of Onset   • Hypertension Mother        The following portions of the patient's history were  reviewed and updated as appropriate: allergies, current medications, past family history, past medical history, past social history, past surgical history and problem list.      Current Outpatient Medications:   •  aspirin 81 MG chewable tablet, Chew 81 mg Daily., Disp: , Rfl:   •  atorvastatin (LIPITOR) 80 MG tablet, Take 80 mg by mouth Daily., Disp: , Rfl:   •  calcium carbonate (TUMS) 500 MG chewable tablet, Chew 1 tablet Daily., Disp: , Rfl:   •  cyclobenzaprine (FLEXERIL) 10 MG tablet, Take 10 mg by mouth 3 (Three) Times a Day., Disp: , Rfl:   •  dicyclomine (BENTYL) 10 MG capsule, Take 10 mg by mouth 3 (Three) Times a Day As Needed., Disp: , Rfl:   •  DULoxetine (CYMBALTA) 30 MG capsule, Take 30 mg by mouth 2 (Two) Times a Day., Disp: , Rfl:   •  famotidine (PEPCID) 20 MG tablet, Take 20 mg by mouth At Night As Needed for Indigestion or Heartburn., Disp: , Rfl:   •  folic acid (FOLVITE) 1 MG tablet, Take 1 mg by mouth Daily., Disp: , Rfl:   •  gabapentin (NEURONTIN) 600 MG tablet, Take 600 mg by mouth 2 (Two) Times a Day., Disp: , Rfl:   •  HYDROcodone-acetaminophen (NORCO)  MG per tablet, Take 1 tablet by mouth Every 6 (Six) Hours As Needed for Moderate Pain ., Disp: , Rfl:   •  lisinopril (PRINIVIL,ZESTRIL) 10 MG tablet, Take 1 tablet by mouth Daily., Disp: 30 tablet, Rfl: 1  •  metoprolol succinate XL (TOPROL-XL) 50 MG 24 hr tablet, Take 50 mg by mouth Daily., Disp: , Rfl:   •  pantoprazole (PROTONIX) 40 MG EC tablet, Take 1 tablet by mouth Daily., Disp: 30 tablet, Rfl: 1    Review of Systems   Constitutional: Negative for chills and fever.   Cardiovascular: Negative for chest pain and paroxysmal nocturnal dyspnea.   Respiratory: Negative for cough and shortness of breath.    Skin: Negative for rash.   Musculoskeletal: Positive for muscle weakness.   Gastrointestinal: Positive for heartburn. Negative for abdominal pain.   Neurological: Negative for dizziness and numbness.       Objective   /58  "(BP Location: Right arm, Patient Position: Sitting, Cuff Size: Adult)   Pulse 80   Ht 160 cm (62.99\")   Wt 83.9 kg (185 lb)   SpO2 98%   BMI 32.78 kg/m²   Constitutional:       Appearance: Well-developed.   HENT:      Head: Normocephalic and atraumatic.   Pulmonary:      Effort: Pulmonary effort is normal.      Breath sounds: Normal breath sounds.   Cardiovascular:      Normal rate. Irregular rhythm.      No gallop. No click.   Skin:     General: Skin is warm and dry.   Neurological:      Mental Status: Alert and oriented to person, place, and time.         ECG 12 Lead    Date/Time: 7/11/2022 2:27 PM  Performed by: Lenard Eastman MD  Authorized by: Lenrad Eastman MD   Comparison: compared with previous ECG from 5/5/2022  Similar to previous ECG  Rhythm: sinus rhythm  Ectopy: atrial premature contractions  Rate: normal              ICD-10-CM ICD-9-CM   1. Chest pain, unspecified type  R07.9 786.50   2. Aortic stenosis, mild  I35.0 424.1   3. Nonrheumatic aortic valve insufficiency  I35.1 424.1   4. Chronic diastolic CHF (congestive heart failure) (HCC)  I50.32 428.32     428.0   5. 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 Essential hypertension  I10 401.9         Assessment/Plan:  1.  Chest pain: Occasional atypical chest pain symptoms.  LHC from 5/6/2022 showed mild nonobstructive disease only.  Offer reassurance.    2.  Aortic stenosis: Mild on echo from 5/4/2022.  Continue routine surveillance.    3.  Aortic regurgitation: Mild on echo from 5/4/2022.  Continue routine surveillance.    4.  Chronic diastolic CHF: Appears euvolemic and stable today.  No longer requiring Lasix.  Low-sodium diet.    5.  Peripheral vascular disease: History of vasculitis with peripheral aneurysms.  Continue aspirin and atorvastatin.    6.  Essential hypertension: Blood pressures well controlled today.  Continue lisinopril and metoprolol.  "

## 2022-08-25 ENCOUNTER — TELEPHONE (OUTPATIENT)
Dept: VASCULAR SURGERY | Facility: CLINIC | Age: 72
End: 2022-08-25

## 2022-08-26 ENCOUNTER — OFFICE VISIT (OUTPATIENT)
Dept: VASCULAR SURGERY | Facility: CLINIC | Age: 72
End: 2022-08-26

## 2022-08-26 VITALS
RESPIRATION RATE: 18 BRPM | OXYGEN SATURATION: 95 % | BODY MASS INDEX: 32.78 KG/M2 | HEIGHT: 63 IN | HEART RATE: 69 BPM | WEIGHT: 185 LBS | DIASTOLIC BLOOD PRESSURE: 62 MMHG | SYSTOLIC BLOOD PRESSURE: 86 MMHG

## 2022-08-26 DIAGNOSIS — E78.2 MIXED HYPERLIPIDEMIA: ICD-10-CM

## 2022-08-26 DIAGNOSIS — I71.22 AORTIC ARCH ANEURYSM: Primary | ICD-10-CM

## 2022-08-26 DIAGNOSIS — I10 ESSENTIAL HYPERTENSION: ICD-10-CM

## 2022-08-26 DIAGNOSIS — I65.23 BILATERAL CAROTID ARTERY STENOSIS: ICD-10-CM

## 2022-08-26 PROCEDURE — 99214 OFFICE O/P EST MOD 30 MIN: CPT | Performed by: SURGERY

## 2022-08-31 NOTE — PROGRESS NOTES
08/26/2022      Den James DO  [address] [place] [address]        Cheryl C Brandon  1950    Chief Complaint   Patient presents with   • Confusion     Follow-up.  Pt states that she has dizziness off and on.         Dear Den James DO:    HPI     I had the pleasure of seeing your patient in the office today for follow up.  As you recall, the patient is a 72 y.o. female who we are currently following for history of significant aortic atherosclerotic disease.  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  She also has a small juxtarenal and infrarenal abdominal aortic aneurysm up to about 3.5 cm.  She had previously been seen here back in June 2021 after being referred from cardiology given her aortic pathology.  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  She had been seen in [place] at that time with both the CT surgery and vascular surgery team seeing her and not recommending any intervention and only follow-up.  However her insurance did not cover the team at [place] and so she has not followed there but wishes to try and follow here.  Here today overall she denies any chest pain, abdominal pain, or back pain.  She recently had CT scans done in May of this year for follow-up with both CTA of the chest and abdomen/pelvis and I was able to review these.  She continues to have the extensive atherosclerotic disease throughout the aortic arch and descending thoracic aorta as well as the abdominal aorta.  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  She also continues to have a saccular  "aneurysm of the descending thoracic aorta.  On discussion of her aortic pathology today she reports that she would not want any surgical intervention for this and understands that if these aneurysms increase in size they carry increased risk of rupture but would not want anything done.  Her other complaint today is that she has been having intermittent dizziness.  She had a previous carotid duplex done in April 2021 that showed some atherosclerotic disease with less than 50% stenosis of the bilateral internal carotid arteries but has not had a repeat since then.  She is currently maintained on 81 mg aspirin daily as well as Lipitor.  She has no other acute complaints today.  She has difficulty with ambulation and so was in a wheelchair at the time of exam.  She has a family member present with her.    Review of Systems   Constitutional: Positive for fatigue. Negative for activity change, appetite change, chills, diaphoresis and fever.        Intermittent difficulty with memory.   HENT: Negative.  Negative for congestion, sneezing, sore throat and trouble swallowing.    Eyes: Negative.  Negative for visual disturbance.   Respiratory: Negative.  Negative for chest tightness and shortness of breath.    Cardiovascular: Negative.  Negative for chest pain, palpitations and leg swelling.   Gastrointestinal: Negative.  Negative for abdominal distention, abdominal pain, nausea and vomiting.   Endocrine: Negative.    Genitourinary: Negative.    Musculoskeletal: Negative.    Skin: Negative.    Allergic/Immunologic: Negative.    Neurological: Negative.    Hematological: Negative.    Psychiatric/Behavioral: Negative.        BP (!) 86/62 (BP Location: Left arm, Patient Position: Sitting, Cuff Size: Adult)   Pulse 69   Resp 18   Ht 160 cm (63\")   Wt 83.9 kg (185 lb)   SpO2 95%   BMI 32.77 kg/m²   Physical Exam  Vitals reviewed.   Constitutional:       Appearance: Normal appearance.   HENT:      Head: Normocephalic and " atraumatic.      Nose: Nose normal.      Mouth/Throat:      Mouth: Mucous membranes are moist.   Eyes:      Extraocular Movements: Extraocular movements intact.      Pupils: Pupils are equal, round, and reactive to light.   Cardiovascular:      Rate and Rhythm: Normal rate and regular rhythm.      Pulses:           Carotid pulses are 2+ on the right side and 2+ on the left side.       Radial pulses are 2+ on the right side and 2+ on the left side.        Femoral pulses are 2+ on the right side and 2+ on the left side.       Popliteal pulses are 2+ on the right side and 2+ on the left side.        Dorsalis pedis pulses are 2+ on the right side and 2+ on the left side.        Posterior tibial pulses are 1+ on the right side and 1+ on the left side.      Comments: Does have palpable pulses throughout the bilateral lower extremities.  Capillary refill is maintained.  She has no wounds.  She does have edema of the bilateral lower extremities.  Pulmonary:      Effort: Pulmonary effort is normal. No respiratory distress.   Abdominal:      General: There is no distension.      Palpations: Abdomen is soft. There is no mass.      Tenderness: There is no abdominal tenderness.   Musculoskeletal:      Cervical back: Normal range of motion and neck supple.      Right lower leg: Edema present.      Left lower leg: Edema present.   Skin:     General: Skin is warm and dry.      Capillary Refill: Capillary refill takes less than 2 seconds.   Neurological:      General: No focal deficit present.      Mental Status: She is alert and oriented to person, place, and time.   Psychiatric:         Mood and Affect: Mood normal.         Behavior: Behavior normal.         Thought Content: Thought content normal.         Judgment: Judgment normal.         DIAGNOSTIC DATA:    No results found.    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 Aortic arch aneurysm (HCC)  I71.2 441.2   2. Bilateral carotid artery stenosis  I65.23 433.10     433.30   3. Essential hypertension  I10 401.9   4. Mixed hyperlipidemia  E78.2 272.2       Lab Frequency Next Occurrence   CT Angiogram Abdomen Pelvis Once 12/21/2022   US Carotid Bilateral Once 09/09/2022       PLAN: After thoroughly evaluating Cheryl Taylor, I believe the best course of action is to initially remain conservative from a vascular standpoint.  She has known significant aortic atherosclerotic disease and some aneurysmal changes of the innominate artery, as well as a saccular aneurysm of the distal descending thoracic aorta.  Finally she also has this [place] segment small aortic aneurysm as well as infrarenal aortic aneurysm measuring up to about 3.5 cm.  She does have palpable pulses in the bilateral lower extremities.  On discussion today she notes she likely would not want surgical intervention on her thoracic or abdominal aortic areas but given the rather sizable aneurysm of that innominate takeoff/bovine arch common trunk going to refer her for follow-up with cardiothoracic surgery as her case had been reviewed with Dr. Franco here before.  Also given her history of some carotid atherosclerotic disease and the symptoms of dizziness recently and going to repeat a carotid duplex for further evaluation and I will see her back here in the office in 2 weeks with that completed to review it and make recommendations as needed.  The patient is to continue taking their medications as previously discussed.   I did discuss vascular risk factors as they pertain to the  progression of vascular disease including controlling pretension, and hyperlipidemia. These factors are controlled on her current regimen. BMI is >= 30 and <35. (Class 1 Obesity). The following options were offered after discussion;: exercise counseling/recommendations and nutrition counseling/recommendations. This was all discussed in full with complete understanding.  Thank you for allowing me to participate in the care of your patient.  Please do not hesitate to call with any questions or concerns.  We will keep you aware of any further encounters with Cheryl Taylor.      Sincerely Yours,      Den Nuñez MD

## 2022-09-08 ENCOUNTER — TELEPHONE (OUTPATIENT)
Dept: VASCULAR SURGERY | Facility: CLINIC | Age: 72
End: 2022-09-08

## 2022-09-08 ENCOUNTER — HOSPITAL ENCOUNTER (OUTPATIENT)
Dept: ULTRASOUND IMAGING | Facility: HOSPITAL | Age: 72
Discharge: HOME | End: 2022-09-08
Admitting: SURGERY

## 2022-09-08 DIAGNOSIS — I65.23 BILATERAL CAROTID ARTERY STENOSIS: ICD-10-CM

## 2022-09-08 PROCEDURE — 93880 EXTRACRANIAL BILAT STUDY: CPT | Performed by: SURGERY

## 2022-09-08 PROCEDURE — 93880 EXTRACRANIAL BILAT STUDY: CPT

## 2022-09-09 ENCOUNTER — OFFICE VISIT (OUTPATIENT)
Dept: VASCULAR SURGERY | Facility: CLINIC | Age: 72
End: 2022-09-09

## 2022-09-09 VITALS
OXYGEN SATURATION: 95 % | DIASTOLIC BLOOD PRESSURE: 62 MMHG | RESPIRATION RATE: 18 BRPM | WEIGHT: 185 LBS | SYSTOLIC BLOOD PRESSURE: 80 MMHG | HEIGHT: 63 IN | BODY MASS INDEX: 32.78 KG/M2 | HEART RATE: 86 BPM

## 2022-09-09 DIAGNOSIS — E78.2 MIXED HYPERLIPIDEMIA: ICD-10-CM

## 2022-09-09 DIAGNOSIS — I10 ESSENTIAL HYPERTENSION: ICD-10-CM

## 2022-09-09 DIAGNOSIS — I71.20 THORACIC AORTIC ANEURYSM WITHOUT RUPTURE: ICD-10-CM

## 2022-09-09 DIAGNOSIS — I71.40 AAA (ABDOMINAL AORTIC ANEURYSM) WITHOUT RUPTURE: Primary | ICD-10-CM

## 2022-09-09 PROBLEM — M54.16 LUMBAR RADICULOPATHY: Status: ACTIVE | Noted: 2022-09-09

## 2022-09-09 PROBLEM — R06.02 SHORTNESS OF BREATH: Status: ACTIVE | Noted: 2021-04-24

## 2022-09-09 PROBLEM — K21.9 GASTROESOPHAGEAL REFLUX DISEASE: Status: ACTIVE | Noted: 2022-09-09

## 2022-09-09 PROBLEM — G89.29 CHRONIC PAIN: Status: ACTIVE | Noted: 2022-09-09

## 2022-09-09 PROBLEM — D50.9 IRON DEFICIENCY ANEMIA: Status: ACTIVE | Noted: 2021-04-30

## 2022-09-09 PROBLEM — I77.6 VASCULITIS: Status: ACTIVE | Noted: 2021-04-30

## 2022-09-09 PROCEDURE — 99214 OFFICE O/P EST MOD 30 MIN: CPT | Performed by: SURGERY

## 2022-09-10 NOTE — PROGRESS NOTES
09/09/2022      Den James DO  [address] [place] [address]        Cheryl Taylor  1950    Chief Complaint   Patient presents with   • Carotid Artery Disease     2 week follow-up with US carotid bilat. Pt states she is having daily dizziness. Pt denies any stroke like symptoms.        Dear Den James DO:    HPI     I had the pleasure of seeing your patient in the office today for follow up.  As you recall, the patient is a 72 y.o. female who we are currently following for history of significant aortic atherosclerotic disease.  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  She also has a small juxtarenal and infrarenal abdominal aortic aneurysm up to about 3.5 cm.  She had previously been seen here back in June 2021 after being referred from cardiology given her aortic pathology.  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  She had been seen in [place] at that time with both the CT surgery and vascular surgery team seeing her and not recommending any intervention and only follow-up.  However her insurance did not cover the team at [place] and so she has not followed there but wishes to try and follow here. She recently had CT scans done in May of this year for follow-up with both CTA of the chest and abdomen/pelvis and I was able to review these.  She continues to have the extensive atherosclerotic disease throughout the aortic arch and descending thoracic aorta as well as the abdominal aorta.  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  She also continues to have a saccular  "aneurysm of the descending thoracic aorta. Her other complaint at our recent visit was that she has been having intermittent dizziness.  She had a previous carotid duplex done in April 2021 that showed some atherosclerotic disease with less than 50% stenosis of the bilateral internal carotid arteries but has not had a repeat since then.  Sh she returns today after having had repeat carotid duplex which I reviewed.  It shows evidence of less than 50% stenosis of the bilateral internal carotid arteries.  She is currently maintained on 81 mg aspirin daily as well as Lipitor.  She does have upcoming evaluation with Dr. Franco of CT surgery on Monday given the aortic arch and innominate pathology/aneurysm.    Review of Systems   Constitutional: Positive for fatigue. Negative for activity change, appetite change, chills, diaphoresis and fever.        Intermittent difficulty with memory.   HENT: Negative.  Negative for congestion, sneezing, sore throat and trouble swallowing.    Eyes: Negative.  Negative for visual disturbance.   Respiratory: Negative.  Negative for chest tightness and shortness of breath.    Cardiovascular: Negative.  Negative for chest pain, palpitations and leg swelling.   Gastrointestinal: Negative.  Negative for abdominal distention, abdominal pain, nausea and vomiting.   Endocrine: Negative.    Genitourinary: Negative.    Musculoskeletal: Negative.    Skin: Negative.    Allergic/Immunologic: Negative.    Neurological: Negative.    Hematological: Negative.    Psychiatric/Behavioral: Negative.        BP (!) 80/62 (BP Location: Left arm, Patient Position: Sitting, Cuff Size: Adult)   Pulse 86   Resp 18   Ht 160 cm (62.99\")   Wt 83.9 kg (185 lb)   SpO2 95%   BMI 32.78 kg/m²   Physical Exam  Vitals reviewed.   Constitutional:       Appearance: Normal appearance.   HENT:      Head: Normocephalic and atraumatic.      Nose: Nose normal.      Mouth/Throat:      Mouth: Mucous membranes are moist.   Eyes: "      Extraocular Movements: Extraocular movements intact.      Pupils: Pupils are equal, round, and reactive to light.   Cardiovascular:      Rate and Rhythm: Normal rate and regular rhythm.      Pulses:           Carotid pulses are 2+ on the right side and 2+ on the left side.       Radial pulses are 2+ on the right side and 2+ on the left side.        Femoral pulses are 2+ on the right side and 2+ on the left side.       Popliteal pulses are 2+ on the right side and 2+ on the left side.        Dorsalis pedis pulses are 2+ on the right side and 2+ on the left side.        Posterior tibial pulses are 1+ on the right side and 1+ on the left side.      Comments: Does have palpable pulses throughout the bilateral lower extremities.  Capillary refill is maintained.  She has no wounds.  She does have edema of the bilateral lower extremities.  Pulmonary:      Effort: Pulmonary effort is normal. No respiratory distress.   Abdominal:      General: There is no distension.      Palpations: Abdomen is soft. There is no mass.      Tenderness: There is no abdominal tenderness.   Musculoskeletal:      Cervical back: Normal range of motion and neck supple.      Right lower leg: Edema present.      Left lower leg: Edema present.   Skin:     General: Skin is warm and dry.      Capillary Refill: Capillary refill takes less than 2 seconds.   Neurological:      General: No focal deficit present.      Mental Status: She is alert and oriented to person, place, and time.   Psychiatric:         Mood and Affect: Mood normal.         Behavior: Behavior normal.         Thought Content: Thought content normal.         Judgment: Judgment normal.         DIAGNOSTIC DATA:    US Carotid Bilateral    Result Date: 9/8/2022  Narrative: History: Carotid occlusive disease      Impression: Impression: 1. There is less than 50% stenosis of the right internal carotid artery. 2. There is less than 50% stenosis of the left internal carotid artery. 3.  Antegrade flow is demonstrated in bilateral vertebral arteries.  Comments: Bilateral carotid vertebral arterial duplex scan was performed.  Grayscale imaging shows intimal thickening and calcified elements at the carotid bifurcation. The right internal carotid artery peak systolic velocity is 121 cm/sec. The end-diastolic velocity is 23.3 cm/sec. The right ICA/CCA ratio is approximately 1.9 . These findings correlate with less than 50% stenosis of the right internal carotid artery.  Grayscale imaging shows intimal thickening and calcified elements at the carotid bifurcation. The left internal carotid artery peak systolic velocity is 107 cm/sec. The end-diastolic velocity is 17.2 cm/sec. The left ICA/CCA ratio is approximately 1.5 . These findings correlate with less than 50% stenosis of the left internal carotid artery.  Antegrade flow is demonstrated in bilateral vertebral arteries.  This report was finalized on 09/08/2022 16:12 by Dr. Den Nuñez MD.      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 AAA (abdominal aortic aneurysm) without rupture ([facility])  I71.4 441.4   2. Thoracic aortic aneurysm without rupture (HCC)  I71.2 441.2   3. Essential hypertension  I10 401.9   4. Mixed hyperlipidemia  E78.2 272.2       Lab Frequency Next  Occurrence   CT Angiogram Abdomen Pelvis Once 12/21/2022       PLAN: After thoroughly evaluating Cheryl Taylor, I believe the best course of action is to remain conservative from a vascular standpoint.  She returns today after having undergone carotid duplex for further evaluation of atherosclerotic disease.  It showed less than 50% stenosis of the bilateral internal carotid arteries.  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  At her previous visit recently she had expressed that she would not want to undergo any surgical intervention of her aneurysm and understood the risk that if these aneurysms increased in size it would increase the risk of rupture which would likely be fatal.  As such moving forward she wishes to be seen on an as-needed basis.  Given that innominate pathology she is going to be evaluated by Dr. Franco of CT surgery on Monday.  Otherwise moving forward I will see her in the office on an as-needed basis should any further vascular issues arise.  The patient is to continue taking their medications as previously discussed.   I did discuss vascular risk factors as they pertain to the progression of vascular disease including controlling hypertension, and hyperlipidemia. These factors are well controlled on her current regimen. BMI is >= 30 and <35. (Class 1 Obesity). The following options were offered after discussion;: exercise counseling/recommendations and nutrition counseling/recommendations. I did  extensively on smoking cessation, and the patient was advised of the continued risks of smoking.  I provided over 10 minutes counseling on this matter. This was all discussed in full with complete understanding. Thank you for allowing me to participate in the care of your  patient.  Please do not hesitate to call with any questions or concerns.  We will keep you aware of any further encounters with Cheryl Taylor.      Sincerely Yours,      Den Nuñez MD

## 2022-09-12 ENCOUNTER — OFFICE VISIT (OUTPATIENT)
Dept: CARDIAC SURGERY | Facility: CLINIC | Age: 72
End: 2022-09-12

## 2022-09-12 VITALS
HEART RATE: 92 BPM | WEIGHT: 185 LBS | BODY MASS INDEX: 32.78 KG/M2 | SYSTOLIC BLOOD PRESSURE: 118 MMHG | DIASTOLIC BLOOD PRESSURE: 62 MMHG | HEIGHT: 63 IN | OXYGEN SATURATION: 98 %

## 2022-09-12 DIAGNOSIS — I71.20 THORACIC AORTIC ANEURYSM WITHOUT RUPTURE: Primary | ICD-10-CM

## 2022-09-12 DIAGNOSIS — I72.9 ANEURYSM: Primary | ICD-10-CM

## 2022-09-12 PROCEDURE — 99214 OFFICE O/P EST MOD 30 MIN: CPT | Performed by: SURGERY

## 2022-09-12 RX ORDER — METOPROLOL SUCCINATE 25 MG/1
25 TABLET, EXTENDED RELEASE ORAL DAILY
COMMUNITY
Start: 2022-09-08

## 2022-09-15 ENCOUNTER — PATIENT ROUNDING (BHMG ONLY) (OUTPATIENT)
Dept: CARDIAC SURGERY | Facility: CLINIC | Age: 72
End: 2022-09-15

## 2022-09-15 NOTE — PROGRESS NOTES
A [employer] message has been sent to the patient for PATIENT ROUNDING with [facility] [place] [facility].

## 2022-09-27 NOTE — PROGRESS NOTES
Cardiothoracic Surgery Consultation    Referring Physician: Dr. Den Nuñez    Primary Care Physician: Dr. Den James    Chief Complaint   Patient presents with   • Aortic Arch Aneurysm     New pt from [facility]         Subjective     History of Present Illness  Ms. 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  In April 2021 she had some chest pain and could not catch her breath.  She has been in the ED since then with chest pain, she saw Dr. Eastman while in the hospital for this chest pain.  She continues to occasionally have some chest pain.  She had an incidental finding on the CT scan at that time that showed this aneurysm at the base of her bovine common trunk to innominate artery and left carotid artery.  She smokes 1 pack/day and has 110-pack-year smoking history.  She has a low-grade temperature on and off for years.  Her chest pain is in her lower chest and she occasionally has it now much less frequently than before and it radiates around her chest.  She has never had surgery on her chest although she did have a right chest tube for pneumothorax in 1968.  She has multiple medical problems, she uses a wheelchair all the time to get around, she has back problems and has had a back pain stimulator placed by Dr. Chacko.  She has trouble walking.  With her history she was referred to Dr. Nuñez, he referred her to me for this aneurysm at the base of her aortic arch vessels.        Review of Systems   Constitutional: Positive for fatigue. Negative for activity change and unexpected weight change.   Respiratory: Positive for chest tightness and shortness of breath.    Cardiovascular: Positive for chest pain and leg swelling. Negative for palpitations.   Musculoskeletal: Positive for back pain.        A complete review of systems was performed, is negative except stated above.    Past Medical History:    Diagnosis Date   • AAA (abdominal aortic aneurysm) (HCC)    • Arthritis    • Asthma    • Chronic kidney disease (CKD)    • Confusion 7/13/2021   • COPD (chronic obstructive pulmonary disease) (HCC)    • Gastroesophageal reflux disease with esophagitis without hemorrhage    • Heart murmur    • Hiatal hernia    • Hypertension    • Inflammation of arteries (HCC) 7/13/2021   • Thoracic aortic aneurysm (HCC)      Past Surgical History:   Procedure Laterality Date   • BACK SURGERY     • CARDIAC CATHETERIZATION N/A 5/6/2022    Procedure: Left Heart Cath;  Surgeon: Lenard Eastman MD;  Location:  [facility];  Service: Cardiology;  Laterality: N/A;   • EYE SURGERY Bilateral    • FOOT FASCIOTOMY Bilateral    • HYSTERECTOMY     • LOWER LEG SOFT TISSUE TUMOR EXCISION     • LUMBAR NERVE STIMLATOR INSERTION Right    • LYMPH NODE DISSECTION     • WRIST FRACTURE SURGERY Right     x2     Family History   Problem Relation Age of Onset   • Hypertension Mother      Social History     Tobacco Use   • Smoking status: Current Every Day Smoker     Packs/day: 1.00     Years: 55.00     Pack years: 55.00   • Smokeless tobacco: Never Used   • Tobacco comment: .   Vaping Use   • Vaping Use: Never used   Substance Use Topics   • Alcohol use: Never   • Drug use: Never     Current Outpatient Medications   Medication Sig Dispense Refill   • aspirin 81 MG chewable tablet Chew 81 mg Daily.     • atorvastatin (LIPITOR) 80 MG tablet Take 80 mg by mouth Daily.     • calcium carbonate (TUMS) 500 MG chewable tablet Chew 1 tablet Daily.     • cyclobenzaprine (FLEXERIL) 10 MG tablet Take 10 mg by mouth 3 (Three) Times a Day As Needed.     • dicyclomine (BENTYL) 10 MG capsule Take 10 mg by mouth 3 (Three) Times a Day As Needed.     • DULoxetine (CYMBALTA) 30 MG capsule Take 30 mg by mouth 2 (Two) Times a Day.     • famotidine (PEPCID) 20 MG tablet Take 20 mg by mouth At Night As Needed for Indigestion or Heartburn.     • folic acid  "(FOLVITE) 1 MG tablet Take 1 mg by mouth Daily.     • gabapentin (NEURONTIN) 600 MG tablet Take 600 mg by mouth 2 (Two) Times a Day.     • HYDROcodone-acetaminophen (NORCO)  MG per tablet Take 1 tablet by mouth Every 6 (Six) Hours As Needed for Moderate Pain .     • lisinopril (PRINIVIL,ZESTRIL) 10 MG tablet Take 1 tablet by mouth Daily. 30 tablet 1   • metoprolol succinate XL (TOPROL-XL) 25 MG 24 hr tablet Take 25 mg by mouth Daily.     • pantoprazole (PROTONIX) 40 MG EC tablet Take 1 tablet by mouth Daily. 30 tablet 1   • metoprolol succinate XL (TOPROL-XL) 50 MG 24 hr tablet Take 25 mg by mouth Daily.       No current facility-administered medications for this visit.     Allergies:  Levaquin [levofloxacin], Naprosyn [naproxen], and Sulfa antibiotics    Objective      Vital Signs  Visit Vitals  /62 (BP Location: Right arm, Patient Position: Sitting, Cuff Size: Adult)   Pulse 92   Ht 160 cm (62.99\")   Wt 83.9 kg (185 lb)   SpO2 98%   BMI 32.78 kg/m²         Physical Exam  Vitals reviewed.   Constitutional:       General: She is not in acute distress.     Appearance: She is well-developed. She is not diaphoretic.      Comments: Clear ill-appearing in wheelchair   HENT:      Head: Normocephalic and atraumatic.   Eyes:      General: No scleral icterus.     Pupils: Pupils are equal, round, and reactive to light.   Neck:      Vascular: No JVD.      Trachea: No tracheal deviation.   Cardiovascular:      Rate and Rhythm: Normal rate and regular rhythm.      Heart sounds: Normal heart sounds. No murmur heard.  Pulmonary:      Effort: Pulmonary effort is normal. No respiratory distress.      Breath sounds: No stridor. Wheezing present.      Comments: Takes breath with each sentence phone  Abdominal:      General: There is no distension.      Palpations: Abdomen is soft.      Tenderness: There is no abdominal tenderness.   Musculoskeletal:         General: No deformity. Normal range of motion.      Cervical back: " Normal range of motion and neck supple.   Skin:     General: Skin is warm and dry.      Capillary Refill: Capillary refill takes less than 2 seconds.      Coloration: Skin is not pale.      Findings: No erythema or rash.   Neurological:      Mental Status: She is alert and oriented to person, place, and time.      Cranial Nerves: No cranial nerve deficit.   Psychiatric:         Behavior: Behavior normal.         Thought Content: Thought content normal.         Judgment: Judgment normal.         Results Review:   WBC   Date Value Ref Range Status   05/05/2022 14.61 (H) 3.40 - 10.80 10*3/mm3 Final     RBC   Date Value Ref Range Status   05/05/2022 3.95 3.77 - 5.28 10*6/mm3 Final     Hemoglobin   Date Value Ref Range Status   05/05/2022 10.3 (L) 12.0 - 15.9 g/dL Final     Hematocrit   Date Value Ref Range Status   05/05/2022 36.4 34.0 - 46.6 % Final     MCV   Date Value Ref Range Status   05/05/2022 92.2 79.0 - 97.0 fL Final     MCH   Date Value Ref Range Status   05/05/2022 26.1 (L) 26.6 - 33.0 pg Final     MCHC   Date Value Ref Range Status   05/05/2022 28.3 (L) 31.5 - 35.7 g/dL Final     RDW   Date Value Ref Range Status   05/05/2022 16.1 (H) 12.3 - 15.4 % Final     RDW-SD   Date Value Ref Range Status   05/05/2022 53.0 37.0 - 54.0 fl Final     MPV   Date Value Ref Range Status   05/05/2022 10.7 6.0 - 12.0 fL Final     Platelets   Date Value Ref Range Status   05/05/2022 417 140 - 450 10*3/mm3 Final     Neutrophil Rel %   Date Value Ref Range Status   04/30/2021 71.0 % Final     Comment:     Per CAP, reference ranges should not be reported for percent cell counts  when reporting reference ranges for absolute number counts to prevent   misinterpretation of WBC differential data.     Neutrophil %   Date Value Ref Range Status   05/04/2022 72.6 42.7 - 76.0 % Final     Lymphocyte %   Date Value Ref Range Status   05/04/2022 17.5 (L) 19.6 - 45.3 % Final     Monocyte %   Date Value Ref Range Status   05/04/2022 8.7 5.0 -  12.0 % Final     Eosinophil %   Date Value Ref Range Status   05/04/2022 0.4 0.3 - 6.2 % Final     Basophil %   Date Value Ref Range Status   05/04/2022 0.4 0.0 - 1.5 % Final     Immature Grans %   Date Value Ref Range Status   05/04/2022 0.4 0.0 - 0.5 % Final     Neutrophils Absolute   Date Value Ref Range Status   05/05/2022 9.60 (H) 1.70 - 7.00 10*3/mm3 Final   04/30/2021 10.06 (H) 1.60 - 8.10 x10(3)/mcL Final     Neutrophils, Absolute   Date Value Ref Range Status   05/04/2022 10.73 (H) 1.70 - 7.00 10*3/mm3 Final     Lymphocytes Absolute   Date Value Ref Range Status   04/30/2021 1.87 1.10 - 3.50 x10(3)/mcL Final     Lymphocytes, Absolute   Date Value Ref Range Status   05/04/2022 2.58 0.70 - 3.10 10*3/mm3 Final     Monocytes Absolute   Date Value Ref Range Status   04/30/2021 1.36 (H) 0.30 - 1.10 x10(3)/mcL Final     Monocytes, Absolute   Date Value Ref Range Status   05/04/2022 1.28 (H) 0.10 - 0.90 10*3/mm3 Final     Eosinophils Absolute   Date Value Ref Range Status   05/05/2022 0.29 0.00 - 0.40 10*3/mm3 Final   04/30/2021 0.26 0.03 - 0.51 x10(3)/mcL Final     Eosinophil Abs   Date Value Ref Range Status   04/30/2021 1.8 % Final     Comment:     Per CAP, reference ranges should not be reported for percent cell counts  when reporting reference ranges for absolute number counts to prevent   misinterpretation of WBC differential data.     Eosinophils, Absolute   Date Value Ref Range Status   05/04/2022 0.06 0.00 - 0.40 10*3/mm3 Final     Basophils Absolute   Date Value Ref Range Status   04/30/2021 0.37 (H) 0.01 - 0.08 x10(3)/mcL Final     Basophils, Absolute   Date Value Ref Range Status   05/04/2022 0.06 0.00 - 0.20 10*3/mm3 Final     Immature Grans, Absolute   Date Value Ref Range Status   05/04/2022 0.06 (H) 0.00 - 0.05 10*3/mm3 Final     nRBC   Date Value Ref Range Status   05/04/2022 0.0 0.0 - 0.2 /100 WBC Final     Glucose   Date Value Ref Range Status   05/06/2022 110 (H) 65 - 99 mg/dL Final     Sodium    Date Value Ref Range Status   05/06/2022 141 136 - 145 mmol/L Final   04/30/2021 139 136 - 145 mmol/L Final     Potassium   Date Value Ref Range Status   05/06/2022 4.1 3.5 - 5.2 mmol/L Final     Comment:     Slight hemolysis detected by analyzer. Results may be affected.   04/30/2021 4.4 3.3 - 4.8 mmol/L Final     CO2   Date Value Ref Range Status   05/06/2022 24.0 22.0 - 29.0 mmol/L Final     Total CO2   Date Value Ref Range Status   04/30/2021 22 (L) 23 - 31 mmol/L Final     Chloride   Date Value Ref Range Status   05/06/2022 105 98 - 107 mmol/L Final   04/30/2021 104 98 - 107 mmol/L Final     Anion Gap   Date Value Ref Range Status   05/06/2022 12.0 5.0 - 15.0 mmol/L Final   04/30/2021 13  Final     Creatinine   Date Value Ref Range Status   05/06/2022 1.27 (H) 0.57 - 1.00 mg/dL Final   04/30/2021 1.13 (H) 0.57 - 1.11 mg/dL Final     BUN   Date Value Ref Range Status   05/06/2022 19 8 - 23 mg/dL Final   04/30/2021 9 8 - 26 mg/dL Final     BUN/Creatinine Ratio   Date Value Ref Range Status   05/06/2022 15.0 7.0 - 25.0 Final     Calcium   Date Value Ref Range Status   05/06/2022 8.7 8.6 - 10.5 mg/dL Final   04/30/2021 8.7 8.4 - 10.5 mg/dL Final     eGFR Non  Amer   Date Value Ref Range Status   06/12/2021 50 (L) >60 mL/min/1.73 Final     Alkaline Phosphatase   Date Value Ref Range Status   05/04/2022 132 (H) 39 - 117 U/L Final     Total Protein   Date Value Ref Range Status   05/04/2022 7.5 6.0 - 8.5 g/dL Final     ALT (SGPT)   Date Value Ref Range Status   05/04/2022 10 1 - 33 U/L Final     AST (SGOT)   Date Value Ref Range Status   05/04/2022 17 1 - 32 U/L Final     Total Bilirubin   Date Value Ref Range Status   05/04/2022 0.4 0.0 - 1.2 mg/dL Final     Albumin   Date Value Ref Range Status   05/04/2022 3.70 3.50 - 5.20 g/dL Final     Globulin   Date Value Ref Range Status   05/04/2022 3.8 gm/dL Final        I reviewed the patient's clinical results and discussed with patient.    CT Chest:     FINDINGS:   There is a bovine variant to the arch of aorta with a common  origin of the left common carotid right brachiocephalic arteries. There  is aneurysmal distention of the common vessel origin from the arch of  the aorta measuring 4 cm containing similar ulcerative plaque. 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 The ascending root aorta measures 3.7 cm.  Ascending aorta similar in size measuring approximately 4 cm. The  ascending thoracic aorta with moderate partially calcified plaque  measuring 3.4 cm. There is a small saccular aneurysm from the descending  thoracic aorta measuring 3.8 cm in width/2.7 cm in length. No discrete  intimal flap of dissection identified. Cardiomegaly. Scattered vascular  calcification involving the coronary arteries. Large hiatal hernia. No  pericardial or pleural effusion. No pathologic intrathoracic or axillary  lymphadenopathy.     Moderate emphysema. Calcified granuloma left lower lobe. No  consolidation. No pneumothorax.     Dorsal column stimulator device.     Moderate degenerative change thoracolumbar spine. No focal destructive  osseous lesions.     IMPRESSION:  1. Stable aneurysms involving the common origin of the right  brachiocephalic/Left common carotid arteries, left subclavian, and  descending thoracic aorta with scattered regions of ulcerative plaque.  Patient with history of underlying vasculitis. No discrete dissection.  2. No pulmonary emboli.  3. Moderate coronary artery  calcification. Cardiomegaly.  4. Large hiatal hernia  5. Moderate emphysema with no acute consolidation or suspicious  nodularity.   This report was finalized on 05/04/2022 16:47 by Dr. Lucy Mccloud MD.      I personally reviewed images of following exams, the following is my interpretation:    CT Chest: Bovine aortic arch, the common trunk has degenerative aneurysm that measures 4 cm in maximum dimension there is also aneurysmal  degeneration at the base of the celiac artery and a saccular aneurysm in the mid ascending that measures 3.8 cm in maximum dimension        Assessment & Plan     Ms. Taylor is a 72-year-old female who presents with aneurysm of the base of the common trunk of innominate and left carotid arteries of her bovine variant aortic arch.  She is asymptomatic from this.  Aneurysm looks degenerative with ulcerative plaques.  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  Today I discussed with her the natural history of aneurysms and treatment options.  Treatment of her aortic arch aneurysm would involve deep branching and possible arch replacement.  She is adamant she does not plan on having any surgical procedures such as this and she understands the consequences of not having surgery to treat her aneurysm.  We discussed today that aneurysm size usually has an effect on risk of acute aortic emergency, she understands and wants to continue watching this without surgical intervention.  I also discussed with her that we do not know the exact size we should intervene on an aneurysm in this location as it is not a typical arch or a sending aneurysm, she understands.    We discussed at length today the surgical treatment of this.  She does not wish to have this.  I also discussed with her that if she presents with an acute emergency due to dissection or rupture of this aneurysm and we decide that we are not going to proceed electively with surgery she does not want to proceed on an emergent basis with surgical intervention.  She agrees with this.  If she presents emergently with acute aortic problem we will proceed with palliative care and not intervene in accordance with her wishes.  She would like to see me back in 6 months to see if the aneurysm is changing I have offered her this appointment.    Thank you for trusting  me with the care of Ms. Taylor.  Please do not hesitate to call with questions or concerns.    Nolan Franco MD  Cardiothoracic Surgeon

## 2022-12-01 ENCOUNTER — TRANSCRIBE ORDERS (OUTPATIENT)
Dept: ADMINISTRATIVE | Facility: HOSPITAL | Age: 72
End: 2022-12-01

## 2022-12-01 ENCOUNTER — HOSPITAL ENCOUNTER (OUTPATIENT)
Dept: GENERAL RADIOLOGY | Facility: HOSPITAL | Age: 72
Discharge: HOME | End: 2022-12-01
Admitting: INTERNAL MEDICINE

## 2022-12-01 DIAGNOSIS — M54.40 LUMBAGO OF MULTIPLE SITES IN SPINE WITH SCIATICA: ICD-10-CM

## 2022-12-01 DIAGNOSIS — M54.40 LUMBAGO OF MULTIPLE SITES IN SPINE WITH SCIATICA: Primary | ICD-10-CM

## 2022-12-01 PROCEDURE — 72110 X-RAY EXAM L-2 SPINE 4/>VWS: CPT

## 2023-02-17 ENCOUNTER — TELEPHONE (OUTPATIENT)
Dept: CARDIAC SURGERY | Facility: CLINIC | Age: 73
End: 2023-02-17
Payer: MEDICARE

## 2023-02-17 NOTE — TELEPHONE ENCOUNTER
Called re: appts scheduled for 03/13/2023.    If pt returns call, please inform her of:    CT appointment 03/13/2023. She must check in at main registration at 3:00pm. Bring photo ID and insurance card. Appointment will begin at 3:30pm. She will needs to be fasting (nothing to eat or drink) for 6 hours (so, after 09:30am). Morning medications are okay to take. If she wears a diabetic monitoring device, she will be asked to remove it prior to the scan.    Pt will then have office visit with Dr. Franco at 4:00pm. Please inform pt to check in [place] [facility] [place] Dr. Franco and discuss CT results, following the CT scan.    Please document in this encounter.

## 2023-03-13 ENCOUNTER — OFFICE VISIT (OUTPATIENT)
Dept: CARDIAC SURGERY | Facility: CLINIC | Age: 73
End: 2023-03-13
Payer: MEDICARE

## 2023-03-13 ENCOUNTER — HOSPITAL ENCOUNTER (OUTPATIENT)
Dept: CT IMAGING | Facility: HOSPITAL | Age: 73
Discharge: HOME | End: 2023-03-13
Admitting: NURSE PRACTITIONER
Payer: MEDICARE

## 2023-03-13 VITALS
SYSTOLIC BLOOD PRESSURE: 130 MMHG | HEART RATE: 90 BPM | BODY MASS INDEX: 32.78 KG/M2 | HEIGHT: 63 IN | OXYGEN SATURATION: 98 % | DIASTOLIC BLOOD PRESSURE: 82 MMHG | WEIGHT: 185 LBS

## 2023-03-13 DIAGNOSIS — I71.20 THORACIC AORTIC ANEURYSM WITHOUT RUPTURE, UNSPECIFIED PART: Primary | ICD-10-CM

## 2023-03-13 DIAGNOSIS — I72.9 ANEURYSM: ICD-10-CM

## 2023-03-13 DIAGNOSIS — I71.20 THORACIC AORTIC ANEURYSM WITHOUT RUPTURE: ICD-10-CM

## 2023-03-13 DIAGNOSIS — E16.2 HYPOGLYCEMIA: ICD-10-CM

## 2023-03-13 DIAGNOSIS — I79.0 ANEURYSM OF AORTA IN DISEASES CLASSIFIED ELSEWHERE: ICD-10-CM

## 2023-03-13 LAB — CREAT BLDA-MCNC: 1.6 MG/DL (ref 0.6–1.3)

## 2023-03-13 PROCEDURE — 71275 CT ANGIOGRAPHY CHEST: CPT

## 2023-03-13 PROCEDURE — 99214 OFFICE O/P EST MOD 30 MIN: CPT | Performed by: SURGERY

## 2023-03-13 PROCEDURE — 3079F DIAST BP 80-89 MM HG: CPT | Performed by: SURGERY

## 2023-03-13 PROCEDURE — 82565 ASSAY OF CREATININE: CPT

## 2023-03-13 PROCEDURE — 3075F SYST BP GE 130 - 139MM HG: CPT | Performed by: SURGERY

## 2023-03-13 PROCEDURE — 25510000001 IOPAMIDOL PER 1 ML: Performed by: NURSE PRACTITIONER

## 2023-03-13 RX ORDER — OXYCODONE AND ACETAMINOPHEN 10; 325 MG/1; MG/1
TABLET ORAL
COMMUNITY
Start: 2023-02-17

## 2023-03-13 RX ADMIN — IOPAMIDOL 100 ML: 755 INJECTION, SOLUTION INTRAVENOUS at 16:02

## 2023-03-14 DIAGNOSIS — I71.20 THORACIC AORTIC ANEURYSM WITHOUT RUPTURE, UNSPECIFIED PART: Primary | ICD-10-CM

## 2023-03-15 ENCOUNTER — TELEPHONE (OUTPATIENT)
Dept: CARDIAC SURGERY | Facility: CLINIC | Age: 73
End: 2023-03-15
Payer: MEDICARE

## 2023-03-15 NOTE — TELEPHONE ENCOUNTER
This has been scheduled for first available on 3/29 & 3/30 - spoke with patient's daughter (Vivian) and informed of appt date/time. I also advised her to go ahead and get labs and blood cultures at other appts scheduled for 3/17. She voiced understanding to all.

## 2023-03-15 NOTE — TELEPHONE ENCOUNTER
NM scan ordered.  Please schedule and notify patient of appointment time.  Please advise her she will also need to have labs drawn when she comes for this appointment, this includes blood cultures.

## 2023-03-17 ENCOUNTER — LAB (OUTPATIENT)
Dept: LAB | Facility: HOSPITAL | Age: 73
End: 2023-03-17
Payer: MEDICARE

## 2023-03-17 ENCOUNTER — HOSPITAL ENCOUNTER (OUTPATIENT)
Dept: PULMONOLOGY | Facility: HOSPITAL | Age: 73
Discharge: HOME | End: 2023-03-17
Payer: MEDICARE

## 2023-03-17 ENCOUNTER — HOSPITAL ENCOUNTER (OUTPATIENT)
Dept: CARDIOLOGY | Facility: HOSPITAL | Age: 73
Discharge: HOME | End: 2023-03-17
Payer: MEDICARE

## 2023-03-17 VITALS
DIASTOLIC BLOOD PRESSURE: 82 MMHG | HEIGHT: 63 IN | WEIGHT: 185 LBS | BODY MASS INDEX: 32.78 KG/M2 | SYSTOLIC BLOOD PRESSURE: 130 MMHG

## 2023-03-17 DIAGNOSIS — E16.2 HYPOGLYCEMIA: ICD-10-CM

## 2023-03-17 DIAGNOSIS — I71.20 THORACIC AORTIC ANEURYSM WITHOUT RUPTURE, UNSPECIFIED PART: ICD-10-CM

## 2023-03-17 DIAGNOSIS — I79.0 ANEURYSM OF AORTA IN DISEASES CLASSIFIED ELSEWHERE: ICD-10-CM

## 2023-03-17 LAB
ALBUMIN SERPL-MCNC: 4.2 G/DL (ref 3.5–5.2)
ALBUMIN/GLOB SERPL: 1.2 G/DL
ALP SERPL-CCNC: 125 U/L (ref 39–117)
ALT SERPL W P-5'-P-CCNC: 10 U/L (ref 1–33)
ANION GAP SERPL CALCULATED.3IONS-SCNC: 14 MMOL/L (ref 5–15)
ARTERIAL PATENCY WRIST A: POSITIVE
AST SERPL-CCNC: 20 U/L (ref 1–32)
ATMOSPHERIC PRESS: 749 MMHG
BASE EXCESS BLDA CALC-SCNC: -2 MMOL/L (ref 0–2)
BASOPHILS # BLD AUTO: 0.09 10*3/MM3 (ref 0–0.2)
BASOPHILS NFR BLD AUTO: 0.6 % (ref 0–1.5)
BDY SITE: ABNORMAL
BILIRUB SERPL-MCNC: 0.3 MG/DL (ref 0–1.2)
BODY TEMPERATURE: 37 C
BUN SERPL-MCNC: 21 MG/DL (ref 8–23)
BUN/CREAT SERPL: 15.4 (ref 7–25)
CALCIUM SPEC-SCNC: 9.8 MG/DL (ref 8.6–10.5)
CHLORIDE SERPL-SCNC: 102 MMOL/L (ref 98–107)
CO2 SERPL-SCNC: 22 MMOL/L (ref 22–29)
CREAT SERPL-MCNC: 1.36 MG/DL (ref 0.57–1)
CRP SERPL-MCNC: <0.3 MG/DL (ref 0–0.5)
DEPRECATED RDW RBC AUTO: 63.6 FL (ref 37–54)
EGFRCR SERPLBLD CKD-EPI 2021: 42 ML/MIN/1.73
EOSINOPHIL # BLD AUTO: 0.29 10*3/MM3 (ref 0–0.4)
EOSINOPHIL NFR BLD AUTO: 2.1 % (ref 0.3–6.2)
ERYTHROCYTE [DISTWIDTH] IN BLOOD BY AUTOMATED COUNT: 20.1 % (ref 12.3–15.4)
GLOBULIN UR ELPH-MCNC: 3.4 GM/DL
GLUCOSE SERPL-MCNC: 99 MG/DL (ref 65–99)
HBA1C MFR BLD: 5.5 % (ref 4.8–5.6)
HCO3 BLDA-SCNC: 22.4 MMOL/L (ref 20–26)
HCT VFR BLD AUTO: 36.7 % (ref 34–46.6)
HGB BLD-MCNC: 10.4 G/DL (ref 12–15.9)
IMM GRANULOCYTES # BLD AUTO: 0.05 10*3/MM3 (ref 0–0.05)
IMM GRANULOCYTES NFR BLD AUTO: 0.4 % (ref 0–0.5)
LYMPHOCYTES # BLD AUTO: 2.14 10*3/MM3 (ref 0.7–3.1)
LYMPHOCYTES NFR BLD AUTO: 15.3 % (ref 19.6–45.3)
Lab: ABNORMAL
MCH RBC QN AUTO: 24.6 PG (ref 26.6–33)
MCHC RBC AUTO-ENTMCNC: 28.3 G/DL (ref 31.5–35.7)
MCV RBC AUTO: 86.8 FL (ref 79–97)
MODALITY: ABNORMAL
MONOCYTES # BLD AUTO: 0.96 10*3/MM3 (ref 0.1–0.9)
MONOCYTES NFR BLD AUTO: 6.9 % (ref 5–12)
NEUTROPHILS NFR BLD AUTO: 10.46 10*3/MM3 (ref 1.7–7)
NEUTROPHILS NFR BLD AUTO: 74.7 % (ref 42.7–76)
NRBC BLD AUTO-RTO: 0 /100 WBC (ref 0–0.2)
PCO2 BLDA: 36 MM HG (ref 35–45)
PCO2 TEMP ADJ BLD: 36 MM HG (ref 35–45)
PH BLDA: 7.4 PH UNITS (ref 7.35–7.45)
PH, TEMP CORRECTED: 7.4 PH UNITS (ref 7.35–7.45)
PLATELET # BLD AUTO: 468 10*3/MM3 (ref 140–450)
PMV BLD AUTO: 10.5 FL (ref 6–12)
PO2 BLDA: 80.8 MM HG (ref 83–108)
PO2 TEMP ADJ BLD: 80.8 MM HG (ref 83–108)
POTASSIUM SERPL-SCNC: 4.2 MMOL/L (ref 3.5–5.2)
PROT SERPL-MCNC: 7.6 G/DL (ref 6–8.5)
RBC # BLD AUTO: 4.23 10*6/MM3 (ref 3.77–5.28)
SAO2 % BLDCOA: 96 % (ref 94–99)
SODIUM SERPL-SCNC: 138 MMOL/L (ref 136–145)
VENTILATOR MODE: ABNORMAL
WBC NRBC COR # BLD: 13.99 10*3/MM3 (ref 3.4–10.8)

## 2023-03-17 PROCEDURE — 83036 HEMOGLOBIN GLYCOSYLATED A1C: CPT

## 2023-03-17 PROCEDURE — 94060 EVALUATION OF WHEEZING: CPT

## 2023-03-17 PROCEDURE — 94729 DIFFUSING CAPACITY: CPT | Performed by: INTERNAL MEDICINE

## 2023-03-17 PROCEDURE — 94726 PLETHYSMOGRAPHY LUNG VOLUMES: CPT | Performed by: INTERNAL MEDICINE

## 2023-03-17 PROCEDURE — 36600 WITHDRAWAL OF ARTERIAL BLOOD: CPT

## 2023-03-17 PROCEDURE — 94726 PLETHYSMOGRAPHY LUNG VOLUMES: CPT

## 2023-03-17 PROCEDURE — 82803 BLOOD GASES ANY COMBINATION: CPT

## 2023-03-17 PROCEDURE — 94060 EVALUATION OF WHEEZING: CPT | Performed by: INTERNAL MEDICINE

## 2023-03-17 PROCEDURE — 93306 TTE W/DOPPLER COMPLETE: CPT | Performed by: EMERGENCY MEDICINE

## 2023-03-17 PROCEDURE — 85025 COMPLETE CBC W/AUTO DIFF WBC: CPT

## 2023-03-17 PROCEDURE — 86140 C-REACTIVE PROTEIN: CPT

## 2023-03-17 PROCEDURE — 94729 DIFFUSING CAPACITY: CPT

## 2023-03-17 PROCEDURE — 93306 TTE W/DOPPLER COMPLETE: CPT

## 2023-03-17 PROCEDURE — 80053 COMPREHEN METABOLIC PANEL: CPT

## 2023-03-17 PROCEDURE — 87040 BLOOD CULTURE FOR BACTERIA: CPT

## 2023-03-17 PROCEDURE — 36415 COLL VENOUS BLD VENIPUNCTURE: CPT

## 2023-03-17 RX ORDER — ALBUTEROL SULFATE 2.5 MG/3ML
2.5 SOLUTION RESPIRATORY (INHALATION) ONCE
Status: COMPLETED | OUTPATIENT
Start: 2023-03-17 | End: 2023-03-17

## 2023-03-17 RX ADMIN — ALBUTEROL SULFATE 2.5 MG: 2.5 SOLUTION RESPIRATORY (INHALATION) at 13:42

## 2023-03-19 LAB
BH CV ECHO MEAS - AO MAX PG: 10.8 MMHG
BH CV ECHO MEAS - AO MEAN PG: 6 MMHG
BH CV ECHO MEAS - AO ROOT DIAM: 3.4 CM
BH CV ECHO MEAS - AO V2 MAX: 164 CM/SEC
BH CV ECHO MEAS - AO V2 VTI: 34.7 CM
BH CV ECHO MEAS - AVA(I,D): 2.7 CM2
BH CV ECHO MEAS - EDV(CUBED): 75.2 ML
BH CV ECHO MEAS - EDV(MOD-SP2): 75.9 ML
BH CV ECHO MEAS - EDV(MOD-SP4): 56.1 ML
BH CV ECHO MEAS - EF(MOD-BP): 55.7 %
BH CV ECHO MEAS - EF(MOD-SP2): 59 %
BH CV ECHO MEAS - EF(MOD-SP4): 56 %
BH CV ECHO MEAS - ESV(CUBED): 28.9 ML
BH CV ECHO MEAS - ESV(MOD-SP2): 31.1 ML
BH CV ECHO MEAS - ESV(MOD-SP4): 24.7 ML
BH CV ECHO MEAS - FS: 27.3 %
BH CV ECHO MEAS - IVS/LVPW: 1.03 CM
BH CV ECHO MEAS - IVSD: 1.44 CM
BH CV ECHO MEAS - LA DIMENSION: 4.6 CM
BH CV ECHO MEAS - LAT PEAK E' VEL: 4.8 CM/SEC
BH CV ECHO MEAS - LV DIASTOLIC VOL/BSA (35-75): 30 CM2
BH CV ECHO MEAS - LV MASS(C)D: 230.9 GRAMS
BH CV ECHO MEAS - LV MAX PG: 10.8 MMHG
BH CV ECHO MEAS - LV MEAN PG: 6 MMHG
BH CV ECHO MEAS - LV SYSTOLIC VOL/BSA (12-30): 13.2 CM2
BH CV ECHO MEAS - LV V1 MAX: 164 CM/SEC
BH CV ECHO MEAS - LV V1 VTI: 36.4 CM
BH CV ECHO MEAS - LVIDD: 4.2 CM
BH CV ECHO MEAS - LVIDS: 3.1 CM
BH CV ECHO MEAS - LVOT AREA: 2.5 CM2
BH CV ECHO MEAS - LVOT DIAM: 1.8 CM
BH CV ECHO MEAS - LVPWD: 1.4 CM
BH CV ECHO MEAS - MED PEAK E' VEL: 4.9 CM/SEC
BH CV ECHO MEAS - MV A MAX VEL: 169 CM/SEC
BH CV ECHO MEAS - MV DEC SLOPE: 266 CM/SEC2
BH CV ECHO MEAS - MV DEC TIME: 0.36 MSEC
BH CV ECHO MEAS - MV E MAX VEL: 94.5 CM/SEC
BH CV ECHO MEAS - MV E/A: 0.56
BH CV ECHO MEAS - MV MAX PG: 12.1 MMHG
BH CV ECHO MEAS - MV MEAN PG: 4 MMHG
BH CV ECHO MEAS - MV V2 VTI: 37.5 CM
BH CV ECHO MEAS - MVA(VTI): 2.47 CM2
BH CV ECHO MEAS - PA V2 MAX: 110 CM/SEC
BH CV ECHO MEAS - RAP SYSTOLE: 5 MMHG
BH CV ECHO MEAS - RV MAX PG: 3.3 MMHG
BH CV ECHO MEAS - RV V1 MAX: 90.2 CM/SEC
BH CV ECHO MEAS - RV V1 VTI: 15.3 CM
BH CV ECHO MEAS - RVDD: 3.3 CM
BH CV ECHO MEAS - RVSP: 30.4 MMHG
BH CV ECHO MEAS - SI(MOD-SP2): 23.9 ML/M2
BH CV ECHO MEAS - SI(MOD-SP4): 16.8 ML/M2
BH CV ECHO MEAS - SV(LVOT): 92.6 ML
BH CV ECHO MEAS - SV(MOD-SP2): 44.8 ML
BH CV ECHO MEAS - SV(MOD-SP4): 31.4 ML
BH CV ECHO MEAS - TR MAX PG: 25.4 MMHG
BH CV ECHO MEAS - TR MAX VEL: 252 CM/SEC
BH CV ECHO MEASUREMENTS AVERAGE E/E' RATIO: 19.48
BH CV XLRA - TDI S': 10.1 CM/SEC
LEFT ATRIUM VOLUME INDEX: 38.7 ML/M2
LEFT ATRIUM VOLUME: 72.3 ML
MAXIMAL PREDICTED HEART RATE: 150 BPM
STRESS TARGET HR: 128 BPM

## 2023-03-21 ENCOUNTER — TELEPHONE (OUTPATIENT)
Dept: CARDIAC SURGERY | Facility: CLINIC | Age: 73
End: 2023-03-21
Payer: MEDICARE

## 2023-03-21 NOTE — TELEPHONE ENCOUNTER
Please call patient and make sure she is working on dental clearance.  Schedule follow up with Dr. Franco on 4/6 to discuss testing and plan moving forward.

## 2023-03-22 LAB
BACTERIA SPEC AEROBE CULT: NORMAL
BACTERIA SPEC AEROBE CULT: NORMAL

## 2023-03-22 NOTE — TELEPHONE ENCOUNTER
Called and spoke with pt's daughter.  Appt sched for 4-6-23 at 9:45am with Dr Franco.  Asked about dental clearance status and daughter stated pt had not given her any info re: this.  Advised daughter of need for dental clearance prior to surgery and she stated she would work on this.  Verified testing appts with her at her request and she voiced understanding to all/kahm

## 2023-03-29 ENCOUNTER — HOSPITAL ENCOUNTER (OUTPATIENT)
Dept: NUCLEAR MEDICINE | Facility: HOSPITAL | Age: 73
Discharge: HOME | End: 2023-03-29
Payer: MEDICARE

## 2023-03-29 DIAGNOSIS — I71.20 THORACIC AORTIC ANEURYSM WITHOUT RUPTURE, UNSPECIFIED PART: ICD-10-CM

## 2023-03-29 PROCEDURE — 0 TECHNETIUM EXAMETAZIME KIT: Performed by: NURSE PRACTITIONER

## 2023-03-29 PROCEDURE — A9521 TC99M EXAMETAZIME: HCPCS | Performed by: NURSE PRACTITIONER

## 2023-03-29 PROCEDURE — 78803 RP LOCLZJ TUM SPECT 1 AREA: CPT

## 2023-03-29 RX ADMIN — EXAMETAZIME 1 DOSE: KIT at 10:50

## 2023-03-30 ENCOUNTER — APPOINTMENT (OUTPATIENT)
Dept: NUCLEAR MEDICINE | Facility: HOSPITAL | Age: 73
End: 2023-03-30
Payer: MEDICARE

## 2023-04-06 ENCOUNTER — TELEPHONE (OUTPATIENT)
Dept: CARDIAC SURGERY | Facility: CLINIC | Age: 73
End: 2023-04-06

## 2023-04-06 ENCOUNTER — OFFICE VISIT (OUTPATIENT)
Dept: CARDIAC SURGERY | Facility: CLINIC | Age: 73
End: 2023-04-06
Payer: MEDICARE

## 2023-04-06 VITALS
WEIGHT: 165.2 LBS | HEIGHT: 63 IN | OXYGEN SATURATION: 97 % | SYSTOLIC BLOOD PRESSURE: 119 MMHG | HEART RATE: 86 BPM | DIASTOLIC BLOOD PRESSURE: 68 MMHG | BODY MASS INDEX: 29.27 KG/M2

## 2023-04-06 DIAGNOSIS — I71.20 THORACIC AORTIC ANEURYSM WITHOUT RUPTURE, UNSPECIFIED PART: Primary | ICD-10-CM

## 2023-04-06 PROCEDURE — 3078F DIAST BP <80 MM HG: CPT | Performed by: SURGERY

## 2023-04-06 PROCEDURE — 99215 OFFICE O/P EST HI 40 MIN: CPT | Performed by: SURGERY

## 2023-04-06 PROCEDURE — 3074F SYST BP LT 130 MM HG: CPT | Performed by: SURGERY

## 2023-04-06 NOTE — TELEPHONE ENCOUNTER
Pt asking at checkout if Dr Franco would rx Chantix for pt to get her started on this.  Informed pt this is not something we typically rx but that I would put a message through to ask.  Can reach pt at #[phone]/jorge

## 2023-04-06 NOTE — PROGRESS NOTES
"    [facility] Cardiothoracic Surgery  PROGRESS NOTE   CC: Aortic arch aneurysm.    Subjective: Ms. 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 This has rapidly enlarged. I last evaluated her 3 weeks ago. Subsequently, she underwent repeat echocardiogram, tagged white blood cell scan, and pulmonary function tests. She follows up today to further discuss surgical treatment of her aneurysmal disease. She is accompanied by family members.    The patient reports that she experiences good and bad days. Her blood pressure is normal today. At her last followup, she had quit smoking, and her ambulation had improved. Currently, she smokes occasionally. A family member states that the patient has chronic back issues. She ambulates with a walker, and her ambulation has increased since she started utilizing her walker. The patient notes that her breathing \"hasn't been good\" and reports occasional orthopnea. She notes soreness of her chest and abdomen and states that her \"bones hurt.\" She reports stress related to medical issues. A family member states that the appearance of the patient's chest is \"different\" and \"puffy.\" She reports that at times, the patient \"can feel\" her aneurysm when lying down.    Her daughters report that the patient has \"brittle\" and \"weak\" bones. The patient was informed that her bones were \"porous,\" and referral to an orthopedic center was initially recommended; however, repeat bone density testing was performed, and the patient states that osteoporosis was not demonstrated. The patient has a history of right distal radius fracture. Initial surgical repair failed, and she required re-operation with a hand specialist in [place]. Her history includes back and ocular surgeries.    ROS:   No fevers, chills or recent illness.    Objective:      /68 (BP Location: " "Right arm, Patient Position: Sitting, Cuff Size: Adult)   Pulse 86   Ht 160 cm (63\")   Wt 74.9 kg (165 lb 3.2 oz)   SpO2 97%   BMI 29.26 kg/m²       PE:  Vitals:    04/06/23 1018   BP: 119/68   Pulse: 86   SpO2: 97%     GENERAL: No acute distress, resting comfortably, normal color, frail appearing.  CARDIOVASCULAR: Regular, regular rate, sinus.  PULMONARY: Normal bilateral breath sounds, no labored breathing.  ABDOMEN: Soft, nontender/nondistended.  EXTREMITIES: Mild peripheral edema, normal pulses, normal range of motion.        Lab Results (last 72 hours)     ** No results found for the last 72 hours. **                  Assessment & Plan     Ms. Taylor is a 73-year-old female who presents to me with a very complex aortic arch. 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 She is overall asymptomatic. She has multiple medical comorbidities including chronic back problems and inability to ambulate well without assistive devices. The patient continues to smoke currently. She has moderate obstructive lung disease with borderline pulmonary function tests. She has brittle bones and has suffered fractures in the past. At her last visit, we discussed treatment options for her aneurysm. 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     We discussed treatment options, and she was willing to reconsider surgery at that time. She would be very high risk for surgical intervention on her aortic arch, given her comorbidities. I am very concerned about the progression of her aneurysm of 5 mm over 6 months, though. Definitive treatment would be an aortic arch replacement, possibly to zone 2 versus zone " 3, with debranching. I am concerned about a potential mycotic aneurysm; however, her tagged white blood cell scan was negative, demonstrating no signs of infection. Another option would be attempted debranching with thoracic endovascular aortic repair; however, if her aortic arch was infected or mycotic, this would be a very poor option and likely lead to future graft infection. Her left ventricular ejection fraction is approximately 55 percent. She has moderate aortic valve insufficiency. We would consider replacing her aortic valve at the time of surgery.    I discussed with Ms. Taylor and her family members at length today surgical treatment of her aneurysm and options. Ms. Taylor is undecided on treatment, and she resumed smoking since her last visit. I discussed the importance of smoking cessation with her for greater than 8 minutes. We discussed preoperative, operative, and postoperative expectations as well as risk of problems.    I am concerned that her risk of mortality or postoperative complications is significantly higher than normal, and her risk of mortality is in the range of approximately 10 to 15 percent. I am concerned about distal sewing with the calcium that is present, and this might require total aortic arch replacement. I discussed this with them again today. She remains unsure if she is willing to proceed with surgery or choose a more palliative route of no intervention and continued surveillance. We will plan on seeing her back in mid-May with a repeat CT angiogram of the chest. If stability is demonstrated, surveillance will likely be continued. If her aneurysm is enlarging, she will reconsider surgical intervention versus a palliative approach without surgical intervention. I discussed with them that if she presented in acute distress with rupture or dissection, the outcome would be very poor with emergency surgery, and we would be unlikely to offer this. They understand.    Thank you for  trusting me with the care of Ms. Taylor. Please do not hesitate to call with questions or concerns. The plan is for the patient to follow up in mid-May.         Nolan Franco MD   Cardiothoracic Surgeon  Transcribed from ambient dictation for Nolan Franco MD by Amaris Torres.  04/06/23   15:01 CDT    I spent a total of 65 minutes today before during and after visit reviewing records educating the patient about their disease and ordering test.    Patient or patient representative verbalized consent to the visit recording.  I have personally performed the services described in this document as transcribed by the above individual, and it is both accurate and complete.

## 2023-04-06 NOTE — LETTER
April 8, 2023       No Recipients    Patient: Cheryl Taylor   YOB: 1950   Date of Visit: 4/6/2023       Dear Den James DO,    Cheryl Taylor was in my office today. Below are the relevant portions of my assessment and plan of care.    Ms. Taylor is a 73-year-old female who presents to me with a very complex aortic arch. 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 She is overall asymptomatic. She has multiple medical comorbidities including chronic back problems and inability to ambulate well without assistive devices. The patient continues to smoke currently. She has moderate obstructive lung disease with borderline pulmonary function tests. She has brittle bones and has suffered fractures in the past. At her last visit, we discussed treatment options for her aneurysm. 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     We discussed treatment options, and she was willing to reconsider surgery at that time. She would be very high risk for surgical intervention on her aortic arch, given her comorbidities. I am very concerned about the progression of her aneurysm of 5 mm over 6 months, though. Definitive treatment would be an aortic arch replacement, possibly to zone 2 versus zone 3, with debranching. I am concerned about a potential mycotic aneurysm; however, her tagged white blood cell scan was negative, demonstrating no signs of infection. Another option would be attempted debranching with thoracic endovascular aortic repair; however, if her aortic arch was infected or mycotic, this would be a very poor option and likely lead to future graft infection. Her left ventricular  ejection fraction is approximately 55 percent. She has moderate aortic valve insufficiency. We would consider replacing her aortic valve at the time of surgery.    I discussed with Ms. Taylor and her family members at length today surgical treatment of her aneurysm and options. Ms. Taylor is undecided on treatment, and she resumed smoking since her last visit. I discussed the importance of smoking cessation with her for greater than 8 minutes. We discussed preoperative, operative, and postoperative expectations as well as risk of problems.    I am concerned that her risk of mortality or postoperative complications is significantly higher than normal, and her risk of mortality is in the range of approximately 10 to 15 percent. I am concerned about distal sewing with the calcium that is present, and this might require total aortic arch replacement. I discussed this with them again today. She remains unsure if she is willing to proceed with surgery or choose a more palliative route of no intervention and continued surveillance. We will plan on seeing her back in mid-May with a repeat CT angiogram of the chest. If stability is demonstrated, surveillance will likely be continued. If her aneurysm is enlarging, she will reconsider surgical intervention versus a palliative approach without surgical intervention. I discussed with them that if she presented in acute distress with rupture or dissection, the outcome would be very poor with emergency surgery, and we would be unlikely to offer this. They understand.    Thank you for trusting me with the care of Ms. Taylor. Please do not hesitate to call with questions or concerns. The plan is for the patient to follow up in mid-May.          Sincerely,        Nolan Franco MD        CC:   No Recipients

## 2023-04-08 PROBLEM — I71.20 THORACIC AORTIC ANEURYSM WITHOUT RUPTURE: Status: ACTIVE | Noted: 2023-04-08

## 2023-05-09 ENCOUNTER — TELEPHONE (OUTPATIENT)
Dept: CARDIAC SURGERY | Facility: CLINIC | Age: 73
End: 2023-05-09
Payer: MEDICARE

## 2023-05-09 NOTE — TELEPHONE ENCOUNTER
HUB TO READ:  Attempted to call pt because we had to resched her appt with Dr Franco from 5-15-23 to 5-18-23 along with her CT scan.  No answer.  Detailed mess left for pt with new appt info and requesting pt to call back to verify she rec'd this message.  If pt calls back, please document in this message/jorge

## 2023-05-10 NOTE — TELEPHONE ENCOUNTER
Called and spoke with pt's daughter.  She had already called back this morning and confirmed with the HUB, it was just not documented in the message/kahm

## 2023-05-18 ENCOUNTER — HOSPITAL ENCOUNTER (OUTPATIENT)
Dept: CT IMAGING | Facility: HOSPITAL | Age: 73
Discharge: HOME | End: 2023-05-18
Payer: MEDICARE

## 2023-05-18 ENCOUNTER — TELEPHONE (OUTPATIENT)
Dept: CARDIAC SURGERY | Facility: CLINIC | Age: 73
End: 2023-05-18
Payer: MEDICARE

## 2023-05-18 ENCOUNTER — OFFICE VISIT (OUTPATIENT)
Dept: CARDIAC SURGERY | Facility: CLINIC | Age: 73
End: 2023-05-18
Payer: MEDICARE

## 2023-05-18 VITALS
BODY MASS INDEX: 28.88 KG/M2 | WEIGHT: 163 LBS | OXYGEN SATURATION: 96 % | HEART RATE: 73 BPM | HEIGHT: 63 IN | SYSTOLIC BLOOD PRESSURE: 118 MMHG | DIASTOLIC BLOOD PRESSURE: 64 MMHG

## 2023-05-18 DIAGNOSIS — I71.20 THORACIC AORTIC ANEURYSM WITHOUT RUPTURE, UNSPECIFIED PART: ICD-10-CM

## 2023-05-18 DIAGNOSIS — I71.20 THORACIC AORTIC ANEURYSM WITHOUT RUPTURE, UNSPECIFIED PART: Primary | ICD-10-CM

## 2023-05-18 LAB — CREAT BLDA-MCNC: 1.8 MG/DL (ref 0.6–1.3)

## 2023-05-18 PROCEDURE — 71250 CT THORAX DX C-: CPT

## 2023-05-18 PROCEDURE — 3078F DIAST BP <80 MM HG: CPT | Performed by: SURGERY

## 2023-05-18 PROCEDURE — 99214 OFFICE O/P EST MOD 30 MIN: CPT | Performed by: SURGERY

## 2023-05-18 PROCEDURE — 3074F SYST BP LT 130 MM HG: CPT | Performed by: SURGERY

## 2023-05-18 PROCEDURE — 82565 ASSAY OF CREATININE: CPT

## 2023-05-18 RX ORDER — HYDROCODONE BITARTRATE AND ACETAMINOPHEN 10; 325 MG/1; MG/1
1 TABLET ORAL EVERY 6 HOURS PRN
COMMUNITY
Start: 2023-04-12

## 2023-05-18 RX ORDER — VARENICLINE TARTRATE 25 MG
KIT ORAL SEE ADMIN INSTRUCTIONS
COMMUNITY
Start: 2023-04-12

## 2023-05-18 RX ORDER — AMLODIPINE BESYLATE 2.5 MG/1
1 TABLET ORAL DAILY
COMMUNITY
Start: 2023-04-12

## 2023-05-18 NOTE — LETTER
May 24, 2023       No Recipients    Patient: Cheryl Taylor   YOB: 1950   Date of Visit: 5/18/2023       Dear Den James DO,    Cheryl Taylor was in my office today. Below are the relevant portions of my assessment and plan of care.    Ms. Taylor is a 73-year-old female who presents with a very complex aortic arch. She has an aneurysm at the base of a common trunk of innominate and left carotid artery from bovine varied of her aortic arch. She also has extensive calcification throughout her aortic arch. She has comorbidities of very poor mobility, chronic back problems, chronic kidney disease stage III A at least, moderate COPD, continued smoking and very brittle bones. She has stopped smoking. I congratulated her on this last month. She is doing this with the assistance of [employer]. She returns today with a repeat CT scan. This was performed without contrast due to her decreased GFR on point of care testing. I compared it to the last CT scan that was 2 months ago, which was a CTA. It is stable to very slightly enlarged by maybe 1 or 2 mm, although difficult to say as comparing noncontrasted to contrasted images.    We discussed again treatment options; she is very high risk for aortic arch replacement. She understands this. If it continues to enlarge, that might be our only option. Over the first 6 months of observation, we saw a 5 mm increase in the size. If this continues, we need to proceed with surgery. I discussed with her today some of the risks of surgery including possible renal failure and the need for dialysis afterwards, and she very adamantly told me she would not undergo dialysis afterwards. If she is unwilling to do dialysis, then surgery is not an option, at least at our facility. I would like to see her back in 3 months with a repeat CTA if her GFR will tolerate. This will give us a better idea and more points in time to see progression of her aneurysm.     Thank you for trusting me with  the care of Ms. Taylor. Please do not hesitate to call with questions or concerns.    Sincerely,        Nolan Franco MD        CC:   No Recipients

## 2023-05-18 NOTE — TELEPHONE ENCOUNTER
Patient needs 3-month follow-up with Dr. Franco with CTA chest.  I have ordered the CTA.  Please call patient to schedule appointment.

## 2023-05-18 NOTE — PROGRESS NOTES
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 Taylor is a 73-year-old female who presented to me with an aneurysm of the bovine aortic arch. This appears to be enlarging on last visit. I last saw her approximately 6 weeks ago and the last CT scan was 2 months ago. She was at first deemed not to be a candidate for surgery but had improved her overall status by stopping smoking. She does not walk well without assistive devices. She also has stage III a chronic kidney disease. At last visit, she had resumed smoking and was not crazy about proceeding with surgery. We are seeing her in a short-term follow-up again today. She is accompanied by an adult female to this visit.     She quit smoking in 04/2023. She has days where she can walk better than others. She has angina that she describes as \"on the insides of her sides.\" The female present states she has stage 4 kidney disease.     ROS:   No fevers, chills or recent illness.    Objective:      /64 (BP Location: Right arm, Patient Position: Sitting, Cuff Size: Adult)   Pulse 73   Ht 160 cm (62.99\")   Wt 73.9 kg (163 lb)   SpO2 96%   BMI 28.88 kg/m²     PE:  Vitals:    05/18/23 1110   BP: 118/64   Pulse: 73   SpO2: 96%     GENERAL: NAD, resting comfortably, normal palor  CARDIOVASCULAR: regular, regular rate, sinus  PULMONARY: Normal bilateral breath sounds, no labored breathing  ABDOMEN: soft, nt/nd  EXTREMITIES: mild peripheral edema, normal pulses, normal ROM        Lab Results (last 72 hours)       ** No results found for the last 72 hours. **            Assessment & Plan     Ms. Taylor is a 73-year-old female who presents with a very complex aortic arch. She has an aneurysm at the base of a common trunk of innominate and left carotid artery from bovine varied of her aortic arch. She also has extensive calcification throughout her aortic arch. She has comorbidities of very " poor mobility, chronic back problems, chronic kidney disease stage III A at least, moderate COPD, continued smoking and very brittle bones. She has stopped smoking. I congratulated her on this last month. She is doing this with the assistance of [employer]. She returns today with a repeat CT scan. This was performed without contrast due to her decreased GFR on point of care testing. I compared it to the last CT scan that was 2 months ago, which was a CTA. It is stable to very slightly enlarged by maybe 1 or 2 mm, although difficult to say as comparing noncontrasted to contrasted images.    We discussed again treatment options; she is very high risk for aortic arch replacement. She understands this. If it continues to enlarge, that might be our only option. Over the first 6 months of observation, we saw a 5 mm increase in the size. If this continues, we need to proceed with surgery. I discussed with her today some of the risks of surgery including possible renal failure and the need for dialysis afterwards, and she very adamantly told me she would not undergo dialysis afterwards. If she is unwilling to do dialysis, then surgery is not an option, at least at our facility. I would like to see her back in 3 months with a repeat CTA if her GFR will tolerate. This will give us a better idea and more points in time to see progression of her aneurysm.     Thank you for trusting me with the care of Ms. Taylor. Please do not hesitate to call with questions or concerns.        Nolan Franco MD   Cardiothoracic Surgeon    Transcribed from ambient dictation for Nolan Franco MD by Jennifer Delcid, Quality .  05/18/23   13:50 CDT    Patient or patient representative verbalized consent to the visit recording.  I have personally performed the services described in this document as transcribed by the above individual, and it is both accurate and complete.

## 2023-07-25 DIAGNOSIS — D50.8 OTHER IRON DEFICIENCY ANEMIA: Primary | ICD-10-CM

## 2023-07-28 ENCOUNTER — LAB (OUTPATIENT)
Dept: LAB | Facility: HOSPITAL | Age: 73
End: 2023-07-28
Payer: MEDICARE

## 2023-07-28 ENCOUNTER — TRANSCRIBE ORDERS (OUTPATIENT)
Dept: ADMINISTRATIVE | Facility: HOSPITAL | Age: 73
End: 2023-07-28
Payer: MEDICARE

## 2023-07-28 ENCOUNTER — CONSULT (OUTPATIENT)
Dept: ONCOLOGY | Facility: CLINIC | Age: 73
End: 2023-07-28
Payer: MEDICARE

## 2023-07-28 VITALS
DIASTOLIC BLOOD PRESSURE: 76 MMHG | RESPIRATION RATE: 16 BRPM | HEART RATE: 78 BPM | WEIGHT: 171 LBS | BODY MASS INDEX: 30.3 KG/M2 | HEIGHT: 63 IN | OXYGEN SATURATION: 97 % | TEMPERATURE: 97.9 F | SYSTOLIC BLOOD PRESSURE: 128 MMHG

## 2023-07-28 DIAGNOSIS — D47.2 MGUS (MONOCLONAL GAMMOPATHY OF UNKNOWN SIGNIFICANCE): ICD-10-CM

## 2023-07-28 DIAGNOSIS — E61.1 IRON DEFICIENCY: ICD-10-CM

## 2023-07-28 DIAGNOSIS — M85.89 OSTEOPENIA OF MULTIPLE SITES: ICD-10-CM

## 2023-07-28 DIAGNOSIS — D75.838 OTHER THROMBOCYTOSIS: ICD-10-CM

## 2023-07-28 DIAGNOSIS — N18.4 ANEMIA DUE TO STAGE 4 CHRONIC KIDNEY DISEASE: Primary | ICD-10-CM

## 2023-07-28 DIAGNOSIS — D72.829 LEUKOCYTOSIS, UNSPECIFIED TYPE: ICD-10-CM

## 2023-07-28 DIAGNOSIS — N18.4 ANEMIA DUE TO STAGE 4 CHRONIC KIDNEY DISEASE: ICD-10-CM

## 2023-07-28 DIAGNOSIS — D63.1 ANEMIA DUE TO STAGE 4 CHRONIC KIDNEY DISEASE: Primary | ICD-10-CM

## 2023-07-28 DIAGNOSIS — I71.20 THORACIC AORTIC ANEURYSM WITHOUT RUPTURE, UNSPECIFIED PART: Primary | ICD-10-CM

## 2023-07-28 DIAGNOSIS — D50.8 OTHER IRON DEFICIENCY ANEMIA: ICD-10-CM

## 2023-07-28 DIAGNOSIS — D63.1 ANEMIA DUE TO STAGE 4 CHRONIC KIDNEY DISEASE: ICD-10-CM

## 2023-07-28 LAB
ALBUMIN SERPL-MCNC: 3.7 G/DL (ref 3.5–5.2)
ALBUMIN/GLOB SERPL: 1.2 G/DL
ALP SERPL-CCNC: 143 U/L (ref 39–117)
ALT SERPL W P-5'-P-CCNC: 9 U/L (ref 1–33)
ANION GAP SERPL CALCULATED.3IONS-SCNC: 13 MMOL/L (ref 5–15)
AST SERPL-CCNC: 17 U/L (ref 1–32)
B2 MICROGLOB SERPL-MCNC: 10.9 MG/L (ref 0.8–2.2)
BASOPHILS # BLD AUTO: 0.08 10*3/MM3 (ref 0–0.2)
BASOPHILS NFR BLD AUTO: 0.7 % (ref 0–1.5)
BILIRUB SERPL-MCNC: <0.2 MG/DL (ref 0–1.2)
BUN SERPL-MCNC: 25 MG/DL (ref 8–23)
BUN/CREAT SERPL: 13.4 (ref 7–25)
CALCIUM SPEC-SCNC: 9.2 MG/DL (ref 8.6–10.5)
CHLORIDE SERPL-SCNC: 103 MMOL/L (ref 98–107)
CO2 SERPL-SCNC: 22 MMOL/L (ref 22–29)
CREAT SERPL-MCNC: 1.87 MG/DL (ref 0.57–1)
DEPRECATED RDW RBC AUTO: 65.1 FL (ref 37–54)
EGFRCR SERPLBLD CKD-EPI 2021: 28.5 ML/MIN/1.73
EOSINOPHIL # BLD AUTO: 0.69 10*3/MM3 (ref 0–0.4)
EOSINOPHIL NFR BLD AUTO: 5.7 % (ref 0.3–6.2)
ERYTHROCYTE [DISTWIDTH] IN BLOOD BY AUTOMATED COUNT: 19.8 % (ref 12.3–15.4)
FERRITIN SERPL-MCNC: 73.87 NG/ML (ref 13–150)
FOLATE SERPL-MCNC: 12 NG/ML (ref 4.78–24.2)
GLOBULIN UR ELPH-MCNC: 3.1 GM/DL
GLUCOSE SERPL-MCNC: 116 MG/DL (ref 65–99)
HCT VFR BLD AUTO: 33.9 % (ref 34–46.6)
HGB BLD-MCNC: 9.1 G/DL (ref 12–15.9)
HOLD SPECIMEN: NORMAL
IMM GRANULOCYTES # BLD AUTO: 0.06 10*3/MM3 (ref 0–0.05)
IMM GRANULOCYTES NFR BLD AUTO: 0.5 % (ref 0–0.5)
IRON 24H UR-MRATE: 41 MCG/DL (ref 37–145)
IRON SATN MFR SERPL: 10 % (ref 20–50)
LYMPHOCYTES # BLD AUTO: 2.84 10*3/MM3 (ref 0.7–3.1)
LYMPHOCYTES NFR BLD AUTO: 23.4 % (ref 19.6–45.3)
MCH RBC QN AUTO: 24.3 PG (ref 26.6–33)
MCHC RBC AUTO-ENTMCNC: 26.8 G/DL (ref 31.5–35.7)
MCV RBC AUTO: 90.6 FL (ref 79–97)
MONOCYTES # BLD AUTO: 0.88 10*3/MM3 (ref 0.1–0.9)
MONOCYTES NFR BLD AUTO: 7.2 % (ref 5–12)
NEUTROPHILS NFR BLD AUTO: 62.5 % (ref 42.7–76)
NEUTROPHILS NFR BLD AUTO: 7.61 10*3/MM3 (ref 1.7–7)
NRBC BLD AUTO-RTO: 0 /100 WBC (ref 0–0.2)
PLATELET # BLD AUTO: 468 10*3/MM3 (ref 140–450)
PMV BLD AUTO: 9.5 FL (ref 6–12)
POTASSIUM SERPL-SCNC: 4.8 MMOL/L (ref 3.5–5.2)
PROT SERPL-MCNC: 6.8 G/DL (ref 6–8.5)
RBC # BLD AUTO: 3.74 10*6/MM3 (ref 3.77–5.28)
SODIUM SERPL-SCNC: 138 MMOL/L (ref 136–145)
TIBC SERPL-MCNC: 414 MCG/DL (ref 298–536)
TRANSFERRIN SERPL-MCNC: 278 MG/DL (ref 200–360)
VIT B12 BLD-MCNC: 286 PG/ML (ref 211–946)
WBC NRBC COR # BLD: 12.16 10*3/MM3 (ref 3.4–10.8)
WHOLE BLOOD HOLD SPECIMEN: NORMAL

## 2023-07-28 PROCEDURE — 82746 ASSAY OF FOLIC ACID SERUM: CPT

## 2023-07-28 PROCEDURE — 83521 IG LIGHT CHAINS FREE EACH: CPT

## 2023-07-28 PROCEDURE — 82784 ASSAY IGA/IGD/IGG/IGM EACH: CPT

## 2023-07-28 PROCEDURE — 85025 COMPLETE CBC W/AUTO DIFF WBC: CPT

## 2023-07-28 PROCEDURE — 82232 ASSAY OF BETA-2 PROTEIN: CPT

## 2023-07-28 PROCEDURE — 36415 COLL VENOUS BLD VENIPUNCTURE: CPT

## 2023-07-28 PROCEDURE — 80053 COMPREHEN METABOLIC PANEL: CPT

## 2023-07-28 PROCEDURE — 83540 ASSAY OF IRON: CPT

## 2023-07-28 PROCEDURE — 82728 ASSAY OF FERRITIN: CPT

## 2023-07-28 PROCEDURE — 84165 PROTEIN E-PHORESIS SERUM: CPT

## 2023-07-28 PROCEDURE — 84466 ASSAY OF TRANSFERRIN: CPT

## 2023-07-28 PROCEDURE — 82607 VITAMIN B-12: CPT

## 2023-07-28 PROCEDURE — 82668 ASSAY OF ERYTHROPOIETIN: CPT

## 2023-07-28 PROCEDURE — 86334 IMMUNOFIX E-PHORESIS SERUM: CPT

## 2023-07-28 RX ORDER — DIPHENHYDRAMINE HYDROCHLORIDE 50 MG/ML
50 INJECTION INTRAMUSCULAR; INTRAVENOUS AS NEEDED
OUTPATIENT
Start: 2023-08-24

## 2023-07-28 RX ORDER — ACETAMINOPHEN 325 MG/1
650 TABLET ORAL ONCE
OUTPATIENT
Start: 2023-08-24

## 2023-07-28 RX ORDER — FAMOTIDINE 10 MG/ML
20 INJECTION, SOLUTION INTRAVENOUS ONCE
OUTPATIENT
Start: 2023-08-17

## 2023-07-28 RX ORDER — FERROUS SULFATE 325(65) MG
325 TABLET ORAL DAILY
COMMUNITY

## 2023-07-28 RX ORDER — SODIUM CHLORIDE 9 MG/ML
250 INJECTION, SOLUTION INTRAVENOUS ONCE
OUTPATIENT
Start: 2023-08-24

## 2023-07-28 RX ORDER — FAMOTIDINE 10 MG/ML
20 INJECTION, SOLUTION INTRAVENOUS AS NEEDED
OUTPATIENT
Start: 2023-08-03

## 2023-07-28 RX ORDER — DULOXETIN HYDROCHLORIDE 60 MG/1
60 CAPSULE, DELAYED RELEASE ORAL DAILY
COMMUNITY

## 2023-07-28 RX ORDER — ACETAMINOPHEN 325 MG/1
650 TABLET ORAL ONCE
OUTPATIENT
Start: 2023-08-03

## 2023-07-28 RX ORDER — DIPHENHYDRAMINE HYDROCHLORIDE 50 MG/ML
50 INJECTION INTRAMUSCULAR; INTRAVENOUS AS NEEDED
OUTPATIENT
Start: 2023-08-17

## 2023-07-28 RX ORDER — FAMOTIDINE 10 MG/ML
20 INJECTION, SOLUTION INTRAVENOUS ONCE
OUTPATIENT
Start: 2023-08-03

## 2023-07-28 RX ORDER — MULTIVIT-MIN/IRON/FOLIC ACID/K 18-600-40
CAPSULE ORAL
COMMUNITY

## 2023-07-28 RX ORDER — SODIUM CHLORIDE 9 MG/ML
250 INJECTION, SOLUTION INTRAVENOUS ONCE
OUTPATIENT
Start: 2023-08-10

## 2023-07-28 RX ORDER — FAMOTIDINE 10 MG/ML
20 INJECTION, SOLUTION INTRAVENOUS AS NEEDED
OUTPATIENT
Start: 2023-08-24

## 2023-07-28 RX ORDER — SODIUM CHLORIDE 9 MG/ML
250 INJECTION, SOLUTION INTRAVENOUS ONCE
OUTPATIENT
Start: 2023-08-17

## 2023-07-28 RX ORDER — FAMOTIDINE 10 MG/ML
20 INJECTION, SOLUTION INTRAVENOUS ONCE
OUTPATIENT
Start: 2023-08-24

## 2023-07-28 RX ORDER — FAMOTIDINE 10 MG/ML
20 INJECTION, SOLUTION INTRAVENOUS AS NEEDED
OUTPATIENT
Start: 2023-08-17

## 2023-07-28 RX ORDER — SODIUM CHLORIDE 9 MG/ML
250 INJECTION, SOLUTION INTRAVENOUS ONCE
OUTPATIENT
Start: 2023-08-03

## 2023-07-28 RX ORDER — FAMOTIDINE 10 MG/ML
20 INJECTION, SOLUTION INTRAVENOUS AS NEEDED
OUTPATIENT
Start: 2023-08-10

## 2023-07-28 RX ORDER — ACETAMINOPHEN 325 MG/1
650 TABLET ORAL ONCE
OUTPATIENT
Start: 2023-08-17

## 2023-07-28 RX ORDER — DIPHENHYDRAMINE HYDROCHLORIDE 50 MG/ML
50 INJECTION INTRAMUSCULAR; INTRAVENOUS AS NEEDED
OUTPATIENT
Start: 2023-08-10

## 2023-07-28 RX ORDER — ACETAMINOPHEN 325 MG/1
650 TABLET ORAL ONCE
OUTPATIENT
Start: 2023-08-10

## 2023-07-28 RX ORDER — MELATONIN
1000 DAILY
COMMUNITY
End: 2023-07-28 | Stop reason: DRUGHIGH

## 2023-07-28 RX ORDER — DIPHENHYDRAMINE HYDROCHLORIDE 50 MG/ML
50 INJECTION INTRAMUSCULAR; INTRAVENOUS AS NEEDED
OUTPATIENT
Start: 2023-08-03

## 2023-07-28 RX ORDER — FAMOTIDINE 10 MG/ML
20 INJECTION, SOLUTION INTRAVENOUS ONCE
OUTPATIENT
Start: 2023-08-10

## 2023-07-28 NOTE — PROGRESS NOTES
"[facility] HEMATOLOGY & ONCOLOGY  [address]  [place] [facility] [address] [phone]    Patient Name: Cheryl Taylor  Encounter Date: 07/28/2023   YOB: 1950  Patient Number: [id number]    Initial Note    REASON FOR CONSULTATION: Patient states \" maybe because of my dizziness \".    HISTORY OF PRESENT ILLNESS: Cheryl Taylor is a 73 y.o. female referred by No ref. provider found for diagnostic and management recommendations for anemia. History is obtained from patient and daughter , Cinthya. History is considered to be accurate.    She has health history significant for anemia, HTN, CKD, COPD, depression, GERD, chronic back pain seeing pain management, stable aortic aneurysm seen by cardiology in [place] and is followed by cardiothoracic surgery here, leukocytosis, MGUS she has received Venofer and Injectafer in the past.    He was followed by Dr. Car on in the past and it appears as though her last visit with him was in 2017.    She had bone marrow biopsy on November 27, 2009.  Per previous office notes it was unremarkable.  Leukocytosis was felt to be reactive in etiology.  She had additional bone marrow biopsy on September 3, 2014.  Results include normocellular bone marrow.  Mild absolute erythroid hyperplasia in the marrow.  Normal myelopoiesis in the marrow.  No increase in lymphocytes or plasma cells in the marrow.  Slight increase in megakaryocytes and aspirate smears.  No marrow fibrosis.  Absence of stainable iron.  No evidence of metastatic carcinoma or granulomatosis disease in the bone marrow.  Flow cytometry showed no evidence of abnormal myeloid maturation or an increased blast population.  There was no evidence of a lymphoproliferative disorder.  FISH for MPN was negative.      Patient has history of MGUS with IgM monoclonal protein.  She has seen Dr. Duron previously       Recently quit smoking approx one months ago.  " She smoked for approx 50+ years 2-3 PPD.      LABS    Lab Results - Last 18 Months   Lab Units 07/28/23  1012 03/17/23  1057 05/05/22  0559 05/04/22  1700   HEMOGLOBIN g/dL 9.1* 10.4* 10.3* 11.1*   HEMATOCRIT % 33.9* 36.7 36.4 37.4   MCV fL 90.6 86.8 92.2 88.2   WBC 10*3/mm3 12.16* 13.99* 14.61* 14.77*   RDW % 19.8* 20.1* 16.1* 15.8*   MPV fL 9.5 10.5 10.7 10.4   PLATELETS 10*3/mm3 468* 468* 417 454*   IMM GRAN % % 0.5 0.4  --  0.4   NEUTROS ABS 10*3/mm3 7.61* 10.46* 9.60* 10.73*   LYMPHS ABS 10*3/mm3 2.84 2.14  --  2.58   MONOS ABS 10*3/mm3 0.88 0.96*  --  1.28*   EOS ABS 10*3/mm3 0.69* 0.29 0.29 0.06   BASOS ABS 10*3/mm3 0.08 0.09  --  0.06   IMMATURE GRANS (ABS) 10*3/mm3 0.06* 0.05  --  0.06*   NRBC /100 WBC 0.0 0.0  --  0.0   NEUTROPHIL % %  --   --  65.7  --    MONOCYTES % %  --   --  6.1  --    ANISOCYTOSIS   --   --  Slight/1+  --        Lab Results - Last 18 Months   Lab Units 07/28/23  1012 05/18/23  1047 03/17/23  1057 03/13/23  1547 05/06/22  0450 05/05/22  0559 05/04/22  1700   GLUCOSE mg/dL 116*  --  99  --  110* 146* 90   SODIUM mmol/L 138  --  138  --  141 140 137   POTASSIUM mmol/L 4.8  --  4.2  --  4.1 4.0 3.7   CO2 mmol/L 22.0  --  22.0  --  24.0 27.0 21.0*   CHLORIDE mmol/L 103  --  102  --  105 105 101   ANION GAP mmol/L 13.0  --  14.0  --  12.0 8.0 15.0   CREATININE mg/dL 1.87* 1.80* 1.36* 1.60* 1.27* 1.29* 1.11*   BUN mg/dL 25*  --  21  --  19 17 16   BUN / CREAT RATIO  13.4  --  15.4  --  15.0 13.2 14.4   CALCIUM mg/dL 9.2  --  9.8  --  8.7 9.3 9.5   ALK PHOS U/L 143*  --  125*  --   --   --  132*   TOTAL PROTEIN g/dL 6.8  --  7.6  --   --   --  7.5   ALT (SGPT) U/L 9  --  10  --   --   --  10   AST (SGOT) U/L 17  --  20  --   --   --  17   BILIRUBIN mg/dL <0.2  --  0.3  --   --   --  0.4   ALBUMIN g/dL 3.7  --  4.2  --   --   --  3.70   GLOBULIN gm/dL 3.1  --  3.4  --   --   --  3.8       No results for input(s): MSPIKE, KAPPALAMB, IGLFLC, URICACID, FREEKAPPAL, CEA, LDH, REFLABREPO in the  last 21417 hours.    Lab Results - Last 18 Months   Lab Units 07/28/23  1012   IRON mcg/dL 41   TIBC mcg/dL 414   IRON SATURATION (TSAT) % 10*   FERRITIN ng/mL 73.87         PAST MEDICAL HISTORY:  ALLERGIES:  Allergies   Allergen Reactions    Levaquin [Levofloxacin] Hives    Naprosyn [Naproxen] Hives    Sulfa Antibiotics Hives     CURRENT MEDICATIONS:  Outpatient Encounter Medications as of 7/28/2023   Medication Sig Dispense Refill    amLODIPine (NORVASC) 2.5 MG tablet Take 1 tablet by mouth Daily.      aspirin 81 MG chewable tablet Chew 1 tablet Daily.      atorvastatin (LIPITOR) 80 MG tablet Take 1 tablet by mouth Daily.      calcium carbonate (TUMS) 500 MG chewable tablet Chew 1 tablet Daily.      Cholecalciferol (Vitamin D) 50 MCG (2000 UT) capsule Take  by mouth.      cyclobenzaprine (FLEXERIL) 10 MG tablet Take 1 tablet by mouth 3 (Three) Times a Day As Needed.      DULoxetine (CYMBALTA) 60 MG capsule Take 1 capsule by mouth Daily.      famotidine (PEPCID) 20 MG tablet Take 1 tablet by mouth At Night As Needed for Indigestion or Heartburn.      HYDROcodone-acetaminophen (NORCO)  MG per tablet Take 1 tablet by mouth Every 6 (Six) Hours As Needed. for pain      metoprolol succinate XL (TOPROL-XL) 25 MG 24 hr tablet Take 1 tablet by mouth Daily.      ferrous sulfate 325 (65 FE) MG tablet Take 1 tablet by mouth Daily. OTC      [DISCONTINUED] cholecalciferol (VITAMIN D3) 25 MCG (1000 UT) tablet Take 1 tablet by mouth Daily. (Patient not taking: Reported on 7/28/2023)      [DISCONTINUED] DULoxetine (CYMBALTA) 30 MG capsule Take 1 capsule by mouth 2 (Two) Times a Day. (Patient not taking: Reported on 7/28/2023)      [DISCONTINUED] oxyCODONE-acetaminophen (PERCOCET)  MG per tablet TAKE (1) TABLET BY MOUTH EVERY EIGHT HOURS AS NEEDED. (Patient not taking: Reported on 7/28/2023)      [DISCONTINUED] Varenicline Tartrate 0.5 MG X 11 & 1 MG X 42 tablet Take  by mouth See Admin Instructions. Chantix  see  package (Patient not taking: Reported on 7/28/2023)       No facility-administered encounter medications on file as of 7/28/2023.     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    Stage 3a chronic kidney disease N18.31    Obesity (BMI 30-39.9) E66.9    Chronic diastolic CHF (congestive heart failure) I50.32    Aortic stenosis, mild I35.0    Nonrheumatic aortic valve insufficiency I35.1    Chronic pain G89.29    Degeneration of lumbar intervertebral disc M51.36    Gastroesophageal reflux disease K21.9    Vasculitis I77.6    Iron deficiency anemia D50.9    Lumbar radiculopathy M54.16    Lumbosacral radiculitis M54.17    Shortness of breath R06.02    Spondylosis M47.9    Thoracic aortic aneurysm without rupture I71.20       HEALTH MAINTENANCE ITEMS:  Health Maintenance Due   Topic Date Due    MAMMOGRAM  Never done    Pneumococcal Vaccine 65+ (1 - PCV) Never done    TDAP/TD VACCINES (1 - Tdap) Never done    ZOSTER VACCINE (1 of 2) Never done    HEPATITIS C SCREENING  Never done    ANNUAL WELLNESS VISIT  Never done    COVID-19 Vaccine (6 - Moderna series) 10/05/2022    LIPID PANEL  05/05/2023       <no information>  Last Completed Colonoscopy            COLORECTAL CANCER SCREENING (COLONOSCOPY - Every 10 Years) Next due on 4/20/2025 04/20/2015  Outside Claim: DE COLSC FLX W/RMVL OF TUMOR POLYP LESION SNARE TQ                  Immunization History   Administered Date(s) Administered    COVID-19 (MODERNA) 1st,2nd,3rd Dose Monovalent 01/19/2022, 03/02/2022    COVID-19 (MODERNA) Monovalent Original Booster 04/20/2022, 08/10/2022     Last Completed Mammogram       This patient has no relevant Health Maintenance data.              FAMILY  "HISTORY:  Family History   Problem Relation Age of Onset    Hypertension Mother      SOCIAL HISTORY:  Social History     Socioeconomic History    Marital status:    Tobacco Use    Smoking status: Former     Packs/day: 3.00     Years: 59.00     Pack years: 177.00     Types: Cigarettes     Start date:      Quit date: 3/31/2023     Years since quittin.3    Smokeless tobacco: Never    Tobacco comments:     Pt was smoking 2-3 ppd and got down to 0.5 ppd before quitting at the end of 2023   Vaping Use    Vaping Use: Never used   Substance and Sexual Activity    Alcohol use: Never    Drug use: Never    Sexual activity: Defer       REVIEW OF SYSTEMS:  Review of Systems   Constitutional:  Positive for fatigue. Negative for fever and unexpected weight loss.   HENT:  Positive for hearing loss. Negative for trouble swallowing.    Eyes:         Wears glasses     Respiratory:  Positive for shortness of breath (with exertion). Negative for cough.         COPD managed by PCP     Cardiovascular:  Positive for chest pain (Reports chest pain at rest.  States has seen PCP.  Sees Cardiology Dr. Franco). Negative for palpitations and leg swelling.        Followed by Cardiology .  Aortic Aneurysm    Gastrointestinal:  Negative for nausea and vomiting.   Endocrine:        Increased nocturia    Genitourinary:  Negative for hematuria, pelvic pain and pelvic pressure.   Musculoskeletal:  Positive for gait problem. Negative for arthralgias and myalgias.        Uses walker to assist with ambulation     Skin:  Negative for rash, skin lesions and wound.   Neurological:  Positive for dizziness (\"my dizziness is so bad I wont drive\") and tremors (Interminttent \"shakes\". pt states \"it looks like I am having a seizure\"  This has been occuring for the past 6-12 months.). Negative for syncope, headache and confusion.        Intermittently loses control of right hand   Psychiatric/Behavioral:  Positive for depressed mood. Negative " "for suicidal ideas. The patient is nervous/anxious.         On Cymbalta.  Recently increased.  Managed by Dr. James, PCP       /76   Pulse 78   Temp 97.9 °F (36.6 °C)   Resp 16   Ht 160 cm (63\")   Wt 77.6 kg (171 lb)   SpO2 97%   BMI 30.29 kg/m²  Body surface area is 1.81 meters squared.    Pain Score    07/28/23 1020   PainSc:   7   PainLoc: Back       Physical Exam:  Physical Exam    Cheryl Taylor reports a pain score of 7.  Given her pain assessment as noted, treatment options were discussed and the following options were decided upon as a follow-up plan to address the patient's pain: continuation of current treatment plan for pain. Sees pain management      ASSESSMENT / PLAN:    1. Anemia due to stage 4 chronic kidney disease    2. MGUS (monoclonal gammopathy of unknown significance)    3. Osteopenia of multiple sites    4. Leukocytosis, unspecified type    5. Other thrombocytosis    6. Iron deficiency        Anemia in Stage IV CKD  Iron Deficiency  MGUS  -Bone marrow biopsy September 3, 2014.    Results include normocellular bone marrow.  Mild absolute erythroid hyperplasia in the marrow.  Normal myelopoiesis in the marrow.  No increase in lymphocytes or plasma cells in the marrow.  Slight increase in megakaryocytes and aspirate smears.  No marrow fibrosis.  Absence of stainable iron.  No evidence of metastatic carcinoma or granulomatosis disease in the bone marrow.  Flow cytometry showed no evidence of abnormal myeloid maturation or an increased blast population.  There was no evidence of a lymphoproliferative disorder.  FISH for MPN was negative.  -CT Chest 5/18/23 showed no mediastinal or hilar lymphadenopathy by CT size criteria.  No acute or suspicious bony finding.   -SPEP May 30, 2023:  M Servando 0.4 in the gamma region   -Labs today: Hgb 9.1, HCT 33.9, iron 41, ferritin 73, saturation 10%, TIBC 414, BUN 25, creatinine 1.87, GFR 28.5  -Taking Ferous sulfate once daily  -Will order Injectafer " 750 mg IV weekly x2  -We will check CBC with each infusion.  If Hgb less than 7g will transfuse 1 unit PRBC  -Once ferritin > 100 and / or saturation > 20, if Hgb remains < 10, will start JARRETT therapy with Retacrit 40,000 units.  We will continue biweekly for Hgb < 11 or HCT < 33.  -SPEP, MILI, light chains, flow cytometry and B2M, erythropoietin, B12, folate pending  -Patient has seen Dr. Duron in the past for nephrology      4.  Osteopenia   -DEXA scan 3/23/2022  Femoral neck T score -2.3  Lumbar spine T score -1.7  -Followed by PCP    5.  Leukocytosis   6.  Thrombocytosis   WBC today 12.16, platelets 468  -Previous bone marrow showed no evidence of lymphoproliferative disorder  -Leukocytosis likely secondary to smoking and thrombocytosis likely secondary to iron deficiency  -Labs pending today for BCR ABL, JAK2, CALR, FISH for CLL      PLAN:   1.   regarding the reason for the referral.   2.  We will order Injectafer 750 mg IV weekly x2.    Once ferritin > 100 and / or saturation > 20, if Hgb remains < 10, will start JARRETT therapy with Retacrit 40,000 units.  We will continue biweekly for Hgb < 11 or HCT < 33.   3.  Labs pending for SPEP, MILI, light chains, flow cytometry and B2M, erythropoietin, B12, folate   4.  Continue current medications, follow up, treatment plans per PCP and any other provider.   5.  Return to office 1 month with pre-office labs for CBC, CMP, iron profile and ferritin   6.  Care discussed with patient.  Understanding expressed.  Patient agreeable with plan.          ACP discussion was held with the patient during this visit. Patient does not have an advance directive, information provided.  Via AVS    Thank you for the referral.    I spent 50 minutes caring for Cheryl on this date of service (approximately 35 minutes face-to-face) . This time includes time spent by me in the following activities: preparing for the visit, reviewing tests, performing a medically appropriate examination  and/or evaluation, counseling and educating the patient/family/caregiver, ordering medications, tests, or procedures, documenting information in the medical record, and care coordination.        Racheal Ruiz, APRN  07/28/2023

## 2023-07-28 NOTE — LETTER
"July 28, 2023       No Recipients    Patient: Cheryl Taylor   YOB: 1950   Date of Visit: 7/28/2023     Dear Den James, DO:       Thank you for referring Cheryl Taylor to me for evaluation. Below are the relevant portions of my assessment and plan of care.    If you have questions, please do not hesitate to call me. I look forward to following Cheryl along with you.         Sincerely,        CALE Zhang        CC:   No Recipients    Racheal Ruiz APRN  07/28/23 1153  Sign when Signing Visit  [facility] HEMATOLOGY & ONCOLOGY  [address]  [facility] [place] [facility] [place] [facility] [id number] [phone]    Patient Name: Cheryl Taylor  Encounter Date: 07/28/2023   YOB: 1950  Patient Number: [id number]    Initial Note    REASON FOR CONSULTATION: Patient states \" maybe because of my dizziness \".    HISTORY OF PRESENT ILLNESS: Cheryl Taylor is a 73 y.o. female referred by No ref. provider found for diagnostic and management recommendations for anemia. History is obtained from patient and daughter , Cinthya. History is considered to be accurate.    She has health history significant for anemia, HTN, CKD, COPD, depression, GERD, chronic back pain seeing pain management, stable aortic aneurysm seen by cardiology in [place] and is followed by cardiothoracic surgery here, leukocytosis, MGUS she has received Venofer and Injectafer in the past.    He was followed by Dr. Car on in the past and it appears as though her last visit with him was in 2017.    She had bone marrow biopsy on November 27, 2009.  Per previous office notes it was unremarkable.  Leukocytosis was felt to be reactive in etiology.  She had additional bone marrow biopsy on September 3, 2014.  Results include normocellular bone marrow.  Mild absolute erythroid hyperplasia in the marrow.  Normal myelopoiesis in the marrow.  No increase in lymphocytes or plasma cells in the marrow. "  Slight increase in megakaryocytes and aspirate smears.  No marrow fibrosis.  Absence of stainable iron.  No evidence of metastatic carcinoma or granulomatosis disease in the bone marrow.  Flow cytometry showed no evidence of abnormal myeloid maturation or an increased blast population.  There was no evidence of a lymphoproliferative disorder.  FISH for MPN was negative.      Patient has history of MGUS with IgM monoclonal protein.  She has seen Dr. Duron previously       Recently quit smoking approx one months ago.  She smoked for approx 50+ years 2-3 PPD.      LABS    Lab Results - Last 18 Months   Lab Units 07/28/23  1012 03/17/23  1057 05/05/22  0559 05/04/22  1700   HEMOGLOBIN g/dL 9.1* 10.4* 10.3* 11.1*   HEMATOCRIT % 33.9* 36.7 36.4 37.4   MCV fL 90.6 86.8 92.2 88.2   WBC 10*3/mm3 12.16* 13.99* 14.61* 14.77*   RDW % 19.8* 20.1* 16.1* 15.8*   MPV fL 9.5 10.5 10.7 10.4   PLATELETS 10*3/mm3 468* 468* 417 454*   IMM GRAN % % 0.5 0.4  --  0.4   NEUTROS ABS 10*3/mm3 7.61* 10.46* 9.60* 10.73*   LYMPHS ABS 10*3/mm3 2.84 2.14  --  2.58   MONOS ABS 10*3/mm3 0.88 0.96*  --  1.28*   EOS ABS 10*3/mm3 0.69* 0.29 0.29 0.06   BASOS ABS 10*3/mm3 0.08 0.09  --  0.06   IMMATURE GRANS (ABS) 10*3/mm3 0.06* 0.05  --  0.06*   NRBC /100 WBC 0.0 0.0  --  0.0   NEUTROPHIL % %  --   --  65.7  --    MONOCYTES % %  --   --  6.1  --    ANISOCYTOSIS   --   --  Slight/1+  --        Lab Results - Last 18 Months   Lab Units 07/28/23  1012 05/18/23  1047 03/17/23  1057 03/13/23  1547 05/06/22  0450 05/05/22  0559 05/04/22  1700   GLUCOSE mg/dL 116*  --  99  --  110* 146* 90   SODIUM mmol/L 138  --  138  --  141 140 137   POTASSIUM mmol/L 4.8  --  4.2  --  4.1 4.0 3.7   CO2 mmol/L 22.0  --  22.0  --  24.0 27.0 21.0*   CHLORIDE mmol/L 103  --  102  --  105 105 101   ANION GAP mmol/L 13.0  --  14.0  --  12.0 8.0 15.0   CREATININE mg/dL 1.87* 1.80* 1.36* 1.60* 1.27* 1.29* 1.11*   BUN mg/dL 25*  --  21  --  19 17 16   BUN / CREAT RATIO  13.4  --   15.4  --  15.0 13.2 14.4   CALCIUM mg/dL 9.2  --  9.8  --  8.7 9.3 9.5   ALK PHOS U/L 143*  --  125*  --   --   --  132*   TOTAL PROTEIN g/dL 6.8  --  7.6  --   --   --  7.5   ALT (SGPT) U/L 9  --  10  --   --   --  10   AST (SGOT) U/L 17  --  20  --   --   --  17   BILIRUBIN mg/dL <0.2  --  0.3  --   --   --  0.4   ALBUMIN g/dL 3.7  --  4.2  --   --   --  3.70   GLOBULIN gm/dL 3.1  --  3.4  --   --   --  3.8       No results for input(s): MSPIKE, KAPPALAMB, IGLFLC, URICACID, FREEKAPPAL, CEA, LDH, REFLABREPO in the last 28622 hours.    Lab Results - Last 18 Months   Lab Units 07/28/23  1012   IRON mcg/dL 41   TIBC mcg/dL 414   IRON SATURATION (TSAT) % 10*   FERRITIN ng/mL 73.87         PAST MEDICAL HISTORY:  ALLERGIES:  Allergies   Allergen Reactions   • Levaquin [Levofloxacin] Hives   • Naprosyn [Naproxen] Hives   • Sulfa Antibiotics Hives     CURRENT MEDICATIONS:  Outpatient Encounter Medications as of 7/28/2023   Medication Sig Dispense Refill   • amLODIPine (NORVASC) 2.5 MG tablet Take 1 tablet by mouth Daily.     • aspirin 81 MG chewable tablet Chew 1 tablet Daily.     • atorvastatin (LIPITOR) 80 MG tablet Take 1 tablet by mouth Daily.     • calcium carbonate (TUMS) 500 MG chewable tablet Chew 1 tablet Daily.     • Cholecalciferol (Vitamin D) 50 MCG (2000 UT) capsule Take  by mouth.     • cyclobenzaprine (FLEXERIL) 10 MG tablet Take 1 tablet by mouth 3 (Three) Times a Day As Needed.     • DULoxetine (CYMBALTA) 60 MG capsule Take 1 capsule by mouth Daily.     • famotidine (PEPCID) 20 MG tablet Take 1 tablet by mouth At Night As Needed for Indigestion or Heartburn.     • HYDROcodone-acetaminophen (NORCO)  MG per tablet Take 1 tablet by mouth Every 6 (Six) Hours As Needed. for pain     • metoprolol succinate XL (TOPROL-XL) 25 MG 24 hr tablet Take 1 tablet by mouth Daily.     • ferrous sulfate 325 (65 FE) MG tablet Take 1 tablet by mouth Daily. OTC     • [DISCONTINUED] cholecalciferol (VITAMIN D3) 25 MCG  (1000 UT) tablet Take 1 tablet by mouth Daily. (Patient not taking: Reported on 7/28/2023)     • [DISCONTINUED] DULoxetine (CYMBALTA) 30 MG capsule Take 1 capsule by mouth 2 (Two) Times a Day. (Patient not taking: Reported on 7/28/2023)     • [DISCONTINUED] oxyCODONE-acetaminophen (PERCOCET)  MG per tablet TAKE (1) TABLET BY MOUTH EVERY EIGHT HOURS AS NEEDED. (Patient not taking: Reported on 7/28/2023)     • [DISCONTINUED] Varenicline Tartrate 0.5 MG X 11 & 1 MG X 42 tablet Take  by mouth See Admin Instructions. Chantix  see package (Patient not taking: Reported on 7/28/2023)       No facility-administered encounter medications on file as of 7/28/2023.     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   • Stage 3a chronic kidney disease N18.31   • Obesity (BMI 30-39.9) E66.9   • Chronic diastolic CHF (congestive heart failure) I50.32   • Aortic stenosis, mild I35.0   • Nonrheumatic aortic valve insufficiency I35.1   • Chronic pain G89.29   • Degeneration of lumbar intervertebral disc M51.36   • Gastroesophageal reflux disease K21.9   • Vasculitis I77.6   • Iron deficiency anemia D50.9   • Lumbar radiculopathy M54.16   • Lumbosacral radiculitis M54.17   • Shortness of breath R06.02   • Spondylosis M47.9   • Thoracic aortic aneurysm without rupture I71.20       HEALTH MAINTENANCE ITEMS:  Health Maintenance Due   Topic Date Due   • MAMMOGRAM  Never done   • Pneumococcal Vaccine 65+ (1 - PCV) Never done   • TDAP/TD VACCINES (1 - Tdap) Never done   • ZOSTER VACCINE (1 of 2) Never done   • HEPATITIS C SCREENING  Never done   • ANNUAL WELLNESS VISIT  Never done   • COVID-19 Vaccine (6 - Moderna series) 10/05/2022   • LIPID PANEL  05/05/2023        <no information>  Last Completed Colonoscopy            COLORECTAL CANCER SCREENING (COLONOSCOPY - Every 10 Years) Next due on 2015  Outside Claim: [place] COLSC FLX W/RMVL OF TUMOR POLYP LESION SNARE TQ                  Immunization History   Administered Date(s) Administered   • COVID-19 (MODERNA) 1st,2nd,3rd Dose Monovalent 2022, 2022   • COVID-19 (MODERNA) Monovalent Original Booster 2022, 08/10/2022     Last Completed Mammogram       This patient has no relevant Health Maintenance data.              FAMILY HISTORY:  Family History   Problem Relation Age of Onset   • Hypertension Mother      SOCIAL HISTORY:  Social History     Socioeconomic History   • Marital status:    Tobacco Use   • Smoking status: Former     Packs/day: 3.00     Years: 59.00     Pack years: 177.00     Types: Cigarettes     Start date:      Quit date: 3/31/2023     Years since quittin.3   • Smokeless tobacco: Never   • Tobacco comments:     Pt was smoking 2-3 ppd and got down to 0.5 ppd before quitting at the end of 2023   Vaping Use   • Vaping Use: Never used   Substance and Sexual Activity   • Alcohol use: Never   • Drug use: Never   • Sexual activity: Defer       REVIEW OF SYSTEMS:  Review of Systems   Constitutional:  Positive for fatigue. Negative for fever and unexpected weight loss.   HENT:  Positive for hearing loss. Negative for trouble swallowing.    Eyes:         Wears glasses     Respiratory:  Positive for shortness of breath (with exertion). Negative for cough.         COPD managed by PCP     Cardiovascular:  Positive for chest pain (Reports chest pain at rest.  States has seen PCP.  Sees Cardiology Dr. Franco). Negative for palpitations and leg swelling.        Followed by Cardiology .  Aortic Aneurysm    Gastrointestinal:  Negative for nausea and vomiting.   Endocrine:        Increased nocturia    Genitourinary:  Negative for hematuria, pelvic pain and pelvic  "pressure.   Musculoskeletal:  Positive for gait problem. Negative for arthralgias and myalgias.        Uses walker to assist with ambulation     Skin:  Negative for rash, skin lesions and wound.   Neurological:  Positive for dizziness (\"my dizziness is so bad I wont drive\") and tremors (Interminttent \"shakes\". pt states \"it looks like I am having a seizure\"  This has been occuring for the past 6-12 months.). Negative for syncope, headache and confusion.        Intermittently loses control of right hand   Psychiatric/Behavioral:  Positive for depressed mood. Negative for suicidal ideas. The patient is nervous/anxious.         On Cymbalta.  Recently increased.  Managed by Dr. James, PCP       /76   Pulse 78   Temp 97.9 °F (36.6 °C)   Resp 16   Ht 160 cm (63\")   Wt 77.6 kg (171 lb)   SpO2 97%   BMI 30.29 kg/m²  Body surface area is 1.81 meters squared.    Pain Score    07/28/23 1020   PainSc:   7   PainLoc: Back       Physical Exam:  Physical Exam    Cheryl C Brandon reports a pain score of 7.  Given her pain assessment as noted, treatment options were discussed and the following options were decided upon as a follow-up plan to address the patient's pain: continuation of current treatment plan for pain. Sees pain management      ASSESSMENT / PLAN:    1. Anemia due to stage 4 chronic kidney disease    2. MGUS (monoclonal gammopathy of unknown significance)    3. Osteopenia of multiple sites    4. Leukocytosis, unspecified type    5. Other thrombocytosis    6. Iron deficiency        Anemia in Stage IV CKD  Iron Deficiency  MGUS  -Bone marrow biopsy September 3, 2014.    Results include normocellular bone marrow.  Mild absolute erythroid hyperplasia in the marrow.  Normal myelopoiesis in the marrow.  No increase in lymphocytes or plasma cells in the marrow.  Slight increase in megakaryocytes and aspirate smears.  No marrow fibrosis.  Absence of stainable iron.  No evidence of metastatic carcinoma or " granulomatosis disease in the bone marrow.  Flow cytometry showed no evidence of abnormal myeloid maturation or an increased blast population.  There was no evidence of a lymphoproliferative disorder.  FISH for MPN was negative.  -CT Chest 5/18/23 showed no mediastinal or hilar lymphadenopathy by CT size criteria.  No acute or suspicious bony finding.   -SPEP May 30, 2023:  M Servando 0.4 in the gamma region   -Labs today: Hgb 9.1, HCT 33.9, iron 41, ferritin 73, saturation 10%, TIBC 414, BUN 25, creatinine 1.87, GFR 28.5  -Taking Ferous sulfate once daily  -Will order Injectafer 750 mg IV weekly x2  -We will check CBC with each infusion.  If Hgb less than 7g will transfuse 1 unit PRBC  -Once ferritin > 100 and / or saturation > 20, if Hgb remains < 10, will start JARRETT therapy with Retacrit 40,000 units.  We will continue biweekly for Hgb < 11 or HCT < 33.  -SPEP, MILI, light chains, flow cytometry and B2M, erythropoietin, B12, folate pending  -Patient has seen Dr. Duron in the past for nephrology      4.  Osteopenia   -DEXA scan 3/23/2022  Femoral neck T score -2.3  Lumbar spine T score -1.7  -Followed by PCP    5.  Leukocytosis   6.  Thrombocytosis   WBC today 12.16, platelets 468  -Previous bone marrow showed no evidence of lymphoproliferative disorder  -Leukocytosis likely secondary to smoking and thrombocytosis likely secondary to iron deficiency  -Labs pending today for BCR ABL, JAK2, CALR, FISH for CLL      PLAN:   1.   regarding the reason for the referral.   2.  We will order Injectafer 750 mg IV weekly x2.    Once ferritin > 100 and / or saturation > 20, if Hgb remains < 10, will start JARRETT therapy with Retacrit 40,000 units.  We will continue biweekly for Hgb < 11 or HCT < 33.   3.  Labs pending for SPEP, MILI, light chains, flow cytometry and B2M, erythropoietin, B12, folate   4.  Continue current medications, follow up, treatment plans per PCP and any other provider.   5.  Return to office 1 month with  pre-office labs for CBC, CMP, iron profile and ferritin   6.  Care discussed with patient.  Understanding expressed.  Patient agreeable with plan.          ACP discussion was held with the patient during this visit. Patient does not have an advance directive, information provided.  Via AVS    Thank you for the referral.    I spent 50 minutes caring for Cheryl on this date of service (approximately 35 minutes face-to-face) . This time includes time spent by me in the following activities: preparing for the visit, reviewing tests, performing a medically appropriate examination and/or evaluation, counseling and educating the patient/family/caregiver, ordering medications, tests, or procedures, documenting information in the medical record, and care coordination.        Racheal Ruiz, APRN  07/28/2023

## 2023-07-31 LAB
ALBUMIN SERPL ELPH-MCNC: 3.2 G/DL (ref 2.9–4.4)
ALBUMIN/GLOB SERPL: 1 {RATIO} (ref 0.7–1.7)
ALPHA1 GLOB SERPL ELPH-MCNC: 0.3 G/DL (ref 0–0.4)
ALPHA2 GLOB SERPL ELPH-MCNC: 0.9 G/DL (ref 0.4–1)
B-GLOBULIN SERPL ELPH-MCNC: 1.2 G/DL (ref 0.7–1.3)
EPO SERPL-ACNC: 27.3 MIU/ML (ref 2.6–18.5)
GAMMA GLOB SERPL ELPH-MCNC: 1 G/DL (ref 0.4–1.8)
GLOBULIN SER-MCNC: 3.4 G/DL (ref 2.2–3.9)
IGA SERPL-MCNC: 554 MG/DL (ref 64–422)
IGG SERPL-MCNC: 954 MG/DL (ref 586–1602)
IGM SERPL-MCNC: 315 MG/DL (ref 26–217)
INTERPRETATION SERPL IEP-IMP: ABNORMAL
KAPPA LC FREE SER-MCNC: 116.8 MG/L (ref 3.3–19.4)
KAPPA LC FREE/LAMBDA FREE SER: 1.92 {RATIO} (ref 0.26–1.65)
LABORATORY COMMENT REPORT: ABNORMAL
LAMBDA LC FREE SERPL-MCNC: 60.8 MG/L (ref 5.7–26.3)
M PROTEIN SERPL ELPH-MCNC: 0.3 G/DL
PROT SERPL-MCNC: 6.6 G/DL (ref 6–8.5)
REF LAB TEST METHOD: NORMAL

## 2023-08-03 LAB — REF LAB TEST METHOD: NORMAL

## 2023-08-04 ENCOUNTER — HOSPITAL ENCOUNTER (EMERGENCY)
Facility: HOSPITAL | Age: 73
Discharge: HOME | End: 2023-08-04
Attending: EMERGENCY MEDICINE
Payer: MEDICARE

## 2023-08-04 ENCOUNTER — APPOINTMENT (OUTPATIENT)
Dept: GENERAL RADIOLOGY | Facility: HOSPITAL | Age: 73
End: 2023-08-04
Payer: MEDICARE

## 2023-08-04 ENCOUNTER — PATIENT ROUNDING (BHMG ONLY) (OUTPATIENT)
Dept: ONCOLOGY | Facility: CLINIC | Age: 73
End: 2023-08-04
Payer: MEDICARE

## 2023-08-04 VITALS
WEIGHT: 170 LBS | RESPIRATION RATE: 18 BRPM | TEMPERATURE: 97.4 F | BODY MASS INDEX: 30.12 KG/M2 | OXYGEN SATURATION: 100 % | HEIGHT: 63 IN | DIASTOLIC BLOOD PRESSURE: 67 MMHG | SYSTOLIC BLOOD PRESSURE: 132 MMHG | HEART RATE: 100 BPM

## 2023-08-04 DIAGNOSIS — S22.31XA CLOSED FRACTURE OF ONE RIB OF RIGHT SIDE, INITIAL ENCOUNTER: Primary | ICD-10-CM

## 2023-08-04 DIAGNOSIS — S80.01XA CONTUSION OF RIGHT KNEE, INITIAL ENCOUNTER: ICD-10-CM

## 2023-08-04 LAB
CALR EXON 9 MUT ANL BLD/T: NORMAL
CITATION REF LAB TEST: NORMAL
JAK2 P.V617F BLD/T QL: NORMAL
LAB DIRECTOR NAME PROVIDER: NORMAL
LAB DIRECTOR NAME PROVIDER: NORMAL
LABORATORY COMMENT REPORT: NORMAL
Lab: NORMAL
REF LAB TEST METHOD: NORMAL

## 2023-08-04 PROCEDURE — 73562 X-RAY EXAM OF KNEE 3: CPT

## 2023-08-04 PROCEDURE — 96372 THER/PROPH/DIAG INJ SC/IM: CPT

## 2023-08-04 PROCEDURE — 99282 EMERGENCY DEPT VISIT SF MDM: CPT

## 2023-08-04 PROCEDURE — 0 HYDROMORPHONE 1 MG/ML SOLUTION: Performed by: EMERGENCY MEDICINE

## 2023-08-04 PROCEDURE — 71101 X-RAY EXAM UNILAT RIBS/CHEST: CPT

## 2023-08-04 RX ADMIN — HYDROMORPHONE HYDROCHLORIDE 1 MG: 1 INJECTION, SOLUTION INTRAMUSCULAR; INTRAVENOUS; SUBCUTANEOUS at 16:32

## 2023-08-04 NOTE — ED PROVIDER NOTES
Subjective   History of Present Illness  73-year-old female presents to the ER with complaint of right anterior chest wall pain, left knee pain after mechanical fall.  She fell about 6 days ago.  No loss of consciousness, did not hit her head.  Complaining of right-sided chest pain is worse with deep breathing, worse palpation.  Left knee pain is worse with range of motion and weightbearing.  No shortness of breath but does complain of pain with breathing.  Is in pain management been taking hydrocodone at home with minimal relief.  Patient presents to the ED today because symptoms have not gotten any better.    History provided by:  Patient    Review of Systems   All other systems reviewed and are negative.    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    Thoracic aortic aneurysm        Allergies   Allergen Reactions    Levaquin [Levofloxacin] Hives    Naprosyn [Naproxen] Hives    Sulfa Antibiotics Hives       Past Surgical History:   Procedure Laterality Date    BACK SURGERY      CARDIAC CATHETERIZATION N/A 5/6/2022    Procedure: Left Heart Cath;  Surgeon: Lenard Eastman MD;  Location:  [facility];  Service: Cardiology;  Laterality: N/A;    EYE SURGERY Bilateral     FOOT FASCIOTOMY Bilateral     HYSTERECTOMY      LOWER LEG SOFT TISSUE TUMOR EXCISION      LUMBAR NERVE STIMLATOR INSERTION Right     LYMPH NODE DISSECTION      WRIST FRACTURE SURGERY Right     x2       Family History   Problem Relation Age of Onset    Hypertension Mother        Social History     Socioeconomic History    Marital status:    Tobacco Use    Smoking status: Former     Packs/day: 3.00     Years:  59.00     Pack years: 177.00     Types: Cigarettes     Start date:      Quit date: 3/31/2023     Years since quittin.3    Smokeless tobacco: Never    Tobacco comments:     Pt was smoking 2-3 ppd and got down to 0.5 ppd before quitting at the end of 2023   Vaping Use    Vaping Use: Never used   Substance and Sexual Activity    Alcohol use: Never    Drug use: Never    Sexual activity: Defer           Objective   Physical Exam  Vitals and nursing note reviewed.   Constitutional:       Appearance: Normal appearance. She is normal weight.   HENT:      Head: Normocephalic and atraumatic.      Nose: Nose normal. No congestion or rhinorrhea.      Mouth/Throat:      Mouth: Mucous membranes are moist.   Eyes:      Extraocular Movements: Extraocular movements intact.      Conjunctiva/sclera: Conjunctivae normal.      Pupils: Pupils are equal, round, and reactive to light.   Cardiovascular:      Rate and Rhythm: Normal rate and regular rhythm.      Pulses: Normal pulses.      Heart sounds: Normal heart sounds. No murmur heard.  Pulmonary:      Effort: Pulmonary effort is normal.      Breath sounds: Normal breath sounds. No wheezing.      Comments: Right anterior chest wall tenderness  Chest:      Chest wall: Tenderness present.   Abdominal:      General: Abdomen is flat. Bowel sounds are normal.      Palpations: Abdomen is soft.   Musculoskeletal:         General: Swelling, tenderness and signs of injury present. No deformity.      Cervical back: Normal range of motion and neck supple.      Comments: Left knee tender to palpation in the inferomedial region, pain with range of motion but full range of motion.  Mild swelling   Skin:     General: Skin is warm.      Capillary Refill: Capillary refill takes less than 2 seconds.      Comments: Bruising to right anterior chest wall just above the breast.  Bruising to medial and inferior aspect left knee.   Neurological:      General: No focal deficit present.      Mental  Status: She is alert and oriented to person, place, and time.   Psychiatric:         Mood and Affect: Mood normal.       Procedures       Lab Results (last 24 hours)       ** No results found for the last 24 hours. **         XR Ribs Right With PA Chest    Result Date: 8/4/2023  EXAMINATION: XR RIBS RIGHT W PA CHEST- 8/4/2023 5:08 PM CDT  HISTORY: fall, right side rib pain  REPORT: A frontal view of the chest was obtained, as well as 4 separate views of the right ribs.  COMPARISON: Chest x-ray 05/04/2022.  There is mild volume loss in the lung bases with central and basilar vascular crowding, no focal infiltrate or lung consolidation is identified. There appears to be in moderate-sized hiatal hernia. There is borderline cardiomegaly without evidence of CHF. No pneumothorax or pleural effusion is identified. Dedicated views of the right ribs demonstrate a mildly displaced acute fracture at the anterolateral aspect of the right fourth rib. There is a healed fracture at the lateral aspect of the right third rib.. A dorsal stimulator is present, with the lead tip at the T7 level.      Mildly displaced acute right fourth rib fracture. No pneumothorax or pleural effusion. The lungs are hypoaerated but clear. This report was finalized on 08/04/2023 17:11 by Dr. Garrison Patton MD.    XR Knee 3 View Left    Result Date: 8/4/2023  EXAMINATION: XR KNEE 3 VW LEFT- 8/4/2023 5:07 PM CDT  HISTORY: left knee injury, pain.  REPORT: 3 views of the left knee were obtained.  COMPARISON: There are no correlative imaging studies for comparison.  Osseous alignment is normal, no fracture is identified. There is mild chondrocalcinosis within the medial and lateral compartments. Small joint line spurs are present both medially and laterally, the joint spaces are relatively well-preserved. There is a small suprapatellar joint effusion. There is anterior suprapatellar and prepatellar soft tissue swelling. Vascular calcifications are noted.       No fracture, no acute osseous abnormality. Small joint effusion. Degenerative changes of the medial and lateral compartments as described. Anterior suprapatellar and prepatellar soft tissue swelling is present. This report was finalized on 08/04/2023 17:08 by Dr. Garrison Patton MD.      ED Course  ED Course as of 08/04/23 1811   Fri Aug 04, 2023   1809 Mechanical fall 6 days ago, sustained a right nondisplaced fourth rib fracture.  No pneumothorax or pneumonia.  Also has suspected left knee strain.  We will place knee in an Ace wrap, have her weight-bear as tolerated.  Will give incentive spirometer for rib fracture, she can take her previously prescribed opiate pain meds and follow-up with her pain management doctor if she needs additional medication [AW]      ED Course User Index  [AW] Terrance Da Silva MD                                           Medical Decision Making  Amount and/or Complexity of Data Reviewed  Radiology: ordered.        Final diagnoses:   Closed fracture of one rib of right side, initial encounter   Contusion of right knee, initial encounter       ED Disposition  ED Disposition       ED Disposition   Discharge    Condition   Stable    Comment   --               Den James,   [address] [place] [address]  [phone]    Schedule an appointment as soon as possible for a visit   As needed         Medication List      No changes were made to your prescriptions during this visit.            Terrance Da Silva MD  08/04/23 1811

## 2023-08-09 DIAGNOSIS — D63.1 ANEMIA DUE TO STAGE 4 CHRONIC KIDNEY DISEASE: Primary | ICD-10-CM

## 2023-08-09 DIAGNOSIS — N18.4 ANEMIA DUE TO STAGE 4 CHRONIC KIDNEY DISEASE: Primary | ICD-10-CM

## 2023-08-10 ENCOUNTER — INFUSION (OUTPATIENT)
Dept: ONCOLOGY | Facility: HOSPITAL | Age: 73
End: 2023-08-10
Payer: MEDICARE

## 2023-08-10 ENCOUNTER — LAB (OUTPATIENT)
Dept: LAB | Facility: HOSPITAL | Age: 73
End: 2023-08-10
Payer: MEDICARE

## 2023-08-10 VITALS
WEIGHT: 173 LBS | BODY MASS INDEX: 30.65 KG/M2 | TEMPERATURE: 97.4 F | HEIGHT: 63 IN | HEART RATE: 72 BPM | RESPIRATION RATE: 16 BRPM | SYSTOLIC BLOOD PRESSURE: 147 MMHG | OXYGEN SATURATION: 98 % | DIASTOLIC BLOOD PRESSURE: 63 MMHG

## 2023-08-10 DIAGNOSIS — D63.1 ANEMIA DUE TO STAGE 4 CHRONIC KIDNEY DISEASE: Primary | ICD-10-CM

## 2023-08-10 DIAGNOSIS — D50.9 IRON DEFICIENCY ANEMIA, UNSPECIFIED IRON DEFICIENCY ANEMIA TYPE: Primary | ICD-10-CM

## 2023-08-10 DIAGNOSIS — N18.4 ANEMIA DUE TO STAGE 4 CHRONIC KIDNEY DISEASE: Primary | ICD-10-CM

## 2023-08-10 LAB
BASOPHILS # BLD AUTO: 0.03 10*3/MM3 (ref 0–0.2)
BASOPHILS NFR BLD AUTO: 0.2 % (ref 0–1.5)
DEPRECATED RDW RBC AUTO: 72.6 FL (ref 37–54)
EOSINOPHIL # BLD AUTO: 0 10*3/MM3 (ref 0–0.4)
EOSINOPHIL NFR BLD AUTO: 0 % (ref 0.3–6.2)
ERYTHROCYTE [DISTWIDTH] IN BLOOD BY AUTOMATED COUNT: 21.7 % (ref 12.3–15.4)
HCT VFR BLD AUTO: 34.4 % (ref 34–46.6)
HGB BLD-MCNC: 9.6 G/DL (ref 12–15.9)
HOLD SPECIMEN: NORMAL
IMM GRANULOCYTES # BLD AUTO: 0.1 10*3/MM3 (ref 0–0.05)
IMM GRANULOCYTES NFR BLD AUTO: 0.7 % (ref 0–0.5)
LYMPHOCYTES # BLD AUTO: 1.24 10*3/MM3 (ref 0.7–3.1)
LYMPHOCYTES NFR BLD AUTO: 8.4 % (ref 19.6–45.3)
MCH RBC QN AUTO: 25.6 PG (ref 26.6–33)
MCHC RBC AUTO-ENTMCNC: 27.9 G/DL (ref 31.5–35.7)
MCV RBC AUTO: 91.7 FL (ref 79–97)
MONOCYTES # BLD AUTO: 0.48 10*3/MM3 (ref 0.1–0.9)
MONOCYTES NFR BLD AUTO: 3.2 % (ref 5–12)
NEUTROPHILS NFR BLD AUTO: 12.95 10*3/MM3 (ref 1.7–7)
NEUTROPHILS NFR BLD AUTO: 87.5 % (ref 42.7–76)
NRBC BLD AUTO-RTO: 0.2 /100 WBC (ref 0–0.2)
PLATELET # BLD AUTO: 511 10*3/MM3 (ref 140–450)
PMV BLD AUTO: 10.3 FL (ref 6–12)
RBC # BLD AUTO: 3.75 10*6/MM3 (ref 3.77–5.28)
WBC NRBC COR # BLD: 14.8 10*3/MM3 (ref 3.4–10.8)

## 2023-08-10 PROCEDURE — 36415 COLL VENOUS BLD VENIPUNCTURE: CPT

## 2023-08-10 PROCEDURE — 96375 TX/PRO/DX INJ NEW DRUG ADDON: CPT

## 2023-08-10 PROCEDURE — 25010000002 IRON SUCROSE PER 1 MG: Performed by: NURSE PRACTITIONER

## 2023-08-10 PROCEDURE — 96365 THER/PROPH/DIAG IV INF INIT: CPT

## 2023-08-10 PROCEDURE — 85025 COMPLETE CBC W/AUTO DIFF WBC: CPT

## 2023-08-10 PROCEDURE — 96374 THER/PROPH/DIAG INJ IV PUSH: CPT

## 2023-08-10 RX ORDER — FAMOTIDINE 10 MG/ML
20 INJECTION, SOLUTION INTRAVENOUS AS NEEDED
Status: DISCONTINUED | OUTPATIENT
Start: 2023-08-10 | End: 2023-08-10 | Stop reason: HOSPADM

## 2023-08-10 RX ORDER — DIPHENHYDRAMINE HYDROCHLORIDE 50 MG/ML
50 INJECTION INTRAMUSCULAR; INTRAVENOUS AS NEEDED
Status: DISCONTINUED | OUTPATIENT
Start: 2023-08-10 | End: 2023-08-10 | Stop reason: HOSPADM

## 2023-08-10 RX ORDER — ACETAMINOPHEN 325 MG/1
650 TABLET ORAL ONCE
Status: DISCONTINUED | OUTPATIENT
Start: 2023-08-10 | End: 2023-08-10 | Stop reason: HOSPADM

## 2023-08-10 RX ORDER — FAMOTIDINE 10 MG/ML
20 INJECTION, SOLUTION INTRAVENOUS ONCE
Status: COMPLETED | OUTPATIENT
Start: 2023-08-10 | End: 2023-08-10

## 2023-08-10 RX ORDER — SODIUM CHLORIDE 9 MG/ML
250 INJECTION, SOLUTION INTRAVENOUS ONCE
Status: COMPLETED | OUTPATIENT
Start: 2023-08-10 | End: 2023-08-10

## 2023-08-10 RX ADMIN — FAMOTIDINE 20 MG: 10 INJECTION INTRAVENOUS at 15:21

## 2023-08-10 RX ADMIN — IRON SUCROSE 200 MG: 20 INJECTION, SOLUTION INTRAVENOUS at 15:44

## 2023-08-10 RX ADMIN — SODIUM CHLORIDE 250 ML: 9 INJECTION, SOLUTION INTRAVENOUS at 15:21

## 2023-08-17 ENCOUNTER — INFUSION (OUTPATIENT)
Dept: ONCOLOGY | Facility: HOSPITAL | Age: 73
End: 2023-08-17
Payer: MEDICARE

## 2023-08-17 ENCOUNTER — LAB (OUTPATIENT)
Dept: LAB | Facility: HOSPITAL | Age: 73
End: 2023-08-17
Payer: MEDICARE

## 2023-08-17 VITALS
TEMPERATURE: 96.9 F | OXYGEN SATURATION: 97 % | WEIGHT: 169 LBS | DIASTOLIC BLOOD PRESSURE: 46 MMHG | HEART RATE: 54 BPM | RESPIRATION RATE: 22 BRPM | SYSTOLIC BLOOD PRESSURE: 110 MMHG | HEIGHT: 63 IN | BODY MASS INDEX: 29.95 KG/M2

## 2023-08-17 DIAGNOSIS — D50.9 IRON DEFICIENCY ANEMIA, UNSPECIFIED IRON DEFICIENCY ANEMIA TYPE: Primary | ICD-10-CM

## 2023-08-17 DIAGNOSIS — N18.4 ANEMIA DUE TO STAGE 4 CHRONIC KIDNEY DISEASE: Primary | ICD-10-CM

## 2023-08-17 DIAGNOSIS — D63.1 ANEMIA DUE TO STAGE 4 CHRONIC KIDNEY DISEASE: Primary | ICD-10-CM

## 2023-08-17 LAB
BASOPHILS # BLD AUTO: 0.06 10*3/MM3 (ref 0–0.2)
BASOPHILS NFR BLD AUTO: 0.4 % (ref 0–1.5)
DEPRECATED RDW RBC AUTO: 75.5 FL (ref 37–54)
EOSINOPHIL # BLD AUTO: 0.24 10*3/MM3 (ref 0–0.4)
EOSINOPHIL NFR BLD AUTO: 1.7 % (ref 0.3–6.2)
ERYTHROCYTE [DISTWIDTH] IN BLOOD BY AUTOMATED COUNT: 22.5 % (ref 12.3–15.4)
HCT VFR BLD AUTO: 35.2 % (ref 34–46.6)
HGB BLD-MCNC: 9.9 G/DL (ref 12–15.9)
HOLD SPECIMEN: NORMAL
IMM GRANULOCYTES # BLD AUTO: 0.08 10*3/MM3 (ref 0–0.05)
IMM GRANULOCYTES NFR BLD AUTO: 0.6 % (ref 0–0.5)
LYMPHOCYTES # BLD AUTO: 2.79 10*3/MM3 (ref 0.7–3.1)
LYMPHOCYTES NFR BLD AUTO: 19.7 % (ref 19.6–45.3)
MCH RBC QN AUTO: 26 PG (ref 26.6–33)
MCHC RBC AUTO-ENTMCNC: 28.1 G/DL (ref 31.5–35.7)
MCV RBC AUTO: 92.4 FL (ref 79–97)
MONOCYTES # BLD AUTO: 1.19 10*3/MM3 (ref 0.1–0.9)
MONOCYTES NFR BLD AUTO: 8.4 % (ref 5–12)
NEUTROPHILS NFR BLD AUTO: 69.2 % (ref 42.7–76)
NEUTROPHILS NFR BLD AUTO: 9.83 10*3/MM3 (ref 1.7–7)
NRBC BLD AUTO-RTO: 0 /100 WBC (ref 0–0.2)
PLATELET # BLD AUTO: 419 10*3/MM3 (ref 140–450)
PMV BLD AUTO: 10.5 FL (ref 6–12)
RBC # BLD AUTO: 3.81 10*6/MM3 (ref 3.77–5.28)
WBC NRBC COR # BLD: 14.19 10*3/MM3 (ref 3.4–10.8)

## 2023-08-17 PROCEDURE — 96375 TX/PRO/DX INJ NEW DRUG ADDON: CPT

## 2023-08-17 PROCEDURE — 85025 COMPLETE CBC W/AUTO DIFF WBC: CPT

## 2023-08-17 PROCEDURE — 96365 THER/PROPH/DIAG IV INF INIT: CPT

## 2023-08-17 PROCEDURE — 63710000001 ACETAMINOPHEN 325 MG TABLET: Performed by: NURSE PRACTITIONER

## 2023-08-17 PROCEDURE — 36415 COLL VENOUS BLD VENIPUNCTURE: CPT

## 2023-08-17 PROCEDURE — A9270 NON-COVERED ITEM OR SERVICE: HCPCS | Performed by: NURSE PRACTITIONER

## 2023-08-17 PROCEDURE — 25010000002 IRON SUCROSE PER 1 MG: Performed by: NURSE PRACTITIONER

## 2023-08-17 PROCEDURE — 96374 THER/PROPH/DIAG INJ IV PUSH: CPT

## 2023-08-17 RX ORDER — ACETAMINOPHEN 325 MG/1
650 TABLET ORAL ONCE
Status: COMPLETED | OUTPATIENT
Start: 2023-08-17 | End: 2023-08-17

## 2023-08-17 RX ORDER — SODIUM CHLORIDE 9 MG/ML
250 INJECTION, SOLUTION INTRAVENOUS ONCE
Status: COMPLETED | OUTPATIENT
Start: 2023-08-17 | End: 2023-08-17

## 2023-08-17 RX ORDER — FAMOTIDINE 10 MG/ML
20 INJECTION, SOLUTION INTRAVENOUS ONCE
Status: COMPLETED | OUTPATIENT
Start: 2023-08-17 | End: 2023-08-17

## 2023-08-17 RX ORDER — FAMOTIDINE 10 MG/ML
20 INJECTION, SOLUTION INTRAVENOUS AS NEEDED
Status: DISCONTINUED | OUTPATIENT
Start: 2023-08-17 | End: 2023-08-17 | Stop reason: HOSPADM

## 2023-08-17 RX ORDER — DIPHENHYDRAMINE HYDROCHLORIDE 50 MG/ML
50 INJECTION INTRAMUSCULAR; INTRAVENOUS AS NEEDED
Status: DISCONTINUED | OUTPATIENT
Start: 2023-08-17 | End: 2023-08-17 | Stop reason: HOSPADM

## 2023-08-17 RX ADMIN — FAMOTIDINE 20 MG: 10 INJECTION INTRAVENOUS at 14:51

## 2023-08-17 RX ADMIN — ACETAMINOPHEN 650 MG: 325 TABLET ORAL at 14:50

## 2023-08-17 RX ADMIN — IRON SUCROSE 200 MG: 20 INJECTION, SOLUTION INTRAVENOUS at 15:02

## 2023-08-17 RX ADMIN — SODIUM CHLORIDE 250 ML: 9 INJECTION, SOLUTION INTRAVENOUS at 14:43

## 2023-08-24 ENCOUNTER — LAB (OUTPATIENT)
Dept: LAB | Facility: HOSPITAL | Age: 73
End: 2023-08-24
Payer: MEDICARE

## 2023-08-24 ENCOUNTER — INFUSION (OUTPATIENT)
Dept: ONCOLOGY | Facility: HOSPITAL | Age: 73
End: 2023-08-24
Payer: MEDICARE

## 2023-08-24 VITALS
SYSTOLIC BLOOD PRESSURE: 143 MMHG | TEMPERATURE: 97.1 F | OXYGEN SATURATION: 99 % | DIASTOLIC BLOOD PRESSURE: 50 MMHG | RESPIRATION RATE: 22 BRPM | HEART RATE: 116 BPM

## 2023-08-24 DIAGNOSIS — D63.1 ANEMIA DUE TO STAGE 4 CHRONIC KIDNEY DISEASE: Primary | ICD-10-CM

## 2023-08-24 DIAGNOSIS — D50.9 IRON DEFICIENCY ANEMIA, UNSPECIFIED IRON DEFICIENCY ANEMIA TYPE: Primary | ICD-10-CM

## 2023-08-24 DIAGNOSIS — N18.4 ANEMIA DUE TO STAGE 4 CHRONIC KIDNEY DISEASE: Primary | ICD-10-CM

## 2023-08-24 LAB
BASOPHILS # BLD AUTO: 0.04 10*3/MM3 (ref 0–0.2)
BASOPHILS NFR BLD AUTO: 0.3 % (ref 0–1.5)
DEPRECATED RDW RBC AUTO: 79.9 FL (ref 37–54)
EOSINOPHIL # BLD AUTO: 0.04 10*3/MM3 (ref 0–0.4)
EOSINOPHIL NFR BLD AUTO: 0.3 % (ref 0.3–6.2)
ERYTHROCYTE [DISTWIDTH] IN BLOOD BY AUTOMATED COUNT: 22.6 % (ref 12.3–15.4)
HCT VFR BLD AUTO: 38.7 % (ref 34–46.6)
HGB BLD-MCNC: 11 G/DL (ref 12–15.9)
HOLD SPECIMEN: NORMAL
IMM GRANULOCYTES # BLD AUTO: 0.08 10*3/MM3 (ref 0–0.05)
IMM GRANULOCYTES NFR BLD AUTO: 0.6 % (ref 0–0.5)
LYMPHOCYTES # BLD AUTO: 2.81 10*3/MM3 (ref 0.7–3.1)
LYMPHOCYTES NFR BLD AUTO: 20 % (ref 19.6–45.3)
MCH RBC QN AUTO: 27.3 PG (ref 26.6–33)
MCHC RBC AUTO-ENTMCNC: 28.4 G/DL (ref 31.5–35.7)
MCV RBC AUTO: 96 FL (ref 79–97)
MONOCYTES # BLD AUTO: 1.12 10*3/MM3 (ref 0.1–0.9)
MONOCYTES NFR BLD AUTO: 8 % (ref 5–12)
NEUTROPHILS NFR BLD AUTO: 70.8 % (ref 42.7–76)
NEUTROPHILS NFR BLD AUTO: 9.96 10*3/MM3 (ref 1.7–7)
NRBC BLD AUTO-RTO: 0 /100 WBC (ref 0–0.2)
PLATELET # BLD AUTO: 395 10*3/MM3 (ref 140–450)
PMV BLD AUTO: 10.7 FL (ref 6–12)
RBC # BLD AUTO: 4.03 10*6/MM3 (ref 3.77–5.28)
WBC NRBC COR # BLD: 14.05 10*3/MM3 (ref 3.4–10.8)

## 2023-08-24 PROCEDURE — 96374 THER/PROPH/DIAG INJ IV PUSH: CPT

## 2023-08-24 PROCEDURE — 96365 THER/PROPH/DIAG IV INF INIT: CPT

## 2023-08-24 PROCEDURE — 96375 TX/PRO/DX INJ NEW DRUG ADDON: CPT

## 2023-08-24 PROCEDURE — 25010000002 IRON SUCROSE PER 1 MG: Performed by: NURSE PRACTITIONER

## 2023-08-24 PROCEDURE — 63710000001 ACETAMINOPHEN 325 MG TABLET: Performed by: NURSE PRACTITIONER

## 2023-08-24 PROCEDURE — 36415 COLL VENOUS BLD VENIPUNCTURE: CPT

## 2023-08-24 PROCEDURE — 85025 COMPLETE CBC W/AUTO DIFF WBC: CPT

## 2023-08-24 PROCEDURE — A9270 NON-COVERED ITEM OR SERVICE: HCPCS | Performed by: NURSE PRACTITIONER

## 2023-08-24 RX ORDER — SODIUM CHLORIDE 9 MG/ML
250 INJECTION, SOLUTION INTRAVENOUS ONCE
Status: COMPLETED | OUTPATIENT
Start: 2023-08-24 | End: 2023-08-24

## 2023-08-24 RX ORDER — FAMOTIDINE 10 MG/ML
20 INJECTION, SOLUTION INTRAVENOUS AS NEEDED
Status: DISCONTINUED | OUTPATIENT
Start: 2023-08-24 | End: 2023-08-24 | Stop reason: HOSPADM

## 2023-08-24 RX ORDER — DIPHENHYDRAMINE HYDROCHLORIDE 50 MG/ML
50 INJECTION INTRAMUSCULAR; INTRAVENOUS AS NEEDED
Status: DISCONTINUED | OUTPATIENT
Start: 2023-08-24 | End: 2023-08-24 | Stop reason: HOSPADM

## 2023-08-24 RX ORDER — ACETAMINOPHEN 325 MG/1
650 TABLET ORAL ONCE
Status: COMPLETED | OUTPATIENT
Start: 2023-08-24 | End: 2023-08-24

## 2023-08-24 RX ORDER — FAMOTIDINE 10 MG/ML
20 INJECTION, SOLUTION INTRAVENOUS ONCE
Status: COMPLETED | OUTPATIENT
Start: 2023-08-24 | End: 2023-08-24

## 2023-08-24 RX ADMIN — IRON SUCROSE 200 MG: 20 INJECTION, SOLUTION INTRAVENOUS at 15:06

## 2023-08-24 RX ADMIN — SODIUM CHLORIDE 250 ML: 9 INJECTION, SOLUTION INTRAVENOUS at 15:03

## 2023-08-24 RX ADMIN — FAMOTIDINE 20 MG: 10 INJECTION INTRAVENOUS at 15:05

## 2023-08-24 RX ADMIN — ACETAMINOPHEN 650 MG: 325 TABLET, FILM COATED ORAL at 15:04

## 2023-09-07 ENCOUNTER — LAB (OUTPATIENT)
Dept: LAB | Facility: HOSPITAL | Age: 73
End: 2023-09-07
Payer: MEDICARE

## 2023-09-07 ENCOUNTER — OFFICE VISIT (OUTPATIENT)
Dept: ONCOLOGY | Facility: CLINIC | Age: 73
End: 2023-09-07
Payer: MEDICARE

## 2023-09-07 VITALS
HEART RATE: 75 BPM | HEIGHT: 63 IN | OXYGEN SATURATION: 96 % | RESPIRATION RATE: 16 BRPM | WEIGHT: 167.8 LBS | TEMPERATURE: 96 F | BODY MASS INDEX: 29.73 KG/M2 | DIASTOLIC BLOOD PRESSURE: 78 MMHG | SYSTOLIC BLOOD PRESSURE: 126 MMHG

## 2023-09-07 DIAGNOSIS — D75.838 OTHER THROMBOCYTOSIS: ICD-10-CM

## 2023-09-07 DIAGNOSIS — D63.1 ANEMIA DUE TO STAGE 4 CHRONIC KIDNEY DISEASE: ICD-10-CM

## 2023-09-07 DIAGNOSIS — D72.829 LEUKOCYTOSIS, UNSPECIFIED TYPE: ICD-10-CM

## 2023-09-07 DIAGNOSIS — E61.1 IRON DEFICIENCY: ICD-10-CM

## 2023-09-07 DIAGNOSIS — D50.9 IRON DEFICIENCY ANEMIA, UNSPECIFIED IRON DEFICIENCY ANEMIA TYPE: ICD-10-CM

## 2023-09-07 DIAGNOSIS — M85.89 OSTEOPENIA OF MULTIPLE SITES: ICD-10-CM

## 2023-09-07 DIAGNOSIS — N18.32 ANEMIA DUE TO STAGE 3B CHRONIC KIDNEY DISEASE: ICD-10-CM

## 2023-09-07 DIAGNOSIS — N18.4 ANEMIA DUE TO STAGE 4 CHRONIC KIDNEY DISEASE: ICD-10-CM

## 2023-09-07 DIAGNOSIS — D47.2 MGUS (MONOCLONAL GAMMOPATHY OF UNKNOWN SIGNIFICANCE): ICD-10-CM

## 2023-09-07 DIAGNOSIS — D47.2 MGUS (MONOCLONAL GAMMOPATHY OF UNKNOWN SIGNIFICANCE): Primary | ICD-10-CM

## 2023-09-07 DIAGNOSIS — D63.1 ANEMIA DUE TO STAGE 3B CHRONIC KIDNEY DISEASE: ICD-10-CM

## 2023-09-07 LAB
ALBUMIN SERPL-MCNC: 3.9 G/DL (ref 3.5–5.2)
ALBUMIN/GLOB SERPL: 1.4 G/DL
ALP SERPL-CCNC: 152 U/L (ref 39–117)
ALT SERPL W P-5'-P-CCNC: 14 U/L (ref 1–33)
ANION GAP SERPL CALCULATED.3IONS-SCNC: 14 MMOL/L (ref 5–15)
AST SERPL-CCNC: 16 U/L (ref 1–32)
BASOPHILS # BLD AUTO: 0.05 10*3/MM3 (ref 0–0.2)
BASOPHILS NFR BLD AUTO: 0.5 % (ref 0–1.5)
BILIRUB SERPL-MCNC: 0.2 MG/DL (ref 0–1.2)
BUN SERPL-MCNC: 19 MG/DL (ref 8–23)
BUN/CREAT SERPL: 14.3 (ref 7–25)
CALCIUM SPEC-SCNC: 9.5 MG/DL (ref 8.6–10.5)
CHLORIDE SERPL-SCNC: 104 MMOL/L (ref 98–107)
CO2 SERPL-SCNC: 21 MMOL/L (ref 22–29)
CREAT SERPL-MCNC: 1.33 MG/DL (ref 0.57–1)
DEPRECATED RDW RBC AUTO: 75.6 FL (ref 37–54)
EGFRCR SERPLBLD CKD-EPI 2021: 42.9 ML/MIN/1.73
EOSINOPHIL # BLD AUTO: 0.4 10*3/MM3 (ref 0–0.4)
EOSINOPHIL NFR BLD AUTO: 4.2 % (ref 0.3–6.2)
ERYTHROCYTE [DISTWIDTH] IN BLOOD BY AUTOMATED COUNT: 21.1 % (ref 12.3–15.4)
FERRITIN SERPL-MCNC: 307.3 NG/ML (ref 13–150)
GLOBULIN UR ELPH-MCNC: 2.7 GM/DL
GLUCOSE SERPL-MCNC: 122 MG/DL (ref 65–99)
HCT VFR BLD AUTO: 40.3 % (ref 34–46.6)
HGB BLD-MCNC: 11.7 G/DL (ref 12–15.9)
IMM GRANULOCYTES # BLD AUTO: 0.04 10*3/MM3 (ref 0–0.05)
IMM GRANULOCYTES NFR BLD AUTO: 0.4 % (ref 0–0.5)
IRON 24H UR-MRATE: 61 MCG/DL (ref 37–145)
IRON SATN MFR SERPL: 21 % (ref 20–50)
LYMPHOCYTES # BLD AUTO: 2.01 10*3/MM3 (ref 0.7–3.1)
LYMPHOCYTES NFR BLD AUTO: 21.2 % (ref 19.6–45.3)
MCH RBC QN AUTO: 28.4 PG (ref 26.6–33)
MCHC RBC AUTO-ENTMCNC: 29 G/DL (ref 31.5–35.7)
MCV RBC AUTO: 97.8 FL (ref 79–97)
MONOCYTES # BLD AUTO: 0.7 10*3/MM3 (ref 0.1–0.9)
MONOCYTES NFR BLD AUTO: 7.4 % (ref 5–12)
NEUTROPHILS NFR BLD AUTO: 6.28 10*3/MM3 (ref 1.7–7)
NEUTROPHILS NFR BLD AUTO: 66.3 % (ref 42.7–76)
NRBC BLD AUTO-RTO: 0 /100 WBC (ref 0–0.2)
PLATELET # BLD AUTO: 402 10*3/MM3 (ref 140–450)
PMV BLD AUTO: 9.7 FL (ref 6–12)
POTASSIUM SERPL-SCNC: 4 MMOL/L (ref 3.5–5.2)
PROT SERPL-MCNC: 6.6 G/DL (ref 6–8.5)
RBC # BLD AUTO: 4.12 10*6/MM3 (ref 3.77–5.28)
SODIUM SERPL-SCNC: 139 MMOL/L (ref 136–145)
TIBC SERPL-MCNC: 295 MCG/DL (ref 298–536)
TRANSFERRIN SERPL-MCNC: 198 MG/DL (ref 200–360)
WBC NRBC COR # BLD: 9.48 10*3/MM3 (ref 3.4–10.8)

## 2023-09-07 PROCEDURE — 82728 ASSAY OF FERRITIN: CPT

## 2023-09-07 PROCEDURE — 86334 IMMUNOFIX E-PHORESIS SERUM: CPT

## 2023-09-07 PROCEDURE — 83521 IG LIGHT CHAINS FREE EACH: CPT

## 2023-09-07 PROCEDURE — 83540 ASSAY OF IRON: CPT

## 2023-09-07 PROCEDURE — 36415 COLL VENOUS BLD VENIPUNCTURE: CPT

## 2023-09-07 PROCEDURE — 84165 PROTEIN E-PHORESIS SERUM: CPT

## 2023-09-07 PROCEDURE — 84466 ASSAY OF TRANSFERRIN: CPT

## 2023-09-07 PROCEDURE — 80053 COMPREHEN METABOLIC PANEL: CPT

## 2023-09-07 PROCEDURE — 85025 COMPLETE CBC W/AUTO DIFF WBC: CPT

## 2023-09-07 PROCEDURE — 82784 ASSAY IGA/IGD/IGG/IGM EACH: CPT

## 2023-09-07 NOTE — PROGRESS NOTES
[facility] HEMATOLOGY & ONCOLOGY  [address]  [facility] [address] [phone]    Patient Name: Cheryl Taylor  Encounter Date: 09/07/2023   YOB: 1950  Patient Number: [id number]    PROGRESS NOTE       HISTORY OF PRESENT ILLNESS: Cheryl Taylor is a 73 y.o. female followed by this office for anemia. History is obtained from patient and daughter , Cinthya. History is considered to be accurate.    She has health history significant for anemia, HTN, CKD, COPD, depression, GERD, chronic back pain seeing pain management, stable aortic aneurysm seen by cardiology in [place] and is followed by cardiothoracic surgery here, leukocytosis, MGUS she has received Venofer and Injectafer in the past.    She was followed by Dr. Car on in the past and it appears as though her last visit with him was in 2017.    She had bone marrow biopsy on November 27, 2009.  Per previous office notes it was unremarkable.  Leukocytosis was felt to be reactive in etiology.  She had additional bone marrow biopsy on September 3, 2014.  Results include normocellular bone marrow.  Mild absolute erythroid hyperplasia in the marrow.  Normal myelopoiesis in the marrow.  No increase in lymphocytes or plasma cells in the marrow.  Slight increase in megakaryocytes and aspirate smears.  No marrow fibrosis.  Absence of stainable iron.  No evidence of metastatic carcinoma or granulomatosis disease in the bone marrow.  Flow cytometry showed no evidence of abnormal myeloid maturation or an increased blast population.  There was no evidence of a lymphoproliferative disorder.  FISH for MPN was negative.      Patient has history of MGUS with IgM monoclonal protein.  She has seen Dr. Duron previously     Recently quit smoking recently.  She smoked for approx 50+ years 2-3 PPD.        INTERVAL HISTORY   Pt presents to clinic today for continued follow up. She states she has fallen  since her last  visit.  She had IV iron on August 17 and 24th.     She had labs drawn today and results were reviewed with her in office.       LABS    Lab Results - Last 18 Months   Lab Units 09/07/23  1108 08/24/23  1414 08/17/23  1358 08/10/23  1406 07/28/23  1012 03/17/23  1057 05/05/22  0559   HEMOGLOBIN g/dL 11.7* 11.0* 9.9* 9.6* 9.1* 10.4* 10.3*   HEMATOCRIT % 40.3 38.7 35.2 34.4 33.9* 36.7 36.4   MCV fL 97.8* 96.0 92.4 91.7 90.6 86.8 92.2   WBC 10*3/mm3 9.48 14.05* 14.19* 14.80* 12.16* 13.99* 14.61*   RDW % 21.1* 22.6* 22.5* 21.7* 19.8* 20.1* 16.1*   MPV fL 9.7 10.7 10.5 10.3 9.5 10.5 10.7   PLATELETS 10*3/mm3 402 395 419 511* 468* 468* 417   IMM GRAN % % 0.4 0.6* 0.6* 0.7* 0.5 0.4  --    NEUTROS ABS 10*3/mm3 6.28 9.96* 9.83* 12.95* 7.61* 10.46* 9.60*   LYMPHS ABS 10*3/mm3 2.01 2.81 2.79 1.24 2.84 2.14  --    MONOS ABS 10*3/mm3 0.70 1.12* 1.19* 0.48 0.88 0.96*  --    EOS ABS 10*3/mm3 0.40 0.04 0.24 0.00 0.69* 0.29 0.29   BASOS ABS 10*3/mm3 0.05 0.04 0.06 0.03 0.08 0.09  --    IMMATURE GRANS (ABS) 10*3/mm3 0.04 0.08* 0.08* 0.10* 0.06* 0.05  --    NRBC /100 WBC 0.0 0.0 0.0 0.2 0.0 0.0  --    NEUTROPHIL % %  --   --   --   --   --   --  65.7   MONOCYTES % %  --   --   --   --   --   --  6.1   ANISOCYTOSIS   --   --   --   --   --   --  Slight/1+       Lab Results - Last 18 Months   Lab Units 09/07/23  1243 09/07/23  1108 07/28/23  1012 05/18/23  1047 03/17/23  1057 03/13/23  1547 05/06/22  0450 05/05/22  0559 05/04/22  1700   GLUCOSE mg/dL  --  122* 116*  --  99  --  110* 146* 90   SODIUM mmol/L  --  139 138  --  138  --  141 140 137   POTASSIUM mmol/L  --  4.0 4.8  --  4.2  --  4.1 4.0 3.7   CO2 mmol/L  --  21.0* 22.0  --  22.0  --  24.0 27.0 21.0*   CHLORIDE mmol/L  --  104 103  --  102  --  105 105 101   ANION GAP mmol/L  --  14.0 13.0  --  14.0  --  12.0 8.0 15.0   CREATININE mg/dL  --  1.33* 1.87* 1.80* 1.36* 1.60* 1.27* 1.29* 1.11*   BUN mg/dL  --  19 25*  --  21  --  19 17 16   BUN / CREAT RATIO   --   14.3 13.4  --  15.4  --  15.0 13.2 14.4   CALCIUM mg/dL  --  9.5 9.2  --  9.8  --  8.7 9.3 9.5   ALK PHOS U/L  --  152* 143*  --  125*  --   --   --  132*   TOTAL PROTEIN g/dL  --  6.6 6.8  --  7.6  --   --   --  7.5   ALT (SGPT) U/L  --  14 9  --  10  --   --   --  10   AST (SGOT) U/L  --  16 17  --  20  --   --   --  17   BILIRUBIN mg/dL  --  0.2 <0.2  --  0.3  --   --   --  0.4   ALBUMIN g/dL 3.0 3.9 3.7  3.2  --  4.2  --   --   --  3.70   GLOBULIN gm/dL  --  2.7 3.1  --  3.4  --   --   --  3.8       Lab Results - Last 18 Months   Lab Units 09/07/23  1243 07/28/23  1139 07/28/23  1012   M-SPIKE g/dL 0.4*  --  0.3*   KAPPA/LAMBDA RATIO, S  1.43  --  1.92*   FREE LAMBDA LIGHT CHAINS mg/L 40.7*  --  60.8*   IG KAPPA FREE LIGHT CHAIN mg/L 58.3*  --  116.8*   REFERENCE LAB REPORT  See Attached Report SEE ATTACHED REPORT SEE ATTACHED REPORT       Lab Results - Last 18 Months   Lab Units 09/07/23  1108 07/28/23  1016 07/28/23  1012   IRON mcg/dL 61  --  41   TIBC mcg/dL 295*  --  414   IRON SATURATION (TSAT) % 21  --  10*   FERRITIN ng/mL 307.30*  --  73.87   FOLATE ng/mL  --  12.00  --          PAST MEDICAL HISTORY:  ALLERGIES:  Allergies   Allergen Reactions    Levaquin [Levofloxacin] Hives    Naprosyn [Naproxen] Hives    Sulfa Antibiotics Hives     CURRENT MEDICATIONS:  Outpatient Encounter Medications as of 9/7/2023   Medication Sig Dispense Refill    amLODIPine (NORVASC) 2.5 MG tablet Take 1 tablet by mouth Daily.      aspirin 81 MG chewable tablet Chew 1 tablet Daily.      atorvastatin (LIPITOR) 80 MG tablet Take 1 tablet by mouth Daily.      calcium carbonate (TUMS) 500 MG chewable tablet Chew 1 tablet Daily.      Cholecalciferol (Vitamin D) 50 MCG (2000 UT) capsule Take  by mouth.      cyclobenzaprine (FLEXERIL) 10 MG tablet Take 1 tablet by mouth 3 (Three) Times a Day As Needed.      DULoxetine (CYMBALTA) 60 MG capsule Take 1 capsule by mouth Daily.      famotidine (PEPCID) 20 MG tablet Take 1 tablet by mouth  At Night As Needed for Indigestion or Heartburn.      ferrous sulfate 325 (65 FE) MG tablet Take 1 tablet by mouth Daily. OTC      HYDROcodone-acetaminophen (NORCO)  MG per tablet Take 1 tablet by mouth Every 6 (Six) Hours As Needed. for pain      metoprolol succinate XL (TOPROL-XL) 25 MG 24 hr tablet Take 1 tablet by mouth Daily.       No facility-administered encounter medications on file as of 9/7/2023.     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    Stage 3a chronic kidney disease N18.31    Obesity (BMI 30-39.9) E66.9    Chronic diastolic CHF (congestive heart failure) I50.32    Aortic stenosis, mild I35.0    Nonrheumatic aortic valve insufficiency I35.1    Chronic pain G89.29    Degeneration of lumbar intervertebral disc M51.36    Gastroesophageal reflux disease K21.9    Vasculitis I77.6    Iron deficiency anemia D50.9    Lumbar radiculopathy M54.16    Lumbosacral radiculitis M54.17    Shortness of breath R06.02    Spondylosis M47.9    Thoracic aortic aneurysm without rupture I71.20       HEALTH MAINTENANCE ITEMS:  Health Maintenance Due   Topic Date Due    MAMMOGRAM  Never done    Pneumococcal Vaccine 65+ (1 - PCV) Never done    TDAP/TD VACCINES (1 - Tdap) Never done    ZOSTER VACCINE (1 of 2) Never done    HEPATITIS C SCREENING  Never done    ANNUAL WELLNESS VISIT  Never done    COVID-19 Vaccine (6 - Moderna series) 10/05/2022    LIPID PANEL  05/05/2023    BMI FOLLOWUP  09/09/2023       <no information>  Last Completed Colonoscopy            COLORECTAL CANCER SCREENING (COLONOSCOPY - Every 10 Years) Next due on 4/20/2025 04/20/2015  Outside Claim: [place] COLSC FLX W/RMVL OF TUMOR POLYP LESION SNARE TQ               "    Immunization History   Administered Date(s) Administered    COVID-19 (MODERNA) 1st,2nd,3rd Dose Monovalent 2022, 2022    COVID-19 (MODERNA) Monovalent Original Booster 2022, 08/10/2022     Last Completed Mammogram       This patient has no relevant Health Maintenance data.              FAMILY HISTORY:  Family History   Problem Relation Age of Onset    Hypertension Mother      SOCIAL HISTORY:  Social History     Socioeconomic History    Marital status:    Tobacco Use    Smoking status: Former     Packs/day: 3.00     Years: 59.00     Pack years: 177.00     Types: Cigarettes     Start date:      Quit date: 3/31/2023     Years since quittin.4    Smokeless tobacco: Never    Tobacco comments:     Pt was smoking 2-3 ppd and got down to 0.5 ppd before quitting at the end of 2023   Vaping Use    Vaping Use: Never used   Substance and Sexual Activity    Alcohol use: Never    Drug use: Never    Sexual activity: Defer       REVIEW OF SYSTEMS:  Review of Systems   Constitutional:  Positive for fatigue. Negative for fever and unexpected weight loss.   HENT:  Positive for hearing loss. Negative for trouble swallowing.    Eyes:         Wears glasses     Respiratory:  Positive for shortness of breath (with exertion). Negative for cough.         COPD managed by PCP     Cardiovascular:  Positive for chest pain (Reports chest pain at rest.  States has seen PCP.  Sees Cardiology Dr. Franco). Negative for palpitations and leg swelling.        Followed by Cardiology .  Aortic Aneurysm    Gastrointestinal:  Negative for nausea and vomiting.   Endocrine:        Increased nocturia    Genitourinary:  Negative for hematuria, pelvic pain and pelvic pressure.   Musculoskeletal:  Positive for gait problem. Negative for arthralgias and myalgias.        Uses walker to assist with ambulation     Skin:  Negative for rash, skin lesions and wound.   Neurological:  Positive for dizziness (\"my dizziness is so bad " "I wont drive\") and tremors (Interminttent \"shakes\". pt states \"it looks like I am having a seizure\"  This has been occuring for the past 6-12 months.). Negative for syncope, headache and confusion.        Intermittently loses control of right hand   Psychiatric/Behavioral:  Positive for depressed mood. Negative for suicidal ideas. The patient is nervous/anxious.         On Cymbalta.  Recently increased.  Managed by Dr. James, PCP       /78   Pulse 75   Temp 96 °F (35.6 °C) (Temporal)   Resp 16   Ht 160 cm (63\")   Wt 76.1 kg (167 lb 12.8 oz)   SpO2 96%   BMI 29.72 kg/m²  Body surface area is 1.79 meters squared.    Pain Score    09/07/23 1144   PainSc:   6   PainLoc: Chest       Physical Exam:  Physical Exam    Cheryl Taylor reports a pain score of 6.  Given her pain assessment as noted, treatment options were discussed and the following options were decided upon as a follow-up plan to address the patient's pain: continuation of current treatment plan for pain. Sees pain management      ASSESSMENT / PLAN:    1. MGUS (monoclonal gammopathy of unknown significance)    2. Iron deficiency anemia, unspecified iron deficiency anemia type    3. Anemia due to stage 3b chronic kidney disease    4. Leukocytosis, unspecified type    5. Osteopenia of multiple sites    6. Other thrombocytosis        Anemia in Stage IIIb CKD  Iron Deficiency  MGUS  -Bone marrow biopsy September 3, 2014.    Results include normocellular bone marrow.  Mild absolute erythroid hyperplasia in the marrow.  Normal myelopoiesis in the marrow.  No increase in lymphocytes or plasma cells in the marrow.  Slight increase in megakaryocytes and aspirate smears.  No marrow fibrosis.  Absence of stainable iron.  No evidence of metastatic carcinoma or granulomatosis disease in the bone marrow.  Flow cytometry showed no evidence of abnormal myeloid maturation or an increased blast population.  There was no evidence of a lymphoproliferative disorder.  " FISH for MPN was negative.  -CT Chest 5/18/23 showed no mediastinal or hilar lymphadenopathy by CT size criteria.  No acute or suspicious bony finding.   -SPEP May 30, 2023:  M Servando 0.4 in the gamma region   -Received Venofer August 10 and 17, 2023  -Taking oral iron daily  -SPEP, MILI and Light Chains pending today.   -Labs today, BUN 19, Creatinine 1.33, GFR 42.9, Hgb 11.7, Hct 40.3, Iron 61, Ferritin 307, Sat 21%, TIBC 295.  -Stable for observation.   -If Hgb gets < 10g and Ferritin is >100 and/or Sat >20, can start JARRETT therapy      4.  Osteopenia   -DEXA scan 3/23/2022  Femoral neck T score -2.3  Lumbar spine T score -1.7  -Followed by PCP    5.  Leukocytosis   6.  Thrombocytosis   -Resolved  WBC and differential normal today.  9.48  -Plts normal today 402  -Previous bone marrow showed no evidence of lymphoproliferative disorder  -Leukocytosis likely secondary to smoking and thrombocytosis likely secondary to iron deficiency  - JAK2, CALR, FISH for CLL all negative.       PLAN:  Stable for observation  Continue current medications, treatment plans and follow up with PCP and any other providers  Labs only in 6 weeks for CBC, CMP, Iron Profile, Ferritin   Return to office 3 months   Pre-office labs for CBC, CMP, Iron Profile, Ferritin, SPEP, MILI, Light Chains  Care discussed with patient.  Understanding expressed.  Patient agreeable with plan.             ACP discussion was held with the patient during this visit. Patient does not have an advance directive, information provided.  Via AVS    CALE Zhang  09/07/2023

## 2023-09-08 LAB
ALBUMIN SERPL ELPH-MCNC: 3 G/DL (ref 2.9–4.4)
ALBUMIN/GLOB SERPL: 0.9 {RATIO} (ref 0.7–1.7)
ALPHA1 GLOB SERPL ELPH-MCNC: 0.3 G/DL (ref 0–0.4)
ALPHA2 GLOB SERPL ELPH-MCNC: 0.9 G/DL (ref 0.4–1)
B-GLOBULIN SERPL ELPH-MCNC: 1.1 G/DL (ref 0.7–1.3)
GAMMA GLOB SERPL ELPH-MCNC: 1.3 G/DL (ref 0.4–1.8)
GLOBULIN SER-MCNC: 3.6 G/DL (ref 2.2–3.9)
IGA SERPL-MCNC: 463 MG/DL (ref 64–422)
IGG SERPL-MCNC: 792 MG/DL (ref 586–1602)
IGM SERPL-MCNC: 290 MG/DL (ref 26–217)
INTERPRETATION SERPL IEP-IMP: ABNORMAL
KAPPA LC FREE SER-MCNC: 58.3 MG/L (ref 3.3–19.4)
KAPPA LC FREE/LAMBDA FREE SER: 1.43 {RATIO} (ref 0.26–1.65)
LABORATORY COMMENT REPORT: ABNORMAL
LAMBDA LC FREE SERPL-MCNC: 40.7 MG/L (ref 5.7–26.3)
M PROTEIN SERPL ELPH-MCNC: 0.4 G/DL
PROT SERPL-MCNC: 6.6 G/DL (ref 6–8.5)

## 2023-09-11 LAB — REF LAB TEST METHOD: NORMAL

## 2023-09-22 ENCOUNTER — HOSPITAL ENCOUNTER (OUTPATIENT)
Dept: CT IMAGING | Facility: HOSPITAL | Age: 73
Discharge: HOME | End: 2023-09-22
Admitting: NURSE PRACTITIONER
Payer: MEDICARE

## 2023-09-22 DIAGNOSIS — I71.20 THORACIC AORTIC ANEURYSM WITHOUT RUPTURE, UNSPECIFIED PART: ICD-10-CM

## 2023-09-22 PROCEDURE — 71250 CT THORAX DX C-: CPT

## 2023-09-25 ENCOUNTER — OFFICE VISIT (OUTPATIENT)
Dept: CARDIAC SURGERY | Facility: CLINIC | Age: 73
End: 2023-09-25
Payer: MEDICARE

## 2023-09-25 VITALS
SYSTOLIC BLOOD PRESSURE: 106 MMHG | OXYGEN SATURATION: 96 % | BODY MASS INDEX: 29.77 KG/M2 | HEART RATE: 69 BPM | HEIGHT: 63 IN | DIASTOLIC BLOOD PRESSURE: 59 MMHG | WEIGHT: 168 LBS

## 2023-09-25 DIAGNOSIS — I71.20 THORACIC AORTIC ANEURYSM WITHOUT RUPTURE, UNSPECIFIED PART: Primary | ICD-10-CM

## 2023-09-25 LAB — CREAT BLDA-MCNC: 2.3 MG/DL (ref 0.6–1.3)

## 2023-09-25 PROCEDURE — 99214 OFFICE O/P EST MOD 30 MIN: CPT | Performed by: SURGERY

## 2023-09-25 PROCEDURE — 3078F DIAST BP <80 MM HG: CPT | Performed by: SURGERY

## 2023-09-25 PROCEDURE — 3074F SYST BP LT 130 MM HG: CPT | Performed by: SURGERY

## 2023-09-25 NOTE — PROGRESS NOTES
"    [facility] Cardiothoracic Surgery  PROGRESS NOTE   CC: Aneurysm of bovine aortic arch    Subjective:  Ms. 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  I followed her since 2022.  She has many medical comorbidities and we have delayed surgery and discussed not proceeding due to her comorbidities and the risk of surgery.    The patient is accompanied by her daughter. The patient states that she has been doing well. She reports that she has been scared getting around. She states that she fell and broke some of her ribs. She reports that she has broken multiple ribs but this time, it caused a lot of pain. She notes that she cannot walk by herself. Her daughter states that the patient does not know if she can have surgery. She reports if cracking the patient's breast bone hurts anything like the broken ribs, she does not want to have the surgery. She states the patient has an appointment with the nephrologist in 11/2023. The patient reports that she is unsure if she is feeling pain or soreness. She notes it is much better than it was previously. Her daughter states sometimes the patient's chest looks swollen. She reports that she and her sister has noticed her chest protrudes a little. The patient wonders if her fall may have made the aneursym loose.    ROS: No fevers chills or recent illnesses doing well overall    Objective:      /59 (BP Location: Left arm, Patient Position: Sitting, Cuff Size: Adult)   Pulse 69   Ht 160 cm (63\")   Wt 76.2 kg (168 lb)   SpO2 96%   BMI 29.76 kg/m²       PE:  Vitals:    09/25/23 1424   BP: 106/59   Pulse: 69   SpO2: 96%     GENERAL: NAD, resting comfortably, normal color  CARDIOVASCULAR: regular, regular rate, sinus  PULMONARY: Normal bilateral breath sounds, no labored breathing  ABDOMEN: soft, nontender/nondistended  EXTREMITIES: mild peripheral edema, normal pulses, normal ROM        Lab Results (last " 72 hours)       ** No results found for the last 72 hours. **          CT Chest without contrast:  FINDINGS:  AIRWAYS/PULMONARY PARENCHYMA: The central airways are midline and  patent. No pleural effusion or pneumothorax. Moderate to severe  emphysematous changes. No focal consolidation or focally suspicious  pulmonary finding.     VASCULATURE: Limited evaluation of the vasculature without intravenous  contrast.     Similar saccular aneurysm at the aortic arch involving the RIGHT  brachiocephalic origin and LEFT common carotid origin measuring 5.6 cm  in greatest dimension, previously 5.6 cm on 5/18/2023.     Similar saccular aneurysm at the RIGHT lateral aspect of the descending  thoracic aortic arch measuring 2.5 cm today, previously 2.5 cm on  5/18/2023.     Enlarged pulmonary artery, similar to prior, which can be seen with  pulmonary artery hypertension.     CARDIAC: Cardiomegaly. Heavily calcified coronary arteries verses stent  material. No pericardial effusion.       MEDIASTINUM: Large hiatal hernia measuring 9.8 x 4.1 cm. Esophagus  normal in course and caliber. There is no mediastinal or hilar  lymphadenopathy by CT size criteria.      EXTRATHORACIC: The visualized portions of the thyroid gland are  unremarkable. No thoracic inlet or axillary adenopathy. Mechanical  device at the RIGHT dorsal soft tissues with intrathecal lead, tip  projects at the level of upper T7. Otherwise overlying soft tissues  appear within normal limits.     INCLUDED UPPER ABDOMEN: Visualized portion of the liver, gallbladder,  pancreas, spleen, adrenal glands appear within normal limits. Bilateral  nonobstructing nephrolithiasis. Bilateral exophytic renal lesions,  grossly similar to prior and previously demonstrated to be cysts on  5/4/2022.     OSSEOUS: Degenerative changes of the visualized spine. Diffuse bone  demineralization. Moderately displaced interval sternal fracture with  some evidence of healing. New right-sided rib  fractures with some  evidence of healing.        IMPRESSION:  1. Similar aortic aneurysms including saccular aneurysm of the arch  measuring 5.6 cm and saccular aneurysm of the descending thoracic aorta  measuring 2.5 cm compared to 5/18/2023.  2.Sternal and RIGHT rib fractures with evidence of healing, new compared  to 5/18/2023  3. Cardiomegaly with heavily calcified coronary arteries persistent  material.  4. Mildly enlarged main pulmonary artery, which can be seen with  pulmonary artery hypertension.  5. Emphysema.  6. Large hiatal hernia.    I personally reviewed the CT scan of the chest and the following is my interpretation:No change in aneurysm of base of bovine arch and aortic arch, extensive calcifications throughout, I measured no change at site for site compared to last scan performed 4 months ago.      Assessment & Plan     Ms. 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 She has extensive calcification throughout her aortic arch as well. She has many medical comorbidities including very poor mobility, chronic back problems, chronic kidney disease, stage IIIb today, moderate COPD and continued smoking. She has very brittle bones, recently, had a fall with multiple rib fractures. In the last 3 months, she smoked 3 cigarettes only. I congratulated her on this and we discussed the smoking cessation for greater than 8 minutes. Noncontrasted CT of the chest was performed today and this shows no change in the size of her aneurysm or dilation. She cannot have contrast due to decreased GFR, today her creatinine was 2.3 which is elevated compared to the last more recent ones, which were 1.3.     We discussed again treatment options and agreed that she is not a good surgical candidate for treatment of her aneurysm. She understands this and actually does not want to proceed forward with  surgery. I do believe we should continue to watch this. We did discuss that if she were to present emergently, and is not going to have elective surgery, we should not proceed with emergent surgery for this. She understands and agrees with this as well as her daughter that if she presents emergently with a rupture or dissection we would not offer surgery and instead pursue blood pressure control with palliative care. She is okay with this option.    I will plan on seeing her back in 6 months with a repeat CT scan. Noncontrasted if GFR won't tolerate, but CTA if it will tolerate. Thank you for trusting me with the care of Miss Taylor. Please do not hesitate to call with questions or concerns.    Nolan Franco MD   Cardiothoracic Surgeon    Transcribed from ambient dictation for Nolan Franco MD by Tasia Sharma.  09/25/23   17:51 CDT    Patient or patient representative verbalized consent to the visit recording.  I have personally performed the services described in this document as transcribed by the above individual, and it is both accurate and complete.

## 2023-09-25 NOTE — LETTER
September 29, 2023       No Recipients    Patient: Cheryl Taylor   YOB: 1950   Date of Visit: 9/25/2023       Dear Den James DO,    Cheryl Taylor was in my office today. Below are the relevant portions of my assessment and plan of care.    Ms. 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 She has extensive calcification throughout her aortic arch as well. She has many medical comorbidities including very poor mobility, chronic back problems, chronic kidney disease, stage IIIb today, moderate COPD and continued smoking. She has very brittle bones, recently, had a fall with multiple rib fractures. In the last 3 months, she smoked 3 cigarettes only. I congratulated her on this and we discussed the smoking cessation for greater than 8 minutes. Noncontrasted CT of the chest was performed today and this shows no change in the size of her aneurysm or dilation. She cannot have contrast due to decreased GFR, today her creatinine was 2.3 which is elevated compared to the last more recent ones, which were 1.3.     We discussed again treatment options and agreed that she is not a good surgical candidate for treatment of her aneurysm. She understands this and actually does not want to proceed forward with surgery. I do believe we should continue to watch this. We did discuss that if she were to present emergently, and is not going to have elective surgery, we should not proceed with emergent surgery for this. She understands and agrees with this as well as her daughter that if she presents emergently with a rupture or dissection we would not offer surgery and instead pursue blood pressure control with palliative care. She is okay with this option.    I will plan on seeing her back in 6 months with a repeat CT scan. Noncontrasted if GFR won't tolerate, but CTA if it will tolerate. Thank  you for trusting me with the care of Miss Taylor. Please do not hesitate to call with questions or concerns.         Sincerely,        Nolan Franco MD        CC:   No Recipients

## 2023-10-19 ENCOUNTER — LAB (OUTPATIENT)
Dept: LAB | Facility: HOSPITAL | Age: 73
End: 2023-10-19
Payer: MEDICARE

## 2023-10-19 DIAGNOSIS — D50.9 IRON DEFICIENCY ANEMIA, UNSPECIFIED IRON DEFICIENCY ANEMIA TYPE: ICD-10-CM

## 2023-10-19 LAB
ALBUMIN SERPL-MCNC: 3.7 G/DL (ref 3.5–5.2)
ALBUMIN/GLOB SERPL: 1.2 G/DL
ALP SERPL-CCNC: 130 U/L (ref 39–117)
ALT SERPL W P-5'-P-CCNC: 13 U/L (ref 1–33)
ANION GAP SERPL CALCULATED.3IONS-SCNC: 12 MMOL/L (ref 5–15)
AST SERPL-CCNC: 20 U/L (ref 1–32)
BASOPHILS # BLD AUTO: 0.07 10*3/MM3 (ref 0–0.2)
BASOPHILS NFR BLD AUTO: 0.7 % (ref 0–1.5)
BILIRUB SERPL-MCNC: 0.2 MG/DL (ref 0–1.2)
BUN SERPL-MCNC: 18 MG/DL (ref 8–23)
BUN/CREAT SERPL: 13.8 (ref 7–25)
CALCIUM SPEC-SCNC: 9.5 MG/DL (ref 8.6–10.5)
CHLORIDE SERPL-SCNC: 105 MMOL/L (ref 98–107)
CO2 SERPL-SCNC: 21 MMOL/L (ref 22–29)
CREAT SERPL-MCNC: 1.3 MG/DL (ref 0.57–1)
DEPRECATED RDW RBC AUTO: 61.8 FL (ref 37–54)
EGFRCR SERPLBLD CKD-EPI 2021: 44.1 ML/MIN/1.73
EOSINOPHIL # BLD AUTO: 0.32 10*3/MM3 (ref 0–0.4)
EOSINOPHIL NFR BLD AUTO: 3.1 % (ref 0.3–6.2)
ERYTHROCYTE [DISTWIDTH] IN BLOOD BY AUTOMATED COUNT: 17.2 % (ref 12.3–15.4)
FERRITIN SERPL-MCNC: 102.2 NG/ML (ref 13–150)
GLOBULIN UR ELPH-MCNC: 3 GM/DL
GLUCOSE SERPL-MCNC: 103 MG/DL (ref 65–99)
HCT VFR BLD AUTO: 41.7 % (ref 34–46.6)
HGB BLD-MCNC: 12.7 G/DL (ref 12–15.9)
IMM GRANULOCYTES # BLD AUTO: 0.06 10*3/MM3 (ref 0–0.05)
IMM GRANULOCYTES NFR BLD AUTO: 0.6 % (ref 0–0.5)
IRON 24H UR-MRATE: 69 MCG/DL (ref 37–145)
IRON SATN MFR SERPL: 20 % (ref 20–50)
LYMPHOCYTES # BLD AUTO: 2.14 10*3/MM3 (ref 0.7–3.1)
LYMPHOCYTES NFR BLD AUTO: 20.6 % (ref 19.6–45.3)
MCH RBC QN AUTO: 29.8 PG (ref 26.6–33)
MCHC RBC AUTO-ENTMCNC: 30.5 G/DL (ref 31.5–35.7)
MCV RBC AUTO: 97.9 FL (ref 79–97)
MONOCYTES # BLD AUTO: 0.71 10*3/MM3 (ref 0.1–0.9)
MONOCYTES NFR BLD AUTO: 6.8 % (ref 5–12)
NEUTROPHILS NFR BLD AUTO: 68.2 % (ref 42.7–76)
NEUTROPHILS NFR BLD AUTO: 7.08 10*3/MM3 (ref 1.7–7)
NRBC BLD AUTO-RTO: 0 /100 WBC (ref 0–0.2)
PLATELET # BLD AUTO: 355 10*3/MM3 (ref 140–450)
PMV BLD AUTO: 9.9 FL (ref 6–12)
POTASSIUM SERPL-SCNC: 4.2 MMOL/L (ref 3.5–5.2)
PROT SERPL-MCNC: 6.7 G/DL (ref 6–8.5)
RBC # BLD AUTO: 4.26 10*6/MM3 (ref 3.77–5.28)
SODIUM SERPL-SCNC: 138 MMOL/L (ref 136–145)
TIBC SERPL-MCNC: 344 MCG/DL (ref 298–536)
TRANSFERRIN SERPL-MCNC: 231 MG/DL (ref 200–360)
WBC NRBC COR # BLD: 10.38 10*3/MM3 (ref 3.4–10.8)

## 2023-10-19 PROCEDURE — 82728 ASSAY OF FERRITIN: CPT

## 2023-10-19 PROCEDURE — 84466 ASSAY OF TRANSFERRIN: CPT

## 2023-10-19 PROCEDURE — 80053 COMPREHEN METABOLIC PANEL: CPT

## 2023-10-19 PROCEDURE — 85025 COMPLETE CBC W/AUTO DIFF WBC: CPT

## 2023-10-19 PROCEDURE — 36415 COLL VENOUS BLD VENIPUNCTURE: CPT

## 2023-10-19 PROCEDURE — 83540 ASSAY OF IRON: CPT

## 2023-12-02 ENCOUNTER — TELEPHONE (OUTPATIENT)
Dept: VASCULAR SURGERY | Facility: CLINIC | Age: 73
End: 2023-12-02
Payer: MEDICARE

## 2023-12-02 NOTE — TELEPHONE ENCOUNTER
UNABLE TO REACH PATIENT TO INFORM THAT  [facility] WILL NO LONGER ACCEPT THEIR INSURANCE. LEFT  ASKING FOR RETURN CALL AT [phone].

## 2024-01-09 ENCOUNTER — LAB (OUTPATIENT)
Dept: LAB | Facility: HOSPITAL | Age: 74
End: 2024-01-09
Payer: MEDICARE

## 2024-01-09 DIAGNOSIS — N18.32 ANEMIA DUE TO STAGE 3B CHRONIC KIDNEY DISEASE: ICD-10-CM

## 2024-01-09 DIAGNOSIS — D47.2 MGUS (MONOCLONAL GAMMOPATHY OF UNKNOWN SIGNIFICANCE): ICD-10-CM

## 2024-01-09 DIAGNOSIS — D63.1 ANEMIA DUE TO STAGE 3B CHRONIC KIDNEY DISEASE: ICD-10-CM

## 2024-01-09 DIAGNOSIS — D50.9 IRON DEFICIENCY ANEMIA, UNSPECIFIED IRON DEFICIENCY ANEMIA TYPE: ICD-10-CM

## 2024-01-09 LAB
ALBUMIN SERPL-MCNC: 3.7 G/DL (ref 3.5–5.2)
ALBUMIN/GLOB SERPL: 1 G/DL
ALP SERPL-CCNC: 165 U/L (ref 39–117)
ALT SERPL W P-5'-P-CCNC: 11 U/L (ref 1–33)
ANION GAP SERPL CALCULATED.3IONS-SCNC: 12 MMOL/L (ref 5–15)
AST SERPL-CCNC: 20 U/L (ref 1–32)
BASOPHILS # BLD AUTO: 0.08 10*3/MM3 (ref 0–0.2)
BASOPHILS NFR BLD AUTO: 0.7 % (ref 0–1.5)
BILIRUB SERPL-MCNC: 0.2 MG/DL (ref 0–1.2)
BUN SERPL-MCNC: 15 MG/DL (ref 8–23)
BUN/CREAT SERPL: 10.6 (ref 7–25)
CALCIUM SPEC-SCNC: 9.6 MG/DL (ref 8.6–10.5)
CHLORIDE SERPL-SCNC: 100 MMOL/L (ref 98–107)
CO2 SERPL-SCNC: 25 MMOL/L (ref 22–29)
CREAT SERPL-MCNC: 1.41 MG/DL (ref 0.57–1)
DEPRECATED RDW RBC AUTO: 49.2 FL (ref 37–54)
EGFRCR SERPLBLD CKD-EPI 2021: 40 ML/MIN/1.73
EOSINOPHIL # BLD AUTO: 0.16 10*3/MM3 (ref 0–0.4)
EOSINOPHIL NFR BLD AUTO: 1.4 % (ref 0.3–6.2)
ERYTHROCYTE [DISTWIDTH] IN BLOOD BY AUTOMATED COUNT: 13.3 % (ref 12.3–15.4)
FERRITIN SERPL-MCNC: 92.85 NG/ML (ref 13–150)
GLOBULIN UR ELPH-MCNC: 3.6 GM/DL
GLUCOSE SERPL-MCNC: 112 MG/DL (ref 65–99)
HCT VFR BLD AUTO: 42.3 % (ref 34–46.6)
HGB BLD-MCNC: 12.5 G/DL (ref 12–15.9)
IMM GRANULOCYTES # BLD AUTO: 0.07 10*3/MM3 (ref 0–0.05)
IMM GRANULOCYTES NFR BLD AUTO: 0.6 % (ref 0–0.5)
IRON 24H UR-MRATE: 49 MCG/DL (ref 37–145)
IRON SATN MFR SERPL: 14 % (ref 20–50)
LYMPHOCYTES # BLD AUTO: 1.69 10*3/MM3 (ref 0.7–3.1)
LYMPHOCYTES NFR BLD AUTO: 14.4 % (ref 19.6–45.3)
MCH RBC QN AUTO: 29.7 PG (ref 26.6–33)
MCHC RBC AUTO-ENTMCNC: 29.6 G/DL (ref 31.5–35.7)
MCV RBC AUTO: 100.5 FL (ref 79–97)
MONOCYTES # BLD AUTO: 0.93 10*3/MM3 (ref 0.1–0.9)
MONOCYTES NFR BLD AUTO: 7.9 % (ref 5–12)
NEUTROPHILS NFR BLD AUTO: 75 % (ref 42.7–76)
NEUTROPHILS NFR BLD AUTO: 8.83 10*3/MM3 (ref 1.7–7)
NRBC BLD AUTO-RTO: 0 /100 WBC (ref 0–0.2)
PLATELET # BLD AUTO: 384 10*3/MM3 (ref 140–450)
PMV BLD AUTO: 10.1 FL (ref 6–12)
POTASSIUM SERPL-SCNC: 4.1 MMOL/L (ref 3.5–5.2)
PROT SERPL-MCNC: 7.3 G/DL (ref 6–8.5)
RBC # BLD AUTO: 4.21 10*6/MM3 (ref 3.77–5.28)
SODIUM SERPL-SCNC: 137 MMOL/L (ref 136–145)
TIBC SERPL-MCNC: 353 MCG/DL (ref 298–536)
TRANSFERRIN SERPL-MCNC: 237 MG/DL (ref 200–360)
WBC NRBC COR # BLD AUTO: 11.76 10*3/MM3 (ref 3.4–10.8)

## 2024-01-09 PROCEDURE — 84466 ASSAY OF TRANSFERRIN: CPT

## 2024-01-09 PROCEDURE — 82728 ASSAY OF FERRITIN: CPT

## 2024-01-09 PROCEDURE — 84165 PROTEIN E-PHORESIS SERUM: CPT

## 2024-01-09 PROCEDURE — 83540 ASSAY OF IRON: CPT

## 2024-01-09 PROCEDURE — 36415 COLL VENOUS BLD VENIPUNCTURE: CPT

## 2024-01-09 PROCEDURE — 83521 IG LIGHT CHAINS FREE EACH: CPT

## 2024-01-09 PROCEDURE — 80053 COMPREHEN METABOLIC PANEL: CPT

## 2024-01-09 PROCEDURE — 85025 COMPLETE CBC W/AUTO DIFF WBC: CPT

## 2024-01-09 PROCEDURE — 86334 IMMUNOFIX E-PHORESIS SERUM: CPT

## 2024-01-09 PROCEDURE — 82784 ASSAY IGA/IGD/IGG/IGM EACH: CPT

## 2024-01-10 LAB
ALBUMIN SERPL ELPH-MCNC: 3.2 G/DL (ref 2.9–4.4)
ALBUMIN/GLOB SERPL: 0.9 {RATIO} (ref 0.7–1.7)
ALPHA1 GLOB SERPL ELPH-MCNC: 0.3 G/DL (ref 0–0.4)
ALPHA2 GLOB SERPL ELPH-MCNC: 0.9 G/DL (ref 0.4–1)
B-GLOBULIN SERPL ELPH-MCNC: 1.2 G/DL (ref 0.7–1.3)
GAMMA GLOB SERPL ELPH-MCNC: 1.2 G/DL (ref 0.4–1.8)
GLOBULIN SER-MCNC: 3.6 G/DL (ref 2.2–3.9)
IGA SERPL-MCNC: 594 MG/DL (ref 64–422)
IGG SERPL-MCNC: 1020 MG/DL (ref 586–1602)
IGM SERPL-MCNC: 381 MG/DL (ref 26–217)
INTERPRETATION SERPL IEP-IMP: ABNORMAL
KAPPA LC FREE SER-MCNC: 93.4 MG/L (ref 3.3–19.4)
KAPPA LC FREE/LAMBDA FREE SER: 1.95 {RATIO} (ref 0.26–1.65)
LABORATORY COMMENT REPORT: ABNORMAL
LAMBDA LC FREE SERPL-MCNC: 47.9 MG/L (ref 5.7–26.3)
M PROTEIN SERPL ELPH-MCNC: 0.4 G/DL
PROT SERPL-MCNC: 6.8 G/DL (ref 6–8.5)

## 2024-02-01 ENCOUNTER — TELEPHONE (OUTPATIENT)
Dept: ONCOLOGY | Facility: CLINIC | Age: 74
End: 2024-02-01
Payer: MEDICARE

## 2024-02-01 DIAGNOSIS — D50.9 IRON DEFICIENCY ANEMIA, UNSPECIFIED IRON DEFICIENCY ANEMIA TYPE: Primary | ICD-10-CM

## 2024-02-01 NOTE — TELEPHONE ENCOUNTER
Her sat was 14% so I would like to recheck it to be sure she doesn't need more iron if they are willing.

## 2024-02-01 NOTE — TELEPHONE ENCOUNTER
Caller: Cinthya Garvey    Relationship: Emergency Contact    Best call back number: [phone]    What is the best time to reach you: ANY    Who are you requesting to speak with (clinical staff, provider,  specific staff member): CLINICAL     What was the call regarding: CINTHYA STATES THAT LISBETH MISSED HER APPOINTMENT IN JANUARY AND WAS SUPPOSE TO GET A CALL BACK TO RESCHEDULE AND SHE HAS NOT HEARD ANYTHING    PLEASE CALL TO RESCHEDULE AND ADVISE     Is it okay if the provider responds through MyChart: NO

## 2024-02-16 ENCOUNTER — LAB (OUTPATIENT)
Dept: LAB | Facility: HOSPITAL | Age: 74
End: 2024-02-16
Payer: MEDICARE

## 2024-02-16 ENCOUNTER — OFFICE VISIT (OUTPATIENT)
Dept: ONCOLOGY | Facility: CLINIC | Age: 74
End: 2024-02-16
Payer: MEDICARE

## 2024-02-16 VITALS
BODY MASS INDEX: 29.16 KG/M2 | RESPIRATION RATE: 16 BRPM | WEIGHT: 164.6 LBS | OXYGEN SATURATION: 96 % | SYSTOLIC BLOOD PRESSURE: 126 MMHG | HEIGHT: 63 IN | DIASTOLIC BLOOD PRESSURE: 70 MMHG | TEMPERATURE: 97.8 F | HEART RATE: 75 BPM

## 2024-02-16 DIAGNOSIS — N18.32 ANEMIA DUE TO STAGE 3B CHRONIC KIDNEY DISEASE: ICD-10-CM

## 2024-02-16 DIAGNOSIS — D50.9 IRON DEFICIENCY ANEMIA, UNSPECIFIED IRON DEFICIENCY ANEMIA TYPE: Primary | ICD-10-CM

## 2024-02-16 DIAGNOSIS — D63.1 ANEMIA DUE TO STAGE 3B CHRONIC KIDNEY DISEASE: ICD-10-CM

## 2024-02-16 DIAGNOSIS — D47.2 MGUS (MONOCLONAL GAMMOPATHY OF UNKNOWN SIGNIFICANCE): ICD-10-CM

## 2024-02-16 LAB
ALBUMIN SERPL-MCNC: 3.4 G/DL (ref 3.5–5.2)
ALBUMIN/GLOB SERPL: 1.1 G/DL
ALP SERPL-CCNC: 160 U/L (ref 39–117)
ALT SERPL W P-5'-P-CCNC: 15 U/L (ref 1–33)
ANION GAP SERPL CALCULATED.3IONS-SCNC: 11 MMOL/L (ref 5–15)
AST SERPL-CCNC: 23 U/L (ref 1–32)
BASOPHILS # BLD AUTO: 0.07 10*3/MM3 (ref 0–0.2)
BASOPHILS NFR BLD AUTO: 0.6 % (ref 0–1.5)
BILIRUB SERPL-MCNC: 0.2 MG/DL (ref 0–1.2)
BUN SERPL-MCNC: 23 MG/DL (ref 8–23)
BUN/CREAT SERPL: 16.3 (ref 7–25)
CALCIUM SPEC-SCNC: 9.2 MG/DL (ref 8.6–10.5)
CHLORIDE SERPL-SCNC: 102 MMOL/L (ref 98–107)
CO2 SERPL-SCNC: 22 MMOL/L (ref 22–29)
CREAT SERPL-MCNC: 1.41 MG/DL (ref 0.57–1)
DEPRECATED RDW RBC AUTO: 48.1 FL (ref 37–54)
EGFRCR SERPLBLD CKD-EPI 2021: 40 ML/MIN/1.73
EOSINOPHIL # BLD AUTO: 0.32 10*3/MM3 (ref 0–0.4)
EOSINOPHIL NFR BLD AUTO: 2.7 % (ref 0.3–6.2)
ERYTHROCYTE [DISTWIDTH] IN BLOOD BY AUTOMATED COUNT: 13.5 % (ref 12.3–15.4)
FERRITIN SERPL-MCNC: 67.43 NG/ML (ref 13–150)
GLOBULIN UR ELPH-MCNC: 3.2 GM/DL
GLUCOSE SERPL-MCNC: 111 MG/DL (ref 65–99)
HCT VFR BLD AUTO: 39.3 % (ref 34–46.6)
HGB BLD-MCNC: 12.4 G/DL (ref 12–15.9)
HOLD SPECIMEN: NORMAL
IMM GRANULOCYTES # BLD AUTO: 0.05 10*3/MM3 (ref 0–0.05)
IMM GRANULOCYTES NFR BLD AUTO: 0.4 % (ref 0–0.5)
IRON 24H UR-MRATE: 80 MCG/DL (ref 37–145)
IRON SATN MFR SERPL: 23 % (ref 20–50)
LYMPHOCYTES # BLD AUTO: 2 10*3/MM3 (ref 0.7–3.1)
LYMPHOCYTES NFR BLD AUTO: 17.1 % (ref 19.6–45.3)
MCH RBC QN AUTO: 30.9 PG (ref 26.6–33)
MCHC RBC AUTO-ENTMCNC: 31.6 G/DL (ref 31.5–35.7)
MCV RBC AUTO: 98 FL (ref 79–97)
MONOCYTES # BLD AUTO: 1.24 10*3/MM3 (ref 0.1–0.9)
MONOCYTES NFR BLD AUTO: 10.6 % (ref 5–12)
NEUTROPHILS NFR BLD AUTO: 68.6 % (ref 42.7–76)
NEUTROPHILS NFR BLD AUTO: 8.04 10*3/MM3 (ref 1.7–7)
NRBC BLD AUTO-RTO: 0 /100 WBC (ref 0–0.2)
PLATELET # BLD AUTO: 325 10*3/MM3 (ref 140–450)
PMV BLD AUTO: 10.2 FL (ref 6–12)
POTASSIUM SERPL-SCNC: 4.4 MMOL/L (ref 3.5–5.2)
PROT SERPL-MCNC: 6.6 G/DL (ref 6–8.5)
RBC # BLD AUTO: 4.01 10*6/MM3 (ref 3.77–5.28)
SODIUM SERPL-SCNC: 135 MMOL/L (ref 136–145)
TIBC SERPL-MCNC: 349 MCG/DL (ref 298–536)
TRANSFERRIN SERPL-MCNC: 234 MG/DL (ref 200–360)
WBC NRBC COR # BLD AUTO: 11.72 10*3/MM3 (ref 3.4–10.8)

## 2024-02-16 PROCEDURE — 82728 ASSAY OF FERRITIN: CPT

## 2024-02-16 PROCEDURE — 83540 ASSAY OF IRON: CPT

## 2024-02-16 PROCEDURE — 80053 COMPREHEN METABOLIC PANEL: CPT

## 2024-02-16 PROCEDURE — 85025 COMPLETE CBC W/AUTO DIFF WBC: CPT

## 2024-02-16 PROCEDURE — 84466 ASSAY OF TRANSFERRIN: CPT

## 2024-02-16 PROCEDURE — 36415 COLL VENOUS BLD VENIPUNCTURE: CPT

## 2024-02-16 RX ORDER — BUPRENORPHINE HYDROCHLORIDE 300 UG/1
FILM, SOLUBLE BUCCAL
COMMUNITY
Start: 2024-01-15

## 2024-02-16 RX ORDER — ARIPIPRAZOLE 5 MG/1
5 TABLET ORAL
COMMUNITY

## 2024-02-19 DIAGNOSIS — L98.9 SKIN LESION: Primary | ICD-10-CM

## 2024-04-03 ENCOUNTER — TELEPHONE (OUTPATIENT)
Dept: CARDIAC SURGERY | Facility: CLINIC | Age: 74
End: 2024-04-03
Payer: MEDICARE

## 2024-04-03 NOTE — TELEPHONE ENCOUNTER
Attempted to call pt re: cx'd appts from March due to ins changes.  Pt has insurance that would allow her to be seen here now.  No answer.  VM message left for pt to call and let us know if she would like to resched./jorge

## 2024-05-24 ENCOUNTER — INFUSION (OUTPATIENT)
Dept: ONCOLOGY | Facility: HOSPITAL | Age: 74
End: 2024-05-24
Payer: MEDICARE

## 2024-05-24 ENCOUNTER — LAB (OUTPATIENT)
Dept: LAB | Facility: HOSPITAL | Age: 74
End: 2024-05-24
Payer: MEDICARE

## 2024-05-24 ENCOUNTER — OFFICE VISIT (OUTPATIENT)
Dept: ONCOLOGY | Facility: CLINIC | Age: 74
End: 2024-05-24
Payer: MEDICARE

## 2024-05-24 VITALS
WEIGHT: 171.8 LBS | TEMPERATURE: 97.5 F | RESPIRATION RATE: 18 BRPM | OXYGEN SATURATION: 99 % | SYSTOLIC BLOOD PRESSURE: 154 MMHG | BODY MASS INDEX: 30.44 KG/M2 | HEIGHT: 63 IN | DIASTOLIC BLOOD PRESSURE: 84 MMHG | HEART RATE: 95 BPM

## 2024-05-24 VITALS
DIASTOLIC BLOOD PRESSURE: 66 MMHG | WEIGHT: 170 LBS | SYSTOLIC BLOOD PRESSURE: 118 MMHG | HEART RATE: 85 BPM | TEMPERATURE: 97.2 F | OXYGEN SATURATION: 95 % | HEIGHT: 63 IN | RESPIRATION RATE: 16 BRPM | BODY MASS INDEX: 30.12 KG/M2

## 2024-05-24 DIAGNOSIS — D72.829 LEUKOCYTOSIS, UNSPECIFIED TYPE: ICD-10-CM

## 2024-05-24 DIAGNOSIS — N18.32 ANEMIA DUE TO STAGE 3B CHRONIC KIDNEY DISEASE: ICD-10-CM

## 2024-05-24 DIAGNOSIS — D47.2 MGUS (MONOCLONAL GAMMOPATHY OF UNKNOWN SIGNIFICANCE): ICD-10-CM

## 2024-05-24 DIAGNOSIS — D50.9 IRON DEFICIENCY ANEMIA, UNSPECIFIED IRON DEFICIENCY ANEMIA TYPE: ICD-10-CM

## 2024-05-24 DIAGNOSIS — D75.839 THROMBOCYTOSIS: ICD-10-CM

## 2024-05-24 DIAGNOSIS — D63.1 ANEMIA DUE TO STAGE 3B CHRONIC KIDNEY DISEASE: Primary | ICD-10-CM

## 2024-05-24 DIAGNOSIS — D50.9 IRON DEFICIENCY ANEMIA, UNSPECIFIED IRON DEFICIENCY ANEMIA TYPE: Primary | ICD-10-CM

## 2024-05-24 DIAGNOSIS — D63.1 ANEMIA DUE TO STAGE 3B CHRONIC KIDNEY DISEASE: ICD-10-CM

## 2024-05-24 DIAGNOSIS — N18.32 ANEMIA DUE TO STAGE 3B CHRONIC KIDNEY DISEASE: Primary | ICD-10-CM

## 2024-05-24 LAB
ALBUMIN SERPL-MCNC: 3.8 G/DL (ref 3.5–5.2)
ALBUMIN/GLOB SERPL: 1.2 G/DL
ALP SERPL-CCNC: 161 U/L (ref 39–117)
ALT SERPL W P-5'-P-CCNC: 21 U/L (ref 1–33)
ANION GAP SERPL CALCULATED.3IONS-SCNC: 14 MMOL/L (ref 5–15)
AST SERPL-CCNC: 26 U/L (ref 1–32)
BASOPHILS # BLD AUTO: 0.07 10*3/MM3 (ref 0–0.2)
BASOPHILS NFR BLD AUTO: 0.7 % (ref 0–1.5)
BILIRUB SERPL-MCNC: 0.2 MG/DL (ref 0–1.2)
BUN SERPL-MCNC: 17 MG/DL (ref 8–23)
BUN/CREAT SERPL: 10.8 (ref 7–25)
CALCIUM SPEC-SCNC: 9.6 MG/DL (ref 8.6–10.5)
CHLORIDE SERPL-SCNC: 103 MMOL/L (ref 98–107)
CO2 SERPL-SCNC: 22 MMOL/L (ref 22–29)
CREAT SERPL-MCNC: 1.58 MG/DL (ref 0.57–1)
DEPRECATED RDW RBC AUTO: 50.3 FL (ref 37–54)
EGFRCR SERPLBLD CKD-EPI 2021: 34.6 ML/MIN/1.73
EOSINOPHIL # BLD AUTO: 0.27 10*3/MM3 (ref 0–0.4)
EOSINOPHIL NFR BLD AUTO: 2.7 % (ref 0.3–6.2)
ERYTHROCYTE [DISTWIDTH] IN BLOOD BY AUTOMATED COUNT: 14.5 % (ref 12.3–15.4)
FERRITIN SERPL-MCNC: 39.62 NG/ML (ref 13–150)
GLOBULIN UR ELPH-MCNC: 3.1 GM/DL
GLUCOSE SERPL-MCNC: 127 MG/DL (ref 65–99)
HCT VFR BLD AUTO: 38.3 % (ref 34–46.6)
HGB BLD-MCNC: 11.7 G/DL (ref 12–15.9)
IMM GRANULOCYTES # BLD AUTO: 0.05 10*3/MM3 (ref 0–0.05)
IMM GRANULOCYTES NFR BLD AUTO: 0.5 % (ref 0–0.5)
IRON 24H UR-MRATE: 24 MCG/DL (ref 37–145)
IRON SATN MFR SERPL: 6 % (ref 20–50)
LYMPHOCYTES # BLD AUTO: 1.99 10*3/MM3 (ref 0.7–3.1)
LYMPHOCYTES NFR BLD AUTO: 19.9 % (ref 19.6–45.3)
MCH RBC QN AUTO: 28.9 PG (ref 26.6–33)
MCHC RBC AUTO-ENTMCNC: 30.5 G/DL (ref 31.5–35.7)
MCV RBC AUTO: 94.6 FL (ref 79–97)
MONOCYTES # BLD AUTO: 0.96 10*3/MM3 (ref 0.1–0.9)
MONOCYTES NFR BLD AUTO: 9.6 % (ref 5–12)
NEUTROPHILS NFR BLD AUTO: 6.66 10*3/MM3 (ref 1.7–7)
NEUTROPHILS NFR BLD AUTO: 66.6 % (ref 42.7–76)
NRBC BLD AUTO-RTO: 0 /100 WBC (ref 0–0.2)
PLATELET # BLD AUTO: 355 10*3/MM3 (ref 140–450)
PMV BLD AUTO: 9.3 FL (ref 6–12)
POTASSIUM SERPL-SCNC: 4.7 MMOL/L (ref 3.5–5.2)
PROT SERPL-MCNC: 6.9 G/DL (ref 6–8.5)
RBC # BLD AUTO: 4.05 10*6/MM3 (ref 3.77–5.28)
SODIUM SERPL-SCNC: 139 MMOL/L (ref 136–145)
TIBC SERPL-MCNC: 414 MCG/DL (ref 298–536)
TRANSFERRIN SERPL-MCNC: 278 MG/DL (ref 200–360)
WBC NRBC COR # BLD AUTO: 10 10*3/MM3 (ref 3.4–10.8)

## 2024-05-24 PROCEDURE — 36415 COLL VENOUS BLD VENIPUNCTURE: CPT

## 2024-05-24 PROCEDURE — 84165 PROTEIN E-PHORESIS SERUM: CPT

## 2024-05-24 PROCEDURE — 83540 ASSAY OF IRON: CPT

## 2024-05-24 PROCEDURE — 85025 COMPLETE CBC W/AUTO DIFF WBC: CPT

## 2024-05-24 PROCEDURE — 25810000003 SODIUM CHLORIDE 0.9 % SOLUTION: Performed by: NURSE PRACTITIONER

## 2024-05-24 PROCEDURE — 82784 ASSAY IGA/IGD/IGG/IGM EACH: CPT

## 2024-05-24 PROCEDURE — 86334 IMMUNOFIX E-PHORESIS SERUM: CPT

## 2024-05-24 PROCEDURE — 83521 IG LIGHT CHAINS FREE EACH: CPT

## 2024-05-24 PROCEDURE — A9270 NON-COVERED ITEM OR SERVICE: HCPCS | Performed by: NURSE PRACTITIONER

## 2024-05-24 PROCEDURE — 96375 TX/PRO/DX INJ NEW DRUG ADDON: CPT

## 2024-05-24 PROCEDURE — 96365 THER/PROPH/DIAG IV INF INIT: CPT

## 2024-05-24 PROCEDURE — 63710000001 ACETAMINOPHEN 325 MG TABLET: Performed by: NURSE PRACTITIONER

## 2024-05-24 PROCEDURE — 84466 ASSAY OF TRANSFERRIN: CPT

## 2024-05-24 PROCEDURE — 25010000002 IRON SUCROSE PER 1 MG: Performed by: NURSE PRACTITIONER

## 2024-05-24 PROCEDURE — 80053 COMPREHEN METABOLIC PANEL: CPT

## 2024-05-24 PROCEDURE — 82728 ASSAY OF FERRITIN: CPT

## 2024-05-24 RX ORDER — FAMOTIDINE 10 MG/ML
20 INJECTION, SOLUTION INTRAVENOUS ONCE
Status: COMPLETED | OUTPATIENT
Start: 2024-05-24 | End: 2024-05-24

## 2024-05-24 RX ORDER — TRIAMCINOLONE ACETONIDE 1 MG/G
1 CREAM TOPICAL 3 TIMES DAILY
COMMUNITY
Start: 2024-05-17

## 2024-05-24 RX ORDER — SODIUM CHLORIDE 9 MG/ML
250 INJECTION, SOLUTION INTRAVENOUS ONCE
Status: COMPLETED | OUTPATIENT
Start: 2024-05-24 | End: 2024-05-24

## 2024-05-24 RX ORDER — FAMOTIDINE 10 MG/ML
20 INJECTION, SOLUTION INTRAVENOUS AS NEEDED
OUTPATIENT
Start: 2024-05-31

## 2024-05-24 RX ORDER — FAMOTIDINE 10 MG/ML
20 INJECTION, SOLUTION INTRAVENOUS AS NEEDED
OUTPATIENT
Start: 2024-06-14

## 2024-05-24 RX ORDER — SODIUM CHLORIDE 9 MG/ML
250 INJECTION, SOLUTION INTRAVENOUS ONCE
OUTPATIENT
Start: 2024-06-07

## 2024-05-24 RX ORDER — DIPHENHYDRAMINE HYDROCHLORIDE 50 MG/ML
50 INJECTION INTRAMUSCULAR; INTRAVENOUS AS NEEDED
Status: DISCONTINUED | OUTPATIENT
Start: 2024-05-24 | End: 2024-05-24 | Stop reason: HOSPADM

## 2024-05-24 RX ORDER — FAMOTIDINE 10 MG/ML
20 INJECTION, SOLUTION INTRAVENOUS ONCE
OUTPATIENT
Start: 2024-06-14

## 2024-05-24 RX ORDER — FAMOTIDINE 10 MG/ML
20 INJECTION, SOLUTION INTRAVENOUS ONCE
OUTPATIENT
Start: 2024-06-07

## 2024-05-24 RX ORDER — FAMOTIDINE 10 MG/ML
20 INJECTION, SOLUTION INTRAVENOUS AS NEEDED
Status: DISCONTINUED | OUTPATIENT
Start: 2024-05-24 | End: 2024-05-24 | Stop reason: HOSPADM

## 2024-05-24 RX ORDER — ACETAMINOPHEN 325 MG/1
650 TABLET ORAL ONCE
OUTPATIENT
Start: 2024-06-14

## 2024-05-24 RX ORDER — ACETAMINOPHEN 325 MG/1
650 TABLET ORAL ONCE
Status: COMPLETED | OUTPATIENT
Start: 2024-05-24 | End: 2024-05-24

## 2024-05-24 RX ORDER — KETOCONAZOLE 20 MG/G
1 CREAM TOPICAL DAILY
COMMUNITY
Start: 2024-05-17

## 2024-05-24 RX ORDER — DIPHENHYDRAMINE HYDROCHLORIDE 50 MG/ML
50 INJECTION INTRAMUSCULAR; INTRAVENOUS AS NEEDED
OUTPATIENT
Start: 2024-06-14

## 2024-05-24 RX ORDER — SODIUM CHLORIDE 9 MG/ML
250 INJECTION, SOLUTION INTRAVENOUS ONCE
OUTPATIENT
Start: 2024-06-14

## 2024-05-24 RX ORDER — SODIUM CHLORIDE 9 MG/ML
250 INJECTION, SOLUTION INTRAVENOUS ONCE
OUTPATIENT
Start: 2024-05-31

## 2024-05-24 RX ORDER — DIPHENHYDRAMINE HYDROCHLORIDE 50 MG/ML
50 INJECTION INTRAMUSCULAR; INTRAVENOUS AS NEEDED
OUTPATIENT
Start: 2024-05-31

## 2024-05-24 RX ORDER — DIPHENHYDRAMINE HYDROCHLORIDE 50 MG/ML
50 INJECTION INTRAMUSCULAR; INTRAVENOUS AS NEEDED
OUTPATIENT
Start: 2024-06-07

## 2024-05-24 RX ORDER — FAMOTIDINE 10 MG/ML
20 INJECTION, SOLUTION INTRAVENOUS ONCE
OUTPATIENT
Start: 2024-05-31

## 2024-05-24 RX ORDER — ACETAMINOPHEN 325 MG/1
650 TABLET ORAL ONCE
OUTPATIENT
Start: 2024-06-07

## 2024-05-24 RX ORDER — ACETAMINOPHEN 325 MG/1
650 TABLET ORAL ONCE
OUTPATIENT
Start: 2024-05-31

## 2024-05-24 RX ORDER — FAMOTIDINE 10 MG/ML
20 INJECTION, SOLUTION INTRAVENOUS AS NEEDED
OUTPATIENT
Start: 2024-06-07

## 2024-05-24 RX ADMIN — FAMOTIDINE 20 MG: 10 INJECTION INTRAVENOUS at 12:29

## 2024-05-24 RX ADMIN — SODIUM CHLORIDE 250 ML: 9 INJECTION, SOLUTION INTRAVENOUS at 12:20

## 2024-05-24 RX ADMIN — ACETAMINOPHEN 650 MG: 325 TABLET, FILM COATED ORAL at 12:29

## 2024-05-24 RX ADMIN — IRON SUCROSE 200 MG: 20 INJECTION, SOLUTION INTRAVENOUS at 12:33

## 2024-05-24 NOTE — PROGRESS NOTES
[facility] GROUP HEMATOLOGY & ONCOLOGY  [address]  [facility] [address] [phone]    Patient Name: Cheryl Taylor  Encounter Date: 05/24/2024   YOB: 1950  Patient Number: [id number]    PROGRESS NOTE       HISTORY OF PRESENT ILLNESS: Cheryl Taylor is a 74 y.o. female followed by this office for anemia. History is obtained from patient and daughter , Cinthya. History is considered to be accurate.    She has health history significant for anemia, HTN, CKD, COPD, depression, GERD, chronic back pain seeing pain management, stable aortic aneurysm seen by cardiology in [place] and is followed by cardiothoracic surgery here, leukocytosis, MGUS she has received Venofer and Injectafer in the past.    She was followed by Dr. Car on in the past and it appears as though her last visit with him was in 2017.    She had bone marrow biopsy on November 27, 2009.  Per previous office notes it was unremarkable.  Leukocytosis was felt to be reactive in etiology.  She had additional bone marrow biopsy on September 3, 2014.  Results include normocellular bone marrow.  Mild absolute erythroid hyperplasia in the marrow.  Normal myelopoiesis in the marrow.  No increase in lymphocytes or plasma cells in the marrow.  Slight increase in megakaryocytes and aspirate smears.  No marrow fibrosis.  Absence of stainable iron.  No evidence of metastatic carcinoma or granulomatosis disease in the bone marrow.  Flow cytometry showed no evidence of abnormal myeloid maturation or an increased blast population.  There was no evidence of a lymphoproliferative disorder.  FISH for MPN was negative.      Patient has history of MGUS with IgM monoclonal protein.  She has seen Dr. Duron previously     Recently quit smoking recently.  She smoked for approx 50+ years 2-3 PPD.      She had IV iron on August 10, 17 and 24th, 2023.      INTERVAL HISTORY     Pt presents to clinic  today for continued follow up.   She is doing well and has no acute complaints today.   She had labs drawn today and results were reviewed with her in office.           LABS    Lab Results - Last 18 Months   Lab Units 05/24/24  1108 02/16/24  1045 01/09/24  1410 10/19/23  1555 09/07/23  1108 08/24/23  1414   HEMOGLOBIN g/dL 11.7* 12.4 12.5 12.7 11.7* 11.0*   HEMATOCRIT % 38.3 39.3 42.3 41.7 40.3 38.7   MCV fL 94.6 98.0* 100.5* 97.9* 97.8* 96.0   WBC 10*3/mm3 10.00 11.72* 11.76* 10.38 9.48 14.05*   RDW % 14.5 13.5 13.3 17.2* 21.1* 22.6*   MPV fL 9.3 10.2 10.1 9.9 9.7 10.7   PLATELETS 10*3/mm3 355 325 384 355 402 395   IMM GRAN % % 0.5 0.4 0.6* 0.6* 0.4 0.6*   NEUTROS ABS 10*3/mm3 6.66 8.04* 8.83* 7.08* 6.28 9.96*   LYMPHS ABS 10*3/mm3 1.99 2.00 1.69 2.14 2.01 2.81   MONOS ABS 10*3/mm3 0.96* 1.24* 0.93* 0.71 0.70 1.12*   EOS ABS 10*3/mm3 0.27 0.32 0.16 0.32 0.40 0.04   BASOS ABS 10*3/mm3 0.07 0.07 0.08 0.07 0.05 0.04   IMMATURE GRANS (ABS) 10*3/mm3 0.05 0.05 0.07* 0.06* 0.04 0.08*   NRBC /100 WBC 0.0 0.0 0.0 0.0 0.0 0.0       Lab Results - Last 18 Months   Lab Units 05/24/24  1108 02/16/24  1045 01/09/24  1410 10/19/23  1555 09/22/23  1115 09/07/23  1243 09/07/23  1108 07/28/23  1012   GLUCOSE mg/dL 127* 111* 112* 103*  --   --  122* 116*   SODIUM mmol/L 139 135* 137 138  --   --  139 138   POTASSIUM mmol/L 4.7 4.4 4.1 4.2  --   --  4.0 4.8   CO2 mmol/L 22.0 22.0 25.0 21.0*  --   --  21.0* 22.0   CHLORIDE mmol/L 103 102 100 105  --   --  104 103   ANION GAP mmol/L 14.0 11.0 12.0 12.0  --   --  14.0 13.0   CREATININE mg/dL 1.58* 1.41* 1.41* 1.30* 2.30*  --  1.33* 1.87*   BUN mg/dL 17 23 15 18  --   --  19 25*   BUN / CREAT RATIO  10.8 16.3 10.6 13.8  --   --  14.3 13.4   CALCIUM mg/dL 9.6 9.2 9.6 9.5  --   --  9.5 9.2   ALK PHOS U/L 161* 160* 165* 130*  --   --  152* 143*   TOTAL PROTEIN g/dL 6.9 6.6 7.3 6.7  --   --  6.6 6.8   ALT (SGPT) U/L 21 15 11 13  --   --  14 9   AST (SGOT) U/L 26 23 20 20  --   --  16 17    BILIRUBIN mg/dL 0.2 0.2 0.2 0.2  --   --  0.2 <0.2   ALBUMIN g/dL 3.8  3.3 3.4* 3.7  3.2 3.7  --    < > 3.9 3.7  3.2   GLOBULIN gm/dL 3.1 3.2 3.6 3.0  --   --  2.7 3.1    < > = values in this interval not displayed.       Lab Results - Last 18 Months   Lab Units 05/24/24  1108 01/09/24  1410 09/07/23  1243 07/28/23  1139 07/28/23  1012   M-SPIKE g/dL 0.2* 0.4* 0.4*  --  0.3*   KAPPA/LAMBDA RATIO, S  1.59 1.95* 1.43  --  1.92*   FREE LAMBDA LIGHT CHAINS mg/L 42.7* 47.9* 40.7*  --  60.8*   IG KAPPA FREE LIGHT CHAIN mg/L 67.7* 93.4* 58.3*  --  116.8*   REFERENCE LAB REPORT   --   --  See Attached Report SEE ATTACHED REPORT SEE ATTACHED REPORT       Lab Results - Last 18 Months   Lab Units 05/24/24  1108 02/16/24  1045 01/09/24  1410 10/19/23  1555 09/07/23  1108 07/28/23  1016 07/28/23  1012   IRON mcg/dL 24* 80 49 69 61  --  41   TIBC mcg/dL 414 349 353 344 295*  --  414   IRON SATURATION (TSAT) % 6* 23 14* 20 21  --  10*   FERRITIN ng/mL 39.62 67.43 92.85 102.20 307.30*  --  73.87   FOLATE ng/mL  --   --   --   --   --  12.00  --          PAST MEDICAL HISTORY:  ALLERGIES:  Allergies   Allergen Reactions    Levaquin [Levofloxacin] Hives    Naprosyn [Naproxen] Hives    Sulfa Antibiotics Hives     CURRENT MEDICATIONS:  Outpatient Encounter Medications as of 5/24/2024   Medication Sig Dispense Refill    amLODIPine (NORVASC) 2.5 MG tablet Take 1 tablet by mouth Daily.      ARIPiprazole (ABILIFY) 5 MG tablet Take 1 tablet by mouth every night at bedtime.      aspirin 81 MG chewable tablet Chew 1 tablet Daily.      atorvastatin (LIPITOR) 80 MG tablet Take 1 tablet by mouth Daily.      Belbuca film PLACE 1 FILM IN CHEEK EVERY 12 HOURS AS DIRECTED      calcium carbonate (TUMS) 500 MG chewable tablet Chew 1 tablet Daily.      Cholecalciferol (Vitamin D) 50 MCG (2000 UT) capsule Take  by mouth.      cyclobenzaprine (FLEXERIL) 10 MG tablet Take 1 tablet by mouth 3 (Three) Times a Day As Needed.      DULoxetine (CYMBALTA) 60  MG capsule Take 1 capsule by mouth Daily.      famotidine (PEPCID) 20 MG tablet Take 1 tablet by mouth At Night As Needed for Indigestion or Heartburn.      ferrous sulfate 325 (65 FE) MG tablet Take 1 tablet by mouth Daily. OTC      HYDROcodone-acetaminophen (NORCO)  MG per tablet Take 1 tablet by mouth Every 6 (Six) Hours As Needed. for pain      ketoconazole (NIZORAL) 2 % cream Apply 1 Application topically to the appropriate area as directed Daily.      metoprolol succinate XL (TOPROL-XL) 25 MG 24 hr tablet Take 1 tablet by mouth Daily.      triamcinolone (KENALOG) 0.1 % cream Apply 1 Application topically to the appropriate area as directed 3 (Three) Times a Day.       No facility-administered encounter medications on file as of 5/24/2024.     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    Stage 3a chronic kidney disease N18.31    Obesity (BMI 30-39.9) E66.9    Chronic diastolic CHF (congestive heart failure) I50.32    Aortic stenosis, mild I35.0    Nonrheumatic aortic valve insufficiency I35.1    Chronic pain G89.29    Degeneration of lumbar intervertebral disc M51.36    Gastroesophageal reflux disease K21.9    Vasculitis I77.6    Iron deficiency anemia D50.9    Lumbar radiculopathy M54.16    Lumbosacral radiculitis M54.17    Shortness of breath R06.02    Spondylosis M47.9    Thoracic aortic aneurysm without rupture I71.20       HEALTH MAINTENANCE ITEMS:  Health Maintenance Due   Topic Date Due    Pneumococcal Vaccine 65+ (1 of 2 - PCV) Never done    TDAP/TD VACCINES (1 - Tdap) Never done    ZOSTER VACCINE (1 of 2) Never done    RSV Vaccine - Adults (1 - 1-dose 60+ series) Never done    MAMMOGRAM  04/04/2015    HEPATITIS C SCREENING   Never done    ANNUAL WELLNESS VISIT  Never done    LIPID PANEL  2023    COVID-19 Vaccine ( season) 2023    BMI FOLLOWUP  2023    DXA SCAN  2024       <no information>  Last Completed Colonoscopy            COLORECTAL CANCER SCREENING (COLONOSCOPY - Every 10 Years) Next due on 2015  Outside Claim: [place] COLSC FLX W/RMVL OF TUMOR POLYP LESION SNARE TQ                  Immunization History   Administered Date(s) Administered    COVID-19 (MODERNA) 1st,2nd,3rd Dose Monovalent 2022, 2022    COVID-19 (MODERNA) Monovalent Original Booster 2022, 08/10/2022     Last Completed Mammogram       This patient has no relevant Health Maintenance data.              FAMILY HISTORY:  Family History   Problem Relation Age of Onset    Hypertension Mother      SOCIAL HISTORY:  Social History     Socioeconomic History    Marital status:    Tobacco Use    Smoking status: Former     Current packs/day: 0.00     Average packs/day: 3.0 packs/day for 59.2 years (177.7 ttl pk-yrs)     Types: Cigarettes     Start date:      Quit date: 3/31/2023     Years since quittin.1    Smokeless tobacco: Never    Tobacco comments:     Pt quit end of 2023, but as of 23 states she has smoked about 3 cigarettes total since then.   Vaping Use    Vaping status: Never Used   Substance and Sexual Activity    Alcohol use: Never    Drug use: Never    Sexual activity: Defer       REVIEW OF SYSTEMS:  Review of Systems   Constitutional:  Positive for fatigue. Negative for fever and unexpected weight loss.   HENT:  Positive for hearing loss. Negative for trouble swallowing.    Eyes:         Wears glasses     Respiratory:  Positive for shortness of breath (with exertion). Negative for cough.         COPD managed by PCP     Cardiovascular:  Positive for chest pain (Reports chest pain at rest.  States has seen PCP.  Sees Cardiology Dr. Franco). Negative for palpitations and leg  "swelling.        Followed by Cardiology .  Aortic Aneurysm    Gastrointestinal:  Negative for nausea and vomiting.   Endocrine:        Increased nocturia    Genitourinary:  Negative for hematuria, pelvic pain and pelvic pressure.   Musculoskeletal:  Positive for gait problem. Negative for arthralgias and myalgias.        Uses walker to assist with ambulation     Skin:  Negative for rash, skin lesions and wound.   Neurological:  Positive for dizziness (\"my dizziness is so bad I wont drive\") and tremors (Interminttent \"shakes\". pt states \"it looks like I am having a seizure\"  This has been occuring for the past 6-12 months.). Negative for syncope, headache and confusion.        Intermittently loses control of right hand   Psychiatric/Behavioral:  Positive for depressed mood. Negative for suicidal ideas. The patient is nervous/anxious.         On Cymbalta.  Recently increased.  Managed by Dr. James, PCP       I have reviewed the ROS and verified with the patient the accuracy of it. No changes since the information was documented.  Racheal SIMS Joseph, APRN 05/24/2024     /66   Pulse 85   Temp 97.2 °F (36.2 °C)   Resp 16   Ht 160 cm (63\")   Wt 77.1 kg (170 lb)   SpO2 95%   BMI 30.11 kg/m²  Body surface area is 1.8 meters squared.    Pain Score    05/24/24 1116   PainSc: 0-No pain        Physical Exam  Constitutional:       Appearance: Normal appearance.   HENT:      Head: Normocephalic and atraumatic.   Cardiovascular:      Rate and Rhythm: Normal rate and regular rhythm.   Pulmonary:      Effort: Pulmonary effort is normal.      Breath sounds: Normal breath sounds.   Abdominal:      General: Bowel sounds are normal.      Palpations: Abdomen is soft.   Musculoskeletal:      Right lower leg: No edema.      Left lower leg: No edema.   Feet:      Comments:    Skin:     General: Skin is warm and dry.   Neurological:      Mental Status: She is alert and oriented to person, place, and time.   Psychiatric:         " Attention and Perception: Attention normal.         Mood and Affect: Mood normal.         Judgment: Judgment normal.         Cheryl Taylor reports a pain score of 0.  Given her pain assessment as noted, treatment options were discussed and the following options were decided upon as a follow-up plan to address the patient's pain: continuation of current treatment plan for pain. Sees pain management      ASSESSMENT / PLAN:    1. Anemia due to stage 3b chronic kidney disease    2. Iron deficiency anemia, unspecified iron deficiency anemia type    3. MGUS (monoclonal gammopathy of unknown significance)    4. Leukocytosis, unspecified type    5. Thrombocytosis          Anemia in Stage IIIb CKD  Iron Deficiency   -Received Venofer August 10 and 17, 24. 2023  -Taking oral iron daily  -Labs today:  BUN 17, Creatinine 1.58, GFR 34.6, Hgb 11.7, Hct 38.3, Iron 24, Ferritin 39, Sat 6%, TIBC 414  -Will order Venofer 200 mg IV weekly x 4   -If Hgb gets < 10g AND Ferritin is >100 and/or Sat >20, AND GFR < 60 can start JARRETT therapy  -Followed by Dr. Duron, Nephrology     MGUS  -Bone marrow biopsy September 3, 2014.    Results include normocellular bone marrow.  Mild absolute erythroid hyperplasia in the marrow.  Normal myelopoiesis in the marrow.  No increase in lymphocytes or plasma cells in the marrow.  Slight increase in megakaryocytes and aspirate smears.  No marrow fibrosis.  Absence of stainable iron.  No evidence of metastatic carcinoma or granulomatosis disease in the bone marrow.  Flow cytometry showed no evidence of abnormal myeloid maturation or an increased blast population.  There was no evidence of a lymphoproliferative disorder.  FISH for MPN was negative.  -CT Chest 5/18/23 showed no mediastinal or hilar lymphadenopathy by CT size criteria.  No acute or suspicious bony finding.   -SPEP-Labs today:M Servando 0.2, Immunofixation shows IgM monoclonal protein with lambda light chain specificity.  Kappa FLC 67.7, Lambda FLC  42.7, Ratio 1.59.  -Immunoglobulins:  IgA 448, AgG 818, IgM 312.    -No evidence of CRAB as a result of plasma cell dyscrasia   -Stable for observation     4.  Osteopenia   -DEXA scan 3/23/2022  Femoral neck T score -2.3  Lumbar spine T score -1.7   -Pt is taking calcium carbonate and Vitamin D   -Followed by PCP    5.  Leukocytosis   6.  Thrombocytosis     -Previous bone marrow showed no evidence of lymphoproliferative disorder  -Leukocytosis likely secondary to smoking and thrombocytosis resolved.  - JAK2, CALR, FISH for CLL all negative.   -Flow Cytometry 9/11/23 with no abnormal myeloid maturation or increased blast population.    Pt has white growth in between 4th and 5th toe.    Consider referral to dermatology.     PLAN:  Will order Venofer 200 mg IV weekly x 4   Continue current medications, treatment plans and follow up with PCP and any other providers   Labs only in 3 months   RTC in 6 months   Pre-office labs for CBC, CMP, Iron Profile, Ferritin, SPEP, MILI, Light Chains  Orders have been signed.  Care discussed with patient.  Understanding expressed.  Patient agreeable with plan.             ACP discussion was held with the patient during this visit. Patient does not have an advance directive, information provided.  Via AVCALE Gutierrez  05/24/2024

## 2024-05-28 LAB
ALBUMIN SERPL ELPH-MCNC: 3.3 G/DL (ref 2.9–4.4)
ALBUMIN/GLOB SERPL: 1.2 {RATIO} (ref 0.7–1.7)
ALPHA1 GLOB SERPL ELPH-MCNC: 0.3 G/DL (ref 0–0.4)
ALPHA2 GLOB SERPL ELPH-MCNC: 0.8 G/DL (ref 0.4–1)
B-GLOBULIN SERPL ELPH-MCNC: 1.2 G/DL (ref 0.7–1.3)
GAMMA GLOB SERPL ELPH-MCNC: 0.8 G/DL (ref 0.4–1.8)
GLOBULIN SER-MCNC: 3 G/DL (ref 2.2–3.9)
IGA SERPL-MCNC: 448 MG/DL (ref 64–422)
IGG SERPL-MCNC: 818 MG/DL (ref 586–1602)
IGM SERPL-MCNC: 312 MG/DL (ref 26–217)
INTERPRETATION SERPL IEP-IMP: ABNORMAL
KAPPA LC FREE SER-MCNC: 67.7 MG/L (ref 3.3–19.4)
KAPPA LC FREE/LAMBDA FREE SER: 1.59 {RATIO} (ref 0.26–1.65)
LABORATORY COMMENT REPORT: ABNORMAL
LAMBDA LC FREE SERPL-MCNC: 42.7 MG/L (ref 5.7–26.3)
M PROTEIN SERPL ELPH-MCNC: 0.2 G/DL
PROT SERPL-MCNC: 6.3 G/DL (ref 6–8.5)

## 2024-05-31 ENCOUNTER — INFUSION (OUTPATIENT)
Dept: ONCOLOGY | Facility: HOSPITAL | Age: 74
End: 2024-05-31
Payer: MEDICARE

## 2024-05-31 VITALS
OXYGEN SATURATION: 100 % | RESPIRATION RATE: 18 BRPM | HEART RATE: 62 BPM | DIASTOLIC BLOOD PRESSURE: 48 MMHG | SYSTOLIC BLOOD PRESSURE: 112 MMHG | TEMPERATURE: 98 F

## 2024-05-31 DIAGNOSIS — D50.9 IRON DEFICIENCY ANEMIA, UNSPECIFIED IRON DEFICIENCY ANEMIA TYPE: Primary | ICD-10-CM

## 2024-05-31 PROCEDURE — 96375 TX/PRO/DX INJ NEW DRUG ADDON: CPT

## 2024-05-31 PROCEDURE — A9270 NON-COVERED ITEM OR SERVICE: HCPCS | Performed by: NURSE PRACTITIONER

## 2024-05-31 PROCEDURE — 25010000002 IRON SUCROSE PER 1 MG: Performed by: NURSE PRACTITIONER

## 2024-05-31 PROCEDURE — 25810000003 SODIUM CHLORIDE 0.9 % SOLUTION: Performed by: NURSE PRACTITIONER

## 2024-05-31 PROCEDURE — 96365 THER/PROPH/DIAG IV INF INIT: CPT

## 2024-05-31 PROCEDURE — 63710000001 ACETAMINOPHEN 325 MG TABLET: Performed by: NURSE PRACTITIONER

## 2024-05-31 RX ORDER — ACETAMINOPHEN 325 MG/1
650 TABLET ORAL ONCE
Status: COMPLETED | OUTPATIENT
Start: 2024-05-31 | End: 2024-05-31

## 2024-05-31 RX ORDER — SODIUM CHLORIDE 9 MG/ML
250 INJECTION, SOLUTION INTRAVENOUS ONCE
Status: COMPLETED | OUTPATIENT
Start: 2024-05-31 | End: 2024-05-31

## 2024-05-31 RX ORDER — FAMOTIDINE 10 MG/ML
20 INJECTION, SOLUTION INTRAVENOUS ONCE
Status: COMPLETED | OUTPATIENT
Start: 2024-05-31 | End: 2024-05-31

## 2024-05-31 RX ADMIN — ACETAMINOPHEN 650 MG: 325 TABLET, FILM COATED ORAL at 11:54

## 2024-05-31 RX ADMIN — FAMOTIDINE 20 MG: 10 INJECTION INTRAVENOUS at 11:54

## 2024-05-31 RX ADMIN — IRON SUCROSE 200 MG: 20 INJECTION, SOLUTION INTRAVENOUS at 11:57

## 2024-05-31 RX ADMIN — SODIUM CHLORIDE 250 ML: 9 INJECTION, SOLUTION INTRAVENOUS at 11:54

## 2024-06-07 ENCOUNTER — INFUSION (OUTPATIENT)
Dept: ONCOLOGY | Facility: HOSPITAL | Age: 74
End: 2024-06-07
Payer: MEDICARE

## 2024-06-07 VITALS
DIASTOLIC BLOOD PRESSURE: 51 MMHG | SYSTOLIC BLOOD PRESSURE: 135 MMHG | HEART RATE: 60 BPM | OXYGEN SATURATION: 97 % | WEIGHT: 174.8 LBS | HEIGHT: 63 IN | RESPIRATION RATE: 18 BRPM | BODY MASS INDEX: 30.97 KG/M2 | TEMPERATURE: 97.1 F

## 2024-06-07 DIAGNOSIS — D50.9 IRON DEFICIENCY ANEMIA, UNSPECIFIED IRON DEFICIENCY ANEMIA TYPE: Primary | ICD-10-CM

## 2024-06-07 PROCEDURE — A9270 NON-COVERED ITEM OR SERVICE: HCPCS | Performed by: NURSE PRACTITIONER

## 2024-06-07 PROCEDURE — 25810000003 SODIUM CHLORIDE 0.9 % SOLUTION: Performed by: NURSE PRACTITIONER

## 2024-06-07 PROCEDURE — 96374 THER/PROPH/DIAG INJ IV PUSH: CPT

## 2024-06-07 PROCEDURE — 96375 TX/PRO/DX INJ NEW DRUG ADDON: CPT

## 2024-06-07 PROCEDURE — 96365 THER/PROPH/DIAG IV INF INIT: CPT

## 2024-06-07 PROCEDURE — 25010000002 IRON SUCROSE PER 1 MG: Performed by: NURSE PRACTITIONER

## 2024-06-07 PROCEDURE — 63710000001 ACETAMINOPHEN 325 MG TABLET: Performed by: NURSE PRACTITIONER

## 2024-06-07 RX ORDER — FAMOTIDINE 10 MG/ML
20 INJECTION, SOLUTION INTRAVENOUS ONCE
Status: COMPLETED | OUTPATIENT
Start: 2024-06-07 | End: 2024-06-07

## 2024-06-07 RX ORDER — FAMOTIDINE 10 MG/ML
20 INJECTION, SOLUTION INTRAVENOUS AS NEEDED
Status: DISCONTINUED | OUTPATIENT
Start: 2024-06-07 | End: 2024-06-07 | Stop reason: HOSPADM

## 2024-06-07 RX ORDER — SODIUM CHLORIDE 9 MG/ML
250 INJECTION, SOLUTION INTRAVENOUS ONCE
Status: COMPLETED | OUTPATIENT
Start: 2024-06-07 | End: 2024-06-07

## 2024-06-07 RX ORDER — ACETAMINOPHEN 325 MG/1
650 TABLET ORAL ONCE
Status: COMPLETED | OUTPATIENT
Start: 2024-06-07 | End: 2024-06-07

## 2024-06-07 RX ORDER — DIPHENHYDRAMINE HYDROCHLORIDE 50 MG/ML
50 INJECTION INTRAMUSCULAR; INTRAVENOUS AS NEEDED
Status: DISCONTINUED | OUTPATIENT
Start: 2024-06-07 | End: 2024-06-07 | Stop reason: HOSPADM

## 2024-06-07 RX ADMIN — FAMOTIDINE 20 MG: 10 INJECTION INTRAVENOUS at 12:25

## 2024-06-07 RX ADMIN — ACETAMINOPHEN 650 MG: 325 TABLET, FILM COATED ORAL at 12:24

## 2024-06-07 RX ADMIN — SODIUM CHLORIDE 250 ML: 9 INJECTION, SOLUTION INTRAVENOUS at 12:15

## 2024-06-07 RX ADMIN — IRON SUCROSE 200 MG: 20 INJECTION, SOLUTION INTRAVENOUS at 12:39

## 2024-06-08 ENCOUNTER — APPOINTMENT (OUTPATIENT)
Dept: GENERAL RADIOLOGY | Facility: HOSPITAL | Age: 74
End: 2024-06-08
Payer: MEDICARE

## 2024-06-08 ENCOUNTER — HOSPITAL ENCOUNTER (EMERGENCY)
Facility: HOSPITAL | Age: 74
Discharge: HOME | End: 2024-06-08
Attending: EMERGENCY MEDICINE
Payer: MEDICARE

## 2024-06-08 VITALS
WEIGHT: 172 LBS | TEMPERATURE: 98 F | DIASTOLIC BLOOD PRESSURE: 57 MMHG | OXYGEN SATURATION: 100 % | BODY MASS INDEX: 30.48 KG/M2 | RESPIRATION RATE: 18 BRPM | HEART RATE: 74 BPM | HEIGHT: 63 IN | SYSTOLIC BLOOD PRESSURE: 118 MMHG

## 2024-06-08 DIAGNOSIS — S89.91XA INJURY OF RIGHT KNEE, INITIAL ENCOUNTER: ICD-10-CM

## 2024-06-08 DIAGNOSIS — S92.355A CLOSED NONDISPLACED FRACTURE OF FIFTH METATARSAL BONE OF LEFT FOOT, INITIAL ENCOUNTER: Primary | ICD-10-CM
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
## 2024-06-08 PROCEDURE — 73630 X-RAY EXAM OF FOOT: CPT

## 2024-06-08 PROCEDURE — 73564 X-RAY EXAM KNEE 4 OR MORE: CPT

## 2024-06-08 PROCEDURE — 63710000001 ONDANSETRON ODT 4 MG TABLET DISPERSIBLE: Performed by: EMERGENCY MEDICINE

## 2024-06-08 PROCEDURE — 99283 EMERGENCY DEPT VISIT LOW MDM: CPT

## 2024-06-08 PROCEDURE — 96372 THER/PROPH/DIAG INJ SC/IM: CPT

## 2024-06-08 PROCEDURE — 25010000002 MORPHINE PER 10 MG: Performed by: EMERGENCY MEDICINE

## 2024-06-08 PROCEDURE — 73590 X-RAY EXAM OF LOWER LEG: CPT

## 2024-06-08 RX ORDER — ONDANSETRON 4 MG/1
4 TABLET, ORALLY DISINTEGRATING ORAL ONCE
Status: COMPLETED | OUTPATIENT
Start: 2024-06-08 | End: 2024-06-08

## 2024-06-08 RX ADMIN — ONDANSETRON 4 MG: 4 TABLET, ORALLY DISINTEGRATING ORAL at 11:17

## 2024-06-08 RX ADMIN — MORPHINE SULFATE 4 MG: 4 INJECTION, SOLUTION INTRAMUSCULAR; INTRAVENOUS at 11:17

## 2024-06-08 NOTE — ED PROVIDER NOTES
Subjective   History of Present Illness    Patient is a pleasant 74-year-old female with chief complaint of lower leg pain status post fall.  The patient describes that she was trying to get to the restroom to urinate but started to pee on herself by the time she got to the bathroom.  She slipped in the urine and fell impacting her left leg against the tub and her right leg against the toilet.  She has difficulty bearing weight and had immediate pain particularly in her right knee, tib-fib, and both of her feet.  She denies any head or neck injury.  She denies any pain above her knees.  She denies any previous musculoskeletal injury to her lower legs.  She denies been on anticoagulant.  She denies any loss of sensation but complains of limited movement due to the pain. This event happened around midnight.     Review of Systems   Constitutional:  Positive for activity change.   Musculoskeletal:  Positive for gait problem and neck pain. Negative for back pain.   Neurological:  Negative for headaches.   All other systems reviewed and are negative.      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    Thoracic aortic aneurysm        Allergies   Allergen Reactions    Levaquin [Levofloxacin] Hives    Naprosyn [Naproxen] Hives    Sulfa Antibiotics Hives       Past Surgical History:   Procedure Laterality Date    BACK SURGERY      CARDIAC CATHETERIZATION N/A 5/6/2022    Procedure: Left Heart Cath;  Surgeon: Lenard Eastman MD;  Location:  [facility];  Service: Cardiology;  Laterality: N/A;    EYE SURGERY Bilateral     FOOT FASCIOTOMY Bilateral     HYSTERECTOMY      LOWER LEG SOFT TISSUE  TUMOR EXCISION      LUMBAR NERVE STIMLATOR INSERTION Right     LYMPH NODE DISSECTION      WRIST FRACTURE SURGERY Right     x2       Family History   Problem Relation Age of Onset    Hypertension Mother        Social History     Socioeconomic History    Marital status:    Tobacco Use    Smoking status: Former     Current packs/day: 0.00     Average packs/day: 3.0 packs/day for 59.2 years (177.7 ttl pk-yrs)     Types: Cigarettes     Start date:      Quit date: 3/31/2023     Years since quittin.1    Smokeless tobacco: Never    Tobacco comments:     Pt quit end of 2023, but as of 23 states she has smoked about 3 cigarettes total since then.   Vaping Use    Vaping status: Never Used   Substance and Sexual Activity    Alcohol use: Never    Drug use: Never    Sexual activity: Defer       Prior to Admission medications    Medication Sig Start Date End Date Taking? Authorizing Provider   amLODIPine (NORVASC) 2.5 MG tablet Take 1 tablet by mouth Daily. 23   Minnie Paige MD   ARIPiprazole (ABILIFY) 5 MG tablet Take 1 tablet by mouth every night at bedtime.    Minnie Paige MD   aspirin 81 MG chewable tablet Chew 1 tablet Daily.    Minnie Paige MD   atorvastatin (LIPITOR) 80 MG tablet Take 1 tablet by mouth Daily.    Minnie Paige MD   Belbuca film PLACE 1 FILM IN CHEEK EVERY 12 HOURS AS DIRECTED 1/15/24   Minnie Paige MD   calcium carbonate (TUMS) 500 MG chewable tablet Chew 1 tablet Daily.    Minnie Paige MD   Cholecalciferol (Vitamin D) 50 MCG (2000 UT) capsule Take  by mouth.    Minnie Paige MD   cyclobenzaprine (FLEXERIL) 10 MG tablet Take 1 tablet by mouth 3 (Three) Times a Day As Needed. 22   Minnie Paige MD   DULoxetine (CYMBALTA) 60 MG capsule Take 1 capsule by mouth Daily.    Minnie Paige MD   famotidine (PEPCID) 20 MG tablet Take 1 tablet by mouth At Night As Needed for Indigestion or Heartburn.     "Minnie Paige MD   ferrous sulfate 325 (65 FE) MG tablet Take 1 tablet by mouth Daily. OTC    Minnie Paige MD   HYDROcodone-acetaminophen (NORCO)  MG per tablet Take 1 tablet by mouth Every 6 (Six) Hours As Needed. for pain 4/12/23   Minnie Paige MD   ketoconazole (NIZORAL) 2 % cream Apply 1 Application topically to the appropriate area as directed Daily. 5/17/24   Minnie Paige MD   metoprolol succinate XL (TOPROL-XL) 25 MG 24 hr tablet Take 1 tablet by mouth Daily. 9/8/22   Minnie Paige MD   triamcinolone (KENALOG) 0.1 % cream Apply 1 Application topically to the appropriate area as directed 3 (Three) Times a Day. 5/17/24   Minnie Paige MD       Medications   HYDROmorphone (DILAUDID) injection 1 mg (has no administration in time range)   morphine injection 4 mg (4 mg Intramuscular Given 6/8/24 1117)   ondansetron ODT (ZOFRAN-ODT) disintegrating tablet 4 mg (4 mg Oral Given 6/8/24 1117)       /80   Pulse 74   Temp 98 °F (36.7 °C)   Resp 18   Ht 160 cm (63\")   Wt 78 kg (172 lb)   SpO2 100%   BMI 30.47 kg/m²       Objective   Physical Exam  Vitals and nursing note reviewed.   Constitutional:       General: She is not in acute distress.     Appearance: She is well-developed. She is not diaphoretic.      Comments: Patient wheeled by wheelchair   HENT:      Head: Normocephalic and atraumatic.   Eyes:      Conjunctiva/sclera: Conjunctivae normal.      Pupils: Pupils are equal, round, and reactive to light.   Neck:      Trachea: No tracheal deviation.   Cardiovascular:      Rate and Rhythm: Normal rate and regular rhythm.      Heart sounds: Normal heart sounds. No murmur heard.  Pulmonary:      Effort: Pulmonary effort is normal.      Breath sounds: Normal breath sounds.   Abdominal:      General: Bowel sounds are normal. There is no distension.      Palpations: Abdomen is soft. There is no mass.      Tenderness: There is no abdominal tenderness. There " is no guarding or rebound.   Musculoskeletal:         General: Tenderness present. No signs of injury.      Cervical back: Normal range of motion and neck supple.      Right hip: Normal.      Left hip: Normal.      Right upper leg: Normal.      Left upper leg: Normal.      Right knee: Bony tenderness present. No crepitus. Decreased range of motion. Tenderness present over the lateral joint line, MCL, ACL and patellar tendon. No LCL laxity, MCL laxity, ACL laxity or PCL laxity. Normal alignment, normal meniscus and normal patellar mobility. Normal pulse.      Left knee: Normal.      Right lower leg: Tenderness present. No swelling.      Left lower leg: No tenderness or bony tenderness.      Right ankle: Normal.      Right Achilles Tendon: Normal.      Left ankle: Normal.      Left Achilles Tendon: Normal.      Right foot: Decreased range of motion. Normal capillary refill. Tenderness and bony tenderness present. Normal pulse.      Left foot: Decreased range of motion. Normal capillary refill. Tenderness and bony tenderness present. Normal pulse.   Skin:     General: Skin is warm and dry.      Capillary Refill: Capillary refill takes less than 2 seconds.   Neurological:      General: No focal deficit present.      Mental Status: She is alert and oriented to person, place, and time.      Deep Tendon Reflexes: Reflexes are normal and symmetric.   Psychiatric:         Behavior: Behavior normal.         Thought Content: Thought content normal.         Judgment: Judgment normal.         Procedures         Lab Results (last 24 hours)       ** No results found for the last 24 hours. **            XR Foot 3+ View Right    Result Date: 6/8/2024  Narrative: XR FOOT 3+ VW RIGHT- 6/8/2024 9:48 AM  HISTORY: fall worst lateral pain  COMPARISON: None  FINDINGS: Frontal, lateral and oblique radiographs of the right foot were provided for review.  There is no fracture or joint subluxation. The soft tissues are normal in appearance.  Mild interphalangeal joint osteoarthritis. Joint spaces are well-maintained throughout the midfoot and hindfoot. No ankle joint capsular distention.      Impression: 1. No acute fracture or malalignment.  This report was signed and finalized on 6/8/2024 11:26 AM by Dr Gonzalez Leone.      XR Knee 4+ View Right    Result Date: 6/8/2024  Narrative: XR KNEE 4+ VW RIGHT- 6/8/2024 9:48 AM  HISTORY: fall pain worst anterior, lateral, posterior  COMPARISON: None  FINDINGS: Frontal, lateral, sunrise and oblique views of the right knee were obtained.  There is no fracture or joint subluxation. Grade 3 osteoarthritis with joint space narrowing and tricompartmental osteophyte formation. No lipohemarthrosis. Neutral patellar tracking. Surrounding soft tissues are unremarkable.      Impression: 1. No visualized acute fracture or malalignment. 2. Grade 3 osteoarthritis.    This report was signed and finalized on 6/8/2024 11:24 AM by Dr Gonzalez Leone.      XR Tibia Fibula 2 View Right    Result Date: 6/8/2024  Narrative: XR TIBIA FIBULA 2 VW RIGHT- 6/8/2024 9:48 AM  HISTORY: fall pain  COMPARISON: NONE  FINDINGS: Frontal and lateral radiographs of the right lower leg were obtained.  There is no visualized acute fracture. Grade 3 osteoarthritis at the knee with tricompartmental marginal osteophytes. Alignment is anatomic at the knee. Mild osteoarthritis change at the ankle. No ankle joint capsular distention. No discrete soft tissue abnormality.      Impression: 1. No visualized acute fracture or malalignment. 2. Grade 3 osteoarthritis of the knee.   This report was signed and finalized on 6/8/2024 11:21 AM by Dr Gonzalez Leone.      XR Foot 3+ View Left    Result Date: 6/8/2024  Narrative: XR FOOT 3+ VW LEFT- 6/8/2024 9:47 AM  HISTORY: fall pain worst left 5th mt and toe  COMPARISON: None  FINDINGS: Frontal, lateral and oblique radiographs of the left foot were provided for review.  There is a horizontal nondisplaced fracture across  the distal fifth metatarsal, just proximal to the metatarsal head. No other fractures identified. No joint subluxation. No destructive osseous changes are noted. Soft tissue edema is seen just lateral to the fifth MTP joint. No ankle joint capsular distention. Small plantar calcaneal spur.      Impression: 1. There is a horizontal nondisplaced fracture across the distal fifth metatarsal, just proximal to the metatarsal head.  This report was signed and finalized on 6/8/2024 11:19 AM by Dr Gonzalez Leone.       ED Course  ED Course as of 06/08/24 1154   Sat Jun 08, 2024   1152 I have educated both patient and daughter about the x-ray results.  Radiologist did not see any acute fracture or malalignment in the right lower extremity.  She does have grade 3 osteoarthritis of the knee.  An x-ray of the right knee, tib-fib, and foot were completed.    In regards to the left foot x-ray, she does have a horizontal nondisplaced fracture across the distal fifth metatarsal, just proximal to the metatarsal head.  Because of this, we will place her in a boot.  Since she had injured both of her lower extremities, a prescription for wheelchair will be prescribed.  She already has 1.  Also, the patient is under contract with pain management and she understands she is not able to receive further prescribed pain medication upon discharge.  She has been educated follow-up with orthopedic surgeon.  She requests Dr. Swift's information.  Fall precautions advised.  Return precautions advised.  Patient will be discharged in stable condition. [TK]      ED Course User Index  [TK] Piper Muniz PA          [facility]      Final diagnoses:   Closed nondisplaced fracture of fifth metatarsal bone of left foot, initial encounter   Injury of right knee, initial encounter   Fall, initial encounter       Disposition: Patient will be discharged in stable condition.       Piper Muniz PA  06/08/24 [id number]

## 2024-06-14 ENCOUNTER — INFUSION (OUTPATIENT)
Dept: ONCOLOGY | Facility: HOSPITAL | Age: 74
End: 2024-06-14
Payer: MEDICARE

## 2024-06-14 VITALS
BODY MASS INDEX: 30.48 KG/M2 | OXYGEN SATURATION: 98 % | SYSTOLIC BLOOD PRESSURE: 139 MMHG | DIASTOLIC BLOOD PRESSURE: 63 MMHG | WEIGHT: 172 LBS | HEIGHT: 63 IN | TEMPERATURE: 96.4 F | HEART RATE: 64 BPM | RESPIRATION RATE: 20 BRPM

## 2024-06-14 DIAGNOSIS — D50.9 IRON DEFICIENCY ANEMIA, UNSPECIFIED IRON DEFICIENCY ANEMIA TYPE: Primary | ICD-10-CM

## 2024-06-14 PROCEDURE — 63710000001 ACETAMINOPHEN 325 MG TABLET: Performed by: NURSE PRACTITIONER

## 2024-06-14 PROCEDURE — 25810000003 SODIUM CHLORIDE 0.9 % SOLUTION: Performed by: NURSE PRACTITIONER

## 2024-06-14 PROCEDURE — 96365 THER/PROPH/DIAG IV INF INIT: CPT

## 2024-06-14 PROCEDURE — A9270 NON-COVERED ITEM OR SERVICE: HCPCS | Performed by: NURSE PRACTITIONER

## 2024-06-14 PROCEDURE — 25010000002 IRON SUCROSE PER 1 MG: Performed by: NURSE PRACTITIONER

## 2024-06-14 PROCEDURE — 96375 TX/PRO/DX INJ NEW DRUG ADDON: CPT

## 2024-06-14 RX ORDER — SODIUM CHLORIDE 9 MG/ML
250 INJECTION, SOLUTION INTRAVENOUS ONCE
Status: COMPLETED | OUTPATIENT
Start: 2024-06-14 | End: 2024-06-14

## 2024-06-14 RX ORDER — ACETAMINOPHEN 325 MG/1
650 TABLET ORAL ONCE
Status: COMPLETED | OUTPATIENT
Start: 2024-06-14 | End: 2024-06-14

## 2024-06-14 RX ORDER — FAMOTIDINE 10 MG/ML
20 INJECTION, SOLUTION INTRAVENOUS ONCE
Status: COMPLETED | OUTPATIENT
Start: 2024-06-14 | End: 2024-06-14

## 2024-06-14 RX ORDER — FAMOTIDINE 10 MG/ML
20 INJECTION, SOLUTION INTRAVENOUS AS NEEDED
Status: DISCONTINUED | OUTPATIENT
Start: 2024-06-14 | End: 2024-06-14 | Stop reason: HOSPADM

## 2024-06-14 RX ORDER — DIPHENHYDRAMINE HYDROCHLORIDE 50 MG/ML
50 INJECTION INTRAMUSCULAR; INTRAVENOUS AS NEEDED
Status: DISCONTINUED | OUTPATIENT
Start: 2024-06-14 | End: 2024-06-14 | Stop reason: HOSPADM

## 2024-06-14 RX ADMIN — IRON SUCROSE 200 MG: 20 INJECTION, SOLUTION INTRAVENOUS at 12:53

## 2024-06-14 RX ADMIN — SODIUM CHLORIDE 250 ML: 9 INJECTION, SOLUTION INTRAVENOUS at 12:37

## 2024-06-14 RX ADMIN — FAMOTIDINE 20 MG: 10 INJECTION INTRAVENOUS at 12:51

## 2024-06-14 RX ADMIN — ACETAMINOPHEN 650 MG: 325 TABLET, FILM COATED ORAL at 12:51

## 2024-09-03 ENCOUNTER — TELEPHONE (OUTPATIENT)
Dept: CARDIAC SURGERY | Facility: CLINIC | Age: 74
End: 2024-09-03
Payer: MEDICARE

## 2024-09-03 NOTE — TELEPHONE ENCOUNTER
Please call patient and see if she plans to reschedule appointment with Dr. Franco for follow-up.  Per last phone message, patient's insurance was not accepted here.

## 2024-09-04 NOTE — TELEPHONE ENCOUNTER
Attempted to call pt re: this but no answer.  VM mess left requesting pt to call back either way to let us know re: this.  OK to tx call to me if pt calls back or OK to document pt's response in this message if I am unavailable./[email or web address]

## 2024-10-22 ENCOUNTER — TELEPHONE (OUTPATIENT)
Dept: ONCOLOGY | Facility: CLINIC | Age: 74
End: 2024-10-22

## 2024-10-22 NOTE — TELEPHONE ENCOUNTER
Caller: Cinthya Garvey    Relationship to patient: Emergency Contact    Best call back number: [phone]     Chief complaint: PATIENT TO RESCHEDULE 11/14 AND 11/21 APPTS    Type of visit: LAB AND FU1    Requested date: THE END OF DECEMBER

## 2024-12-06 ENCOUNTER — TRANSCRIBE ORDERS (OUTPATIENT)
Dept: ADMINISTRATIVE | Facility: HOSPITAL | Age: 74
End: 2024-12-06
Payer: MEDICARE

## 2024-12-06 ENCOUNTER — HOSPITAL ENCOUNTER (OUTPATIENT)
Dept: GENERAL RADIOLOGY | Facility: HOSPITAL | Age: 74
Discharge: HOME | End: 2024-12-06
Payer: MEDICARE

## 2024-12-06 DIAGNOSIS — M17.12 OSTEOARTHRITIS OF LEFT KNEE, UNSPECIFIED OSTEOARTHRITIS TYPE: ICD-10-CM

## 2024-12-06 DIAGNOSIS — M17.12 OSTEOARTHRITIS OF LEFT KNEE, UNSPECIFIED OSTEOARTHRITIS TYPE: Primary | ICD-10-CM

## 2024-12-06 PROCEDURE — 73564 X-RAY EXAM KNEE 4 OR MORE: CPT

## 2024-12-10 ENCOUNTER — TRANSCRIBE ORDERS (OUTPATIENT)
Dept: ADMINISTRATIVE | Facility: HOSPITAL | Age: 74
End: 2024-12-10
Payer: MEDICARE

## 2024-12-10 DIAGNOSIS — M17.12 OSTEOARTHRITIS OF LEFT KNEE, UNSPECIFIED OSTEOARTHRITIS TYPE: ICD-10-CM

## 2024-12-10 DIAGNOSIS — M79.605 LEFT LEG PAIN: Primary | ICD-10-CM

## 2025-02-14 ENCOUNTER — TELEPHONE (OUTPATIENT)
Dept: ONCOLOGY | Facility: CLINIC | Age: 75
End: 2025-02-14
Payer: MEDICARE

## 2025-02-14 NOTE — TELEPHONE ENCOUNTER
Caller: PENELOPE    Relationship to patient: Child    Best call back number: [phone]    Type of visit: LAB    Requested date: 2/17     If rescheduling, when is the original appointment: 2/18

## 2025-02-17 ENCOUNTER — LAB (OUTPATIENT)
Dept: LAB | Facility: HOSPITAL | Age: 75
End: 2025-02-17
Payer: MEDICARE

## 2025-02-17 DIAGNOSIS — D47.2 MGUS (MONOCLONAL GAMMOPATHY OF UNKNOWN SIGNIFICANCE): ICD-10-CM

## 2025-02-17 DIAGNOSIS — D63.1 ANEMIA DUE TO STAGE 3B CHRONIC KIDNEY DISEASE: ICD-10-CM

## 2025-02-17 DIAGNOSIS — N18.32 ANEMIA DUE TO STAGE 3B CHRONIC KIDNEY DISEASE: ICD-10-CM

## 2025-02-17 DIAGNOSIS — D50.9 IRON DEFICIENCY ANEMIA, UNSPECIFIED IRON DEFICIENCY ANEMIA TYPE: ICD-10-CM

## 2025-02-17 LAB
ALBUMIN SERPL-MCNC: 3.6 G/DL (ref 3.5–5.2)
ALBUMIN/GLOB SERPL: 1.1 G/DL
ALP SERPL-CCNC: 167 U/L (ref 39–117)
ALT SERPL W P-5'-P-CCNC: 12 U/L (ref 1–33)
ANION GAP SERPL CALCULATED.3IONS-SCNC: 13 MMOL/L (ref 5–15)
AST SERPL-CCNC: 17 U/L (ref 1–32)
BASOPHILS # BLD AUTO: 0.05 10*3/MM3 (ref 0–0.2)
BASOPHILS NFR BLD AUTO: 0.5 % (ref 0–1.5)
BILIRUB SERPL-MCNC: 0.4 MG/DL (ref 0–1.2)
BUN SERPL-MCNC: 20 MG/DL (ref 8–23)
BUN/CREAT SERPL: 13.7 (ref 7–25)
CALCIUM SPEC-SCNC: 9.5 MG/DL (ref 8.6–10.5)
CHLORIDE SERPL-SCNC: 107 MMOL/L (ref 98–107)
CO2 SERPL-SCNC: 20 MMOL/L (ref 22–29)
CREAT SERPL-MCNC: 1.46 MG/DL (ref 0.57–1)
DEPRECATED RDW RBC AUTO: 51.8 FL (ref 37–54)
EGFRCR SERPLBLD CKD-EPI 2021: 38.1 ML/MIN/1.73
EOSINOPHIL # BLD AUTO: 0.35 10*3/MM3 (ref 0–0.4)
EOSINOPHIL NFR BLD AUTO: 3.5 % (ref 0.3–6.2)
ERYTHROCYTE [DISTWIDTH] IN BLOOD BY AUTOMATED COUNT: 14.3 % (ref 12.3–15.4)
FERRITIN SERPL-MCNC: 98.95 NG/ML (ref 13–150)
GLOBULIN UR ELPH-MCNC: 3.4 GM/DL
GLUCOSE SERPL-MCNC: 152 MG/DL (ref 65–99)
HCT VFR BLD AUTO: 39.5 % (ref 34–46.6)
HGB BLD-MCNC: 12.3 G/DL (ref 12–15.9)
IMM GRANULOCYTES # BLD AUTO: 0.04 10*3/MM3 (ref 0–0.05)
IMM GRANULOCYTES NFR BLD AUTO: 0.4 % (ref 0–0.5)
IRON 24H UR-MRATE: 92 MCG/DL (ref 37–145)
IRON SATN MFR SERPL: 28 % (ref 20–50)
LYMPHOCYTES # BLD AUTO: 1.79 10*3/MM3 (ref 0.7–3.1)
LYMPHOCYTES NFR BLD AUTO: 18.1 % (ref 19.6–45.3)
MCH RBC QN AUTO: 30.3 PG (ref 26.6–33)
MCHC RBC AUTO-ENTMCNC: 31.1 G/DL (ref 31.5–35.7)
MCV RBC AUTO: 97.3 FL (ref 79–97)
MONOCYTES # BLD AUTO: 0.78 10*3/MM3 (ref 0.1–0.9)
MONOCYTES NFR BLD AUTO: 7.9 % (ref 5–12)
NEUTROPHILS NFR BLD AUTO: 6.87 10*3/MM3 (ref 1.7–7)
NEUTROPHILS NFR BLD AUTO: 69.6 % (ref 42.7–76)
NRBC BLD AUTO-RTO: 0 /100 WBC (ref 0–0.2)
PLATELET # BLD AUTO: 376 10*3/MM3 (ref 140–450)
PMV BLD AUTO: 10.3 FL (ref 6–12)
POTASSIUM SERPL-SCNC: 4.2 MMOL/L (ref 3.5–5.2)
PROT SERPL-MCNC: 7 G/DL (ref 6–8.5)
RBC # BLD AUTO: 4.06 10*6/MM3 (ref 3.77–5.28)
SODIUM SERPL-SCNC: 140 MMOL/L (ref 136–145)
TIBC SERPL-MCNC: 326 MCG/DL (ref 298–536)
TRANSFERRIN SERPL-MCNC: 219 MG/DL (ref 200–360)
WBC NRBC COR # BLD AUTO: 9.88 10*3/MM3 (ref 3.4–10.8)

## 2025-02-17 PROCEDURE — 85025 COMPLETE CBC W/AUTO DIFF WBC: CPT

## 2025-02-17 PROCEDURE — 84466 ASSAY OF TRANSFERRIN: CPT

## 2025-02-17 PROCEDURE — 84165 PROTEIN E-PHORESIS SERUM: CPT

## 2025-02-17 PROCEDURE — 36415 COLL VENOUS BLD VENIPUNCTURE: CPT

## 2025-02-17 PROCEDURE — 83521 IG LIGHT CHAINS FREE EACH: CPT

## 2025-02-17 PROCEDURE — 82232 ASSAY OF BETA-2 PROTEIN: CPT

## 2025-02-17 PROCEDURE — 80053 COMPREHEN METABOLIC PANEL: CPT

## 2025-02-17 PROCEDURE — 86334 IMMUNOFIX E-PHORESIS SERUM: CPT

## 2025-02-17 PROCEDURE — 82784 ASSAY IGA/IGD/IGG/IGM EACH: CPT

## 2025-02-17 PROCEDURE — 82728 ASSAY OF FERRITIN: CPT

## 2025-02-17 PROCEDURE — 83540 ASSAY OF IRON: CPT

## 2025-02-19 DIAGNOSIS — D47.2 MGUS (MONOCLONAL GAMMOPATHY OF UNKNOWN SIGNIFICANCE): Primary | ICD-10-CM

## 2025-02-19 LAB
ALBUMIN SERPL ELPH-MCNC: 3 G/DL (ref 2.9–4.4)
ALBUMIN/GLOB SERPL: 0.9 {RATIO} (ref 0.7–1.7)
ALPHA1 GLOB SERPL ELPH-MCNC: 0.3 G/DL (ref 0–0.4)
ALPHA2 GLOB SERPL ELPH-MCNC: 0.8 G/DL (ref 0.4–1)
B-GLOBULIN SERPL ELPH-MCNC: 1.3 G/DL (ref 0.7–1.3)
GAMMA GLOB SERPL ELPH-MCNC: 1.1 G/DL (ref 0.4–1.8)
GLOBULIN SER-MCNC: 3.4 G/DL (ref 2.2–3.9)
IGA SERPL-MCNC: 548 MG/DL (ref 64–422)
IGG SERPL-MCNC: 1032 MG/DL (ref 586–1602)
IGM SERPL-MCNC: 349 MG/DL (ref 26–217)
INTERPRETATION SERPL IEP-IMP: ABNORMAL
KAPPA LC FREE SER-MCNC: 72.3 MG/L (ref 3.3–19.4)
KAPPA LC FREE/LAMBDA FREE SER: 1.55 {RATIO} (ref 0.26–1.65)
LABORATORY COMMENT REPORT: ABNORMAL
LAMBDA LC FREE SERPL-MCNC: 46.6 MG/L (ref 5.7–26.3)
M PROTEIN SERPL ELPH-MCNC: 0.3 G/DL
PROT SERPL-MCNC: 6.4 G/DL (ref 6–8.5)

## 2025-02-20 LAB — B2 MICROGLOB SERPL-MCNC: 6.8 MG/L (ref 0.8–2.2)

## 2025-02-25 ENCOUNTER — OFFICE VISIT (OUTPATIENT)
Dept: ONCOLOGY | Facility: CLINIC | Age: 75
End: 2025-02-25
Payer: MEDICARE

## 2025-02-25 VITALS
OXYGEN SATURATION: 97 % | TEMPERATURE: 97 F | HEIGHT: 63 IN | WEIGHT: 165.4 LBS | HEART RATE: 67 BPM | DIASTOLIC BLOOD PRESSURE: 64 MMHG | RESPIRATION RATE: 16 BRPM | SYSTOLIC BLOOD PRESSURE: 108 MMHG | BODY MASS INDEX: 29.3 KG/M2

## 2025-02-25 DIAGNOSIS — N18.32 ANEMIA DUE TO STAGE 3B CHRONIC KIDNEY DISEASE: Primary | ICD-10-CM

## 2025-02-25 DIAGNOSIS — D72.829 LEUKOCYTOSIS, UNSPECIFIED TYPE: ICD-10-CM

## 2025-02-25 DIAGNOSIS — M85.89 OSTEOPENIA OF MULTIPLE SITES: ICD-10-CM

## 2025-02-25 DIAGNOSIS — D50.9 IRON DEFICIENCY ANEMIA, UNSPECIFIED IRON DEFICIENCY ANEMIA TYPE: ICD-10-CM

## 2025-02-25 DIAGNOSIS — D47.2 MGUS (MONOCLONAL GAMMOPATHY OF UNKNOWN SIGNIFICANCE): ICD-10-CM

## 2025-02-25 DIAGNOSIS — D63.1 ANEMIA DUE TO STAGE 3B CHRONIC KIDNEY DISEASE: Primary | ICD-10-CM

## 2025-02-25 PROCEDURE — 3078F DIAST BP <80 MM HG: CPT | Performed by: NURSE PRACTITIONER

## 2025-02-25 PROCEDURE — 99214 OFFICE O/P EST MOD 30 MIN: CPT | Performed by: NURSE PRACTITIONER

## 2025-02-25 PROCEDURE — 1125F AMNT PAIN NOTED PAIN PRSNT: CPT | Performed by: NURSE PRACTITIONER

## 2025-02-25 PROCEDURE — 3074F SYST BP LT 130 MM HG: CPT | Performed by: NURSE PRACTITIONER

## 2025-02-25 NOTE — PROGRESS NOTES
[facility] HEMATOLOGY & ONCOLOGY  [address]  [place] [address] [phone]    Patient Name: Cheryl Taylor  Encounter Date: 02/25/2025   YOB: 1950  Patient Number: [id number]    PROGRESS NOTE     HISTORY OF PRESENT ILLNESS: Cheryl Taylor is a 74 y.o. female followed by this office for anemia. History is obtained from patient and daughter , Cinthya. History is considered to be accurate.    She has health history significant for anemia, HTN, CKD, COPD, depression, GERD, chronic back pain seeing pain management, stable aortic aneurysm seen by cardiology in [place] and is followed by cardiothoracic surgery here, leukocytosis, MGUS she has received Venofer and Injectafer in the past.    She was followed by Dr. Car on in the past and it appears as though her last visit with him was in 2017.    She had bone marrow biopsy on November 27, 2009.  Per previous office notes it was unremarkable.  Leukocytosis was felt to be reactive in etiology.  She had additional bone marrow biopsy on September 3, 2014.  Results include normocellular bone marrow.  Mild absolute erythroid hyperplasia in the marrow.  Normal myelopoiesis in the marrow.  No increase in lymphocytes or plasma cells in the marrow.  Slight increase in megakaryocytes and aspirate smears.  No marrow fibrosis.  Absence of stainable iron.  No evidence of metastatic carcinoma or granulomatosis disease in the bone marrow.  Flow cytometry showed no evidence of abnormal myeloid maturation or an increased blast population.  There was no evidence of a lymphoproliferative disorder.  FISH for MPN was negative.      Patient has history of MGUS with IgM monoclonal protein.  She has seen Dr. Duron previously     Recently quit smoking recently.  She smoked for approx 50+ years 2-3 PPD.      She had IV iron on August 10, 17 and 24th, 2023.      INTERVAL HISTORY      History of Present  Illness  The patient presents for evaluation of MGUS.    She reports no significant changes in her health status since the last consultation. She is currently under the care of a nephrologist who conducts regular laboratory tests. She has expressed a desire to reduce the frequency of these tests. Her last iron supplementation was administered on 05/25/2024, 05/31/2024, 06/07/2024, and 06/14/2024.    SOCIAL HISTORY  - Admits to smoking            LABS    Lab Results - Last 18 Months   Lab Units 02/17/25  1416 05/24/24  1108 02/16/24  1045 01/09/24  1410 10/19/23  1555 09/07/23  1108   HEMOGLOBIN g/dL 12.3 11.7* 12.4 12.5 12.7 11.7*   HEMATOCRIT % 39.5 38.3 39.3 42.3 41.7 40.3   MCV fL 97.3* 94.6 98.0* 100.5* 97.9* 97.8*   WBC 10*3/mm3 9.88 10.00 11.72* 11.76* 10.38 9.48   RDW % 14.3 14.5 13.5 13.3 17.2* 21.1*   MPV fL 10.3 9.3 10.2 10.1 9.9 9.7   PLATELETS 10*3/mm3 376 355 325 384 355 402   IMM GRAN % % 0.4 0.5 0.4 0.6* 0.6* 0.4   NEUTROS ABS 10*3/mm3 6.87 6.66 8.04* 8.83* 7.08* 6.28   LYMPHS ABS 10*3/mm3 1.79 1.99 2.00 1.69 2.14 2.01   MONOS ABS 10*3/mm3 0.78 0.96* 1.24* 0.93* 0.71 0.70   EOS ABS 10*3/mm3 0.35 0.27 0.32 0.16 0.32 0.40   BASOS ABS 10*3/mm3 0.05 0.07 0.07 0.08 0.07 0.05   IMMATURE GRANS (ABS) 10*3/mm3 0.04 0.05 0.05 0.07* 0.06* 0.04   NRBC /100 WBC 0.0 0.0 0.0 0.0 0.0 0.0       Lab Results - Last 18 Months   Lab Units 02/17/25  1416 05/24/24  1108 02/16/24  1045 01/09/24  1410 10/19/23  1555 09/22/23  1115 09/07/23  1243 09/07/23  1108   GLUCOSE mg/dL 152* 127* 111* 112* 103*  --   --  122*   SODIUM mmol/L 140 139 135* 137 138  --   --  139   POTASSIUM mmol/L 4.2 4.7 4.4 4.1 4.2  --   --  4.0   CO2 mmol/L 20.0* 22.0 22.0 25.0 21.0*  --   --  21.0*   CHLORIDE mmol/L 107 103 102 100 105  --   --  104   ANION GAP mmol/L 13.0 14.0 11.0 12.0 12.0  --   --  14.0   CREATININE mg/dL 1.46* 1.58* 1.41* 1.41* 1.30* 2.30*  --  1.33*   BUN mg/dL 20 17 23 15 18  --   --  19   BUN / CREAT RATIO  13.7 10.8 16.3 10.6  13.8  --   --  14.3   CALCIUM mg/dL 9.5 9.6 9.2 9.6 9.5  --   --  9.5   ALK PHOS U/L 167* 161* 160* 165* 130*  --   --  152*   TOTAL PROTEIN g/dL 7.0  6.4 6.9  6.3 6.6 7.3  6.8 6.7  --    < > 6.6   ALT (SGPT) U/L 12 21 15 11 13  --   --  14   AST (SGOT) U/L 17 26 23 20 20  --   --  16   BILIRUBIN mg/dL 0.4 0.2 0.2 0.2 0.2  --   --  0.2   ALBUMIN g/dL 3.6  3.0 3.8  3.3 3.4* 3.7  3.2 3.7  --    < > 3.9   GLOBULIN gm/dL 3.4 3.1 3.2 3.6 3.0  --   --  2.7   GLOBULINREF g/dL 3.4 3.0  --  3.6  --   --    < >  --     < > = values in this interval not displayed.       Lab Results - Last 18 Months   Lab Units 02/17/25  1416 05/24/24  1108 01/09/24  1410 09/07/23  1243   M-SPIKE g/dL 0.3* 0.2* 0.4* 0.4*   KAPPA/LAMBDA RATIO, S  1.55 1.59 1.95* 1.43   FREE LAMBDA LIGHT CHAINS mg/L 46.6* 42.7* 47.9* 40.7*   IG KAPPA FREE LIGHT CHAIN mg/L 72.3* 67.7* 93.4* 58.3*   REFERENCE LAB REPORT   --   --   --  See Attached Report       Lab Results - Last 18 Months   Lab Units 02/17/25  1416 05/24/24  1108 02/16/24  1045 01/09/24  1410 10/19/23  1555 09/07/23  1108   IRON mcg/dL 92 24* 80 49 69 61   TIBC mcg/dL 326 414 349 353 344 295*   IRON SATURATION (TSAT) % 28 6* 23 14* 20 21   FERRITIN ng/mL 98.95 39.62 67.43 92.85 102.20 307.30*         PAST MEDICAL HISTORY:  ALLERGIES:  Allergies   Allergen Reactions    Levaquin [Levofloxacin] Hives    Naprosyn [Naproxen] Hives    Sulfa Antibiotics Hives     CURRENT MEDICATIONS:  Outpatient Encounter Medications as of 2/25/2025   Medication Sig Dispense Refill    amLODIPine (NORVASC) 2.5 MG tablet Take 1 tablet by mouth Daily.      ARIPiprazole (ABILIFY) 5 MG tablet Take 1 tablet by mouth every night at bedtime.      aspirin 81 MG chewable tablet Chew 1 tablet Daily.      atorvastatin (LIPITOR) 80 MG tablet Take 1 tablet by mouth Daily.      Belbuca film PLACE 1 FILM IN CHEEK EVERY 12 HOURS AS DIRECTED      calcium carbonate (TUMS) 500 MG chewable tablet Chew 1 tablet Daily.       Cholecalciferol (Vitamin D) 50 MCG (2000 UT) capsule Take  by mouth.      cyclobenzaprine (FLEXERIL) 10 MG tablet Take 1 tablet by mouth 3 (Three) Times a Day As Needed.      DULoxetine (CYMBALTA) 60 MG capsule Take 1 capsule by mouth Daily.      famotidine (PEPCID) 20 MG tablet Take 1 tablet by mouth At Night As Needed for Indigestion or Heartburn.      ferrous sulfate 325 (65 FE) MG tablet Take 1 tablet by mouth Daily. OTC      HYDROcodone-acetaminophen (NORCO)  MG per tablet Take 1 tablet by mouth Every 6 (Six) Hours As Needed. for pain      ketoconazole (NIZORAL) 2 % cream Apply 1 Application topically to the appropriate area as directed Daily.      metoprolol succinate XL (TOPROL-XL) 25 MG 24 hr tablet Take 1 tablet by mouth Daily.      triamcinolone (KENALOG) 0.1 % cream Apply 1 Application topically to the appropriate area as directed 3 (Three) Times a Day.       No facility-administered encounter medications on file as of 2/25/2025.     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    Stage 3a chronic kidney disease N18.31    Obesity (BMI 30-39.9) E66.9    Chronic diastolic CHF (congestive heart failure) I50.32    Aortic stenosis, mild I35.0    Nonrheumatic aortic valve insufficiency I35.1    Chronic pain G89.29    Degeneration of lumbar intervertebral disc M51.369    Gastroesophageal reflux disease K21.9    Vasculitis I77.6    Iron deficiency anemia D50.9    Lumbar radiculopathy M54.16    Lumbosacral radiculitis M54.17    Shortness of breath R06.02    Spondylosis M47.9    Thoracic aortic aneurysm without rupture I71.20       HEALTH MAINTENANCE ITEMS:  Health Maintenance Due   Topic Date Due    Pneumococcal Vaccine 50+ (1 of 2 -  PCV) Never done    TDAP/TD VACCINES (1 - Tdap) Never done    ZOSTER VACCINE (1 of 2) Never done    MAMMOGRAM  2015    HEPATITIS C SCREENING  Never done    LIPID PANEL  2023    BMI FOLLOWUP  2023    DXA SCAN  2024    LUNG CANCER SCREENING  2024    COLORECTAL CANCER SCREENING  2025       <no information>  Last Completed Colonoscopy       This patient has no relevant Health Maintenance data.          Immunization History   Administered Date(s) Administered    COVID-19 (MODERNA) 12YRS+ (SPIKEVAX) 2025    COVID-19 (MODERNA) 1st,2nd,3rd Dose Monovalent 2022, 2022    COVID-19 (MODERNA) Monovalent Original Booster 2022, 08/10/2022     Last Completed Mammogram       This patient has no relevant Health Maintenance data.              FAMILY HISTORY:  Family History   Problem Relation Age of Onset    Hypertension Mother      SOCIAL HISTORY:  Social History     Socioeconomic History    Marital status:    Tobacco Use    Smoking status: Former     Current packs/day: 0.00     Average packs/day: 3.0 packs/day for 59.2 years (177.7 ttl pk-yrs)     Types: Cigarettes     Start date:      Quit date: 3/31/2023     Years since quittin.9    Smokeless tobacco: Never    Tobacco comments:     Pt quit end of 2023, but as of 23 states she has smoked about 3 cigarettes total since then.   Vaping Use    Vaping status: Never Used   Substance and Sexual Activity    Alcohol use: Never    Drug use: Never    Sexual activity: Defer       REVIEW OF SYSTEMS:  Review of Systems   Constitutional:  Positive for fatigue. Negative for fever and unexpected weight loss.   HENT:  Positive for hearing loss. Negative for trouble swallowing.    Eyes:         Wears glasses     Respiratory:  Positive for shortness of breath (with exertion). Negative for cough.         COPD managed by PCP     Cardiovascular:  Positive for chest pain (Reports chest pain at rest.  States has seen PCP.  " Sees Cardiology Dr. Franco). Negative for palpitations and leg swelling.        Followed by Cardiology .  Aortic Aneurysm    Gastrointestinal:  Negative for nausea and vomiting.   Endocrine:        Increased nocturia    Genitourinary:  Negative for hematuria, pelvic pain and pelvic pressure.   Musculoskeletal:  Positive for gait problem. Negative for arthralgias and myalgias.        Uses walker to assist with ambulation     Skin:  Negative for rash, skin lesions and wound.   Neurological:  Positive for dizziness (\"my dizziness is so bad I wont drive\") and tremors (Interminttent \"shakes\". pt states \"it looks like I am having a seizure\"  This has been occuring for the past 6-12 months.). Negative for syncope, headache and confusion.        Intermittently loses control of right hand   Psychiatric/Behavioral:  Positive for depressed mood. Negative for suicidal ideas. The patient is nervous/anxious.         On Cymbalta.  Recently increased.  Managed by Dr. James, PCP       I have reviewed the ROS and verified with the patient the accuracy of it. No changes since the information was documented.  Racheal SIMS Joseph, APRN 02/25/2025     /64   Pulse 67   Temp 97 °F (36.1 °C)   Resp 16   Ht 160 cm (63\")   Wt 75 kg (165 lb 6.4 oz)   SpO2 97%   BMI 29.30 kg/m²  Body surface area is 1.78 meters squared.    Pain Score    02/25/25 1111   PainSc: 7    PainLoc: Generalized        Physical Exam  Constitutional:       Appearance: Normal appearance.   HENT:      Head: Normocephalic and atraumatic.   Cardiovascular:      Rate and Rhythm: Normal rate and regular rhythm.   Pulmonary:      Effort: Pulmonary effort is normal.      Breath sounds: Normal breath sounds.   Abdominal:      General: Bowel sounds are normal.      Palpations: Abdomen is soft.   Musculoskeletal:      Right lower leg: No edema.      Left lower leg: No edema.   Feet:      Comments:    Skin:     General: Skin is warm and dry.   Neurological:      Mental Status: " She is alert and oriented to person, place, and time.   Psychiatric:         Attention and Perception: Attention normal.         Mood and Affect: Mood normal.         Judgment: Judgment normal.         Cheryl Taylor reports a pain score of 7.  Given her pain assessment as noted, treatment options were discussed and the following options were decided upon as a follow-up plan to address the patient's pain: continuation of current treatment plan for pain. Sees pain management      ASSESSMENT / PLAN:    1. Anemia due to stage 3b chronic kidney disease    2. Iron deficiency anemia, unspecified iron deficiency anemia type    3. MGUS (monoclonal gammopathy of unknown significance)    4. Osteopenia of multiple sites    5. Leukocytosis, unspecified type            Anemia in Stage IIIb CKD  Iron Deficiency   -Received Venofer August 10 and 17, 24. 2023  -Taking oral iron daily   -Labs 2/17/25:Iron 92, Ferritin 98, Sat 28%, TIBC 326, Hgb 12.3, Hgb 39.5  -If Hgb gets < 10g AND Ferritin is >100 and/or Sat >20, AND GFR < 60 can start JARRETT therapy  -Followed by Dr. Duron, Nephrology     MGUS  -Bone marrow biopsy September 3, 2014.    Results include normocellular bone marrow.  Mild absolute erythroid hyperplasia in the marrow.  Normal myelopoiesis in the marrow.  No increase in lymphocytes or plasma cells in the marrow.  Slight increase in megakaryocytes and aspirate smears.  No marrow fibrosis.  Absence of stainable iron.  No evidence of metastatic carcinoma or granulomatosis disease in the bone marrow.  Flow cytometry showed no evidence of abnormal myeloid maturation or an increased blast population.  There was no evidence of a lymphoproliferative disorder.  FISH for MPN was negative.  -CT Chest 5/18/23 showed no mediastinal or hilar lymphadenopathy by CT size criteria.  No acute or suspicious bony finding.   - MILI, PE & Free LT Chains, Ser (02/17/2025 14:16)  Beta 2 Microglobulin, Serum (02/17/2025 14:16)    4.  Osteopenia   -DEXA  scan 3/23/2022  Femoral neck T score -2.3  Lumbar spine T score -1.7   -Pt is taking calcium carbonate and Vitamin D   -Followed by PCP    5.  Leukocytosis   6.  Thrombocytosis   CBC & Differential (02/17/2025 14:16)   -Previous bone marrow showed no evidence of lymphoproliferative disorder  -Leukocytosis likely secondary to smoking and thrombocytosis resolved.  - JAK2, CALR, FISH for CLL all negative.   -Flow Cytometry 9/11/23 with no abnormal myeloid maturation or increased blast population.         PLAN:   Stable for observation  Continue current medications, treatment plans and follow up with PCP and any other providers   Labs only q 3 months   RTC in 12 months   Pre-office labs for CBC, CMP, Iron Profile, Ferritin, SPEP, MILI, Light Chains  Orders have been signed.  Care discussed with patient.  Understanding expressed.  Patient agreeable with plan.             ACP discussion was held with the patient during this visit. Patient does not have an advance directive, information provided.  Via AVCALE Gutierrez  02/25/2025

## 2025-04-15 ENCOUNTER — HOSPITAL ENCOUNTER (OUTPATIENT)
Dept: GENERAL RADIOLOGY | Facility: HOSPITAL | Age: 75
Discharge: HOME | End: 2025-04-15
Admitting: NURSE PRACTITIONER
Payer: MEDICARE

## 2025-04-15 ENCOUNTER — TRANSCRIBE ORDERS (OUTPATIENT)
Dept: ADMINISTRATIVE | Facility: HOSPITAL | Age: 75
End: 2025-04-15
Payer: MEDICARE

## 2025-04-15 DIAGNOSIS — M47.816 LUMBAR SPONDYLOSIS: Primary | ICD-10-CM

## 2025-04-15 PROCEDURE — 72110 X-RAY EXAM L-2 SPINE 4/>VWS: CPT

## 2025-04-28 ENCOUNTER — APPOINTMENT (OUTPATIENT)
Dept: GENERAL RADIOLOGY | Facility: HOSPITAL | Age: 75
DRG: 522 | End: 2025-04-28
Payer: MEDICARE

## 2025-04-28 ENCOUNTER — HOSPITAL ENCOUNTER (INPATIENT)
Facility: HOSPITAL | Age: 75
LOS: 8 days | Discharge: HOME | DRG: 522 | End: 2025-05-06
Attending: HOSPITALIST | Admitting: INTERNAL MEDICINE
Payer: MEDICARE

## 2025-04-28 ENCOUNTER — ANESTHESIA (OUTPATIENT)
Dept: PERIOP | Facility: HOSPITAL | Age: 75
End: 2025-04-28
Payer: MEDICARE

## 2025-04-28 ENCOUNTER — ANESTHESIA EVENT (OUTPATIENT)
Dept: PERIOP | Facility: HOSPITAL | Age: 75
End: 2025-04-28
Payer: MEDICARE

## 2025-04-28 DIAGNOSIS — Z74.09 IMPAIRED MOBILITY: ICD-10-CM

## 2025-04-28 DIAGNOSIS — S72.001A CLOSED FRACTURE OF NECK OF RIGHT FEMUR, INITIAL ENCOUNTER: ICD-10-CM

## 2025-04-28 LAB
ALBUMIN SERPL-MCNC: 3.8 G/DL (ref 3.5–5.2)
ALBUMIN/GLOB SERPL: 1.1 G/DL
ALP SERPL-CCNC: 190 U/L (ref 39–117)
ALT SERPL W P-5'-P-CCNC: 14 U/L (ref 1–33)
ANION GAP SERPL CALCULATED.3IONS-SCNC: 16 MMOL/L (ref 5–15)
APTT PPP: 25.6 SECONDS (ref 24.5–36)
AST SERPL-CCNC: 18 U/L (ref 1–32)
BASOPHILS # BLD AUTO: 0.09 10*3/MM3 (ref 0–0.2)
BASOPHILS NFR BLD AUTO: 0.4 % (ref 0–1.5)
BILIRUB SERPL-MCNC: 0.4 MG/DL (ref 0–1.2)
BUN SERPL-MCNC: 20 MG/DL (ref 8–23)
BUN/CREAT SERPL: 14.5 (ref 7–25)
CALCIUM SPEC-SCNC: 9.3 MG/DL (ref 8.6–10.5)
CHLORIDE SERPL-SCNC: 102 MMOL/L (ref 98–107)
CO2 SERPL-SCNC: 18 MMOL/L (ref 22–29)
CREAT SERPL-MCNC: 1.38 MG/DL (ref 0.57–1)
DEPRECATED RDW RBC AUTO: 46.9 FL (ref 37–54)
EGFRCR SERPLBLD CKD-EPI 2021: 40.5 ML/MIN/1.73
EOSINOPHIL # BLD AUTO: 0.04 10*3/MM3 (ref 0–0.4)
EOSINOPHIL NFR BLD AUTO: 0.2 % (ref 0.3–6.2)
ERYTHROCYTE [DISTWIDTH] IN BLOOD BY AUTOMATED COUNT: 14.1 % (ref 12.3–15.4)
GLOBULIN UR ELPH-MCNC: 3.6 GM/DL
GLUCOSE SERPL-MCNC: 115 MG/DL (ref 65–99)
HCT VFR BLD AUTO: 36.8 % (ref 34–46.6)
HGB BLD-MCNC: 11.7 G/DL (ref 12–15.9)
IMM GRANULOCYTES # BLD AUTO: 0.24 10*3/MM3 (ref 0–0.05)
IMM GRANULOCYTES NFR BLD AUTO: 0.9 % (ref 0–0.5)
INR PPP: 1.06 (ref 0.91–1.09)
LYMPHOCYTES # BLD AUTO: 1.44 10*3/MM3 (ref 0.7–3.1)
LYMPHOCYTES NFR BLD AUTO: 5.6 % (ref 19.6–45.3)
MCH RBC QN AUTO: 28.7 PG (ref 26.6–33)
MCHC RBC AUTO-ENTMCNC: 31.8 G/DL (ref 31.5–35.7)
MCV RBC AUTO: 90.2 FL (ref 79–97)
MONOCYTES # BLD AUTO: 1.54 10*3/MM3 (ref 0.1–0.9)
MONOCYTES NFR BLD AUTO: 6 % (ref 5–12)
NEUTROPHILS NFR BLD AUTO: 22.32 10*3/MM3 (ref 1.7–7)
NEUTROPHILS NFR BLD AUTO: 86.9 % (ref 42.7–76)
NRBC BLD AUTO-RTO: 0 /100 WBC (ref 0–0.2)
PLATELET # BLD AUTO: 440 10*3/MM3 (ref 140–450)
PMV BLD AUTO: 9.7 FL (ref 6–12)
POTASSIUM SERPL-SCNC: 4.5 MMOL/L (ref 3.5–5.2)
PROT SERPL-MCNC: 7.4 G/DL (ref 6–8.5)
PROTHROMBIN TIME: 14.3 SECONDS (ref 11.8–14.8)
RBC # BLD AUTO: 4.08 10*6/MM3 (ref 3.77–5.28)
SODIUM SERPL-SCNC: 136 MMOL/L (ref 136–145)
WBC NRBC COR # BLD AUTO: 25.67 10*3/MM3 (ref 3.4–10.8)

## 2025-04-28 PROCEDURE — 25010000002 MORPHINE PER 10 MG: Performed by: HOSPITALIST

## 2025-04-28 PROCEDURE — 71045 X-RAY EXAM CHEST 1 VIEW: CPT

## 2025-04-28 PROCEDURE — 73590 X-RAY EXAM OF LOWER LEG: CPT

## 2025-04-28 PROCEDURE — 25010000002 MORPHINE PER 10 MG: Performed by: EMERGENCY MEDICINE

## 2025-04-28 PROCEDURE — 73552 X-RAY EXAM OF FEMUR 2/>: CPT

## 2025-04-28 PROCEDURE — 85730 THROMBOPLASTIN TIME PARTIAL: CPT

## 2025-04-28 PROCEDURE — 93010 ELECTROCARDIOGRAM REPORT: CPT | Performed by: INTERNAL MEDICINE

## 2025-04-28 PROCEDURE — 85025 COMPLETE CBC W/AUTO DIFF WBC: CPT

## 2025-04-28 PROCEDURE — 25010000002 ONDANSETRON PER 1 MG: Performed by: EMERGENCY MEDICINE

## 2025-04-28 PROCEDURE — 99285 EMERGENCY DEPT VISIT HI MDM: CPT

## 2025-04-28 PROCEDURE — 73600 X-RAY EXAM OF ANKLE: CPT

## 2025-04-28 PROCEDURE — 36415 COLL VENOUS BLD VENIPUNCTURE: CPT

## 2025-04-28 PROCEDURE — 73630 X-RAY EXAM OF FOOT: CPT

## 2025-04-28 PROCEDURE — 73502 X-RAY EXAM HIP UNI 2-3 VIEWS: CPT

## 2025-04-28 PROCEDURE — 93005 ELECTROCARDIOGRAM TRACING: CPT

## 2025-04-28 PROCEDURE — 85610 PROTHROMBIN TIME: CPT

## 2025-04-28 PROCEDURE — 80053 COMPREHEN METABOLIC PANEL: CPT

## 2025-04-28 PROCEDURE — 73562 X-RAY EXAM OF KNEE 3: CPT

## 2025-04-28 RX ORDER — ONDANSETRON 2 MG/ML
4 INJECTION INTRAMUSCULAR; INTRAVENOUS EVERY 6 HOURS PRN
Status: DISCONTINUED | OUTPATIENT
Start: 2025-04-28 | End: 2025-04-29

## 2025-04-28 RX ORDER — SODIUM CHLORIDE 9 MG/ML
40 INJECTION, SOLUTION INTRAVENOUS AS NEEDED
Status: DISCONTINUED | OUTPATIENT
Start: 2025-04-28 | End: 2025-05-06 | Stop reason: HOSPADM

## 2025-04-28 RX ORDER — ACETAMINOPHEN 650 MG/1
650 SUPPOSITORY RECTAL EVERY 4 HOURS PRN
Status: DISCONTINUED | OUTPATIENT
Start: 2025-04-28 | End: 2025-05-01 | Stop reason: SDUPTHER

## 2025-04-28 RX ORDER — ONDANSETRON 2 MG/ML
4 INJECTION INTRAMUSCULAR; INTRAVENOUS ONCE
Status: COMPLETED | OUTPATIENT
Start: 2025-04-28 | End: 2025-04-28

## 2025-04-28 RX ORDER — SODIUM CHLORIDE 0.9 % (FLUSH) 0.9 %
10 SYRINGE (ML) INJECTION AS NEEDED
Status: DISCONTINUED | OUTPATIENT
Start: 2025-04-28 | End: 2025-05-06 | Stop reason: HOSPADM

## 2025-04-28 RX ORDER — POLYETHYLENE GLYCOL 3350 17 G/17G
17 POWDER, FOR SOLUTION ORAL DAILY PRN
Status: DISCONTINUED | OUTPATIENT
Start: 2025-04-28 | End: 2025-05-06 | Stop reason: HOSPADM

## 2025-04-28 RX ORDER — BISACODYL 5 MG/1
5 TABLET, DELAYED RELEASE ORAL DAILY PRN
Status: DISCONTINUED | OUTPATIENT
Start: 2025-04-28 | End: 2025-05-06 | Stop reason: HOSPADM

## 2025-04-28 RX ORDER — AMOXICILLIN 250 MG
2 CAPSULE ORAL 2 TIMES DAILY PRN
Status: DISCONTINUED | OUTPATIENT
Start: 2025-04-28 | End: 2025-05-06 | Stop reason: HOSPADM

## 2025-04-28 RX ORDER — HYDROCODONE BITARTRATE AND ACETAMINOPHEN 5; 325 MG/1; MG/1
1 TABLET ORAL EVERY 6 HOURS PRN
Refills: 0 | Status: DISCONTINUED | OUTPATIENT
Start: 2025-04-28 | End: 2025-04-30

## 2025-04-28 RX ORDER — ACETAMINOPHEN 160 MG/5ML
650 SOLUTION ORAL EVERY 4 HOURS PRN
Status: DISCONTINUED | OUTPATIENT
Start: 2025-04-28 | End: 2025-05-06 | Stop reason: HOSPADM

## 2025-04-28 RX ORDER — ACETAMINOPHEN 325 MG/1
650 TABLET ORAL EVERY 4 HOURS PRN
Status: DISCONTINUED | OUTPATIENT
Start: 2025-04-28 | End: 2025-05-01 | Stop reason: SDUPTHER

## 2025-04-28 RX ORDER — BISACODYL 10 MG
10 SUPPOSITORY, RECTAL RECTAL DAILY PRN
Status: DISCONTINUED | OUTPATIENT
Start: 2025-04-28 | End: 2025-05-06 | Stop reason: HOSPADM

## 2025-04-28 RX ORDER — SODIUM CHLORIDE 0.9 % (FLUSH) 0.9 %
10 SYRINGE (ML) INJECTION EVERY 12 HOURS SCHEDULED
Status: DISCONTINUED | OUTPATIENT
Start: 2025-04-28 | End: 2025-05-06 | Stop reason: HOSPADM

## 2025-04-28 RX ADMIN — ONDANSETRON 4 MG: 2 INJECTION INTRAMUSCULAR; INTRAVENOUS at 16:35

## 2025-04-28 RX ADMIN — MORPHINE SULFATE 4 MG: 4 INJECTION, SOLUTION INTRAMUSCULAR; INTRAVENOUS at 18:17

## 2025-04-28 RX ADMIN — HYDROCODONE BITARTRATE AND ACETAMINOPHEN 1 TABLET: 5; 325 TABLET ORAL at 20:34

## 2025-04-28 RX ADMIN — MORPHINE SULFATE 4 MG: 4 INJECTION, SOLUTION INTRAMUSCULAR; INTRAVENOUS at 16:35

## 2025-04-28 RX ADMIN — MORPHINE SULFATE 4 MG: 4 INJECTION, SOLUTION INTRAMUSCULAR; INTRAVENOUS at 22:10

## 2025-04-28 RX ADMIN — Medication 10 ML: at 20:34

## 2025-04-28 NOTE — ED PROVIDER NOTES
Subjective   History of Present Illness  Patient is a 75-year-old female who presents to the ER post fall.  Patient reports that she was using her rollator and it rolled out from underneath her.  Patient reports that she landed on her right leg.  She is complaining of pain to her entire right leg, primarily to the right upper leg.  Patient reports that she is unable to ambulate due to her pain in her right lower extremity.  She is unable to raise her right lower extremity on exam.  She denies any her head or losing consciousness.  Denies pain elsewhere.  She does have a small skin tear to her right elbow, but does have full range of motion of this.        Review of Systems   Musculoskeletal:  Positive for arthralgias and myalgias.   Skin:  Positive for wound.   All other systems reviewed and are negative.      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    Thoracic aortic aneurysm        Allergies   Allergen Reactions    Levaquin [Levofloxacin] Hives    Naprosyn [Naproxen] Hives    Sulfa Antibiotics Hives       Past Surgical History:   Procedure Laterality Date    BACK SURGERY      CARDIAC CATHETERIZATION N/A 5/6/2022    Procedure: Left Heart Cath;  Surgeon: Lenard Eastman MD;  Location:  [facility]ATH [facility];  Service: Cardiology;  Laterality: N/A;    EYE SURGERY Bilateral     FOOT FASCIOTOMY Bilateral     HYSTERECTOMY      LOWER LEG SOFT TISSUE TUMOR EXCISION      LUMBAR NERVE STIMLATOR INSERTION Right     LYMPH NODE DISSECTION      WRIST FRACTURE SURGERY Right     x2       Family History   Problem Relation Age of Onset    Hypertension Mother        Social History     Socioeconomic  History    Marital status:    Tobacco Use    Smoking status: Former     Current packs/day: 0.00     Average packs/day: 3.0 packs/day for 59.2 years (177.7 ttl pk-yrs)     Types: Cigarettes     Start date:      Quit date: 3/31/2023     Years since quittin.0    Smokeless tobacco: Never    Tobacco comments:     Pt quit end of 2023, but as of 23 states she has smoked about 3 cigarettes total since then.   Vaping Use    Vaping status: Never Used   Substance and Sexual Activity    Alcohol use: Never    Drug use: Never    Sexual activity: Defer           Objective   Physical Exam  Vitals and nursing note reviewed.   Constitutional:       General: She is not in acute distress.     Appearance: Normal appearance. She is normal weight. She is not ill-appearing or toxic-appearing.   HENT:      Head: Normocephalic.   Cardiovascular:      Rate and Rhythm: Normal rate and regular rhythm.      Pulses: Normal pulses.      Heart sounds: Normal heart sounds.   Pulmonary:      Effort: Pulmonary effort is normal.      Breath sounds: Normal breath sounds.   Abdominal:      General: Abdomen is flat. Bowel sounds are normal.      Palpations: Abdomen is soft.   Musculoskeletal:         General: Tenderness present.      Cervical back: Normal range of motion and neck supple.      Right hip: No bony tenderness.      Right upper leg: Tenderness present.      Right knee: Tenderness present.   Skin:     General: Skin is warm and dry.      Findings: Abrasion (right elbow) present. No bruising.   Neurological:      General: No focal deficit present.      Mental Status: She is alert and oriented to person, place, and time. Mental status is at baseline.   Psychiatric:         Mood and Affect: Mood normal.         Behavior: Behavior normal.         Thought Content: Thought content normal.         Judgment: Judgment normal.         Procedures       Labs Reviewed   COMPREHENSIVE METABOLIC PANEL   PROTIME-INR   APTT   CBC WITH  AUTO DIFFERENTIAL   CBC AND DIFFERENTIAL    Narrative:     The following orders were created for panel order CBC & Differential.  Procedure                               Abnormality         Status                     ---------                               -----------         ------                     CBC Auto Differential[[id number]]                            In process                   Please view results for these tests on the individual orders.     XR Chest 1 View   Final Result   1.  Stable chest exam without acute process.       This report was signed and finalized on 4/28/2025 5:10 PM by Dr Gonzalez Leone.          XR Tibia Fibula 2 View Right   Final Result   1. No acute osseous injury identified of the right tibia or fibula.   Arthritic changes at the level of the knee. Osteopenia.           This report was signed and finalized on 4/28/2025 4:30 PM by Dr. Lucy Mccloud MD.          XR Knee 3 View Right   Final Result   1. Osteopenia with tricompartmental osteoarthritis. No acute fracture or   dislocation identified.           This report was signed and finalized on 4/28/2025 4:29 PM by Dr. Lucy Mccloud MD.          XR Hip With or Without Pelvis 2 - 3 View Right   Final Result   1. Impacted right subcapital femoral neck fracture. Osteopenia.           This report was signed and finalized on 4/28/2025 4:28 PM by Dr. Lucy Mccloud MD.          XR Foot 3+ View Right   Final Result   1. No acute fracture or malalignment.       This report was signed and finalized on 4/28/2025 4:31 PM by Dr Gonzalez Leone.          XR Femur 2 View Right   Final Result   1. Impacted right subcapital femoral neck fracture.           This report was signed and finalized on 4/28/2025 4:27 PM by Dr. Lucy Mccloud MD.          XR Ankle 2 View Right   Final Result   1. No acute fracture or malalignment at the ankle.               This report was signed and finalized on 4/28/2025 4:29 PM by Dr Gonzalez Leone.                   ED Course  ED Course as of 04/28/25 1718   Mon Apr 28, 2025   1636 Dr. Lopes, on call orthopedist notified for xray findings [KR]   1701 Discussed with Dr. Lopes - planning for admit to medicine service and NPO status. [KR]   1718 Case discussed with Dr. Rachel, on call hospitalist.  He has accepted patient for further management.  Patient will be admitted under Dr. Hercules's service. [KR]      ED Course User Index  [KR] Claudine Cintron APRN                                                       Medical Decision Making  Problems Addressed:  Closed fracture of neck of right femur, initial encounter: complicated acute illness or injury    Amount and/or Complexity of Data Reviewed  Labs: ordered.  Radiology: ordered.  ECG/medicine tests: ordered.    Risk  Decision regarding hospitalization.        Final diagnoses:   Closed fracture of neck of right femur, initial encounter       ED Disposition  ED Disposition       ED Disposition   Decision to Admit    Condition   --    Comment   Level of Care: Med/Surg [1]   Diagnosis: Closed fracture of neck of right femur [020268]   Admitting Physician: NIMA HERCULES [[id number]]   Certification: I Certify That Inpatient Hospital Services Are Medically Necessary For Greater Than 2 Midnights                 No follow-up provider specified.       Medication List      No changes were made to your prescriptions during this visit.            Claudine Cintron APRN  04/28/25 1718

## 2025-04-28 NOTE — H&P
[facility] Medicine Services  HISTORY AND PHYSICAL    Date of Admission: 4/28/2025  Primary Care Physician: Den James DO    Subjective   Primary Historian: patient    Chief Complaint: fall R hip fracture    Fall    Leg Pain       Patient is a 75-year-old female with PMH of HTN, CKD who presents to the ER post fall. Patient reports that she was using her rollator and it rolled out from underneath her. Patient reports that she landed on her right leg. She is complaining of pain to her entire right leg, primarily to the right upper leg. Patient reports that she is unable to ambulate due to her pain in her right lower extremity. She is unable to raise her right lower extremity on exam. She denies any her head or losing consciousness. Denies pain elsewhere. She does have a small skin tear to her right elbow, but does have full range of motion of this.   In ED R hip fx, ortho contacted, wanted us to admit wbc 22 k, CXR -ve, will check UA no fever NPO after midnight, consult ortho, no abx for now , monitor wbc, identify reconcile restart home meds once reconciled, DVT prophylaxis after surgery per ortho       Review of Systems   Otherwise complete ROS reviewed and negative except as mentioned in the HPI.    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    Thoracic aortic aneurysm      Past Surgical History:  Past Surgical History:   Procedure Laterality Date    BACK SURGERY      CARDIAC CATHETERIZATION N/A 5/6/2022    Procedure: Left Heart Cath;  Surgeon: Lenard Eastman MD;  Location:  [facility] CATH INVASIVE LOCATION;  Service: Cardiology;   Laterality: N/A;    EYE SURGERY Bilateral     FOOT FASCIOTOMY Bilateral     HYSTERECTOMY      LOWER LEG SOFT TISSUE TUMOR EXCISION      LUMBAR NERVE STIMLATOR INSERTION Right     LYMPH NODE DISSECTION      WRIST FRACTURE SURGERY Right     x2     Social History:  reports that she quit smoking about 2 years ago. Her smoking use included cigarettes. She started smoking about 61 years ago. She has a 177.7 pack-year smoking history. She has never used smokeless tobacco. She reports that she does not drink alcohol and does not use drugs.    Family History: family history includes Hypertension in her mother.       Allergies:  Allergies   Allergen Reactions    Levaquin [Levofloxacin] Hives    Naprosyn [Naproxen] Hives    Sulfa Antibiotics Hives       Medications:  Prior to Admission medications    Medication Sig Start Date End Date Taking? Authorizing Provider   amLODIPine (NORVASC) 2.5 MG tablet Take 1 tablet by mouth Daily. 4/12/23   Minnie Paige MD   ARIPiprazole (ABILIFY) 5 MG tablet Take 1 tablet by mouth every night at bedtime.    Minnie Paige MD   aspirin 81 MG chewable tablet Chew 1 tablet Daily.    Minnie Paige MD   atorvastatin (LIPITOR) 80 MG tablet Take 1 tablet by mouth Daily.    Minnie Paige MD   Belbuca film PLACE 1 FILM IN CHEEK EVERY 12 HOURS AS DIRECTED 1/15/24   Minnie Paige MD   calcium carbonate (TUMS) 500 MG chewable tablet Chew 1 tablet Daily.    Minnie Paige MD   Cholecalciferol (Vitamin D) 50 MCG (2000 UT) capsule Take  by mouth.    Minnie Paige MD   cyclobenzaprine (FLEXERIL) 10 MG tablet Take 1 tablet by mouth 3 (Three) Times a Day As Needed. 6/29/22   Minnie Paige MD   DULoxetine (CYMBALTA) 60 MG capsule Take 1 capsule by mouth Daily.    Minnie Paige MD   famotidine (PEPCID) 20 MG tablet Take 1 tablet by mouth At Night As Needed for Indigestion or Heartburn.    Minnie Paige MD   ferrous sulfate 325 (65 FE)  "MG tablet Take 1 tablet by mouth Daily. OTC    ProviderMinnie MD   HYDROcodone-acetaminophen (NORCO)  MG per tablet Take 1 tablet by mouth Every 6 (Six) Hours As Needed. for pain 4/12/23   Minnie Paige MD   ketoconazole (NIZORAL) 2 % cream Apply 1 Application topically to the appropriate area as directed Daily. 5/17/24   Minnie Paige MD   metoprolol succinate XL (TOPROL-XL) 25 MG 24 hr tablet Take 1 tablet by mouth Daily. 9/8/22   Minnie Paige MD   triamcinolone (KENALOG) 0.1 % cream Apply 1 Application topically to the appropriate area as directed 3 (Three) Times a Day. 5/17/24   Minnie Paige MD     I have utilized all available immediate resources to obtain, update, or review the patient's current medications (including all prescriptions, over-the-counter products, herbals, cannabis/cannabidiol products, and vitamin/mineral/dietary (nutritional) supplements).    Objective     Vital Signs: /63   Pulse 78   Temp 97.9 °F (36.6 °C) (Oral)   Resp 19   Ht 160 cm (63\")   Wt 77.1 kg (170 lb)   SpO2 97%   BMI 30.11 kg/m²   Physical Exam  HENT:      Head: Normocephalic.      Nose: Nose normal.   Cardiovascular:      Rate and Rhythm: Normal rate.   Pulmonary:      Breath sounds: Wheezing present.   Abdominal:      General: Abdomen is flat.   Musculoskeletal:      Cervical back: Normal range of motion.      Comments: R hip rotated    Skin:     General: Skin is warm.      Capillary Refill: Capillary refill takes less than 2 seconds.   Neurological:      Mental Status: She is alert.              Results Reviewed:  Lab Results (last 24 hours)       Procedure Component Value Units Date/Time    Protime-INR [[id number]]  (Normal) Collected: 04/28/25 1713    Specimen: Blood Updated: 04/28/25 1730     Protime 14.3 Seconds      INR 1.06    aPTT [[id number]]  (Normal) Collected: 04/28/25 1713    Specimen: Blood Updated: 04/28/25 1730     PTT 25.6 seconds     Narrative:      " PTT = The equivalent PTT values for the therapeutic range of heparin levels at 0.3 to 0.7 U/ml are 77 - 99 seconds.    CBC & Differential [[id number]]  (Abnormal) Collected: 04/28/25 1713    Specimen: Blood Updated: 04/28/25 1719    Narrative:      The following orders were created for panel order CBC & Differential.  Procedure                               Abnormality         Status                     ---------                               -----------         ------                     CBC Auto Differential[[id number]]        Abnormal            Final result                 Please view results for these tests on the individual orders.    CBC Auto Differential [[id number]]  (Abnormal) Collected: 04/28/25 1713    Specimen: Blood Updated: 04/28/25 1719     WBC 25.67 10*3/mm3      RBC 4.08 10*6/mm3      Hemoglobin 11.7 g/dL      Hematocrit 36.8 %      MCV 90.2 fL      MCH 28.7 pg      MCHC 31.8 g/dL      RDW 14.1 %      RDW-SD 46.9 fl      MPV 9.7 fL      Platelets 440 10*3/mm3      Neutrophil % 86.9 %      Lymphocyte % 5.6 %      Monocyte % 6.0 %      Eosinophil % 0.2 %      Basophil % 0.4 %      Immature Grans % 0.9 %      Neutrophils, Absolute 22.32 10*3/mm3      Lymphocytes, Absolute 1.44 10*3/mm3      Monocytes, Absolute 1.54 10*3/mm3      Eosinophils, Absolute 0.04 10*3/mm3      Basophils, Absolute 0.09 10*3/mm3      Immature Grans, Absolute 0.24 10*3/mm3      nRBC 0.0 /100 WBC     Comprehensive Metabolic Panel [[id number]] Collected: 04/28/25 1713    Specimen: Blood Updated: 04/28/25 1717          Imaging Results (Last 24 Hours)       Procedure Component Value Units Date/Time    XR Chest 1 View [[id number]] Collected: 04/28/25 1710     Updated: 04/28/25 1713    Narrative:      XR CHEST 1 VW- 4/28/2025 4:06 PM     HISTORY: pre op; S72.001A-Fracture of unspecified part of neck of right  femur, initial encounter for closed fracture       COMPARISON: 8/4/2023     FINDINGS:  Upright frontal radiograph of the chest was  obtained     Pulmonary fibrosis. Lungs are well expanded. No consolidation or pleural  effusion. Mild cardiomegaly which is stable. Pulmonary vasculature are  nondilated. Partially imaged dorsal column stimulator. No acute bony  abnormality.       Impression:      1.  Stable chest exam without acute process.     This report was signed and finalized on 4/28/2025 5:10 PM by Dr Gonzalez Leone.       XR Foot 3+ View Right [[id number]] Collected: 04/28/25 1630     Updated: 04/28/25 1634    Narrative:      XR FOOT 3+ VW RIGHT- 4/28/2025 3:23 PM     HISTORY: fall     COMPARISON: None      FINDINGS:  Frontal, lateral and oblique radiographs of the right foot were provided  for review.     There is no fracture or joint subluxation. Lisfranc alignment is  anatomic. Joint spaces are fairly well maintained throughout the  forefoot and midfoot. Fifth metatarsal base is intact. No discrete soft  tissue abnormality.       Impression:      1. No acute fracture or malalignment.     This report was signed and finalized on 4/28/2025 4:31 PM by Dr Gonzalez Leone.       XR Tibia Fibula 2 View Right [[id number]] Collected: 04/28/25 1630     Updated: 04/28/25 1633    Narrative:      XR TIBIA FIBULA 2 VW RIGHT-     HISTORY: fall     COMPARISON: None     FINDINGS: Frontal and lateral views of the right tibia and fibula are  obtained.     Moderate arthritic changes at the level of the knee. Osteopenia. No  fracture or dislocation identified. Scattered vascular calcification.       Impression:      1. No acute osseous injury identified of the right tibia or fibula.  Arthritic changes at the level of the knee. Osteopenia.        This report was signed and finalized on 4/28/2025 4:30 PM by Dr. Lucy Mccloud MD.       XR Knee 3 View Right [[id number]] Collected: 04/28/25 1628     Updated: 04/28/25 1632    Narrative:      XR KNEE 3 VW RIGHT-     HISTORY: fall     COMPARISON: None     FINDINGS: 3 views of the right knee are obtained.      Osteopenia. Tricompartmental osteoarthritis with joint space narrowing  moderate bony spurring. No acute fracture or dislocation. Anterior soft  tissue swelling. Scattered vascular calcification.       Impression:      1. Osteopenia with tricompartmental osteoarthritis. No acute fracture or  dislocation identified.        This report was signed and finalized on 4/28/2025 4:29 PM by Dr. Lucy Mccloud MD.       XR Ankle 2 View Right [[id number]] Collected: 04/28/25 1627     Updated: 04/28/25 1632    Narrative:      XR ANKLE 2 VW RIGHT- 4/28/2025 3:23 PM     HISTORY: fall       COMPARISON: None     FINDINGS:  2 views of the right ankle were provided for review.     There is no evidence of acute fracture or malalignment. The ankle  mortise is congruent. The talar dome is intact. No ankle joint capsular  distention. Subtalar joint is unremarkable.       Impression:      1. No acute fracture or malalignment at the ankle.           This report was signed and finalized on 4/28/2025 4:29 PM by Dr Gonzalez Leone.       XR Hip With or Without Pelvis 2 - 3 View Right [[id number]] Collected: 04/28/25 1628     Updated: 04/28/25 1631    Narrative:      XR HIP W OR WO PELVIS 2-3 VIEW RIGHT-     HISTORY: fall     COMPARISON: None     FINDINGS: Frontal view of the pelvis as well as frontal and frog-leg  lateral views right hip 18.     Right subcapital femoral neck fracture with impaction. Osteopenia. Mild  bilateral hip joint space narrowing. Vascular calcification.  Degenerative change lower lumbar spine.       Impression:      1. Impacted right subcapital femoral neck fracture. Osteopenia.        This report was signed and finalized on 4/28/2025 4:28 PM by Dr. Lucy Mccloud MD.       XR Femur 2 View Right [[id number]] Collected: 04/28/25 1626     Updated: 04/28/25 1631    Narrative:      XR FEMUR 2 VW RIGHT-     HISTORY: fall     COMPARISON: None     FINDINGS: Frontal and lateral views of the right femur are obtained.      There is a proximal right subcapital femoral neck fracture with  impaction. The mid and distal right femur appear intact. Osteopenia with  arthritic changes of the knee. Scattered vascular calcifications.       Impression:      1. Impacted right subcapital femoral neck fracture.        This report was signed and finalized on 4/28/2025 4:27 PM by Dr. Lucy Mccloud MD.             I have personally reviewed and interpreted the radiology studies and ECG obtained at time of admission.     Assessment / Plan   Assessment:   Active Hospital Problems    Diagnosis     **Closed fracture of neck of right femur        Treatment Plan  The patient will be admitted to my service here at [facility].   In ED R hip fx, ortho contacted, wanted us to admit wbc 22 k, CXR -ve, will check UA no fever NPO after midnight, consult ortho, no abx for now , monitor wbc, identify reconcile restart home meds once reconciled, DVT prophylaxis after surgery per ortho     Medical Decision Making  Number and Complexity of problems: 2 acute   Differential Diagnosis: as above     Conditions and Status        Condition is at treatment goal.     MDM Data  External documents reviewed: yes  Cardiac tracing (EKG, telemetry) interpretation: yes  Radiology interpretation: yes  Labs reviewed: yes  Any tests that were considered but not ordered: no     Decision rules/scores evaluated (example [id number], Wells, etc): no     Discussed with: ED     Care Planning  Shared decision making: Patient apprised of current labs, vitals, imaging and treatment plan.  They are agreeable with proceeding with plans as discussed.   Code status and discussions: full     Disposition  Social Determinants of Health that impact treatment or disposition: no  Estimated length of stay is TBD.     I confirmed that the patient's advanced care plan is present, code status is documented, and a surrogate decision maker is listed in the patient's medical record.       The  patient was seen and examined by me on 1740 at [facility] .    Electronically signed by Ifeanyi Hercules MD, 04/28/25, 17:30 CDT.

## 2025-04-29 ENCOUNTER — APPOINTMENT (OUTPATIENT)
Dept: GENERAL RADIOLOGY | Facility: HOSPITAL | Age: 75
DRG: 522 | End: 2025-04-29
Payer: MEDICARE

## 2025-04-29 LAB
ANION GAP SERPL CALCULATED.3IONS-SCNC: 12 MMOL/L (ref 5–15)
BACTERIA UR QL AUTO: ABNORMAL /HPF
BACTERIA UR QL AUTO: ABNORMAL /HPF
BASOPHILS # BLD AUTO: 0.08 10*3/MM3 (ref 0–0.2)
BASOPHILS NFR BLD AUTO: 0.4 % (ref 0–1.5)
BILIRUB UR QL STRIP: NEGATIVE
BILIRUB UR QL STRIP: NEGATIVE
BUN SERPL-MCNC: 21 MG/DL (ref 8–23)
BUN/CREAT SERPL: 14.5 (ref 7–25)
CALCIUM SPEC-SCNC: 8.9 MG/DL (ref 8.6–10.5)
CHLORIDE SERPL-SCNC: 100 MMOL/L (ref 98–107)
CLARITY UR: ABNORMAL
CLARITY UR: CLEAR
CO2 SERPL-SCNC: 23 MMOL/L (ref 22–29)
COLOR UR: YELLOW
COLOR UR: YELLOW
CREAT SERPL-MCNC: 1.45 MG/DL (ref 0.57–1)
DEPRECATED RDW RBC AUTO: 46.6 FL (ref 37–54)
EGFRCR SERPLBLD CKD-EPI 2021: 38.2 ML/MIN/1.73
EOSINOPHIL # BLD AUTO: 0.1 10*3/MM3 (ref 0–0.4)
EOSINOPHIL NFR BLD AUTO: 0.5 % (ref 0.3–6.2)
ERYTHROCYTE [DISTWIDTH] IN BLOOD BY AUTOMATED COUNT: 14 % (ref 12.3–15.4)
GLUCOSE SERPL-MCNC: 96 MG/DL (ref 65–99)
GLUCOSE UR STRIP-MCNC: NEGATIVE MG/DL
GLUCOSE UR STRIP-MCNC: NEGATIVE MG/DL
HCT VFR BLD AUTO: 33 % (ref 34–46.6)
HGB BLD-MCNC: 10.3 G/DL (ref 12–15.9)
HGB UR QL STRIP.AUTO: NEGATIVE
HGB UR QL STRIP.AUTO: NEGATIVE
HYALINE CASTS UR QL AUTO: ABNORMAL /LPF
HYALINE CASTS UR QL AUTO: ABNORMAL /LPF
IMM GRANULOCYTES # BLD AUTO: 0.14 10*3/MM3 (ref 0–0.05)
IMM GRANULOCYTES NFR BLD AUTO: 0.7 % (ref 0–0.5)
KETONES UR QL STRIP: ABNORMAL
KETONES UR QL STRIP: NEGATIVE
LEUKOCYTE ESTERASE UR QL STRIP.AUTO: ABNORMAL
LEUKOCYTE ESTERASE UR QL STRIP.AUTO: ABNORMAL
LYMPHOCYTES # BLD AUTO: 2.32 10*3/MM3 (ref 0.7–3.1)
LYMPHOCYTES NFR BLD AUTO: 12.1 % (ref 19.6–45.3)
MCH RBC QN AUTO: 28.8 PG (ref 26.6–33)
MCHC RBC AUTO-ENTMCNC: 31.2 G/DL (ref 31.5–35.7)
MCV RBC AUTO: 92.2 FL (ref 79–97)
MONOCYTES # BLD AUTO: 1.96 10*3/MM3 (ref 0.1–0.9)
MONOCYTES NFR BLD AUTO: 10.3 % (ref 5–12)
NEUTROPHILS NFR BLD AUTO: 14.5 10*3/MM3 (ref 1.7–7)
NEUTROPHILS NFR BLD AUTO: 76 % (ref 42.7–76)
NITRITE UR QL STRIP: POSITIVE
NITRITE UR QL STRIP: POSITIVE
NRBC BLD AUTO-RTO: 0 /100 WBC (ref 0–0.2)
PH UR STRIP.AUTO: 5.5 [PH] (ref 5–8)
PH UR STRIP.AUTO: 5.5 [PH] (ref 5–8)
PLATELET # BLD AUTO: 381 10*3/MM3 (ref 140–450)
PMV BLD AUTO: 10 FL (ref 6–12)
POTASSIUM SERPL-SCNC: 4.6 MMOL/L (ref 3.5–5.2)
PROT UR QL STRIP: ABNORMAL
PROT UR QL STRIP: NEGATIVE
RBC # BLD AUTO: 3.58 10*6/MM3 (ref 3.77–5.28)
RBC # UR STRIP: ABNORMAL /HPF
RBC # UR STRIP: ABNORMAL /HPF
REF LAB TEST METHOD: ABNORMAL
REF LAB TEST METHOD: ABNORMAL
RENAL EPI CELLS #/AREA URNS HPF: ABNORMAL /HPF
SODIUM SERPL-SCNC: 135 MMOL/L (ref 136–145)
SP GR UR STRIP: 1.01 (ref 1–1.03)
SP GR UR STRIP: 1.01 (ref 1–1.03)
SQUAMOUS #/AREA URNS HPF: ABNORMAL /HPF
SQUAMOUS #/AREA URNS HPF: ABNORMAL /HPF
TRANS CELLS #/AREA URNS HPF: ABNORMAL /HPF
UROBILINOGEN UR QL STRIP: ABNORMAL
UROBILINOGEN UR QL STRIP: ABNORMAL
WBC # UR STRIP: ABNORMAL /HPF
WBC # UR STRIP: ABNORMAL /HPF
WBC NRBC COR # BLD AUTO: 19.1 10*3/MM3 (ref 3.4–10.8)

## 2025-04-29 PROCEDURE — 25010000002 VASOPRESSIN 20 UNIT/ML SOLUTION: Performed by: NURSE ANESTHETIST, CERTIFIED REGISTERED

## 2025-04-29 PROCEDURE — 25810000003 SODIUM CHLORIDE 0.9 % SOLUTION: Performed by: ORTHOPAEDIC SURGERY

## 2025-04-29 PROCEDURE — 87088 URINE BACTERIA CULTURE: CPT | Performed by: HOSPITALIST

## 2025-04-29 PROCEDURE — 25010000002 SUGAMMADEX 200 MG/2ML SOLUTION: Performed by: NURSE ANESTHETIST, CERTIFIED REGISTERED

## 2025-04-29 PROCEDURE — 76000 FLUOROSCOPY <1 HR PHYS/QHP: CPT

## 2025-04-29 PROCEDURE — 85025 COMPLETE CBC W/AUTO DIFF WBC: CPT | Performed by: HOSPITALIST

## 2025-04-29 PROCEDURE — 25010000002 BUPIVACAINE 0.25 % SOLUTION: Performed by: ORTHOPAEDIC SURGERY

## 2025-04-29 PROCEDURE — 25010000002 HYDROMORPHONE 1 MG/ML SOLUTION: Performed by: NURSE ANESTHETIST, CERTIFIED REGISTERED

## 2025-04-29 PROCEDURE — 80048 BASIC METABOLIC PNL TOTAL CA: CPT | Performed by: HOSPITALIST

## 2025-04-29 PROCEDURE — 36415 COLL VENOUS BLD VENIPUNCTURE: CPT | Performed by: HOSPITALIST

## 2025-04-29 PROCEDURE — 25810000003 LACTATED RINGERS PER 1000 ML: Performed by: ORTHOPAEDIC SURGERY

## 2025-04-29 PROCEDURE — 73502 X-RAY EXAM HIP UNI 2-3 VIEWS: CPT

## 2025-04-29 PROCEDURE — 0SRR0JA REPLACEMENT OF RIGHT HIP JOINT, FEMORAL SURFACE WITH SYNTHETIC SUBSTITUTE, UNCEMENTED, OPEN APPROACH: ICD-10-PCS | Performed by: ORTHOPAEDIC SURGERY

## 2025-04-29 PROCEDURE — 25010000002 CEFTRIAXONE PER 250 MG: Performed by: HOSPITALIST

## 2025-04-29 PROCEDURE — 25010000002 PROPOFOL 10 MG/ML EMULSION: Performed by: NURSE ANESTHETIST, CERTIFIED REGISTERED

## 2025-04-29 PROCEDURE — 25010000002 MORPHINE PER 10 MG: Performed by: HOSPITALIST

## 2025-04-29 PROCEDURE — 25010000002 LIDOCAINE PF 2% 2 % SOLUTION: Performed by: NURSE ANESTHETIST, CERTIFIED REGISTERED

## 2025-04-29 PROCEDURE — 25010000002 CEFAZOLIN PER 500 MG: Performed by: ORTHOPAEDIC SURGERY

## 2025-04-29 PROCEDURE — 87186 SC STD MICRODIL/AGAR DIL: CPT | Performed by: HOSPITALIST

## 2025-04-29 PROCEDURE — C1776 JOINT DEVICE (IMPLANTABLE): HCPCS | Performed by: ORTHOPAEDIC SURGERY

## 2025-04-29 PROCEDURE — 25010000002 FENTANYL CITRATE (PF) 100 MCG/2ML SOLUTION: Performed by: NURSE ANESTHETIST, CERTIFIED REGISTERED

## 2025-04-29 PROCEDURE — 87086 URINE CULTURE/COLONY COUNT: CPT | Performed by: HOSPITALIST

## 2025-04-29 PROCEDURE — 81001 URINALYSIS AUTO W/SCOPE: CPT | Performed by: HOSPITALIST

## 2025-04-29 DEVICE — HEMOST ABS SURGICEL PWDR 3GM: Type: IMPLANTABLE DEVICE | Site: HIP | Status: FUNCTIONAL

## 2025-04-29 DEVICE — ARTICUL/EZE FEMORAL HEAD DIAMETER 28MM +8.5 12/14 TAPER
Type: IMPLANTABLE DEVICE | Site: HIP | Status: FUNCTIONAL
Brand: ARTICUL/EZE

## 2025-04-29 DEVICE — CAP BIPOL HIP CORAIL ACTIS: Type: IMPLANTABLE DEVICE | Site: HIP | Status: FUNCTIONAL

## 2025-04-29 DEVICE — ACTIS DUOFIX HIP PROSTHESIS (FEMORAL STEM 12/14 TAPER CEMENTLESS SIZE 9 STD COLLAR)  CE
Type: IMPLANTABLE DEVICE | Site: HIP | Status: FUNCTIONAL
Brand: ACTIS

## 2025-04-29 DEVICE — SELF CENTERING BI-POLAR HEAD 28MM ID 46MM OD
Type: IMPLANTABLE DEVICE | Site: HIP | Status: FUNCTIONAL
Brand: SELF CENTERING

## 2025-04-29 RX ORDER — TRANEXAMIC ACID 100 MG/ML
INJECTION, SOLUTION INTRAVENOUS AS NEEDED
Status: DISCONTINUED | OUTPATIENT
Start: 2025-04-29 | End: 2025-04-29 | Stop reason: SURG

## 2025-04-29 RX ORDER — LABETALOL HYDROCHLORIDE 5 MG/ML
5 INJECTION, SOLUTION INTRAVENOUS
Status: DISCONTINUED | OUTPATIENT
Start: 2025-04-29 | End: 2025-04-29 | Stop reason: HOSPADM

## 2025-04-29 RX ORDER — SODIUM CHLORIDE 9 MG/ML
75 INJECTION, SOLUTION INTRAVENOUS CONTINUOUS
Status: DISCONTINUED | OUTPATIENT
Start: 2025-04-29 | End: 2025-05-01

## 2025-04-29 RX ORDER — ACETAMINOPHEN 325 MG/1
650 TABLET ORAL EVERY 4 HOURS PRN
Status: DISCONTINUED | OUTPATIENT
Start: 2025-04-29 | End: 2025-05-06 | Stop reason: HOSPADM

## 2025-04-29 RX ORDER — NALOXONE HCL 0.4 MG/ML
0.4 VIAL (ML) INJECTION
Status: DISCONTINUED | OUTPATIENT
Start: 2025-04-29 | End: 2025-05-06 | Stop reason: HOSPADM

## 2025-04-29 RX ORDER — BUPIVACAINE HYDROCHLORIDE 2.5 MG/ML
INJECTION, SOLUTION INFILTRATION; PERINEURAL AS NEEDED
Status: DISCONTINUED | OUTPATIENT
Start: 2025-04-29 | End: 2025-04-29 | Stop reason: HOSPADM

## 2025-04-29 RX ORDER — CEFAZOLIN SODIUM 1 G/3ML
INJECTION, POWDER, FOR SOLUTION INTRAMUSCULAR; INTRAVENOUS AS NEEDED
Status: DISCONTINUED | OUTPATIENT
Start: 2025-04-29 | End: 2025-04-29

## 2025-04-29 RX ORDER — ACETAMINOPHEN 650 MG/1
650 SUPPOSITORY RECTAL EVERY 4 HOURS PRN
Status: DISCONTINUED | OUTPATIENT
Start: 2025-04-29 | End: 2025-05-06 | Stop reason: HOSPADM

## 2025-04-29 RX ORDER — POLYETHYLENE GLYCOL 3350 17 G/17G
17 POWDER, FOR SOLUTION ORAL DAILY
Status: DISCONTINUED | OUTPATIENT
Start: 2025-04-30 | End: 2025-05-06 | Stop reason: HOSPADM

## 2025-04-29 RX ORDER — MORPHINE SULFATE 2 MG/ML
1 INJECTION, SOLUTION INTRAMUSCULAR; INTRAVENOUS EVERY 4 HOURS PRN
Status: ACTIVE | OUTPATIENT
Start: 2025-04-29 | End: 2025-04-30

## 2025-04-29 RX ORDER — HYDROMORPHONE HYDROCHLORIDE 1 MG/ML
0.5 INJECTION, SOLUTION INTRAMUSCULAR; INTRAVENOUS; SUBCUTANEOUS
Status: DISPENSED | OUTPATIENT
Start: 2025-04-29 | End: 2025-04-30

## 2025-04-29 RX ORDER — FLUMAZENIL 0.1 MG/ML
0.2 INJECTION INTRAVENOUS AS NEEDED
Status: DISCONTINUED | OUTPATIENT
Start: 2025-04-29 | End: 2025-04-29 | Stop reason: HOSPADM

## 2025-04-29 RX ORDER — LIDOCAINE HYDROCHLORIDE 10 MG/ML
0.5 INJECTION, SOLUTION EPIDURAL; INFILTRATION; INTRACAUDAL; PERINEURAL ONCE AS NEEDED
Status: DISCONTINUED | OUTPATIENT
Start: 2025-04-29 | End: 2025-04-29 | Stop reason: HOSPADM

## 2025-04-29 RX ORDER — SODIUM CHLORIDE, SODIUM LACTATE, POTASSIUM CHLORIDE, CALCIUM CHLORIDE 600; 310; 30; 20 MG/100ML; MG/100ML; MG/100ML; MG/100ML
100 INJECTION, SOLUTION INTRAVENOUS CONTINUOUS
Status: DISPENSED | OUTPATIENT
Start: 2025-04-29 | End: 2025-04-30

## 2025-04-29 RX ORDER — SODIUM CHLORIDE 0.9 % (FLUSH) 0.9 %
3 SYRINGE (ML) INJECTION AS NEEDED
Status: DISCONTINUED | OUTPATIENT
Start: 2025-04-29 | End: 2025-04-29 | Stop reason: HOSPADM

## 2025-04-29 RX ORDER — ONDANSETRON 2 MG/ML
4 INJECTION INTRAMUSCULAR; INTRAVENOUS EVERY 6 HOURS PRN
Status: DISCONTINUED | OUTPATIENT
Start: 2025-04-29 | End: 2025-05-06 | Stop reason: HOSPADM

## 2025-04-29 RX ORDER — FENTANYL CITRATE 50 UG/ML
50 INJECTION, SOLUTION INTRAMUSCULAR; INTRAVENOUS
Status: DISCONTINUED | OUTPATIENT
Start: 2025-04-29 | End: 2025-04-29 | Stop reason: HOSPADM

## 2025-04-29 RX ORDER — OXYCODONE AND ACETAMINOPHEN 10; 325 MG/1; MG/1
1 TABLET ORAL EVERY 4 HOURS PRN
Refills: 0 | Status: DISCONTINUED | OUTPATIENT
Start: 2025-04-29 | End: 2025-04-29 | Stop reason: HOSPADM

## 2025-04-29 RX ORDER — ROCURONIUM BROMIDE 10 MG/ML
INJECTION, SOLUTION INTRAVENOUS AS NEEDED
Status: DISCONTINUED | OUTPATIENT
Start: 2025-04-29 | End: 2025-04-29 | Stop reason: SURG

## 2025-04-29 RX ORDER — ONDANSETRON 2 MG/ML
4 INJECTION INTRAMUSCULAR; INTRAVENOUS
Status: DISCONTINUED | OUTPATIENT
Start: 2025-04-29 | End: 2025-04-29 | Stop reason: HOSPADM

## 2025-04-29 RX ORDER — HYDROMORPHONE HYDROCHLORIDE 1 MG/ML
0.5 INJECTION, SOLUTION INTRAMUSCULAR; INTRAVENOUS; SUBCUTANEOUS
Refills: 0 | Status: DISCONTINUED | OUTPATIENT
Start: 2025-04-29 | End: 2025-04-29 | Stop reason: HOSPADM

## 2025-04-29 RX ORDER — LIDOCAINE HYDROCHLORIDE 20 MG/ML
INJECTION, SOLUTION EPIDURAL; INFILTRATION; INTRACAUDAL; PERINEURAL AS NEEDED
Status: DISCONTINUED | OUTPATIENT
Start: 2025-04-29 | End: 2025-04-29 | Stop reason: SURG

## 2025-04-29 RX ORDER — NALOXONE HCL 0.4 MG/ML
0.04 VIAL (ML) INJECTION AS NEEDED
Status: DISCONTINUED | OUTPATIENT
Start: 2025-04-29 | End: 2025-04-29 | Stop reason: HOSPADM

## 2025-04-29 RX ORDER — ONDANSETRON 4 MG/1
4 TABLET, ORALLY DISINTEGRATING ORAL EVERY 6 HOURS PRN
Status: DISCONTINUED | OUTPATIENT
Start: 2025-04-29 | End: 2025-05-06 | Stop reason: HOSPADM

## 2025-04-29 RX ORDER — HYDROCODONE BITARTRATE AND ACETAMINOPHEN 7.5; 325 MG/1; MG/1
2 TABLET ORAL EVERY 4 HOURS PRN
Status: DISPENSED | OUTPATIENT
Start: 2025-04-29 | End: 2025-05-04

## 2025-04-29 RX ORDER — FENTANYL CITRATE 50 UG/ML
INJECTION, SOLUTION INTRAMUSCULAR; INTRAVENOUS AS NEEDED
Status: DISCONTINUED | OUTPATIENT
Start: 2025-04-29 | End: 2025-04-29 | Stop reason: SURG

## 2025-04-29 RX ORDER — PROPOFOL 10 MG/ML
VIAL (ML) INTRAVENOUS AS NEEDED
Status: DISCONTINUED | OUTPATIENT
Start: 2025-04-29 | End: 2025-04-29 | Stop reason: SURG

## 2025-04-29 RX ORDER — BISACODYL 10 MG
10 SUPPOSITORY, RECTAL RECTAL DAILY PRN
Status: DISCONTINUED | OUTPATIENT
Start: 2025-04-29 | End: 2025-04-29 | Stop reason: SDUPTHER

## 2025-04-29 RX ORDER — MAGNESIUM HYDROXIDE 1200 MG/15ML
LIQUID ORAL AS NEEDED
Status: DISCONTINUED | OUTPATIENT
Start: 2025-04-29 | End: 2025-04-29 | Stop reason: HOSPADM

## 2025-04-29 RX ORDER — HYDROCODONE BITARTRATE AND ACETAMINOPHEN 7.5; 325 MG/1; MG/1
1 TABLET ORAL EVERY 4 HOURS PRN
Status: DISPENSED | OUTPATIENT
Start: 2025-04-29 | End: 2025-05-04

## 2025-04-29 RX ADMIN — MORPHINE SULFATE 4 MG: 4 INJECTION, SOLUTION INTRAMUSCULAR; INTRAVENOUS at 11:33

## 2025-04-29 RX ADMIN — ROCURONIUM BROMIDE 20 MG: 10 INJECTION, SOLUTION INTRAVENOUS at 19:01

## 2025-04-29 RX ADMIN — MORPHINE SULFATE 4 MG: 4 INJECTION, SOLUTION INTRAMUSCULAR; INTRAVENOUS at 00:01

## 2025-04-29 RX ADMIN — Medication 10 ML: at 09:31

## 2025-04-29 RX ADMIN — HYDROCODONE BITARTRATE AND ACETAMINOPHEN 1 TABLET: 7.5; 325 TABLET ORAL at 22:01

## 2025-04-29 RX ADMIN — FENTANYL CITRATE 50 MCG: 50 INJECTION, SOLUTION INTRAMUSCULAR; INTRAVENOUS at 18:44

## 2025-04-29 RX ADMIN — HYDROCODONE BITARTRATE AND ACETAMINOPHEN 1 TABLET: 5; 325 TABLET ORAL at 02:29

## 2025-04-29 RX ADMIN — PROPOFOL 100 MG: 10 INJECTION, EMULSION INTRAVENOUS at 17:42

## 2025-04-29 RX ADMIN — HYDROMORPHONE HYDROCHLORIDE 0.5 MG: 1 INJECTION, SOLUTION INTRAMUSCULAR; INTRAVENOUS; SUBCUTANEOUS at 19:35

## 2025-04-29 RX ADMIN — SUGAMMADEX 200 MG: 100 INJECTION, SOLUTION INTRAVENOUS at 19:32

## 2025-04-29 RX ADMIN — CEFAZOLIN 2 G: 2 INJECTION, POWDER, FOR SOLUTION INTRAMUSCULAR; INTRAVENOUS at 18:08

## 2025-04-29 RX ADMIN — LIDOCAINE HYDROCHLORIDE 100 MG: 20 INJECTION, SOLUTION EPIDURAL; INFILTRATION; INTRACAUDAL; PERINEURAL at 17:42

## 2025-04-29 RX ADMIN — TRANEXAMIC ACID 1000 MG: 100 INJECTION, SOLUTION INTRAVENOUS at 18:26

## 2025-04-29 RX ADMIN — FENTANYL CITRATE 50 MCG: 50 INJECTION, SOLUTION INTRAMUSCULAR; INTRAVENOUS at 18:13

## 2025-04-29 RX ADMIN — MORPHINE SULFATE 4 MG: 4 INJECTION, SOLUTION INTRAMUSCULAR; INTRAVENOUS at 13:33

## 2025-04-29 RX ADMIN — CEFTRIAXONE SODIUM 2000 MG: 2 INJECTION, POWDER, FOR SOLUTION INTRAMUSCULAR; INTRAVENOUS at 11:34

## 2025-04-29 RX ADMIN — MORPHINE SULFATE 4 MG: 4 INJECTION, SOLUTION INTRAMUSCULAR; INTRAVENOUS at 09:30

## 2025-04-29 RX ADMIN — MORPHINE SULFATE 4 MG: 4 INJECTION, SOLUTION INTRAMUSCULAR; INTRAVENOUS at 04:08

## 2025-04-29 RX ADMIN — HYDROMORPHONE HYDROCHLORIDE 0.5 MG: 1 INJECTION, SOLUTION INTRAMUSCULAR; INTRAVENOUS; SUBCUTANEOUS at 19:37

## 2025-04-29 RX ADMIN — MORPHINE SULFATE 4 MG: 4 INJECTION, SOLUTION INTRAMUSCULAR; INTRAVENOUS at 15:38

## 2025-04-29 RX ADMIN — Medication 10 ML: at 22:03

## 2025-04-29 RX ADMIN — SODIUM CHLORIDE, POTASSIUM CHLORIDE, SODIUM LACTATE AND CALCIUM CHLORIDE 100 ML/HR: 600; 310; 30; 20 INJECTION, SOLUTION INTRAVENOUS at 16:47

## 2025-04-29 RX ADMIN — ROCURONIUM BROMIDE 50 MG: 10 INJECTION, SOLUTION INTRAVENOUS at 17:42

## 2025-04-29 RX ADMIN — CEFAZOLIN 2000 MG: 2 INJECTION, POWDER, FOR SOLUTION INTRAMUSCULAR; INTRAVENOUS at 21:59

## 2025-04-29 RX ADMIN — SODIUM CHLORIDE 75 ML/HR: 9 INJECTION, SOLUTION INTRAVENOUS at 22:03

## 2025-04-29 RX ADMIN — OXYCODONE AND ACETAMINOPHEN 1 TABLET: 325; 10 TABLET ORAL at 20:08

## 2025-04-29 NOTE — PLAN OF CARE
Goal Outcome Evaluation:  Plan of Care Reviewed With: (P) patient        Progress: (P) no change  Outcome Evaluation: (P) Alert and oriented x4. VSS on 2 liters nasal cannula. Complaints of pain, PRN pain medicine given, relief noted. Bed alarm on. Call light within reach. Safety maintained.

## 2025-04-29 NOTE — PAYOR COMM NOTE
"4/29/25 [facility] [phone]  [phone]    ER ADMIT TO INPATIENT ON 4/28/25. FAXING CLINICAL            Lisbeth Julien (75 y.o. Female)       Date of Birth   1950    Social Security Number       Address   [address]  [facility] [address]    Home Phone   [phone]    MRN   [id number]       Episcopal   Sabianist    Marital Status                               Admission Date   4/28/2025    Admission Type   Emergency    Admitting Provider   Ifeanyi Hercules MD    Attending Provider   Ifeanyi Hercules MD    Department, Room/Bed   [facility] 3A, 346/1       Discharge Date       Discharge Disposition       Discharge Destination                                 Attending Provider: Ifeanyi Hercules MD    Allergies: Levaquin [Levofloxacin], Naprosyn [Naproxen], Sulfa Antibiotics    Isolation: None   Infection: None   Code Status: CPR    Ht: 160 cm (62.99\")   Wt: 80.4 kg (177 lb 4.8 oz)    Admission Cmt: None   Principal Problem: Closed fracture of neck of right femur [S72.001A]                   Active Insurance as of 4/28/2025       Primary Coverage       Payor Plan Insurance Group Employer/Plan Group    AETNA MEDICARE REPLACEMENT AETNA MEDICARE ADVANTAGE PPO [id number]       Payor Plan Address Payor Plan Phone Number Payor Plan Fax Number Effective Dates    [address] [phone]  1/1/2024 - None Entered    [facility] [id number]         Subscriber Name Subscriber Birth Date Member ID       LISBETH JULIEN 1950 [id number]                     Emergency Contacts        (Rel.) Home Phone Work Phone Mobile Phone    Vivian Muniz (Daughter) [phone]    Tez Garveyley (Daughter) [phone]             [facility] Encounter Date/Time: 4/28/2025 1503   Hospital Account: [id number]    MRN: [id number]   Patient:  Lisbeth Julien   Contact Serial #: [id number]   SSN:     "      ENCOUNTER             Patient Class: Inpatient   Unit: [address] Service: Medicine     Bed: 346/1   Admitting Provider: Ifeanyi Hercules MD   Referring Physician: Provider, No Known   Attending Provider: Ifeanyi Hercules MD   Adm Diagnosis: Closed fracture of neck *               PATIENT             Name: Lisbeth Julien : 1950 (75 yrs)   Address: [address] [place] [address],  Sex: Female   City: [place] [address]   County: [facility]   Marital Status:  Ethnicity: NOT        Race: WHITE   Primary Care Provider: Den James DO Patients Phone: Home Phone: [phone]     Mobile Phone: [phone]     EMERGENCY CONTACT   Contact Name Legal Guardian? Relationship to Patient Home Phone Work Phone Mobile Phone   1. Vivian Muniz  2. Cinhtya Garvey      Daughter  Daughter [phone]   GUARANTOR             Guarantor: Lisbeth Julien     : 1950   Address: [address] [place] [address];[address] Sex: [place] [address]     Relation to Patient: Self       Home Phone: [phone]   Guarantor ID: [id number]       Work Phone:     GUARANTOR EMPLOYER   Employer:           Status: RETIRED   COVERAGE          PRIMARY INSURANCE   Payor: AETNA MEDICARE REPLACEMENT Plan: AETNA MEDICARE ADVANTAGE PPO   Group Number: [id number] Insurance Type: INDEMNITY   Subscriber Name: LISBETH JULIEN Subscriber : 1950   Subscriber ID: [id number] Coverage Address: [facility] [id number] [place] [address]   Pat. Rel. to Subscriber: Self Coverage Phone: [phone]   SECONDARY INSURANCE   Payor: N/A Plan: N/A   Group Number:   Insurance Type:     Subscriber Name:   Subscriber :     Subscriber ID:   Coverage Address:     Pat. Rel. to Subscriber:   Coverage Phone:        Contact Serial # ([id number])         2025    Chart ID ([id number])      Claudine Cintron APRN   Nurse Practitioner  Emergency Medicine     ED Provider Notes       Cosign Needed     Date of Service: 04/28/25 1515  Creation Time: 04/28/25 1515     Cosign Needed       Expand All Collapse All    Subjective  History of Present Illness  Patient is a 75-year-old female who presents to the ER post fall.  Patient reports that she was using her rollator and it rolled out from underneath her.  Patient reports that she landed on her right leg.  She is complaining of pain to her entire right leg, primarily to the right upper leg.  Patient reports that she is unable to ambulate due to her pain in her right lower extremity.  She is unable to raise her right lower extremity on exam.  She denies any her head or losing consciousness.  Denies pain elsewhere.  She does have a small skin tear to her right elbow, but does have full range of motion of this.           Review of Systems   Musculoskeletal:  Positive for arthralgias and myalgias.   Skin:  Positive for wound.   All other systems reviewed and are negative.        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    Thoracic aortic aneurysm              Allergies        Allergies   Allergen Reactions    Levaquin [Levofloxacin] Hives    Naprosyn [Naproxen] Hives    Sulfa Antibiotics Hives            Surgical History         Past Surgical History:   Procedure Laterality Date    BACK SURGERY        CARDIAC CATHETERIZATION N/A 5/6/2022     Procedure: Left Heart Cath;  Surgeon: Lenard Eastman MD;  Location:  [facility] CATH INVASIVE LOCATION;  Service: Cardiology;  Laterality: N/A;    EYE SURGERY Bilateral      FOOT FASCIOTOMY Bilateral      HYSTERECTOMY        LOWER LEG SOFT TISSUE TUMOR EXCISION         LUMBAR NERVE STIMLATOR INSERTION Right      LYMPH NODE DISSECTION        WRIST FRACTURE SURGERY Right       x2                  Family History   Problem Relation Age of Onset    Hypertension Mother           Social History   Social History            Socioeconomic History    Marital status:    Tobacco Use    Smoking status: Former       Current packs/day: 0.00       Average packs/day: 3.0 packs/day for 59.2 years (177.7 ttl pk-yrs)       Types: Cigarettes       Start date:        Quit date: 3/31/2023       Years since quittin.0    Smokeless tobacco: Never    Tobacco comments:       Pt quit end of 2023, but as of 23 states she has smoked about 3 cigarettes total since then.   Vaping Use    Vaping status: Never Used   Substance and Sexual Activity    Alcohol use: Never    Drug use: Never    Sexual activity: Defer                  Objective[]Expand by Default  Physical Exam  Vitals and nursing note reviewed.   Constitutional:       General: She is not in acute distress.     Appearance: Normal appearance. She is normal weight. She is not ill-appearing or toxic-appearing.   HENT:      Head: Normocephalic.   Cardiovascular:      Rate and Rhythm: Normal rate and regular rhythm.      Pulses: Normal pulses.      Heart sounds: Normal heart sounds.   Pulmonary:      Effort: Pulmonary effort is normal.      Breath sounds: Normal breath sounds.   Abdominal:      General: Abdomen is flat. Bowel sounds are normal.      Palpations: Abdomen is soft.   Musculoskeletal:         General: Tenderness present.      Cervical back: Normal range of motion and neck supple.      Right hip: No bony tenderness.      Right upper leg: Tenderness present.      Right knee: Tenderness present.   Skin:     General: Skin is warm and dry.      Findings: Abrasion (right elbow) present. No bruising.   Neurological:      General: No focal deficit present.      Mental Status: She is alert and oriented to person, place, and  time. Mental status is at baseline.   Psychiatric:         Mood and Affect: Mood normal.         Behavior: Behavior normal.         Thought Content: Thought content normal.         Judgment: Judgment normal.                         Review of Systems   Musculoskeletal:  Positive for arthralgias and myalgias.   Skin:  Positive for wound.   All other systems reviewed and are negative.        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  Patient reports that she was using her rollator and it rolled out from underneath her.  Patient reports that she landed on her right leg.  She is complaining of pain to her entire right leg, primarily to the right upper leg.  Patient reports that she is unable to ambulate due to her pain in her right lower extremity.  She is unable to raise her right lower extremity on exam.  She denies any her head or losing consciousness.  Denies pain elsewhere.  She does have a small skin tear to her right elbow, but does have full range of motion of this.           Review of Systems   Musculoskeletal:  Positive for arthralgias and myalgias.   Skin:  Positive for wound.   All other systems reviewed and are negative.        Medical History        Past Medical History:   Diagnosis Date    AAA (abdominal aortic aneurysm)      Arthritis      Asthma      Chronic kidney disease (CKD)      Confusion 7/13/2021    COPD (chronic obstructive pulmonary disease)       Gastroesophageal reflux disease with esophagitis without hemorrhage      Heart murmur      Hiatal hernia      Hypertension      Inflammation of arteries 2021    Thoracic aortic aneurysm              Allergies        Allergies   Allergen Reactions    Levaquin [Levofloxacin] Hives    Naprosyn [Naproxen] Hives    Sulfa Antibiotics Hives            Surgical History         Past Surgical History:   Procedure Laterality Date    BACK SURGERY        CARDIAC CATHETERIZATION N/A 2022     Procedure: Left Heart Cath;  Surgeon: Lenard Eastman MD;  Location:  [facility] CATH INVASIVE LOCATION;  Service: Cardiology;  Laterality: N/A;    EYE SURGERY Bilateral      FOOT FASCIOTOMY Bilateral      HYSTERECTOMY        LOWER LEG SOFT TISSUE TUMOR EXCISION        LUMBAR NERVE STIMLATOR INSERTION Right      LYMPH NODE DISSECTION        WRIST FRACTURE SURGERY Right       x2                  Family History   Problem Relation Age of Onset    Hypertension Mother           Social History   Social History            Socioeconomic History    Marital status:    Tobacco Use    Smoking status: Former       Current packs/day: 0.00       Average packs/day: 3.0 packs/day for 59.2 years (177.7 ttl pk-yrs)       Types: Cigarettes       Start date:        Quit date: 3/31/2023       Years since quittin.0    Smokeless tobacco: Never    Tobacco comments:       Pt quit end of 2023, but as of 23 states she has smoked about 3 cigarettes total since then.   Vaping Use    Vaping status: Never Used   Substance and Sexual Activity    Alcohol use: Never    Drug use: Never    Sexual activity: Defer                  Objective  Physical Exam  Vitals and nursing note reviewed.   Constitutional:       General: She is not in acute distress.     Appearance: Normal appearance. She is normal weight. She is not ill-appearing or toxic-appearing.   HENT:      Head: Normocephalic.   Cardiovascular:      Rate and Rhythm: Normal rate and regular  rhythm.      Pulses: Normal pulses.      Heart sounds: Normal heart sounds.   Pulmonary:      Effort: Pulmonary effort is normal.      Breath sounds: Normal breath sounds.   Abdominal:      General: Abdomen is flat. Bowel sounds are normal.      Palpations: Abdomen is soft.   Musculoskeletal:         General: Tenderness present.      Cervical back: Normal range of motion and neck supple.      Right hip: No bony tenderness.      Right upper leg: Tenderness present.      Right knee: Tenderness present.   Skin:     General: Skin is warm and dry.      Findings: Abrasion (right elbow) present. No bruising.   Neurological:      General: No focal deficit present.      Mental Status: She is alert and oriented to person, place, and time. Mental status is at baseline.   Psychiatric:         Mood and Affect: Mood normal.         Behavior: Behavior normal.         Thought Content: Thought content normal.         Judgment: Judgment normal.            Procedures            Labs Reviewed   COMPREHENSIVE METABOLIC PANEL   PROTIME-INR   APTT   CBC WITH AUTO DIFFERENTIAL   CBC AND DIFFERENTIAL     Narrative:      The following orders were created for panel order CBC & Differential.  Procedure                               Abnormality         Status                     ---------                               -----------         ------                     CBC Auto Differential[[id number]]                            In process                    Please view results for these tests on the individual orders.      XR Chest 1 View   Final Result   1.  Stable chest exam without acute process.       This report was signed and finalized on 4/28/2025 5:10 PM by Dr Gonzalez Leone.           XR Tibia Fibula 2 View Right   Final Result   1. No acute osseous injury identified of the right tibia or fibula.   Arthritic changes at the level of the knee. Osteopenia.           This report was signed and finalized on 4/28/2025 4:30 PM by Dr. Ayala  MD Rogers.           XR Knee 3 View Right   Final Result   1. Osteopenia with tricompartmental osteoarthritis. No acute fracture or   dislocation identified.           This report was signed and finalized on 4/28/2025 4:29 PM by Dr. Lucy Mccloud MD.           XR Hip With or Without Pelvis 2 - 3 View Right   Final Result   1. Impacted right subcapital femoral neck fracture. Osteopenia.           This report was signed and finalized on 4/28/2025 4:28 PM by Dr. Lucy Mccloud MD.           XR Foot 3+ View Right   Final Result   1. No acute fracture or malalignment.       This report was signed and finalized on 4/28/2025 4:31 PM by Dr Gonzalez Leone.           XR Femur 2 View Right   Final Result   1. Impacted right subcapital femoral neck fracture.           This report was signed and finalized on 4/28/2025 4:27 PM by Dr. Lucy Mccloud MD.           XR Ankle 2 View Right   Final Result   1. No acute fracture or malalignment at the ankle.               This report was signed and finalized on 4/28/2025 4:29 PM by Dr Gonzalez Leoen.                           Course      ED Course as of 04/28/25 1718   Mon Apr 28, 2025   1636 Dr. Lopes, on call orthopedist notified for xray findings [KR]   1701 Discussed with Dr. Lopes - planning for admit to medicine service and NPO status. [KR]   1718 Case discussed with Dr. Rachel, on call hospitalist.  He has accepted patient for further management.  Patient will be admitted under Dr. Hercules's service. [KR]       ED Course User Index  [KR] Claudine Cintron, Isael Velasquez MD   Physician  Orthopedics     Consults     Incomplete     Date of Service: 04/29/25 0804  Creation Time: 04/29/25 0804  Consult Orders   Inpatient Orthopedic Surgery Consult [[id number]] ordered by Ifeanyi Hercules MD at 04/28/25 1729          Incomplete       Expand All Collapse All    Orthopaedic Surgery Consult Note        4/29/2025   08:05 CDT     Name:  Cheryl Taylor  MRN:    [id number]                              Acct:     [id number]   Room:  [address] [place] [address] Day: 1     Admit Date: 4/28/2025                        PCP: Den James DO           Subjective:      C/C: Right hip fracture. We are seeing this patient in consultation for an orthopaedic evaluation.  The patient is a pleasant 75-year-old female with multiple medical comorbidities who sustained a mechanical ground-level fall onto her right side resulting in the acute onset of right hip pain, mild deformity and the inability to bear weight.  She was transported to [facility] where imaging demonstrated a impacted but pronouncedly varus angulated right femoral neck fracture.  Orthopedics was consulted.  Original plan was to address her surgically yesterday evening, but she was provided a diet erroneously despite orders/recommendations through the emergency department to remain n.p.o.     Pain: 6 /10        Code Status:        Code Status and Medical Interventions: CPR (Attempt to Resuscitate); Full Support   Ordered at: 04/28/25 1729     Code Status (Patient has no pulse and is not breathing):     CPR (Attempt to Resuscitate)     Medical Interventions (Patient has pulse or is breathing):     Full Support            Past Medical History:    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    Thoracic aortic aneurysm              Past Surgical History:    Surgical History         Past Surgical History:   Procedure Laterality Date    BACK SURGERY        CARDIAC CATHETERIZATION N/A 5/6/2022     Procedure: Left Heart Cath;  Surgeon: Lenard Eastman MD;  Location: [facility] CATH INVASIVE LOCATION;   Service: Cardiology;  Laterality: N/A;    EYE SURGERY Bilateral      FOOT FASCIOTOMY Bilateral      HYSTERECTOMY        LOWER LEG SOFT TISSUE TUMOR EXCISION        LUMBAR NERVE STIMLATOR INSERTION Right      LYMPH NODE DISSECTION        WRIST FRACTURE SURGERY Right       x2            Current Medications:           Prior to Admission medications    Medication Sig Start Date End Date Taking? Authorizing Provider   amLODIPine (NORVASC) 2.5 MG tablet Take 1 tablet by mouth Daily. 4/12/23   Yes Minnie Paige MD   ARIPiprazole (ABILIFY) 5 MG tablet Take 1 tablet by mouth every night at bedtime.     Yes Minnie Paige MD   aspirin 81 MG chewable tablet Chew 1 tablet Daily.     Yes Minnie Paige MD   atorvastatin (LIPITOR) 80 MG tablet Take 1 tablet by mouth Daily.     Yes Minnie Paige MD   Buprenorphine HCl (BELBUCA) film Apply 1 film to cheek Every 12 (Twelve) Hours. 1/15/24   Yes Minnie Paige MD   calcium carbonate (TUMS) 500 MG chewable tablet Chew 1 tablet Daily.     Yes Minnie Paige MD   Cholecalciferol (Vitamin D) 50 MCG (2000 UT) capsule Take 1 capsule by mouth Daily.     Yes Minnie Paige MD   cyclobenzaprine (FLEXERIL) 10 MG tablet Take 1 tablet by mouth 2 (Two) Times a Day As Needed for Muscle Spasms. 6/29/22   Yes Minnie Paige MD   DULoxetine (CYMBALTA) 60 MG capsule Take 1 capsule by mouth 2 (Two) Times a Day.     Yes Minnie Paige MD   ferrous sulfate 325 (65 FE) MG tablet Take 1 tablet by mouth Daily. OTC     Yes Minnie Paige MD   HYDROcodone-acetaminophen (NORCO)  MG per tablet Take 1 tablet by mouth Every 12 (Twelve) Hours As Needed for Moderate Pain or Severe Pain. for pain 4/12/23   Yes Minnie Paige MD   metoprolol succinate XL (TOPROL-XL) 25 MG 24 hr tablet Take 1 tablet by mouth Daily. 9/8/22   Yes Minnie Paige MD   famotidine (PEPCID) 20 MG tablet Take 1 tablet by mouth At Night As Needed for  "Indigestion or Heartburn.  Patient not taking: Reported on 2025       ProvideriMnnie MD   ketoconazole (NIZORAL) 2 % cream Apply 1 Application topically to the appropriate area as directed Daily.  Patient not taking: Reported on 2025     Minnie Paige MD   triamcinolone (KENALOG) 0.1 % cream Apply 1 Application topically to the appropriate area as directed 3 (Three) Times a Day.  Patient not taking: Reported on 2025     Minnie Paige MD         Allergies:  Levaquin [levofloxacin], Naprosyn [naproxen], and Sulfa antibiotics     Social History:   Social History   Social History            Socioeconomic History    Marital status:    Tobacco Use    Smoking status: Former       Current packs/day: 0.00       Average packs/day: 3.0 packs/day for 59.2 years (177.7 ttl pk-yrs)       Types: Cigarettes       Start date:        Quit date: 3/31/2023       Years since quittin.0    Smokeless tobacco: Never    Tobacco comments:       Pt quit end of 2023, but as of 23 states she has smoked about 3 cigarettes total since then.   Vaping Use    Vaping status: Never Used   Substance and Sexual Activity    Alcohol use: Never    Drug use: Never    Sexual activity: Defer            Family History:         Family History   Problem Relation Age of Onset    Hypertension Mother                 REVIEW OF SYSTEMS:    Review of systems from admission history and physical are discussed with the patient and noted to be unchanged.     Vitals:  /66 (BP Location: Right arm, Patient Position: Lying)   Pulse 87   Temp 99.1 °F (37.3 °C) (Oral)   Resp 16   Ht 160 cm (62.99\")   Wt 80.4 kg (177 lb 4.8 oz)   SpO2 92%   BMI 31.42 kg/m²   Temp (24hrs), Av.3 °F (36.8 °C), Min:97.7 °F (36.5 °C), Max:99.1 °F (37.3 °C)        I/O (24Hr):     Intake/Output Summary (Last 24 hours) at 2025 0805  Last data filed at 2025 0404      Gross per 24 hour   Intake -- "   Output 100 ml   Net -100 ml         Labs:  Lab Results (last 72 hours)         Procedure Component Value Units Date/Time     Urinalysis, Microscopic Only - Urine, Clean Catch [[id number]]  (Abnormal) Collected: 04/29/25 0404     Specimen: Urine, Clean Catch Updated: 04/29/25 0605       RBC, UA 0-2 /HPF         WBC, UA Too Numerous to Count /HPF         Bacteria, UA 4+ /HPF         Squamous Epithelial Cells, UA 31-50 /HPF         Transitional Epithelial Cells, UA 0-2 /HPF         Renal Epithelial Cells, UA 0-2 /HPF         Hyaline Casts, UA 0-2 /LPF         Methodology Manual Light Microscopy     Urine Culture - Urine, Urine, Clean Catch [[id number]] Collected: 04/29/25 0404     Specimen: Urine, Clean Catch Updated: 04/29/25 0605     Urinalysis With Culture If Indicated - Urine, Clean Catch [[id number]]  (Abnormal) Collected: 04/29/25 0404     Specimen: Urine, Clean Catch Updated: 04/29/25 0447       Color, UA Yellow       Appearance, UA Cloudy       pH, UA 5.5       Specific Gravity, UA 1.014       Glucose, UA Negative       Ketones, UA Trace       Bilirubin, UA Negative       Blood, UA Negative       Protein, UA Trace       Leuk Esterase, UA Moderate (2+)       Nitrite, UA Positive       Urobilinogen, UA 0.2 E.U./dL     Narrative:       In absence of clinical symptoms, the presence of pyuria, bacteria, and/or nitrites on the urinalysis result does not correlate with infection.     Basic Metabolic Panel [[id number]]  (Abnormal) Collected: 04/29/25 0329     Specimen: Blood Updated: 04/29/25 0431       Glucose 96 mg/dL         BUN 21 mg/dL         Creatinine 1.45 mg/dL         Sodium 135 mmol/L         Potassium 4.6 mmol/L         Chloride 100 mmol/L         CO2 23.0 mmol/L         Calcium 8.9 mg/dL         BUN/Creatinine Ratio 14.5       Anion Gap 12.0 mmol/L         eGFR 38.2 mL/min/1.73       Narrative:       GFR Categories in Chronic Kidney Disease (CKD)                         GFR Category          GFR  (mL/min/1.73)    Interpretation  G1                       90 or greater              Normal or high (1)  G2                               60-89                Mild decrease (1)  G3a                   45-59                Mild to moderate decrease  G3b                   30-44                Moderate to severe decrease  G4                    15-29                Severe decrease  G5                    14 or less           Kidney failure                                                 (1)In the absence of evidence of kidney disease, neither GFR category G1 or G2 fulfill the criteria for CKD.     eGFR calculation 2021 CKD-EPI creatinine equation, which does not include race as a factor     CBC & Differential [[id number]]  (Abnormal) Collected: 04/29/25 0329     Specimen: Blood Updated: 04/29/25 0410     Narrative:       The following orders were created for panel order CBC & Differential.  Procedure                               Abnormality         Status                     ---------                               -----------         ------                     CBC Auto Differential[[id number]]        Abnormal            Final result                  Please view results for these tests on the individual orders.     CBC Auto Differential [[id number]]  (Abnormal) Collected: 04/29/25 0329     Specimen: Blood Updated: 04/29/25 0410       WBC 19.10 10*3/mm3         RBC 3.58 10*6/mm3         Hemoglobin 10.3 g/dL         Hematocrit 33.0 %         MCV 92.2 fL         MCH 28.8 pg         MCHC 31.2 g/dL         RDW 14.0 %         RDW-SD 46.6 fl         MPV 10.0 fL         Platelets 381 10*3/mm3         Neutrophil % 76.0 %         Lymphocyte % 12.1 %         Monocyte % 10.3 %         Eosinophil % 0.5 %         Basophil % 0.4 %         Immature Grans % 0.7 %         Neutrophils, Absolute 14.50 10*3/mm3         Lymphocytes, Absolute 2.32 10*3/mm3         Monocytes, Absolute 1.96 10*3/mm3         Eosinophils, Absolute 0.10 10*3/mm3          Basophils, Absolute 0.08 10*3/mm3         Immature Grans, Absolute 0.14 10*3/mm3         nRBC 0.0 /100 WBC       Comprehensive Metabolic Panel [[id number]]  (Abnormal) Collected: 04/28/25 1713     Specimen: Blood Updated: 04/28/25 1737       Glucose 115 mg/dL         BUN 20 mg/dL         Creatinine 1.38 mg/dL         Sodium 136 mmol/L         Potassium 4.5 mmol/L         Chloride 102 mmol/L         CO2 18.0 mmol/L         Calcium 9.3 mg/dL         Total Protein 7.4 g/dL         Albumin 3.8 g/dL         ALT (SGPT) 14 U/L         AST (SGOT) 18 U/L         Alkaline Phosphatase 190 U/L         Total Bilirubin 0.4 mg/dL         Globulin 3.6 gm/dL         A/G Ratio 1.1 g/dL         BUN/Creatinine Ratio 14.5       Anion Gap 16.0 mmol/L         eGFR 40.5 mL/min/1.73       Narrative:       GFR Categories in Chronic Kidney Disease (CKD)                         GFR Category          GFR (mL/min/1.73)    Interpretation  G1                       90 or greater              Normal or high (1)  G2                               60-89                Mild decrease (1)  G3a                   45-59                Mild to moderate decrease  G3b                   30-44                Moderate to severe decrease  G4                    15-29                Severe decrease  G5                    14 or less           Kidney failure                                                 (1)In the absence of evidence of kidney disease, neither GFR category G1 or G2 fulfill the criteria for CKD.     eGFR calculation 2021 CKD-EPI creatinine equation, which does not include race as a factor     Protime-INR [[id number]]  (Normal) Collected: 04/28/25 1713     Specimen: Blood Updated: 04/28/25 1730       Protime 14.3 Seconds         INR 1.06     aPTT [[id number]]  (Normal) Collected: 04/28/25 1713     Specimen: Blood Updated: 04/28/25 1730       PTT 25.6 seconds       Narrative:       PTT = The equivalent PTT values for the therapeutic range of heparin levels  at 0.3 to 0.7 U/ml are 77 - 99 seconds.     CBC & Differential [[id number]]  (Abnormal) Collected: 04/28/25 1713     Specimen: Blood Updated: 04/28/25 1719     Narrative:       The following orders were created for panel order CBC & Differential.  Procedure                               Abnormality         Status                     ---------                               -----------         ------                     CBC Auto Differential[[id number]]        Abnormal            Final result                  Please view results for these tests on the individual orders.     CBC Auto Differential [[id number]]  (Abnormal) Collected: 04/28/25 1713     Specimen: Blood Updated: 04/28/25 1719       WBC 25.67 10*3/mm3         RBC 4.08 10*6/mm3         Hemoglobin 11.7 g/dL         Hematocrit 36.8 %         MCV 90.2 fL         MCH 28.7 pg         MCHC 31.8 g/dL         RDW 14.1 %         RDW-SD 46.9 fl         MPV 9.7 fL         Platelets 440 10*3/mm3         Neutrophil % 86.9 %         Lymphocyte % 5.6 %         Monocyte % 6.0 %         Eosinophil % 0.2 %         Basophil % 0.4 %         Immature Grans % 0.9 %         Neutrophils, Absolute 22.32 10*3/mm3         Lymphocytes, Absolute 1.44 10*3/mm3         Monocytes, Absolute 1.54 10*3/mm3         Eosinophils, Absolute 0.04 10*3/mm3         Basophils, Absolute 0.09 10*3/mm3         Immature Grans, Absolute 0.24 10*3/mm3         nRBC 0.0 /100 WBC                  Radiology:  Imaging Results (Last 72 Hours)         Procedure Component Value Units Date/Time     XR Chest 1 View [[id number]] Collected: 04/28/25 1710       Updated: 04/28/25 1713     Narrative:       XR CHEST 1 VW- 4/28/2025 4:06 PM     HISTORY: pre op; S72.001A-Fracture of unspecified part of neck of right  femur, initial encounter for closed fracture       COMPARISON: 8/4/2023     FINDINGS:  Upright frontal radiograph of the chest was obtained     Pulmonary fibrosis. Lungs are well expanded. No consolidation or  pleural  effusion. Mild cardiomegaly which is stable. Pulmonary vasculature are  nondilated. Partially imaged dorsal column stimulator. No acute bony  abnormality.        Impression:       1.  Stable chest exam without acute process.     This report was signed and finalized on 4/28/2025 5:10 PM by Dr Gonzalez Leone.        XR Foot 3+ View Right [[id number]] Collected: 04/28/25 1630       Updated: 04/28/25 1634     Narrative:       XR FOOT 3+ VW RIGHT- 4/28/2025 3:23 PM     HISTORY: fall     COMPARISON: None      FINDINGS:  Frontal, lateral and oblique radiographs of the right foot were provided  for review.     There is no fracture or joint subluxation. Lisfranc alignment is  anatomic. Joint spaces are fairly well maintained throughout the  forefoot and midfoot. Fifth metatarsal base is intact. No discrete soft  tissue abnormality.        Impression:       1. No acute fracture or malalignment.     This report was signed and finalized on 4/28/2025 4:31 PM by Dr Gonzalez Leone.        XR Tibia Fibula 2 View Right [[id number]] Collected: 04/28/25 1630       Updated: 04/28/25 1633     Narrative:       XR TIBIA FIBULA 2 VW RIGHT-     HISTORY: fall     COMPARISON: None     FINDINGS: Frontal and lateral views of the right tibia and fibula are  obtained.     Moderate arthritic changes at the level of the knee. Osteopenia. No  fracture or dislocation identified. Scattered vascular calcification.        Impression:       1. No acute osseous injury identified of the right tibia or fibula.  Arthritic changes at the level of the knee. Osteopenia.        This report was signed and finalized on 4/28/2025 4:30 PM by Dr. Lucy Mccloud MD.        XR Knee 3 View Right [[id number]] Collected: 04/28/25 1628       Updated: 04/28/25 1632     Narrative:       XR KNEE 3 VW RIGHT-     HISTORY: fall     COMPARISON: None     FINDINGS: 3 views of the right knee are obtained.     Osteopenia. Tricompartmental osteoarthritis with joint space  narrowing  moderate bony spurring. No acute fracture or dislocation. Anterior soft  tissue swelling. Scattered vascular calcification.        Impression:       1. Osteopenia with tricompartmental osteoarthritis. No acute fracture or  dislocation identified.        This report was signed and finalized on 4/28/2025 4:29 PM by Dr. Lucy Mccloud MD.        XR Ankle 2 View Right [[id number]] Collected: 04/28/25 1627       Updated: 04/28/25 1632     Narrative:       XR ANKLE 2 VW RIGHT- 4/28/2025 3:23 PM     HISTORY: fall       COMPARISON: None     FINDINGS:  2 views of the right ankle were provided for review.     There is no evidence of acute fracture or malalignment. The ankle  mortise is congruent. The talar dome is intact. No ankle joint capsular  distention. Subtalar joint is unremarkable.        Impression:       1. No acute fracture or malalignment at the ankle.           This report was signed and finalized on 4/28/2025 4:29 PM by Dr Gonzalez Leone.        XR Hip With or Without Pelvis 2 - 3 View Right [[id number]] Collected: 04/28/25 1628       Updated: 04/28/25 1631     Narrative:       XR HIP W OR WO PELVIS 2-3 VIEW RIGHT-     HISTORY: fall     COMPARISON: None     FINDINGS: Frontal view of the pelvis as well as frontal and frog-leg  lateral views right hip 18.     Right subcapital femoral neck fracture with impaction. Osteopenia. Mild  bilateral hip joint space narrowing. Vascular calcification.  Degenerative change lower lumbar spine.        Impression:       1. Impacted right subcapital femoral neck fracture. Osteopenia.        This report was signed and finalized on 4/28/2025 4:28 PM by Dr. Lucy Mccloud MD.        XR Femur 2 View Right [[id number]] Collected: 04/28/25 1626       Updated: 04/28/25 1631     Narrative:       XR FEMUR 2 VW RIGHT-     HISTORY: fall     COMPARISON: None     FINDINGS: Frontal and lateral views of the right femur are obtained.     There is a proximal right subcapital femoral  neck fracture with  impaction. The mid and distal right femur appear intact. Osteopenia with  arthritic changes of the knee. Scattered vascular calcifications.        Impression:       1. Impacted right subcapital femoral neck fracture.        This report was signed and finalized on 4/28/2025 4:27 PM by Dr. Lucy Mccloud MD.                   Physical Exam:      PHYSICAL EXAM:       Physical Examination:  Vitals:   Vitals          Vitals:     04/28/25 2004 04/28/25 2343 04/29/25 0400 04/29/25 0712   BP: 122/51 130/55 117/50 121/66   BP Location: Left arm Left arm Left arm Right arm   Patient Position: Lying Lying Lying Lying   Pulse: 88 98 88 87   Resp: 18 18 18 16   Temp: 97.7 °F (36.5 °C)   98.5 °F (36.9 °C) 99.1 °F (37.3 °C)   TempSrc: Oral   Oral Oral   SpO2: 96% 92% 93% 92%   Weight:           Height:                 General:  Appears stated age, no distress.  Orientation:  Alert and oriented to time, place, and person.  Mood and Affect:  Cooperative and pleasant.  Gait:  Resting comfortably in bed.  Cardiovascular:  Symmetric 1-2 plus pulses in upper and lower extremities.  Lymph:  No cervical or inguinal lymphadenopathy noted.  Sensation:  Grossly intact to light touch.  DTR:  Normal, no pathologic reflexes.  Coordination/balance:  Normal     Musculoskeletal:  Right lower extremity exam:  There is no tenderness to palpation about the knee, ankle or foot, but there is noted pain at the level of the right hip.  The right lower extremity is shortened and externally rotated.  The overlying skin is intact.  Muscle compartments are soft compressible.  No palpable defect, but there is pronounced discomfort with any attempted active or passive range of motion at the level of the hip.  The right lower extremity is warm and well-perfused.        Radiology Review  My review of patient's x-rays shows the patient to have a displaced subcapital right femoral neck fracture with impaction     Assessment:      Primary  Problem  Closed fracture of neck of right femur - displaced subcapital right femoral neck fracture with impaction     Plan:   A prolonged conversation was had with the patient regarding the fracture location and morphology.  Based on the degree of varus angulation despite impaction, it was ultimately recommended that she consider arthroplasty.  Based on the patient's spectrum of medical comorbidities, bipolar hemiarthroplasty was ultimately elected.  The risks and benefits of that procedure were discussed with the patient including the risk for infection, nerve and artery damage, continued pain, continued decreased range of motion, paresthesias, paralysis, loss of limb, loss of life, fracture, subsidence, disassociation, dislocation, leg length inequality, blood clot and the need for further surgery.  The patient indicates her understanding and provides her written and verbal consent to proceed.  2.   The patient has now been n.p.o. since midnight.  Previously, we attempted to address her surgically yesterday evening, but she was provided a diet erroneously.  3.   We will likely anticoagulate the patient with Eliquis 2.5 mg p.o. twice daily postoperatively unless directed otherwise by cardiothoracic surgery  4.    Currently Ifeanyi Vu MD   Physician  Hospitalist     Progress Notes     Incomplete     Date of Service: 04/29/25 1005  Creation Time: 04/29/25 1005     Incomplete              [facility] Medicine Services  INPATIENT PROGRESS NOTE     Patient Name: Cheryl Taylor  Date of Admission: 4/28/2025  Today's Date: 04/29/25  Length of Stay: 1  Primary Care Physician: Den James DO     Subjective   Chief Complaint: fall R hip fracture     Fall     Leg Pain         Patient is a 75-year-old female with PMH of HTN, CKD who presents to the ER post fall. Patient reports that she was using her rollator and it rolled out from underneath her. Patient reports that  "she landed on her right leg. She is complaining of pain to her entire right leg, primarily to the right upper leg. Patient reports that she is unable to ambulate due to her pain in her right lower extremity. She is unable to raise her right lower extremity on exam. She denies any her head or losing consciousness. Denies pain elsewhere. She does have a small skin tear to her right elbow, but does have full range of motion of this.   In ED R hip fx, ortho contacted, wanted us to admit wbc 22 k, CXR -ve, will check UA no fever NPO after midnight, consult ortho, no abx for now , monitor wbc, identify reconcile restart home meds once reconciled, DVT prophylaxis after surgery per ortho      4/29  As before presented with fall right hip fracture found to have leukocytosis chest x-ray was unremarkable  UA positive.  But very poor sample will repeat UA clean-catch and start her on Rocephin  Review of Systems   All pertinent negatives and positives are as above. All other systems have been reviewed and are negative unless otherwise stated.      Objective    Temp:  [97.7 °F (36.5 °C)-99.1 °F (37.3 °C)] 99.1 °F (37.3 °C)  Heart Rate:  [78-98] 87  Resp:  [16-19] 16  BP: (117-139)/(50-74) 121/66         Results Review:  I have reviewed the labs, radiology results, and diagnostic studies.     Laboratory Data:         Results from last 7 days   Lab Units 04/29/25  0329 04/28/25  1713   WBC 10*3/mm3 19.10* 25.67*   HEMOGLOBIN g/dL 10.3* 11.7*   HEMATOCRIT % 33.0* 36.8   PLATELETS 10*3/mm3 381 440               Results from last 7 days   Lab Units 04/29/25  0329 04/28/25  1713   SODIUM mmol/L 135* 136   POTASSIUM mmol/L 4.6 4.5   CHLORIDE mmol/L 100 102   CO2 mmol/L 23.0 18.0*   BUN mg/dL 21 20   CREATININE mg/dL 1.45* 1.38*   CALCIUM mg/dL 8.9 9.3   BILIRUBIN mg/dL  --  0.4   ALK PHOS U/L  --  190*   ALT (SGPT) U/L  --  14   AST (SGOT) U/L  --  18   GLUCOSE mg/dL 96 115*         Culture Data:   No results found for: \"BLOODCX\", " "\"URINECX\", \"WOUNDCX\", \"MRSACX\", \"RESPCX\", \"STOOLCX\"     Radiology Data:   Imaging Results (Last 24 Hours)         Procedure Component Value Units Date/Time     XR Chest 1 View [[id number]] Collected: 04/28/25 1710       Updated: 04/28/25 1713     Narrative:       XR CHEST 1 VW- 4/28/2025 4:06 PM     HISTORY: pre op; S72.001A-Fracture of unspecified part of neck of right  femur, initial encounter for closed fracture       COMPARISON: 8/4/2023     FINDINGS:  Upright frontal radiograph of the chest was obtained     Pulmonary fibrosis. Lungs are well expanded. No consolidation or pleural  effusion. Mild cardiomegaly which is stable. Pulmonary vasculature are  nondilated. Partially imaged dorsal column stimulator. No acute bony  abnormality.        Impression:       1.  Stable chest exam without acute process.     This report was signed and finalized on 4/28/2025 5:10 PM by Dr Gonzalez Leone.        XR Foot 3+ View Right [[id number]] Collected: 04/28/25 1630       Updated: 04/28/25 1634     Narrative:       XR FOOT 3+ VW RIGHT- 4/28/2025 3:23 PM     HISTORY: fall     COMPARISON: None      FINDINGS:  Frontal, lateral and oblique radiographs of the right foot were provided  for review.     There is no fracture or joint subluxation. Lisfranc alignment is  anatomic. Joint spaces are fairly well maintained throughout the  forefoot and midfoot. Fifth metatarsal base is intact. No discrete soft  tissue abnormality.        Impression:       1. No acute fracture or malalignment.     This report was signed and finalized on 4/28/2025 4:31 PM by Dr Gonzalez Leone.        XR Tibia Fibula 2 View Right [[id number]] Collected: 04/28/25 1630       Updated: 04/28/25 1633     Narrative:       XR TIBIA FIBULA 2 VW RIGHT-     HISTORY: fall     COMPARISON: None     FINDINGS: Frontal and lateral views of the right tibia and fibula are  obtained.     Moderate arthritic changes at the level of the knee. Osteopenia. No  fracture or dislocation " identified. Scattered vascular calcification.        Impression:       1. No acute osseous injury identified of the right tibia or fibula.  Arthritic changes at the level of the knee. Osteopenia.        This report was signed and finalized on 4/28/2025 4:30 PM by Dr. Lucy Mccloud MD.        XR Knee 3 View Right [[id number]] Collected: 04/28/25 1628       Updated: 04/28/25 1632     Narrative:       XR KNEE 3 VW RIGHT-     HISTORY: fall     COMPARISON: None     FINDINGS: 3 views of the right knee are obtained.     Osteopenia. Tricompartmental osteoarthritis with joint space narrowing  moderate bony spurring. No acute fracture or dislocation. Anterior soft  tissue swelling. Scattered vascular calcification.        Impression:       1. Osteopenia with tricompartmental osteoarthritis. No acute fracture or  dislocation identified.        This report was signed and finalized on 4/28/2025 4:29 PM by Dr. Lucy Mccloud MD.        XR Ankle 2 View Right [[id number]] Collected: 04/28/25 1627       Updated: 04/28/25 1632     Narrative:       XR ANKLE 2 VW RIGHT- 4/28/2025 3:23 PM     HISTORY: fall       COMPARISON: None     FINDINGS:  2 views of the right ankle were provided for review.     There is no evidence of acute fracture or malalignment. The ankle  mortise is congruent. The talar dome is intact. No ankle joint capsular  distention. Subtalar joint is unremarkable.        Impression:       1. No acute fracture or malalignment at the ankle.           This report was signed and finalized on 4/28/2025 4:29 PM by Dr Gonzalez Leone.        XR Hip With or Without Pelvis 2 - 3 View Right [[id number]] Collected: 04/28/25 1628       Updated: 04/28/25 1631     Narrative:       XR HIP W OR WO PELVIS 2-3 VIEW RIGHT-     HISTORY: fall     COMPARISON: None     FINDINGS: Frontal view of the pelvis as well as frontal and frog-leg  lateral views right hip 18.     Right subcapital femoral neck fracture with impaction. Osteopenia.  Mild  bilateral hip joint space narrowing. Vascular calcification.  Degenerative change lower lumbar spine.        Impression:       1. Impacted right subcapital femoral neck fracture. Osteopenia.        This report was signed and finalized on 4/28/2025 4:28 PM by Dr. Lucy Mcclodu MD.        XR Femur 2 View Right [[id number]] Collected: 04/28/25 1626       Updated: 04/28/25 1631     Narrative:       XR FEMUR 2 VW RIGHT-     HISTORY: fall     COMPARISON: None     FINDINGS: Frontal and lateral views of the right femur are obtained.     There is a proximal right subcapital femoral neck fracture with  impaction. The mid and distal right femur appear intact. Osteopenia with  arthritic changes of the knee. Scattered vascular calcifications.        Impression:       1. Impacted right subcapital femoral neck fracture.        This report was signed and finalized on 4/28/2025 4:27 PM by Dr. Lucy Mccloud MD.                   I have reviewed the patient's current medications.      Assessment/Plan   Assessment       Active Hospital Problems     Diagnosis      **Closed fracture of neck of right femur                      Medical Decision Making  Number and Complexity of problems:   Differential Diagnosis:      Conditions and Status             MDM Data  External documents reviewed:   Cardiac tracing (EKG, telemetry) interpretation:   Radiology interpretation:   Labs reviewed:   Any tests that were considered but not ordered:      Decision rules/scores evaluated (example [id number], [place], etc):           Care Planning  Shared decision making:   Code status and discussions:      Disposition  Social Determinants of Health that impact treatment or disposition:   I expect the patient to be discharged to  in  days.            Electronically signed by Ifeanyi Hercules MD, 04/29/25, 10:05 CDT.                       Urinalysis With Culture If Indicated - Urine, Clean Catch [[id number]] (Order [id number])  Order  Date: 4/28/2025  Department: [facility]A Released By/Authorizing: Ifeanyi Hercules MD (auto-released)     Reprint Order Requisition    Urinalysis With Culture If Indicated - Urine, Clean Catch (Order #[id number]) on 4/28/25         Contains abnormal data Urinalysis With Culture If Indicated - Urine, Clean Catch  Order: [id number]   Status: Final result       Next appt: 05/26/2025 at 11:15 AM in Lab ([facility])    Test Result Released: Yes (not seen)    Specimen Information: Urine, Clean Catch   0 Result Notes         Component  Ref Range & Units (hover) 04:04  Michelle/Acra 2 yr ago  Michelle/Acra 4 yr ago  Michelle/[place] 10 yr ago  Michelle/Acra   Color, UA Yellow Yellow Yellow R Yellow R   Appearance, UA Cloudy Abnormal  Clear  Clear R   pH, UA 5.5 7.0 5.0 R 6.0   Specific Gravity, UA 1.014 1.016 >1.050 High  R, CM <=1.005   Glucose, UA Negative Negative  Negative R   Ketones, UA Trace Abnormal  Negative Negative R Negative R   Bilirubin, UA Negative Negative Negative Negative R   Blood, UA Negative Negative Negative Negative R   Protein, UA Trace Abnormal  Negative Negative R Negative R   Leuk Esterase, UA Moderate (2+) Abnormal  Moderate (2+) Abnormal  Negative Negative R   Nitrite, UA Positive Abnormal            Urinalysis With Culture If Indicated - [[id number]] (Order [id number])  Order  Date: 4/29/2025 Department: [facility]A Released By/Authorizing: Ifeanyi Hercules MD (auto-released)         Basic Metabolic Panel [[id number]] (Order [id number])  Order  Date: 4/28/2025 Department: [facility]A Released By/Authorizing: Ifeanyi Hercules MD (auto-released)     Reprint Order Requisition    Basic Metabolic Panel (Order #[id number]) on 4/28/25         Contains abnormal data Basic Metabolic Panel  Order: [id number]   Status: Final result       Next appt: 05/26/2025 at 11:15 AM in Lab ([facility])    Test Result Released: Yes (not seen)    Specimen Information: Blood   0  Result Notes            Component  Ref Range & Units (hover) 03:29  (4/29/25) 1 d ago  (4/28/25) 2 mo ago  (2/17/25) 11 mo ago  (5/24/24) 1 yr ago  (2/16/24) 1 yr ago  (1/9/24) 1 yr ago  (10/19/23)   Glucose 96 115 High  152 High  127 High  111 High  112 High  103 High    BUN 21 20 20 17 23 15 18   Creatinine 1.45 High  1.38 High  1.46 High  1.58 High  1.41 High  1.41 High  1.30 High    Sodium 135 Low  136 140 139 135 Low  137 138   Potassium 4.6 4.5 4.2 4.7 4.4 CM 4.1 4.2   Chloride 100 102 107 103 102 100 105   CO2 23.0 18.0 Low  20.0 Low  22.0 22.0 25.0 21.0 Low    Calcium 8.9 9.3 9.5 9.6 9.2 9.6 9.5   BUN/Creatinine Ratio 14.5 14.5 13.7 10.8 16.3 10.6 13.8   Anion Gap 12.0 16.0 High  13.0 14.0 11.0 12.0 12.0   eGFR 38.2 Low               te/Time Temp Pulse Resp BP Patient Position Device (Oxygen Therapy) Flow (L/min) (Oxygen Therapy) SpO2   04/29/25 0712 99.1 (37.3) 87 16 121/66 Lying nasal cannula 2 92   04/29/25 0400 98.5 (36.9) 88 18 117/50 Lying nasal cannula -- 93   04/28/25 2343 -- 98 18 130/55 Lying nasal cannula -- 92   04/28/25 2034 -- -- -- -- -- nasal cannula 2 --   04/28/25 2004 97.7 (36.5) 88 18 122/51 Lying nasal cannula -- 96   04/28/25 1817 -- -- -- -- -- -- 2 --   04/28/25 1752 98.5 (36.9) 92 16 139/66 Lying nasal cannula 2 98   04/28/25 1724 -- 78 -- 130/63 -- -- -- 97   04/                 Current Facility-Administered Medications   Medication Dose Route Frequency Provider Last Rate Last Admin    acetaminophen (TYLENOL) tablet 650 mg  650 mg Oral Q4H PRN Ifeanyi Hercules MD        Or    acetaminophen (TYLENOL) 160 MG/5ML oral solution 650 mg  650 mg Oral Q4H PRN Ifeanyi Hercules MD        Or    acetaminophen (TYLENOL) suppository 650 mg  650 mg Rectal Q4H PRN Ifeanyi Hercules MD        sennosides-docusate (PERICOLACE) 8.6-50 MG per tablet 2 tablet  2 tablet Oral BID PRN Ifeanyi Hercules MD        And    polyethylene glycol (MIRALAX) packet 17 g  17 g Oral Daily PRN Ifeanyi Hercules MD        And     bisacodyl (DULCOLAX) EC tablet 5 mg  5 mg Oral Daily PRN Ifeanyi Hercules MD        And    bisacodyl (DULCOLAX) suppository 10 mg  10 mg Rectal Daily PRN Ifeanyi Hercules MD        Calcium Replacement - Follow Nurse / BPA Driven Protocol   Not Applicable PRIfeanyi Franz MD        cefTRIAXone (ROCEPHIN) 2,000 mg in sodium chloride 0.9 % 100 mL MBP  2,000 mg Intravenous Q24H Ifeanyi Hercules MD        HYDROcodone-acetaminophen (NORCO) 5-325 MG per tablet 1 tablet  1 tablet Oral Q6H PRN Ifeanyi Hercules MD   1 tablet at 04/29/25 0229    Magnesium Standard Dose Replacement - Follow Nurse / BPA Driven Protocol   Not Applicable PRIfeanyi Franz MD        morphine injection 4 mg  4 mg Intravenous Q2H PRN Ifeanyi Hercules MD   4 mg at 04/29/25 0930    ondansetron (ZOFRAN) injection 4 mg  4 mg Intravenous Q6H PRN Ifeanyi Hercules MD        Phosphorus Replacement - Follow Nurse / BPA Driven Protocol   Not Applicable PRN Ifeanyi Hercules MD        Potassium Replacement - Follow Nurse / BPA Driven Protocol   Not Applicable PRIfeanyi Franz MD        sodium chloride 0.9 % flush 10 mL  10 mL Intravenous PRN Immanuel Bentley MD        sodium chloride 0.9 % flush 10 mL  10 mL Intravenous Q12H Ifeanyi Hercules MD   10 mL at 04/29/25 0931    sodium chloride 0.9 % flush 10 mL  10 mL Intravenous PRN Ifeanyi Hercules MD        sodium chloride 0.9 % infusion 40 mL  40 mL Intravenous PRN Ifeanyi Hercules MD

## 2025-04-29 NOTE — PLAN OF CARE
Goal Outcome Evaluation:  Plan of Care Reviewed With: patient, family        Progress: no change  Outcome Evaluation: Pt aox4. VSS. NPO at midnight. Distal pulses palpable. Pain controlled with PRN medication. Safety maintained.

## 2025-04-29 NOTE — DISCHARGE INSTRUCTIONS
Lower Extremity Post-op Instructions    Dr. Lopes      POST-OP CARE: Please follow these instructions closely!    Weight Bearing: Your surgery requires that you have the following weight bearing restrictions:   _____ Weight Bearing as tolerated (with or without crutches) with anterior precautions    IMPORTANT PHONE NUMBERS:  For emergencies, please call [phone]  You may reach Dr. Lopes or his office medical staff at [phone] EXT: [phone]  M-F 8:00am-5:00pm  After 5pm or on the weekends, please call [phone] to reach the doctor on call.  Call immediately if you have any of the following symptoms:  Elevated temperature above 101 degrees for more than 48hours after surgery  Persistent drainage from wound  Severe pain around surgical site  Calf pain  Any signs of infection    Dressings:   Do NOT remove dressing/splint unless noted below. SOME DRAINAGE IS NORMAL!  DO NOT touch, remove, or apply ointment to the incision and/or steri strips  Signs of infection that warrant a phone call to our clinical line:  Excessive drainage or redness  Red streaking coming away from the incision  Increased pain  Increased temperature above 101 degrees    Sutures:  If your physician uses sutures in your knee, they will dissolve on their own and will not need to be removed.  Some black sutures occasionally used will need to be removed 10-14 days after surgery.    Bathing:    _X_ Keep your adhesive dressing in place until follow up.  You may begin showering on the 3rd day following surgery.  Please check the border of the dressing(s) to ensure there is a good seal before each showering session.  Water may cascade over your dressing(s), but do not submerge your dressing(s) and incision(s) in water.    Elevate: Place 2 pillows under your ankle to get the incision area above the level of the heart to help in swelling (a recliner is not elevated!!!)    Ice: Ice your surgery site 5-6 times per day for 20 minutes at a time with dressing in place.  You should wait at least 30 minutes before icing again to avoid ice irritation. It may be difficult at first to ice the surgery area due to the amount of dressing, but continue to be diligent with icing.  Your dressings will be taken down at your first post-op appointment.     Range of motion:   For the knee- It is important to keep the knee as straight as possible following surgery. NO PILLOWS UNDER THE KNEE, ONLY under the ankle  For the foot/ankle- range of motion restrictions will be given to you at your first post-op appointment. Until that time, avoid any unnecessary range o f motion  Physical therapy- Your physical therapy status will be discussed with you at your first post-op appointment.   **Achieving range of motion goals and decreasing swelling/inflammation are the primary focus for the first two (2) weeks following surgery. **    Medications: You will be discharged with the appropriate medications following your surgery. Fill these at the pharmacy and take them as directed on the label.   Possible medications that will be prescribed are below.  You may or may not receive all of these. Occasionally, additional medications may be given with specific instructions.    Percocet/Lortab (oxycodone/hydrocodone with tylenol) - Pain Medication.  Take one tablet every 4-6 hours. DO NOT EXCEED 4,000mg of Tylenol in 24 hours.  **DO NOT MIX WITH ALCOHOL, DRIVE WHILE TAKING, OR TAKE EXTRA TYLENOL*     Zofran - Anti-nausea medication to help prevent nausea and vomiting after surgery.     Eliquis 2.5 mg - all patients with lower extremity surgery should take one 2.5 mg tablet every 12 hours (twice daily) for 42 days after surgery    **If you are running low on pain medications, please notify us if you need a refill 24-48 hours prior to when you run out, so we can make arrangements to refill the prescription for you if we determine it is necessary**

## 2025-04-29 NOTE — CASE MANAGEMENT/SOCIAL WORK
Discharge Planning Assessment  [facility]     Patient Name: Cheryl Taylor  MRN: [id number]  Today's Date: 4/29/2025    Admit Date: 4/28/2025        Discharge Needs Assessment       Row Name 04/29/25 1020       Living Environment    People in Home alone    Current Living Arrangements apartment    Potentially Unsafe Housing Conditions none    In the past 12 months has the electric, gas, oil, or water company threatened to shut off services in your home? No    Primary Care Provided by self    Provides Primary Care For no one    Quality of Family Relationships supportive       Resource/Environmental Concerns    Resource/Environmental Concerns none    Transportation Concerns none       Transportation Needs    In the past 12 months, has lack of transportation kept you from medical appointments or from getting medications? no    In the past 12 months, has lack of transportation kept you from meetings, work, or from getting things needed for daily living? No       Food Insecurity    Within the past 12 months, you worried that your food would run out before you got the money to buy more. Never true    Within the past 12 months, the food you bought just didn't last and you didn't have money to get more. Never true       Transition Planning    Patient/Family Anticipates Transition to inpatient rehabilitation facility;long-term care facility    Patient/Family Anticipated Services at Transition skilled nursing;rehabilitation services    Transportation Anticipated family or friend will provide       Discharge Needs Assessment    Readmission Within the Last 30 Days no previous admission in last 30 days    Equipment Currently Used at Home rollator;wheelchair;commode;shower chair    Concerns to be Addressed discharge planning;adjustment to diagnosis/illness    Do you want help finding or keeping work or a job? I do not need or want help    Do you want help with school or training? For example, starting or completing job training or  getting a high school diploma, GED or equivalent No    Anticipated Changes Related to Illness inability to care for self    Outpatient/Agency/Support Group Needs skilled nursing facility    Discharge Facility/Level of Care Needs acute rehab;nursing facility, skilled    Provided Post Acute Provider List? Yes    Provided Post Acute Provider Quality & Resource List? Yes    Delivered To Patient;Support Person    Support Person Daughter    Method of Delivery In person    Current Discharge Risk chronically ill;lives alone;physical impairment    Discharge Coordination/Progress SW spoke with patient and patient's daughter at bedside.  Patient resides alone, has a PCP and RX coverage.  Patient will be having surgery.  SW informed patient and daughter patient may need rehab upon discharge depending on therapy evaluations.  Patient's insurance will require prior approval for any type of placement.  SW will follow up post op.                   Discharge Plan    No documentation.                      Demographic Summary    No documentation.                  Functional Status    No documentation.                  Psychosocial    No documentation.                  Abuse/Neglect    No documentation.                  Legal    No documentation.                  Substance Abuse    No documentation.                  Patient Forms    No documentation.                     CLARKE García

## 2025-04-29 NOTE — ANESTHESIA PREPROCEDURE EVALUATION
Anesthesia Evaluation     Patient summary reviewed and Nursing notes reviewed                Airway   Dental      Pulmonary    (+) COPD, asthma,  Cardiovascular     (+) hypertension poorly controlled 2 medications or greater      Neuro/Psych  GI/Hepatic/Renal/Endo    (+) morbid obesity, hiatal hernia, GERD, renal disease-    Musculoskeletal     Abdominal    Substance History      OB/GYN          Other   arthritis,                 Anesthesia Plan    ASA 3 - emergent     general     intravenous induction     Anesthetic plan, risks, benefits, and alternatives have been provided, discussed and informed consent has been obtained with: patient.    CODE STATUS:    Code Status (Patient has no pulse and is not breathing): CPR (Attempt to Resuscitate)  Medical Interventions (Patient has pulse or is breathing): Full Support

## 2025-04-29 NOTE — OP NOTE
Patient Name: Amanda  : 1950  MRN: [id number]    DATE of SURGERY: 2025     PREOPERATIVE DIAGNOSIS:  right acute varus angulated subcapital femoral neck fracture    POSTOPERATIVE DIAGNOSIS:  right  acute varus angulated subcapital femoral neck fracture     PROCEDURE:  right Bipolar Mingo Hip Arthroplasty     SURGEON:  Isael Lopes MD     ASSISTANT:  None     ANESTHESIA:  General     ESTIMATED BLOOD LOSS:  200 mL     COMPLICATIONS:  None.     CONDITION:  Stable.     IMPLANTS:  [employer] ACTIS System with the following components:               46 mm diameter + 8.5 mm metal bipolar femoral head              Size 9 ACTIS cementless femoral press-fit stem     OPERATIVE INDICATIONS: 75 y.o. female who now presents following traumatic acute onset of right hip pain following a mechanical fall with imaging demonstrating a varus impacted acute angulated right subcapital femoral neck fracture.  Given the patient's fracture morphology and co-morbidities, we decided to move forward with bipolar hip mingo arthroplasty.  Risks include, but are not limited to, bleeding, infection, pain, damage to neurovascular structures, leg length inequality, hip dislocation, blood clots, intraoperative death. Risks, benefits, and alternatives were discussed, and the patient wished to proceed.  The patient agrees and now presents for right bipolar hip hemiarthroplasty.     PROCEDURE:  After informed consent, the patient was given 2 g of Ancef, 1 g of Tranxene acid and underwent anesthetic induction.  The patient was placed on the [place] table.  The right hip was then prepped and draped in the usual sterile fashion.  A 10 cm incision starting lateral to the ASIS extending it distally just anterior to the greater trochanter.  The TFL fascia was then incised.  The TFL was taken laterally, while the sartorius and rectus were taken medially.  The ascending branch of the lateral femoral circumflex vessels were identified and cauterized.  An  anterior capsulotomy was then performed acute rush of fracture hematoma.  The neck resection was made at the equator of the femoral head and in the saddle of the neck effectively creating a wafer osteotomy with the subcapital fracture line in the retained neck portion of the femoral head to facilitate extraction.  Corkscrew extractor was advanced into the retained femoral head and the femoral head was disarticulated from the acetabulum.  The femoral head was sized, and the back table noted to be 46 mm in diameter.       At this point, the leg was externally rotated and extended.  Continued capsular excision was performed, but ultimately the proximal extent of the incision was extended to help with exposure.  Under fluoroscopic guidance the canal was entered with a canal finder.  Sequential broaching was then performed up to a size 9 broach followed by the use of a calcar planer.  A trial reduction was performed.  C-arm fluoroscopy images were then taken of the pelvis in the AP trajectory to include visualization of the bilateral lesser trochanters to ensure appropriate restoration of leg length.  Finally, a final size 9 ACTIS stem with 130° neck shaft angle was press-fit down the proximal femoral canal.  A final 46 mm + 8.5 mm metal bipolar head was placed on the Mane taper.  It was impacted into place and noted to be firmly fixated.  The hip was reduced with excellent stability.  Anterior maneuvers including hyperextension with external rotation and abduction were performed with no evidence of anterior instability.  Fluoroscopic views revealed excellent alignment of the femoral and acetabular components.  The wound was then copiously irrigated with 2 L of irrigation.  TFL fascia was then closed with an 0 Vicryl suture.  2-0 Vicryl suture was then used for the subcutaneous closure of the tissues.  A final 3-0 Vicryl and a running subcuticular fashion was placed.  Finally, a pernio Dermabond adhesive skin dressing  was applied to the incision without tension.  A Mepilex dressing was applied thereafter.      The patient was awakened by anesthesia transported to the PACU in stable condition.       POSTOPERATIVE PLAN: Return to floor postoperatively, PT/OT, 6 weeks of DVT prophylaxis.  Weight bear as tolerated with anterior hip precautions.    Isael Lopes MD     Date: 4/29/2025  Time: 17:37 CDT    Please note that portions of this note were completed with a voice recognition program.

## 2025-04-29 NOTE — ANESTHESIA PROCEDURE NOTES
Airway  Date/Time: 4/29/2025 5:44 PM  Airway not difficult    General Information and Staff    Patient location during procedure: OR  CRNA/CAA: Riley Randolph, CRNA    Indications and Patient Condition  Indications for airway management: airway protection    Preoxygenated: yes    Mask difficulty assessment: 0 - not attempted    Final Airway Details    Final airway type: endotracheal airway      Successful airway: ETT  Cuffed: yes   Successful intubation technique: video laryngoscopy  Blade: Benjamin  Blade size: 3  ETT size (mm): 7.0  Cormack-Lehane Classification: grade I - full view of glottis  Placement verified by: chest auscultation and capnometry   Cuff volume (mL): 6  Measured from: lips  ETT/EBT  to lips (cm): 20  Number of attempts at approach: 1  Assessment: lips, teeth, and gum same as pre-op and atraumatic intubation    Additional Comments  ATRAUMATIC INTUBATION

## 2025-04-29 NOTE — PROGRESS NOTES
"Pharmacy Dosing Service  Antimicrobial Dosing  Rocephin    Assessment/Action/Plan:  Based on indication and BMI greater than 30, Rocpehin dose adjusted to  21 gm IV every 24 hours. Pharmacy will continue to monitor daily and make further adjustment(s) accordingly.     Subjective:  Cheryl Taylor is a 75 y.o. female initiated on Rocephin 1 gm IV every .24 hours, for the treatment of Pyelonephritis by prescriber, day 1 of 5 of treatment.    Objective:  Ht: 160 cm (62.99\"); Wt: 80.4 kg (177 lb 4.8 oz)  Estimated Creatinine Clearance: 34.7 mL/min (A) (by C-G formula based on SCr of 1.45 mg/dL (H)).   Creatinine   Date Value Ref Range Status   04/29/2025 1.45 (H) 0.57 - 1.00 mg/dL Final   04/28/2025 1.38 (H) 0.57 - 1.00 mg/dL Final   Final   Final   Final   Final   Final   Final   Final      Lab Results   Component Value Date    WBC 19.10 (H) 04/29/2025    WBC 25.67 (H) 04/28/2025       MARY Gutiérrez, PharmD  04/29/25 10:09 CDT    "

## 2025-04-29 NOTE — PROGRESS NOTES
[facility] Medicine Services  INPATIENT PROGRESS NOTE    Patient Name: Cheryl Taylor  Date of Admission: 4/28/2025  Today's Date: 04/29/25  Length of Stay: 1  Primary Care Physician: Den James DO    Subjective   Chief Complaint: fall R hip fracture     Fall     Leg Pain         Patient is a 75-year-old female with PMH of HTN, CKD who presents to the ER post fall. Patient reports that she was using her rollator and it rolled out from underneath her. Patient reports that she landed on her right leg. She is complaining of pain to her entire right leg, primarily to the right upper leg. Patient reports that she is unable to ambulate due to her pain in her right lower extremity. She is unable to raise her right lower extremity on exam. She denies any her head or losing consciousness. Denies pain elsewhere. She does have a small skin tear to her right elbow, but does have full range of motion of this.   In ED R hip fx, ortho contacted, wanted us to admit wbc 22 k, CXR -ve, will check UA no fever NPO after midnight, consult ortho, no abx for now , monitor wbc, identify reconcile restart home meds once reconciled, DVT prophylaxis after surgery per ortho     4/29  As before presented with fall right hip fracture found to have leukocytosis chest x-ray was unremarkable  UA positive.  But very poor sample will repeat UA clean-catch and start her on Rocephin  Review of Systems   All pertinent negatives and positives are as above. All other systems have been reviewed and are negative unless otherwise stated.     Objective    Temp:  [97.7 °F (36.5 °C)-99.1 °F (37.3 °C)] 99.1 °F (37.3 °C)  Heart Rate:  [78-98] 87  Resp:  [16-19] 16  BP: (117-139)/(50-74) 121/66  Physical Exam  HENT:      Head: Normocephalic.      Nose: Nose normal.   Cardiovascular:      Rate and Rhythm: Normal rate.   Pulmonary:      Breath sounds: Wheezing present.   Abdominal:      General: Abdomen is flat.  "  Musculoskeletal:      Cervical back: Normal range of motion.      Comments: R hip rotated    Skin:     General: Skin is warm.      Capillary Refill: Capillary refill takes less than 2 seconds.   Neurological:      Mental Status: She is alert.       Results Review:  I have reviewed the labs, radiology results, and diagnostic studies.    Laboratory Data:   Results from last 7 days   Lab Units 04/29/25  0329 04/28/25  1713   WBC 10*3/mm3 19.10* 25.67*   HEMOGLOBIN g/dL 10.3* 11.7*   HEMATOCRIT % 33.0* 36.8   PLATELETS 10*3/mm3 381 440        Results from last 7 days   Lab Units 04/29/25  0329 04/28/25  1713   SODIUM mmol/L 135* 136   POTASSIUM mmol/L 4.6 4.5   CHLORIDE mmol/L 100 102   CO2 mmol/L 23.0 18.0*   BUN mg/dL 21 20   CREATININE mg/dL 1.45* 1.38*   CALCIUM mg/dL 8.9 9.3   BILIRUBIN mg/dL  --  0.4   ALK PHOS U/L  --  190*   ALT (SGPT) U/L  --  14   AST (SGOT) U/L  --  18   GLUCOSE mg/dL 96 115*       Culture Data:   No results found for: \"BLOODCX\", \"URINECX\", \"WOUNDCX\", \"MRSACX\", \"RESPCX\", \"STOOLCX\"    Radiology Data:   Imaging Results (Last 24 Hours)       Procedure Component Value Units Date/Time    XR Chest 1 View [[id number]] Collected: 04/28/25 1710     Updated: 04/28/25 1713    Narrative:      XR CHEST 1 VW- 4/28/2025 4:06 PM     HISTORY: pre op; S72.001A-Fracture of unspecified part of neck of right  femur, initial encounter for closed fracture       COMPARISON: 8/4/2023     FINDINGS:  Upright frontal radiograph of the chest was obtained     Pulmonary fibrosis. Lungs are well expanded. No consolidation or pleural  effusion. Mild cardiomegaly which is stable. Pulmonary vasculature are  nondilated. Partially imaged dorsal column stimulator. No acute bony  abnormality.       Impression:      1.  Stable chest exam without acute process.     This report was signed and finalized on 4/28/2025 5:10 PM by Dr Gonzalez Leone.       XR Foot 3+ View Right [[id number]] Collected: 04/28/25 1630     Updated: 04/28/25 " 1634    Narrative:      XR FOOT 3+ VW RIGHT- 4/28/2025 3:23 PM     HISTORY: fall     COMPARISON: None      FINDINGS:  Frontal, lateral and oblique radiographs of the right foot were provided  for review.     There is no fracture or joint subluxation. Lisfranc alignment is  anatomic. Joint spaces are fairly well maintained throughout the  forefoot and midfoot. Fifth metatarsal base is intact. No discrete soft  tissue abnormality.       Impression:      1. No acute fracture or malalignment.     This report was signed and finalized on 4/28/2025 4:31 PM by Dr Gonzalez Leone.       XR Tibia Fibula 2 View Right [[id number]] Collected: 04/28/25 1630     Updated: 04/28/25 1633    Narrative:      XR TIBIA FIBULA 2 VW RIGHT-     HISTORY: fall     COMPARISON: None     FINDINGS: Frontal and lateral views of the right tibia and fibula are  obtained.     Moderate arthritic changes at the level of the knee. Osteopenia. No  fracture or dislocation identified. Scattered vascular calcification.       Impression:      1. No acute osseous injury identified of the right tibia or fibula.  Arthritic changes at the level of the knee. Osteopenia.        This report was signed and finalized on 4/28/2025 4:30 PM by Dr. Lucy Mccloud MD.       XR Knee 3 View Right [[id number]] Collected: 04/28/25 1628     Updated: 04/28/25 1632    Narrative:      XR KNEE 3 VW RIGHT-     HISTORY: fall     COMPARISON: None     FINDINGS: 3 views of the right knee are obtained.     Osteopenia. Tricompartmental osteoarthritis with joint space narrowing  moderate bony spurring. No acute fracture or dislocation. Anterior soft  tissue swelling. Scattered vascular calcification.       Impression:      1. Osteopenia with tricompartmental osteoarthritis. No acute fracture or  dislocation identified.        This report was signed and finalized on 4/28/2025 4:29 PM by Dr. Lucy Mccloud MD.       XR Ankle 2 View Right [[id number]] Collected: 04/28/25 1627     Updated:  04/28/25 1632    Narrative:      XR ANKLE 2 VW RIGHT- 4/28/2025 3:23 PM     HISTORY: fall       COMPARISON: None     FINDINGS:  2 views of the right ankle were provided for review.     There is no evidence of acute fracture or malalignment. The ankle  mortise is congruent. The talar dome is intact. No ankle joint capsular  distention. Subtalar joint is unremarkable.       Impression:      1. No acute fracture or malalignment at the ankle.           This report was signed and finalized on 4/28/2025 4:29 PM by Dr Gonzalez Leone.       XR Hip With or Without Pelvis 2 - 3 View Right [[id number]] Collected: 04/28/25 1628     Updated: 04/28/25 1631    Narrative:      XR HIP W OR WO PELVIS 2-3 VIEW RIGHT-     HISTORY: fall     COMPARISON: None     FINDINGS: Frontal view of the pelvis as well as frontal and frog-leg  lateral views right hip 18.     Right subcapital femoral neck fracture with impaction. Osteopenia. Mild  bilateral hip joint space narrowing. Vascular calcification.  Degenerative change lower lumbar spine.       Impression:      1. Impacted right subcapital femoral neck fracture. Osteopenia.        This report was signed and finalized on 4/28/2025 4:28 PM by Dr. Lucy Mccloud MD.       XR Femur 2 View Right [[id number]] Collected: 04/28/25 1626     Updated: 04/28/25 1631    Narrative:      XR FEMUR 2 VW RIGHT-     HISTORY: fall     COMPARISON: None     FINDINGS: Frontal and lateral views of the right femur are obtained.     There is a proximal right subcapital femoral neck fracture with  impaction. The mid and distal right femur appear intact. Osteopenia with  arthritic changes of the knee. Scattered vascular calcifications.       Impression:      1. Impacted right subcapital femoral neck fracture.        This report was signed and finalized on 4/28/2025 4:27 PM by Dr. Lucy Mccloud MD.               I have reviewed the patient's current medications.     Assessment/Plan   Assessment  [facility]  Problems    Diagnosis     **Closed fracture of neck of right femur        Treatment Plan  In ED R hip fx, ortho contacted, wanted us to admit wbc 22 k, CXR -ve, will check UA no fever NPO after midnight, consult ortho, no abx for now , monitor wbc, identify reconcile restart home meds once reconciled, DVT prophylaxis after surgery per ortho      Medical Decision Making  Number and Complexity of problems: 2 acute   Differential Diagnosis: as above      Conditions and Status        Condition is at treatment goal.     [facility] Data  External documents reviewed: yes  Cardiac tracing (EKG, telemetry) interpretation: yes  Radiology interpretation: yes  Labs reviewed: yes  Any tests that were considered but not ordered: no     Decision rules/scores evaluated (example [id number], [place], etc): no     Discussed with: ED     Care Planning  Shared decision making: Patient apprised of current labs, vitals, imaging and treatment plan.  They are agreeable with proceeding with plans as discussed.   Code status and discussions: full      Disposition  Social Determinants of Health that impact treatment or disposition: no  Estimated length of stay is TBD.      I confirmed that the patient's advanced care plan is present, code status is documented, and a surrogate decision maker is listed in the patient's medical record.         Electronically signed by Ifeanyi Hercules MD, 04/29/25, 10:05 CDT.

## 2025-04-29 NOTE — CONSULTS
Orthopaedic Surgery Consult Note      4/29/2025   08:05 CDT    Name:  Cheryl Taylor  MRN:    [id number]     Acct:     [id number]   Room:  [facility] Day: 1     Admit Date: 4/28/2025  PCP: Den James DO        Subjective:     C/C: Right hip fracture. We are seeing this patient in consultation for an orthopaedic evaluation.  The patient is a pleasant 75-year-old female with multiple medical comorbidities who sustained a mechanical ground-level fall onto her right side resulting in the acute onset of right hip pain, mild deformity and the inability to bear weight.  She was transported to [facility] where imaging demonstrated a impacted but pronouncedly varus angulated right femoral neck fracture.  Orthopedics was consulted.  Original plan was to address her surgically yesterday evening, but she was provided a diet erroneously despite orders/recommendations through the emergency department to remain n.p.o.    Pain: 6 /10      Code Status:    Code Status and Medical Interventions: CPR (Attempt to Resuscitate); Full Support   Ordered at: 04/28/25 1729     Code Status (Patient has no pulse and is not breathing):    CPR (Attempt to Resuscitate)     Medical Interventions (Patient has pulse or is breathing):    Full Support         Past Medical History:    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    Thoracic aortic aneurysm        Past Surgical History:    Past Surgical History:   Procedure Laterality Date    BACK SURGERY      CARDIAC CATHETERIZATION N/A 5/6/2022    Procedure: Left Heart Cath;  Surgeon: Lenard Eastman MD;  Location:  [facility] CATH INVASIVE LOCATION;  Service: Cardiology;   Laterality: N/A;    EYE SURGERY Bilateral     FOOT FASCIOTOMY Bilateral     HYSTERECTOMY      LOWER LEG SOFT TISSUE TUMOR EXCISION      LUMBAR NERVE STIMLATOR INSERTION Right     LYMPH NODE DISSECTION      WRIST FRACTURE SURGERY Right     x2       Current Medications:   Prior to Admission medications    Medication Sig Start Date End Date Taking? Authorizing Provider   amLODIPine (NORVASC) 2.5 MG tablet Take 1 tablet by mouth Daily. 4/12/23  Yes Minnie Paige MD   ARIPiprazole (ABILIFY) 5 MG tablet Take 1 tablet by mouth every night at bedtime.   Yes Minnie Paige MD   aspirin 81 MG chewable tablet Chew 1 tablet Daily.   Yes Minnie Paige MD   atorvastatin (LIPITOR) 80 MG tablet Take 1 tablet by mouth Daily.   Yes Minine Paige MD   Buprenorphine HCl (BELBUCA) film Apply 1 film to cheek Every 12 (Twelve) Hours. 1/15/24  Yes Minnie Paige MD   calcium carbonate (TUMS) 500 MG chewable tablet Chew 1 tablet Daily.   Yes Minnie Paige MD   Cholecalciferol (Vitamin D) 50 MCG (2000 UT) capsule Take 1 capsule by mouth Daily.   Yes Minnie Paige MD   cyclobenzaprine (FLEXERIL) 10 MG tablet Take 1 tablet by mouth 2 (Two) Times a Day As Needed for Muscle Spasms. 6/29/22  Yes Minnie Paige MD   DULoxetine (CYMBALTA) 60 MG capsule Take 1 capsule by mouth 2 (Two) Times a Day.   Yes Minnie Paige MD   ferrous sulfate 325 (65 FE) MG tablet Take 1 tablet by mouth Daily. OTC   Yes Minnie Paige MD   HYDROcodone-acetaminophen (NORCO)  MG per tablet Take 1 tablet by mouth Every 12 (Twelve) Hours As Needed for Moderate Pain or Severe Pain. for pain 4/12/23  Yes Minnie Paige MD   metoprolol succinate XL (TOPROL-XL) 25 MG 24 hr tablet Take 1 tablet by mouth Daily. 9/8/22  Yes Minnie Paige MD   famotidine (PEPCID) 20 MG tablet Take 1 tablet by mouth At Night As Needed for Indigestion or Heartburn.  Patient not taking: Reported on  "2025    Minnie Paige MD   ketoconazole (NIZORAL) 2 % cream Apply 1 Application topically to the appropriate area as directed Daily.  Patient not taking: Reported on 2025   Minnie Paige MD   triamcinolone (KENALOG) 0.1 % cream Apply 1 Application topically to the appropriate area as directed 3 (Three) Times a Day.  Patient not taking: Reported on 2025   Minnie Paige MD       Allergies:  Levaquin [levofloxacin], Naprosyn [naproxen], and Sulfa antibiotics    Social History:   Social History     Socioeconomic History    Marital status:    Tobacco Use    Smoking status: Former     Current packs/day: 0.00     Average packs/day: 3.0 packs/day for 59.2 years (177.7 ttl pk-yrs)     Types: Cigarettes     Start date:      Quit date: 3/31/2023     Years since quittin.0    Smokeless tobacco: Never    Tobacco comments:     Pt quit end of 2023, but as of 23 states she has smoked about 3 cigarettes total since then.   Vaping Use    Vaping status: Never Used   Substance and Sexual Activity    Alcohol use: Never    Drug use: Never    Sexual activity: Defer       Family History:   Family History   Problem Relation Age of Onset    Hypertension Mother            REVIEW OF SYSTEMS:    Review of systems from admission history and physical are discussed with the patient and noted to be unchanged.    Vitals:  /66 (BP Location: Right arm, Patient Position: Lying)   Pulse 87   Temp 99.1 °F (37.3 °C) (Oral)   Resp 16   Ht 160 cm (62.99\")   Wt 80.4 kg (177 lb 4.8 oz)   SpO2 92%   BMI 31.42 kg/m²   Temp (24hrs), Av.3 °F (36.8 °C), Min:97.7 °F (36.5 °C), Max:99.1 °F (37.3 °C)      I/O (24Hr):    Intake/Output Summary (Last 24 hours) at 2025 0805  Last data filed at 2025 0404  Gross per 24 hour   Intake --   Output 100 ml   Net -100 ml       Labs:  Lab Results (last 72 hours)       Procedure Component Value Units Date/Time    Urinalysis, " Microscopic Only - Urine, Clean Catch [[id number]]  (Abnormal) Collected: 04/29/25 0404    Specimen: Urine, Clean Catch Updated: 04/29/25 0605     RBC, UA 0-2 /HPF      WBC, UA Too Numerous to Count /HPF      Bacteria, UA 4+ /HPF      Squamous Epithelial Cells, UA 31-50 /HPF      Transitional Epithelial Cells, UA 0-2 /HPF      Renal Epithelial Cells, UA 0-2 /HPF      Hyaline Casts, UA 0-2 /LPF      Methodology Manual Light Microscopy    Urine Culture - Urine, Urine, Clean Catch [[id number]] Collected: 04/29/25 0404    Specimen: Urine, Clean Catch Updated: 04/29/25 0605    Urinalysis With Culture If Indicated - Urine, Clean Catch [[id number]]  (Abnormal) Collected: 04/29/25 0404    Specimen: Urine, Clean Catch Updated: 04/29/25 0447     Color, UA Yellow     Appearance, UA Cloudy     pH, UA 5.5     Specific Gravity, UA 1.014     Glucose, UA Negative     Ketones, UA Trace     Bilirubin, UA Negative     Blood, UA Negative     Protein, UA Trace     Leuk Esterase, UA Moderate (2+)     Nitrite, UA Positive     Urobilinogen, UA 0.2 E.U./dL    Narrative:      In absence of clinical symptoms, the presence of pyuria, bacteria, and/or nitrites on the urinalysis result does not correlate with infection.    Basic Metabolic Panel [[id number]]  (Abnormal) Collected: 04/29/25 0329    Specimen: Blood Updated: 04/29/25 0431     Glucose 96 mg/dL      BUN 21 mg/dL      Creatinine 1.45 mg/dL      Sodium 135 mmol/L      Potassium 4.6 mmol/L      Chloride 100 mmol/L      CO2 23.0 mmol/L      Calcium 8.9 mg/dL      BUN/Creatinine Ratio 14.5     Anion Gap 12.0 mmol/L      eGFR 38.2 mL/min/1.73     Narrative:      GFR Categories in Chronic Kidney Disease (CKD)      GFR Category          GFR (mL/min/1.73)    Interpretation  G1                     90 or greater         Normal or high (1)  G2                      60-89                Mild decrease (1)  G3a                   45-59                Mild to moderate decrease  G3b                   30-44                 Moderate to severe decrease  G4                    15-29                Severe decrease  G5                    14 or less           Kidney failure          (1)In the absence of evidence of kidney disease, neither GFR category G1 or G2 fulfill the criteria for CKD.    eGFR calculation 2021 CKD-EPI creatinine equation, which does not include race as a factor    CBC & Differential [[id number]]  (Abnormal) Collected: 04/29/25 0329    Specimen: Blood Updated: 04/29/25 0410    Narrative:      The following orders were created for panel order CBC & Differential.  Procedure                               Abnormality         Status                     ---------                               -----------         ------                     CBC Auto Differential[[id number]]        Abnormal            Final result                 Please view results for these tests on the individual orders.    CBC Auto Differential [[id number]]  (Abnormal) Collected: 04/29/25 0329    Specimen: Blood Updated: 04/29/25 0410     WBC 19.10 10*3/mm3      RBC 3.58 10*6/mm3      Hemoglobin 10.3 g/dL      Hematocrit 33.0 %      MCV 92.2 fL      MCH 28.8 pg      MCHC 31.2 g/dL      RDW 14.0 %      RDW-SD 46.6 fl      MPV 10.0 fL      Platelets 381 10*3/mm3      Neutrophil % 76.0 %      Lymphocyte % 12.1 %      Monocyte % 10.3 %      Eosinophil % 0.5 %      Basophil % 0.4 %      Immature Grans % 0.7 %      Neutrophils, Absolute 14.50 10*3/mm3      Lymphocytes, Absolute 2.32 10*3/mm3      Monocytes, Absolute 1.96 10*3/mm3      Eosinophils, Absolute 0.10 10*3/mm3      Basophils, Absolute 0.08 10*3/mm3      Immature Grans, Absolute 0.14 10*3/mm3      nRBC 0.0 /100 WBC     Comprehensive Metabolic Panel [[id number]]  (Abnormal) Collected: 04/28/25 1713    Specimen: Blood Updated: 04/28/25 0307     Glucose 115 mg/dL      BUN 20 mg/dL      Creatinine 1.38 mg/dL      Sodium 136 mmol/L      Potassium 4.5 mmol/L      Chloride 102 mmol/L      CO2 18.0 mmol/L       Calcium 9.3 mg/dL      Total Protein 7.4 g/dL      Albumin 3.8 g/dL      ALT (SGPT) 14 U/L      AST (SGOT) 18 U/L      Alkaline Phosphatase 190 U/L      Total Bilirubin 0.4 mg/dL      Globulin 3.6 gm/dL      A/G Ratio 1.1 g/dL      BUN/Creatinine Ratio 14.5     Anion Gap 16.0 mmol/L      eGFR 40.5 mL/min/1.73     Narrative:      GFR Categories in Chronic Kidney Disease (CKD)      GFR Category          GFR (mL/min/1.73)    Interpretation  G1                     90 or greater         Normal or high (1)  G2                      60-89                Mild decrease (1)  G3a                   45-59                Mild to moderate decrease  G3b                   30-44                Moderate to severe decrease  G4                    15-29                Severe decrease  G5                    14 or less           Kidney failure          (1)In the absence of evidence of kidney disease, neither GFR category G1 or G2 fulfill the criteria for CKD.    eGFR calculation 2021 CKD-EPI creatinine equation, which does not include race as a factor    Protime-INR [[id number]]  (Normal) Collected: 04/28/25 1713    Specimen: Blood Updated: 04/28/25 1730     Protime 14.3 Seconds      INR 1.06    aPTT [[id number]]  (Normal) Collected: 04/28/25 1713    Specimen: Blood Updated: 04/28/25 1730     PTT 25.6 seconds     Narrative:      PTT = The equivalent PTT values for the therapeutic range of heparin levels at 0.3 to 0.7 U/ml are 77 - 99 seconds.    CBC & Differential [[id number]]  (Abnormal) Collected: 04/28/25 1713    Specimen: Blood Updated: 04/28/25 1719    Narrative:      The following orders were created for panel order CBC & Differential.  Procedure                               Abnormality         Status                     ---------                               -----------         ------                     CBC Auto Differential[[id number]]        Abnormal            Final result                 Please view results for these tests on  the individual orders.    CBC Auto Differential [[id number]]  (Abnormal) Collected: 04/28/25 1713    Specimen: Blood Updated: 04/28/25 1719     WBC 25.67 10*3/mm3      RBC 4.08 10*6/mm3      Hemoglobin 11.7 g/dL      Hematocrit 36.8 %      MCV 90.2 fL      MCH 28.7 pg      MCHC 31.8 g/dL      RDW 14.1 %      RDW-SD 46.9 fl      MPV 9.7 fL      Platelets 440 10*3/mm3      Neutrophil % 86.9 %      Lymphocyte % 5.6 %      Monocyte % 6.0 %      Eosinophil % 0.2 %      Basophil % 0.4 %      Immature Grans % 0.9 %      Neutrophils, Absolute 22.32 10*3/mm3      Lymphocytes, Absolute 1.44 10*3/mm3      Monocytes, Absolute 1.54 10*3/mm3      Eosinophils, Absolute 0.04 10*3/mm3      Basophils, Absolute 0.09 10*3/mm3      Immature Grans, Absolute 0.24 10*3/mm3      nRBC 0.0 /100 WBC             Radiology:  Imaging Results (Last 72 Hours)       Procedure Component Value Units Date/Time    XR Chest 1 View [[id number]] Collected: 04/28/25 1710     Updated: 04/28/25 1713    Narrative:      XR CHEST 1 VW- 4/28/2025 4:06 PM     HISTORY: pre op; S72.001A-Fracture of unspecified part of neck of right  femur, initial encounter for closed fracture       COMPARISON: 8/4/2023     FINDINGS:  Upright frontal radiograph of the chest was obtained     Pulmonary fibrosis. Lungs are well expanded. No consolidation or pleural  effusion. Mild cardiomegaly which is stable. Pulmonary vasculature are  nondilated. Partially imaged dorsal column stimulator. No acute bony  abnormality.       Impression:      1.  Stable chest exam without acute process.     This report was signed and finalized on 4/28/2025 5:10 PM by Dr Gonzalez Leone.       XR Foot 3+ View Right [[id number]] Collected: 04/28/25 1630     Updated: 04/28/25 1634    Narrative:      XR FOOT 3+ VW RIGHT- 4/28/2025 3:23 PM     HISTORY: fall     COMPARISON: None      FINDINGS:  Frontal, lateral and oblique radiographs of the right foot were provided  for review.     There is no fracture or joint  subluxation. Lisfranc alignment is  anatomic. Joint spaces are fairly well maintained throughout the  forefoot and midfoot. Fifth metatarsal base is intact. No discrete soft  tissue abnormality.       Impression:      1. No acute fracture or malalignment.     This report was signed and finalized on 4/28/2025 4:31 PM by Dr Gonzalez Leone.       XR Tibia Fibula 2 View Right [[id number]] Collected: 04/28/25 1630     Updated: 04/28/25 1633    Narrative:      XR TIBIA FIBULA 2 VW RIGHT-     HISTORY: fall     COMPARISON: None     FINDINGS: Frontal and lateral views of the right tibia and fibula are  obtained.     Moderate arthritic changes at the level of the knee. Osteopenia. No  fracture or dislocation identified. Scattered vascular calcification.       Impression:      1. No acute osseous injury identified of the right tibia or fibula.  Arthritic changes at the level of the knee. Osteopenia.        This report was signed and finalized on 4/28/2025 4:30 PM by Dr. Lucy Mccloud MD.       XR Knee 3 View Right [[id number]] Collected: 04/28/25 1628     Updated: 04/28/25 1632    Narrative:      XR KNEE 3 VW RIGHT-     HISTORY: fall     COMPARISON: None     FINDINGS: 3 views of the right knee are obtained.     Osteopenia. Tricompartmental osteoarthritis with joint space narrowing  moderate bony spurring. No acute fracture or dislocation. Anterior soft  tissue swelling. Scattered vascular calcification.       Impression:      1. Osteopenia with tricompartmental osteoarthritis. No acute fracture or  dislocation identified.        This report was signed and finalized on 4/28/2025 4:29 PM by Dr. Lucy Mccloud MD.       XR Ankle 2 View Right [[id number]] Collected: 04/28/25 1627     Updated: 04/28/25 1632    Narrative:      XR ANKLE 2 VW RIGHT- 4/28/2025 3:23 PM     HISTORY: fall       COMPARISON: None     FINDINGS:  2 views of the right ankle were provided for review.     There is no evidence of acute fracture or malalignment.  The ankle  mortise is congruent. The talar dome is intact. No ankle joint capsular  distention. Subtalar joint is unremarkable.       Impression:      1. No acute fracture or malalignment at the ankle.           This report was signed and finalized on 4/28/2025 4:29 PM by Dr Gonzalez Leone.       XR Hip With or Without Pelvis 2 - 3 View Right [[id number]] Collected: 04/28/25 1628     Updated: 04/28/25 1631    Narrative:      XR HIP W OR WO PELVIS 2-3 VIEW RIGHT-     HISTORY: fall     COMPARISON: None     FINDINGS: Frontal view of the pelvis as well as frontal and frog-leg  lateral views right hip 18.     Right subcapital femoral neck fracture with impaction. Osteopenia. Mild  bilateral hip joint space narrowing. Vascular calcification.  Degenerative change lower lumbar spine.       Impression:      1. Impacted right subcapital femoral neck fracture. Osteopenia.        This report was signed and finalized on 4/28/2025 4:28 PM by Dr. Lucy Mccloud MD.       XR Femur 2 View Right [[id number]] Collected: 04/28/25 1626     Updated: 04/28/25 1631    Narrative:      XR FEMUR 2 VW RIGHT-     HISTORY: fall     COMPARISON: None     FINDINGS: Frontal and lateral views of the right femur are obtained.     There is a proximal right subcapital femoral neck fracture with  impaction. The mid and distal right femur appear intact. Osteopenia with  arthritic changes of the knee. Scattered vascular calcifications.       Impression:      1. Impacted right subcapital femoral neck fracture.        This report was signed and finalized on 4/28/2025 4:27 PM by Dr. Lucy Mccloud MD.               Physical Exam:     PHYSICAL EXAM:      Physical Examination:  Vitals:   Vitals:    04/28/25 2004 04/28/25 2343 04/29/25 0400 04/29/25 0712   BP: 122/51 130/55 117/50 121/66   BP Location: Left arm Left arm Left arm Right arm   Patient Position: Lying Lying Lying Lying   Pulse: 88 98 88 87   Resp: 18 18 18 16   Temp: 97.7 °F (36.5 °C)  98.5 °F  (36.9 °C) 99.1 °F (37.3 °C)   TempSrc: Oral  Oral Oral   SpO2: 96% 92% 93% 92%   Weight:       Height:         General:  Appears stated age, no distress.  Orientation:  Alert and oriented to time, place, and person.  Mood and Affect:  Cooperative and pleasant.  Gait:  Resting comfortably in bed.  Cardiovascular:  Symmetric 1-2 plus pulses in upper and lower extremities.  Lymph:  No cervical or inguinal lymphadenopathy noted.  Sensation:  Grossly intact to light touch.  DTR:  Normal, no pathologic reflexes.  Coordination/balance:  Normal    Musculoskeletal:  Right lower extremity exam:  There is no tenderness to palpation about the knee, ankle or foot, but there is noted pain at the level of the right hip.  The right lower extremity is shortened and externally rotated.  The overlying skin is intact.  Muscle compartments are soft compressible.  No palpable defect, but there is pronounced discomfort with any attempted active or passive range of motion at the level of the hip.  The right lower extremity is warm and well-perfused.       Radiology Review  My review of patient's x-rays shows the patient to have a displaced subcapital right femoral neck fracture with impaction    Assessment:     Primary Problem  Closed fracture of neck of right femur - displaced subcapital right femoral neck fracture with impaction    Plan:   A prolonged conversation was had with the patient regarding the fracture location and morphology.  Based on the degree of varus angulation despite impaction, it was ultimately recommended that she consider arthroplasty.  Based on the patient's spectrum of medical comorbidities, bipolar hemiarthroplasty was ultimately elected.  The risks and benefits of that procedure were discussed with the patient including the risk for infection, nerve and artery damage, continued pain, continued decreased range of motion, paresthesias, paralysis, loss of limb, loss of life, fracture, subsidence, disassociation,  dislocation, leg length inequality, blood clot and the need for further surgery.  The patient indicates her understanding and provides her written and verbal consent to proceed.  2.   The patient has now been n.p.o. since midnight.  Previously, we attempted to address her surgically yesterday evening, but she was provided a diet erroneously.  3.   We will likely anticoagulate the patient with Eliquis 2.5 mg p.o. twice daily postoperatively unless directed otherwise by cardiothoracic surgery  4.    Currently bedrest

## 2025-04-30 LAB
ANION GAP SERPL CALCULATED.3IONS-SCNC: 15 MMOL/L (ref 5–15)
BASOPHILS # BLD AUTO: 0.08 10*3/MM3 (ref 0–0.2)
BASOPHILS NFR BLD AUTO: 0.4 % (ref 0–1.5)
BUN SERPL-MCNC: 20 MG/DL (ref 8–23)
BUN/CREAT SERPL: 15.4 (ref 7–25)
CALCIUM SPEC-SCNC: 8.8 MG/DL (ref 8.6–10.5)
CHLORIDE SERPL-SCNC: 101 MMOL/L (ref 98–107)
CO2 SERPL-SCNC: 21 MMOL/L (ref 22–29)
CREAT SERPL-MCNC: 1.3 MG/DL (ref 0.57–1)
DEPRECATED RDW RBC AUTO: 49.1 FL (ref 37–54)
EGFRCR SERPLBLD CKD-EPI 2021: 43.5 ML/MIN/1.73
EOSINOPHIL # BLD AUTO: 0.1 10*3/MM3 (ref 0–0.4)
EOSINOPHIL NFR BLD AUTO: 0.5 % (ref 0.3–6.2)
ERYTHROCYTE [DISTWIDTH] IN BLOOD BY AUTOMATED COUNT: 14.1 % (ref 12.3–15.4)
GLUCOSE SERPL-MCNC: 89 MG/DL (ref 65–99)
HCT VFR BLD AUTO: 34.8 % (ref 34–46.6)
HGB BLD-MCNC: 10.4 G/DL (ref 12–15.9)
IMM GRANULOCYTES # BLD AUTO: 0.21 10*3/MM3 (ref 0–0.05)
IMM GRANULOCYTES NFR BLD AUTO: 1 % (ref 0–0.5)
LYMPHOCYTES # BLD AUTO: 2.35 10*3/MM3 (ref 0.7–3.1)
LYMPHOCYTES NFR BLD AUTO: 11.4 % (ref 19.6–45.3)
MCH RBC QN AUTO: 28.5 PG (ref 26.6–33)
MCHC RBC AUTO-ENTMCNC: 29.9 G/DL (ref 31.5–35.7)
MCV RBC AUTO: 95.3 FL (ref 79–97)
MONOCYTES # BLD AUTO: 2.03 10*3/MM3 (ref 0.1–0.9)
MONOCYTES NFR BLD AUTO: 9.8 % (ref 5–12)
NEUTROPHILS NFR BLD AUTO: 15.89 10*3/MM3 (ref 1.7–7)
NEUTROPHILS NFR BLD AUTO: 76.9 % (ref 42.7–76)
NRBC BLD AUTO-RTO: 0 /100 WBC (ref 0–0.2)
PLATELET # BLD AUTO: 379 10*3/MM3 (ref 140–450)
PMV BLD AUTO: 11.1 FL (ref 6–12)
POTASSIUM SERPL-SCNC: 4.4 MMOL/L (ref 3.5–5.2)
QT INTERVAL: 382 MS
QTC INTERVAL: 462 MS
RBC # BLD AUTO: 3.65 10*6/MM3 (ref 3.77–5.28)
SODIUM SERPL-SCNC: 137 MMOL/L (ref 136–145)
WBC NRBC COR # BLD AUTO: 20.66 10*3/MM3 (ref 3.4–10.8)

## 2025-04-30 PROCEDURE — 25010000002 HYDROMORPHONE PER 4 MG: Performed by: ORTHOPAEDIC SURGERY

## 2025-04-30 PROCEDURE — 97110 THERAPEUTIC EXERCISES: CPT

## 2025-04-30 PROCEDURE — 36415 COLL VENOUS BLD VENIPUNCTURE: CPT | Performed by: ORTHOPAEDIC SURGERY

## 2025-04-30 PROCEDURE — 25010000002 CEFAZOLIN PER 500 MG: Performed by: ORTHOPAEDIC SURGERY

## 2025-04-30 PROCEDURE — 85025 COMPLETE CBC W/AUTO DIFF WBC: CPT | Performed by: ORTHOPAEDIC SURGERY

## 2025-04-30 PROCEDURE — 80048 BASIC METABOLIC PNL TOTAL CA: CPT | Performed by: ORTHOPAEDIC SURGERY

## 2025-04-30 PROCEDURE — 25010000002 CEFTRIAXONE PER 250 MG: Performed by: ORTHOPAEDIC SURGERY

## 2025-04-30 PROCEDURE — 97162 PT EVAL MOD COMPLEX 30 MIN: CPT

## 2025-04-30 PROCEDURE — 97166 OT EVAL MOD COMPLEX 45 MIN: CPT

## 2025-04-30 RX ORDER — CALCIUM CARBONATE 500 MG/1
2 TABLET, CHEWABLE ORAL ONCE AS NEEDED
Status: COMPLETED | OUTPATIENT
Start: 2025-04-30 | End: 2025-04-30

## 2025-04-30 RX ADMIN — Medication 10 ML: at 08:14

## 2025-04-30 RX ADMIN — HYDROCODONE BITARTRATE AND ACETAMINOPHEN 1 TABLET: 7.5; 325 TABLET ORAL at 12:02

## 2025-04-30 RX ADMIN — Medication 10 ML: at 20:11

## 2025-04-30 RX ADMIN — HYDROCODONE BITARTRATE AND ACETAMINOPHEN 1 TABLET: 7.5; 325 TABLET ORAL at 08:13

## 2025-04-30 RX ADMIN — APIXABAN 2.5 MG: 2.5 TABLET, FILM COATED ORAL at 08:13

## 2025-04-30 RX ADMIN — APIXABAN 2.5 MG: 2.5 TABLET, FILM COATED ORAL at 20:10

## 2025-04-30 RX ADMIN — HYDROMORPHONE HYDROCHLORIDE 0.5 MG: 1 INJECTION, SOLUTION INTRAMUSCULAR; INTRAVENOUS; SUBCUTANEOUS at 06:12

## 2025-04-30 RX ADMIN — POLYETHYLENE GLYCOL 3350 17 G: 17 POWDER, FOR SOLUTION ORAL at 08:13

## 2025-04-30 RX ADMIN — HYDROCODONE BITARTRATE AND ACETAMINOPHEN 2 TABLET: 7.5; 325 TABLET ORAL at 16:18

## 2025-04-30 RX ADMIN — CEFAZOLIN 2000 MG: 2 INJECTION, POWDER, FOR SOLUTION INTRAMUSCULAR; INTRAVENOUS at 06:11

## 2025-04-30 RX ADMIN — CEFTRIAXONE SODIUM 2000 MG: 2 INJECTION, POWDER, FOR SOLUTION INTRAMUSCULAR; INTRAVENOUS at 11:53

## 2025-04-30 RX ADMIN — HYDROCODONE BITARTRATE AND ACETAMINOPHEN 1 TABLET: 7.5; 325 TABLET ORAL at 21:49

## 2025-04-30 RX ADMIN — ANTACID TABLETS 2 TABLET: 500 TABLET, CHEWABLE ORAL at 20:11

## 2025-04-30 RX ADMIN — HYDROCODONE BITARTRATE AND ACETAMINOPHEN 1 TABLET: 7.5; 325 TABLET ORAL at 03:42

## 2025-04-30 NOTE — ANESTHESIA POSTPROCEDURE EVALUATION
"Patient: Cheryl Taylor    Procedure Summary       Date: 04/29/25 Room / Location: [facility] OR  /  [facility] OR    Anesthesia Start: 1740 Anesthesia Stop: 1945    Procedure: HIP HEMIARTHROPLASTY RIGHT (Right: Hip) Diagnosis:     Surgeons: Isael Lopes MD Provider: Stefani Yin CRNA    Anesthesia Type: general ASA Status: 3 - Emergent            Anesthesia Type: general    Vitals  Vitals Value Taken Time   /69 04/29/25 20:10   Temp 98.7 °F (37.1 °C) 04/29/25 20:10   Pulse 102 04/29/25 20:17   Resp 18 04/29/25 20:10   SpO2 97 % 04/29/25 20:16   Vitals shown include unfiled device data.        Post Anesthesia Care and Evaluation    Patient location during evaluation: PACU  Patient participation: complete - patient participated  Level of consciousness: awake and awake and alert  Pain score: 0  Pain management: adequate    Airway patency: patent  Anesthetic complications: No anesthetic complications  PONV Status: none  Cardiovascular status: acceptable  Respiratory status: acceptable  Hydration status: acceptable    Comments: Patient discharged according to acceptable Zak score per RN assessment. See nursing records for further information.     Blood pressure 128/77, pulse 100, temperature 98.1 °F (36.7 °C), temperature source Axillary, resp. rate 16, height 160 cm (62.99\"), weight 80.4 kg (177 lb 4.8 oz), SpO2 92%.      "

## 2025-04-30 NOTE — PLAN OF CARE
Goal Outcome Evaluation:  Plan of Care Reviewed With: patient        Progress: no change  Outcome Evaluation: OT eval completed. Pt in fowlers upon therapist arrival; A&Ox4; c/o 6/10 RLE pain; 1L BNC with SpO2 89-91% at rest. Pt required 2L BNC for remainder of eval to maintain O2 sats above 90. Pt reports Mod I-I with all BADLs including fxl ambulation at baseline. Today, Pt was educated on RLE anterior hip precautions; Pt verbalized understanding. Pt performed supine>sit utilizing bedrail with HOB elevated with Mod A. Pt dependent to hiral B socks. Pt performed sit>stand utilizing rwx with Min A and verbal cues for sequencing and body mechanics. Once standing, Pt was able to take a few steps from bed>chair utilizing rwx with CGA and verbal cues for positioning of AD. BUE strength WFL. Pt additionally required intermittent verbal cues for use of pursed lip breathing techniques due to increased SOB with little activity. Skilled OT intervention indicated in order to address deficits in fxl mobility, fxl activity tolerance, balance, strength, and use of adaptive techniques/equipment during performance of BADLs. Recommend inpatient rehab at discharge.    Anticipated Discharge Disposition (OT): inpatient rehabilitation facility

## 2025-04-30 NOTE — PLAN OF CARE
Goal Outcome Evaluation:  Plan of Care Reviewed With: patient        Progress: no change  Outcome Evaluation: PT treatment complete. Pt recently returned to EOB with nurse aide as she had sat up most of the day. Pt reports she has had an increase in pain but does feel some improvement mobility wise. In sitting pt tolerated 2x5 of sitting hip flexion, long arc quads and educated to perform ankle pumps 2x10 while in bed. Pt was able to perform 5 sit to stands with requiring 5 second rest break after each rep due to fatigue. Once complete pt returned to supine with Min A and left in fowlers positon. Pt educated to perform ther ex again this afternoon and OOB for meals. POC ongoing, Recommend IP Rehab.    Anticipated Discharge Disposition (PT): inpatient rehabilitation facility

## 2025-04-30 NOTE — THERAPY EVALUATION
Patient Name: Cheryl Taylor  : 1950    MRN: [id number]                              Today's Date: 2025       Admit Date: 2025    Visit Dx:     ICD-10-CM ICD-9-CM   1. Closed fracture of neck of right femur, initial encounter  S72.001A 820.8   2. Impaired mobility [Z74.09]  Z74.09 799.89     Patient Active Problem List   Diagnosis    Atypical chest pain    Hiatal hernia    Gastroesophageal reflux disease with esophagitis without hemorrhage    Essential hypertension    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     Tobacco abuse, in remission    Stage 3a chronic kidney disease    Obesity (BMI 30-39.9)    Chronic diastolic CHF (congestive heart failure)    Aortic stenosis, mild    Nonrheumatic aortic valve insufficiency    Chronic pain    Degeneration of lumbar intervertebral disc    Gastroesophageal reflux disease    Vasculitis    Iron deficiency anemia    Lumbar radiculopathy    Lumbosacral radiculitis    Shortness of breath    Spondylosis    Thoracic aortic aneurysm without rupture    Closed fracture of neck of right femur     Past Medical History:   Diagnosis Date    AAA (abdominal aortic aneurysm)     Arthritis     Asthma     Chronic kidney disease (CKD)     Confusion 2021    COPD (chronic obstructive pulmonary disease)     Gastroesophageal reflux disease with esophagitis without hemorrhage     Heart murmur     Hiatal hernia     Hypertension     Inflammation of arteries 2021    Thoracic aortic aneurysm      Past Surgical History:   Procedure Laterality Date    BACK SURGERY      CARDIAC CATHETERIZATION N/A 2022    Procedure: Left Heart Cath;  Surgeon: Lenard Eastman MD;  Location:  [facility];  Service: Cardiology;  Laterality: N/A;    EYE SURGERY Bilateral     FOOT FASCIOTOMY Bilateral     HIP HEMIARTHROPLASTY Right 2025    Procedure: HIP HEMIARTHROPLASTY RIGHT;  Surgeon: Isael Lopes MD;  Location:  [facility] OR;  Service: Orthopedics;  Laterality:  Right;    HYSTERECTOMY      LOWER LEG SOFT TISSUE TUMOR EXCISION      LUMBAR NERVE STIMLATOR INSERTION Right     LYMPH NODE DISSECTION      WRIST FRACTURE SURGERY Right     x2      General Information       Row Name 04/30/25 0758          OT Time and Intention    Subjective Information complains of;pain  -LS     Document Type evaluation  cc: fall with R hip pain. Dx: Closed fracture of neck of right femur. Pt now s/p R hip hemiarthroplasty, anterior approach on 4/29.  -LS     Mode of Treatment occupational therapy  -LS     Patient Effort good  -LS       Row Name 04/30/25 0758          General Information    Patient Profile Reviewed yes  -LS     Prior Level of Function independent:;ADL's;all household mobility;cooking;cleaning;dependent:;driving;shopping  used rollator  -LS     Existing Precautions/Restrictions fall;right;hip, anterior;other (see comments)  WBAT RLE  -LS     Barriers to Rehab previous functional deficit  -LS       Row Name 04/30/25 0758          Occupational Profile    Environmental Supports and Barriers (Occupational Profile) Tub shower with shower chair and grab bars. Grab bars next to toilet. Other AD/DME: rollator, manual w/c, crutches, transport chair  -LS       Row Name 04/30/25 0758          Living Environment    People in Home alone  -LS       Row Name 04/30/25 0758          Home Main Entrance    Number of Stairs, Main Entrance none  -LS       Row Name 04/30/25 0758          Stairs Within Home, Primary    Number of Stairs, Within Home, Primary none  -LS       Row Name 04/30/25 0758          Cognition    Orientation Status (Cognition) oriented x 4  -LS       Row Name 04/30/25 0758          Safety Issues/Impairments Affecting Functional Mobility    Safety Issues Affecting Function (Mobility) safety precaution awareness;safety precautions follow-through/compliance  -LS     Impairments Affecting Function (Mobility) endurance/activity tolerance;shortness of breath;pain;strength;range of motion  (ROM);balance  -LS               User Key  (r) = Recorded By, (t) = Taken By, (c) = Cosigned By      Initials Name Provider Type    Scarlet Castaneda OTR/L Occupational Therapist                     Mobility/ADL's       Row Name 04/30/25 0935          Bed Mobility    Bed Mobility supine-sit  -LS     Rolling Left [place] (Bed Mobility) --  -LS     Supine-Sit [place] (Bed Mobility) moderate assist (50% patient effort);verbal cues  -LS     Assistive Device (Bed Mobility) bed rails;head of bed elevated  -       Row Name 04/30/25 0935          Transfers    Transfers sit-stand transfer;stand-sit transfer  -       Row Name 04/30/25 0935          Sit-Stand Transfer    Sit-Stand [place] (Transfers) minimum assist (75% patient effort);verbal cues  -LS     Assistive Device (Sit-Stand Transfers) walker, front-wheeled  -       Row Name 04/30/25 0935          Functional Mobility    Functional Mobility- Ind. Level contact guard assist;verbal cues required  -     Functional Mobility- Device walker, front-wheeled  -LS     Functional Mobility-Distance (Feet) 5  -LS     Patient was able to Ambulate yes  -       Row Name 04/30/25 0935          Activities of Daily Living    BADL Assessment/Intervention lower body dressing  -       Row Name 04/30/25 0935          Mobility    Extremity Weight-bearing Status right lower extremity  -LS     Right Lower Extremity (Weight-bearing Status) weight-bearing as tolerated (WBAT)  -       Row Name 04/30/25 0935          Lower Body Dressing Assessment/Training    [place] Level (Lower Body Dressing) don;socks;dependent (less than 25% patient effort)  -LS     Position (Lower Body Dressing) --  -LS               User Key  (r) = Recorded By, (t) = Taken By, (c) = Cosigned By      Initials Name Provider Type    Scarlet Castaneda OTR/L Occupational Therapist                   Obj/Interventions       Row Name 04/30/25 0935          Sensory Assessment (Somatosensory)     Sensory Assessment (Somatosensory) UE sensation intact  -       Row Name 04/30/25 0935          Vision Assessment/Intervention    Visual Impairment/Limitations WFL  -LS       Row Name 04/30/25 0935          Range of Motion Comprehensive    General Range of Motion bilateral upper extremity ROM WFL  -LS     Comment, General Range of Motion swan neck deformities present in digits 2-5 on B hands  -LS       Row Name 04/30/25 0935          Strength Comprehensive (MMT)    Comment, General Manual Muscle Testing (MMT) Assessment BUE strength grossly 4+/5  -LS       Row Name 04/30/25 0935          Balance    Balance Assessment sitting static balance;sitting dynamic balance;standing static balance;standing dynamic balance  -LS     Static Sitting Balance standby assist  -LS     Dynamic Sitting Balance standby assist  -LS     Position, Sitting Balance unsupported;sitting edge of bed  -LS     Static Standing Balance contact guard  -LS     Dynamic Standing Balance contact guard  -LS     Position/Device Used, Standing Balance supported;walker, front-wheeled  -LS               User Key  (r) = Recorded By, (t) = Taken By, (c) = Cosigned By      Initials Name Provider Type     Scarlet Mcguire, OTR/L Occupational Therapist                   Goals/Plan       [facility] Name 04/30/25 0935          Transfer Goal 1 (OT)    Activity/Assistive Device (Transfer Goal 1, OT) toilet;shower chair  -LS     [place] Level/Cues Needed (Transfer Goal 1, OT) standby assist  -LS     Time Frame (Transfer Goal 1, OT) long term goal (LTG);10 days  -LS     Progress/Outcome (Transfer Goal 1, OT) new goal  -[facility] Name 04/30/25 0935          Dressing Goal 1 (OT)    Activity/Device (Dressing Goal 1, OT) dressing skills, all  -LS     [place]/Cues Needed (Dressing Goal 1, OT) minimum assist (75% or more patient effort)  -LS     Time Frame (Dressing Goal 1, OT) long term goal (LTG);10 days  -LS     Progress/Outcome (Dressing Goal 1, OT) new goal  -        Row Name 04/30/25 0935          Toileting Goal 1 (OT)    Activity/Device (Toileting Goal 1, OT) toileting skills, all  -LS     [place] Level/Cues Needed (Toileting Goal 1, OT) standby assist  -LS     Time Frame (Toileting Goal 1, OT) long term goal (LTG);10 days  -LS     Progress/Outcome (Toileting Goal 1, OT) new goal  -LS       Row Name 04/30/25 0935          Problem Specific Goal 1 (OT)    Problem Specific Goal 1 (OT) Pt will independently implement one pain management technique to decrease pain and improve functional adl performance.  -LS     Time Frame (Problem Specific Goal 1, OT) long term goal (LTG);by discharge  -LS     Progress/Outcome (Problem Specific Goal 1, OT) new goal  -LS       Row Name 04/30/25 0935          Therapy Assessment/Plan (OT)    Planned Therapy Interventions (OT) transfer/mobility retraining;strengthening exercise;patient/caregiver education/training;occupation/activity based interventions;functional balance retraining;activity tolerance training;BADL retraining;adaptive equipment training;ROM/therapeutic exercise  -LS               User Key  (r) = Recorded By, (t) = Taken By, (c) = Cosigned By      Initials Name Provider Type    LS Scarlet Mcguire, OTR/L Occupational Therapist                   Clinical Impression       Row Name 04/30/25 0935 04/30/25 0758       Pain Assessment    Pretreatment Pain Rating 6/10  -LS 6/10  -LS    Posttreatment Pain Rating 6/10  -LS 6/10  -LS    Pain Location extremity  -LS extremity  -LS    Pain Side/Orientation right;lower  -LS right;lower  -LS    Pain Management Interventions exercise or physical activity utilized  -LS --    Response to Pain Interventions activity participation with tolerable pain  -LS --      Row Name 04/30/25 0935 04/30/25 0758       Plan of Care Review    Plan of Care Reviewed With patient  -LS patient  -LS    Progress no change  -LS no change  -LS    Outcome Evaluation OT eval completed. Pt in fowlers upon therapist arrival;  A&Ox4; c/o 6/10 RLE pain; 1L BNC with SpO2 89-91% at rest. Pt required 2L BNC for remainder of eval to maintain O2 sats above 90. Pt reports Mod I-I with all BADLs including fxl ambulation at baseline. Today, Pt was educated on RLE anterior hip precautions; Pt verbalized understanding. Pt performed supine>sit utilizing bedrail with HOB elevated with Mod A. Pt dependent to hiral B socks. Pt performed sit>stand utilizing rwx with Min A and verbal cues for sequencing and body mechanics. Once standing, Pt was able to take a few steps from bed>chair utilizing rwx with CGA and verbal cues for positioning of AD. BUE strength WFL. Pt additionally required intermittent verbal cues for use of pursed lip breathing techniques due to increased SOB with little activity. Skilled OT intervention indicated in order to address deficits in fxl mobility, fxl activity tolerance, balance, strength, and use of adaptive techniques/equipment during performance of BADLs. Recommend inpatient rehab at discharge.  - --      Row Name 04/30/25 0935          Therapy Assessment/Plan (OT)    Rehab Potential (OT) good  -     Criteria for Skilled Therapeutic Interventions Met (OT) yes;skilled treatment is necessary  -     Therapy Frequency (OT) 5 times/wk  -       Row Name 04/30/25 0935          Therapy Plan Review/Discharge Plan (OT)    Anticipated Discharge Disposition (OT) inpatient rehabilitation facility  -       Row Name 04/30/25 0935 04/30/25 0758       Positioning and Restraints    Pre-Treatment Position in bed  -LS in bed  -LS    Post Treatment Position chair  -LS --    In Chair reclined;call light within reach;encouraged to call for assist;legs elevated  -LS --              User Key  (r) = Recorded By, (t) = Taken By, (c) = Cosigned By      Initials Name Provider Type    Scarlet Castaneda, OTR/L Occupational Therapist                   Outcome Measures       Row Name 04/30/25 0935          How much help from another is currently  needed...    Putting on and taking off regular lower body clothing? 1  -LS     Bathing (including washing, rinsing, and drying) 2  -LS     Toileting (which includes using toilet bed pan or urinal) 2  -LS     Putting on and taking off regular upper body clothing 4  -LS     Taking care of personal grooming (such as brushing teeth) 4  -LS     Eating meals 4  -LS     AM-PAC 6 Clicks Score (OT) 17  -LS       Row Name 04/30/25 0941 04/30/25 0832       How much help from another person do you currently need...    Turning from your back to your side while in flat bed without using bedrails? 3  -AJ 3  -LM    Moving from lying on back to sitting on the side of a flat bed without bedrails? 3  -AJ 2  -LM    Moving to and from a bed to a chair (including a wheelchair)? 3  -AJ 2  -LM    Standing up from a chair using your arms (e.g., wheelchair, bedside chair)? 3  -AJ 3  -LM    Climbing 3-5 steps with a railing? 1  -AJ 2  -LM    To walk in hospital room? 3  -AJ 2  -LM    AM-PAC 6 Clicks Score (PT) 16  -AJ 14  -LM    Highest Level of Mobility Goal 5 --> Static standing  -AJ 4 --> Transfer to chair/commode  -LM      Row Name 04/30/25 0941 04/30/25 0935       Functional Assessment    Outcome Measure Options AM-PAC 6 Clicks Basic Mobility (PT)  -AJ AM-PAC 6 Clicks Daily Activity (OT)  -              User Key  (r) = Recorded By, (t) = Taken By, (c) = Cosigned By      Initials Name Provider Type    Scarlet Castaneda, OTR/L Occupational Therapist    Shahid Sanchez, RN Registered Nurse    Nolan Prado, PT DPT Physical Therapist                    Occupational Therapy Education       Title: PT OT SLP Therapies (In Progress)       Topic: Occupational Therapy (In Progress)       Point: ADL training (Done)       Learning Progress Summary            Patient Acceptance, E, VU,NR by  at 4/30/2025 1156                      Point: Precautions (Done)       Learning Progress Summary            Patient Acceptance, E, VU,NR by  at  4/30/2025 1156                      Point: Body mechanics (Done)       Learning Progress Summary            Patient Acceptance, E, VU,NR by  at 4/30/2025 1156                                      User Key       Initials Effective Dates Name Provider Type Discipline    ASHLEY 06/20/22 -  Scarlet Mcguire, OTR/L Occupational Therapist OT                  OT Recommendation and Plan  Planned Therapy Interventions (OT): transfer/mobility retraining, strengthening exercise, patient/caregiver education/training, occupation/activity based interventions, functional balance retraining, activity tolerance training, BADL retraining, adaptive equipment training, ROM/therapeutic exercise  Therapy Frequency (OT): 5 times/wk  Plan of Care Review  Plan of Care Reviewed With: patient  Progress: no change  Outcome Evaluation: OT eval completed. Pt in fowlers upon therapist arrival; A&Ox4; c/o 6/10 RLE pain; 1L BNC with SpO2 89-91% at rest. Pt required 2L BNC for remainder of eval to maintain O2 sats above 90. Pt reports Mod I-I with all BADLs including fxl ambulation at baseline. Today, Pt was educated on RLE anterior hip precautions; Pt verbalized understanding. Pt performed supine>sit utilizing bedrail with HOB elevated with Mod A. Pt dependent to hiral B socks. Pt performed sit>stand utilizing rwx with Min A and verbal cues for sequencing and body mechanics. Once standing, Pt was able to take a few steps from bed>chair utilizing rwx with CGA and verbal cues for positioning of AD. BUE strength WFL. Pt additionally required intermittent verbal cues for use of pursed lip breathing techniques due to increased SOB with little activity. Skilled OT intervention indicated in order to address deficits in fxl mobility, fxl activity tolerance, balance, strength, and use of adaptive techniques/equipment during performance of BADLs. Recommend inpatient rehab at discharge.     Time Calculation:         Time Calculation- OT       Row Name 04/30/25 0935              Time Calculation- OT    OT Start Time 0935  +5 minutes chart review  -LS      OT Stop Time 1012  -      OT Time Calculation (min) 37 min  -      OT Non-Billable Time (min) 42 min  -      OT Received On 04/30/25  -      OT Goal Re-Cert Due Date 05/10/25  -                User Key  (r) = Recorded By, (t) = Taken By, (c) = Cosigned By      Initials Name Provider Type     Scarlet Mcguire, OTR/L Occupational Therapist                  Therapy Charges for Today       Code Description Service Date Service Provider Modifiers Qty    [id number] HC OT EVAL MOD COMPLEXITY 3 4/30/2025 Scarlet Mcguire, OTR/L GO 1                 Scarlet Mcguire OTR/L  4/30/2025

## 2025-04-30 NOTE — PLAN OF CARE
Goal Outcome Evaluation:  Plan of Care Reviewed With: other (see comments)        Progress: no change  Outcome Evaluation: Pt currently on Cardiac/CCHO,low sodium,low potassium,low phosphorus diet. Review of labs last HgbA1c 3/17/23 5.5%,glucose WNL,K+ WNL at this time . Pt has stage III CKD. RDN modified diet to Cardiac/Renal/Low Sodium/Low Phosphorus diet. Con tto follow for plan of care.

## 2025-04-30 NOTE — PLAN OF CARE
Goal Outcome Evaluation:   Pt A&Ox4, 2L NC, VSS. Pt up with PT to chair today. R hip dressing c/d/I. Cold compress in use. Voiding via PW. Family at bedside.Pain medication admin, when requested, as ordered PRN for pain.  Family at bedside.

## 2025-04-30 NOTE — THERAPY TREATMENT NOTE
Patient Name: Cheryl Taylor  : 1950    MRN: [id number]                              Today's Date: 2025       Admit Date: 2025    Visit Dx:     ICD-10-CM ICD-9-CM   1. Closed fracture of neck of right femur, initial encounter  S72.001A 820.8   2. Impaired mobility [Z74.09]  Z74.09 799.89     Patient Active Problem List   Diagnosis    Atypical chest pain    Hiatal hernia    Gastroesophageal reflux disease with esophagitis without hemorrhage    Essential hypertension    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     Tobacco abuse, in remission    Stage 3a chronic kidney disease    Obesity (BMI 30-39.9)    Chronic diastolic CHF (congestive heart failure)    Aortic stenosis, mild    Nonrheumatic aortic valve insufficiency    Chronic pain    Degeneration of lumbar intervertebral disc    Gastroesophageal reflux disease    Vasculitis    Iron deficiency anemia    Lumbar radiculopathy    Lumbosacral radiculitis    Shortness of breath    Spondylosis    Thoracic aortic aneurysm without rupture    Closed fracture of neck of right femur     Past Medical History:   Diagnosis Date    AAA (abdominal aortic aneurysm)     Arthritis     Asthma     Chronic kidney disease (CKD)     Confusion 2021    COPD (chronic obstructive pulmonary disease)     Gastroesophageal reflux disease with esophagitis without hemorrhage     Heart murmur     Hiatal hernia     Hypertension     Inflammation of arteries 2021    Thoracic aortic aneurysm      Past Surgical History:   Procedure Laterality Date    BACK SURGERY      CARDIAC CATHETERIZATION N/A 2022    Procedure: Left Heart Cath;  Surgeon: Lenard Eastman MD;  Location:  [facility];  Service: Cardiology;  Laterality: N/A;    EYE SURGERY Bilateral     FOOT FASCIOTOMY Bilateral     HIP HEMIARTHROPLASTY Right 2025    Procedure: HIP HEMIARTHROPLASTY RIGHT;  Surgeon: Isael Lopes MD;  Location:  [facility] OR;  Service: Orthopedics;  Laterality:  Right;    HYSTERECTOMY      LOWER LEG SOFT TISSUE TUMOR EXCISION      LUMBAR NERVE STIMLATOR INSERTION Right     LYMPH NODE DISSECTION      WRIST FRACTURE SURGERY Right     x2      General Information       Row Name 04/30/25 1430 04/30/25 0941       Physical Therapy Time and Intention    Document Type therapy note (daily note)  -AJ evaluation  cc: fall with R hip pain. Dx: Closed fracture of neck of right femur. Pt now s/p R hip hemiarthroplasty, anterior approach on 4/29.  -AJ    Mode of Treatment physical therapy  -AJ physical therapy  -AJ      Row Name 04/30/25 1430 04/30/25 0941       General Information    Patient Profile Reviewed yes  -AJ yes  -AJ    Prior Level of Function -- independent:;community mobility;gait;transfer;all household mobility;home management;dependent:;driving  -AJ    Existing Precautions/Restrictions fall;right;other (see comments);hip, anterior  -AJ fall;right;other (see comments);hip, anterior  WBAT  -AJ    Barriers to Rehab -- medically complex;previous functional deficit  -AJ      Row Name 04/30/25 0941          Living Environment    Current Living Arrangements apartment  -AJ     People in Home alone  -AJ       Row Name 04/30/25 0941          Home Main Entrance    Number of Stairs, Main Entrance none  -AJ       Row Name 04/30/25 0941          Stairs Within Home, Primary    Number of Stairs, Within Home, Primary none  -AJ       Row Name 04/30/25 1430 04/30/25 0941       Cognition    Orientation Status (Cognition) oriented x 4  -AJ oriented x 4  -AJ      Row Name 04/30/25 1430 04/30/25 0941       Safety Issues/Impairments Affecting Functional Mobility    Safety Issues Affecting Function (Mobility) -- safety precaution awareness;safety precautions follow-through/compliance;insight into deficits/self-awareness  -AJ    Impairments Affecting Function (Mobility) endurance/activity tolerance;shortness of breath;pain;strength;range of motion (ROM);balance  -AJ endurance/activity  tolerance;shortness of breath;pain;strength;range of motion (ROM);balance  -              User Key  (r) = Recorded By, (t) = Taken By, (c) = Cosigned By      Initials Name Provider Type    Nolan Prado PT DPT Physical Therapist                   Mobility       Row Name 04/30/25 1430 04/30/25 0941       Bed Mobility    Bed Mobility sit-supine  -AJ rolling left;supine-sit  -AJ    Rolling Left [place] (Bed Mobility) -- minimum assist (75% patient effort);1 person assist  -    Supine-Sit [place] (Bed Mobility) -- 1 person assist;moderate assist (50% patient effort)  -AJ    Sit-Supine [place] (Bed Mobility) contact guard;minimum assist (75% patient effort);1 person assist  - --    Assistive Device (Bed Mobility) -- bed rails;head of bed elevated;repositioning sheet  -      Row Name 04/30/25 0941          Bed-Chair Transfer    Bed-Chair [place] (Transfers) minimum assist (75% patient effort);1 person assist  -     Assistive Device (Bed-Chair Transfers) walker, front-wheeled  -       Row Name 04/30/25 0941          Sit-Stand Transfer    Sit-Stand [place] (Transfers) minimum assist (75% patient effort);1 person assist  -     Assistive Device (Sit-Stand Transfers) walker, front-wheeled  -       Row Name 04/30/25 0941          Gait/Stairs (Locomotion)    [place] Level (Gait) minimum assist (75% patient effort);1 person assist  -     Assistive Device (Gait) walker, front-wheeled  -     Patient was able to Ambulate yes  -     Distance in Feet (Gait) 3  -AJ     Deviations/Abnormal Patterns (Gait) bilateral deviations;base of support, narrow;gait speed decreased;stride length decreased;weight shifting decreased  -     Bilateral Gait Deviations forward flexed posture  -       Row Name 04/30/25 1430 04/30/25 0941       Mobility    Extremity Weight-bearing Status right lower extremity  -AJ right lower extremity  -AJ    Right Lower Extremity (Weight-bearing Status)  weight-bearing as tolerated (WBAT)  - weight-bearing as tolerated (WBAT)  -              User Key  (r) = Recorded By, (t) = Taken By, (c) = Cosigned By      Initials Name Provider Type    Nolan Prado PT DPT Physical Therapist                   Obj/Interventions       Row Name 04/30/25 0941          Range of Motion Comprehensive    General Range of Motion lower extremity range of motion deficits identified  -     Comment, General Range of Motion R knee ext limited to 75% but feels pulling across incisions. All others remained WFL  -       Row Name 04/30/25 0941          Strength Comprehensive (MMT)    General Manual Muscle Testing (MMT) Assessment lower extremity strength deficits identified  -     Comment, General Manual Muscle Testing (MMT) Assessment unable to formall test R LE likely 3- to 3/5. L LE remained 4+/5  -[facility] Name 04/30/25 1430          Motor Skills    Therapeutic Exercise hip;knee;ankle  -[facility] Name 04/30/25 1430          Hip (Therapeutic Exercise)    Hip (Therapeutic Exercise) strengthening exercise  -     Hip Strengthening (Therapeutic Exercise) marching while seated;2 sets;5 repetitions;other (see comments);mini squats  10 TOTAL, mini squats 5 reps  -[facility] Name 04/30/25 1430          Knee (Therapeutic Exercise)    Knee (Therapeutic Exercise) strengthening exercise  -     Knee Strengthening (Therapeutic Exercise) LAQ (long arc quad);2 sets;10 repetitions;bilateral  -[facility] Name 04/30/25 1430          Ankle (Therapeutic Exercise)    Ankle (Therapeutic Exercise) strengthening exercise  -     Ankle Strengthening (Therapeutic Exercise) sitting;dorsiflexion;plantarflexion;2 sets;10 repetitions  -[facility] Name 04/30/25 1430 04/30/25 0941       Balance    Balance Assessment sitting static balance;sitting dynamic balance  - sitting static balance;sitting dynamic balance;standing static balance;standing dynamic balance  -    Static Sitting Balance  independent  -AJ independent  -AJ    Dynamic Sitting Balance independent  -AJ independent  -AJ    Position, Sitting Balance unsupported;sitting edge of bed  -AJ unsupported;sitting edge of bed  -AJ    Static Standing Balance contact guard  -AJ contact guard  -AJ    Dynamic Standing Balance contact guard  -AJ contact guard;minimal assist  -AJ    Position/Device Used, Standing Balance supported;walker, front-wheeled  -AJ supported;walker, front-wheeled  -AJ    Balance Interventions sitting;standing;sit to stand;supported;static;dynamic  -AJ sitting;standing;sit to stand;supported;static;dynamic  -AJ      Row Name 04/30/25 0941          Sensory Assessment (Somatosensory)    Sensory Assessment (Somatosensory) other (see comments)  baseline neuropathy  -AJ               User Key  (r) = Recorded By, (t) = Taken By, (c) = Cosigned By      Initials Name Provider Type    Nolan Prado, PT DPT Physical Therapist                   Goals/Plan       Row Name 04/30/25 0941          Bed Mobility Goal 1 (PT)    Activity/Assistive Device (Bed Mobility Goal 1, PT) bed mobility activities, all  -AJ     [place] Level/Cues Needed (Bed Mobility Goal 1, PT) standby assist  -AJ     Time Frame (Bed Mobility Goal 1, PT) long term goal (LTG);10 days  -AJ     Progress/Outcomes (Bed Mobility Goal 1, PT) new goal  -AJ       Row Name 04/30/25 0941          Transfer Goal 1 (PT)    Activity/Assistive Device (Transfer Goal 1, PT) sit-to-stand/stand-to-sit;bed-to-chair/chair-to-bed;wheelchair transfer;toilet  -AJ     [place] Level/Cues Needed (Transfer Goal 1, PT) independent  -AJ     Time Frame (Transfer Goal 1, PT) long term goal (LTG);10 days  -AJ     Progress/Outcome (Transfer Goal 1, PT) new goal  -AJ       Row Name 04/30/25 0941          Gait Training Goal 1 (PT)    Activity/Assistive Device (Gait Training Goal 1, PT) gait (walking locomotion);decrease asymmetrical patterns;decrease fall risk;diminish gait deviation;forward  stepping;improve balance and speed;increase endurance/gait distance;increase energy conservation;assistive device use;walker, rolling  -AJ     [place] Level (Gait Training Goal 1, PT) contact guard required  -AJ     Distance (Gait Training Goal 1, PT) 75' with pain 5/10 or less  -AJ     Time Frame (Gait Training Goal 1, PT) long term goal (LTG);10 days  -AJ     Progress/Outcome (Gait Training Goal 1, PT) new goal  -AJ       Row Name 04/30/25 0941          Balance Goal 1 (PT)    Activity/Assistive Device (Balance Goal) sitting static balance;sitting dynamic balance;sit to stand dynamic balance;standing static balance;standing dynamic balance;with functional activities/occupations;with functional mobility activities;with functional reaching activities;supported;walker, rolling  -AJ     [place] Level/Cues Needed (Balance Goal 1, PT) modified independence  -AJ     Time Frame (Balance Goal 1, PT) long-term goal (LTG);by discharge  -AJ     Progress/Outcomes (Balance Goal 1, PT) other (see comments)  new goal  -AJ       Row Name 04/30/25 0941          Therapy Assessment/Plan (PT)    Planned Therapy Interventions (PT) balance training;bed mobility training;gait training;home exercise program;patient/family education;transfer training;strengthening;ROM (range of motion)  -AJ               User Key  (r) = Recorded By, (t) = Taken By, (c) = Cosigned By      Initials Name Provider Type    Nolan Prado, PT DPT Physical Therapist                   Clinical Impression       Row Name 04/30/25 1430 04/30/25 0941       Pain    Pretreatment Pain Rating 7/10  -AJ 6/10  -AJ    Posttreatment Pain Rating 7/10  -AJ 6/10  -AJ    Pain Location hip  -AJ extremity  -AJ    Pain Side/Orientation proximal;right  -AJ right  -AJ    Pain Management Interventions exercise or physical activity utilized;nursing notified  -AJ exercise or physical activity utilized;premedicated for activity;nursing notified  -AJ    Response to Pain  Interventions no change per patient report  - no change per patient report  -AJ      Row Name 04/30/25 1430 04/30/25 0941       Plan of Care Review    Plan of Care Reviewed With patient  -AJ patient  -AJ    Progress no change  - --    Outcome Evaluation PT treatment complete. Pt recently returned to EOB with nurse aide as she had sat up most of the day. Pt reports she has had an increase in pain but does feel some improvement mobility wise. In sitting pt tolerated 2x5 of sitting hip flexion, long arc quads and educated to perform ankle pumps 2x10 while in bed. Pt was able to perform 5 sit to stands with requiring 5 second rest break after each rep due to fatigue. Once complete pt returned to supine with Min A and left in fowlers positon. Pt educated to perform ther ex again this afternoon and OOB for meals. POC ongoing, Recommend IP Rehab.  - PT eval complete. Pt in fowlers position, AOx4 with complaints of tolerable R hip pain. Pt reports she was indep prior to fall and has goals of returning to indep soon due to upcoming family member graduation. Pt was educated on WBAT status and Anterior hip precautions and verbalized understanding. Prior to mobility, HR elevated between 100-120 and SpO2 reading between 84-88 but poor waveform and cold hands limited acurracy of reading. Pt did recover to 90% within 2 minutes and proper waveform was seen. Pt required Mod A to bring self to EOB and Dep A to don socks due to pain. Pt was able to stand and tolerated static standing balance for roughly 1 minute before fatigue and required return to sit for rest break. Once recovered, pt ambulated short distance to recliner 3' with Min A and FWW and left with needs in reach and educated to call for assist. Pt will benefit from skilled PT services to improve t/f, decrease fall risk, increasing mobility to return to PLOF. Recommend IP rehab when medically stable.  -      Row Name 04/30/25 1430 04/30/25 0941       Therapy  Assessment/Plan (PT)    Patient/Family Therapy Goals Statement (PT) -- take steps to walk  -AJ    Rehab Potential (PT) -- good  -AJ    Criteria for Skilled Interventions Met (PT) yes;meets criteria;skilled treatment is necessary  -AJ yes;meets criteria;skilled treatment is necessary  -AJ    Therapy Frequency (PT) -- 2 times/day  -AJ    Predicted Duration of Therapy Intervention (PT) -- until d/c  -AJ      Row Name 04/30/25 1430 04/30/25 0941       Positioning and Restraints    Pre-Treatment Position sitting in chair/recliner  -AJ in bed  -AJ    Post Treatment Position bed  -AJ chair  -AJ    In Bed notified nsg;fowlers;call light within reach;encouraged to call for assist;with family/caregiver;with nsg;side rails up x2  -AJ --    In Chair -- notified nsg;reclined;call light within reach;encouraged to call for assist;with family/caregiver  -AJ              User Key  (r) = Recorded By, (t) = Taken By, (c) = Cosigned By      Initials Name Provider Type    Nolan Prado, PT DPT Physical Therapist                   Outcome Measures       Row Name 04/30/25 1430 04/30/25 0941       How much help from another person do you currently need...    Turning from your back to your side while in flat bed without using bedrails? 3  -AJ 3  -AJ    Moving from lying on back to sitting on the side of a flat bed without bedrails? 3  -AJ 3  -AJ    Moving to and from a bed to a chair (including a wheelchair)? 3  -AJ 3  -AJ    Standing up from a chair using your arms (e.g., wheelchair, bedside chair)? 3  -AJ 3  -AJ    Climbing 3-5 steps with a railing? 2  -AJ 1  -AJ    To walk in hospital room? 3  -AJ 3  -AJ    AM-PAC 6 Clicks Score (PT) 17  -AJ 16  -AJ    Highest Level of Mobility Goal 5 --> Static standing  -AJ 5 --> Static standing  -AJ      Row Name 04/30/25 0832          How much help from another person do you currently need...    Turning from your back to your side while in flat bed without using bedrails? 3  -LM     Moving from  lying on back to sitting on the side of a flat bed without bedrails? 2  -LM     Moving to and from a bed to a chair (including a wheelchair)? 2  -LM     Standing up from a chair using your arms (e.g., wheelchair, bedside chair)? 3  -LM     Climbing 3-5 steps with a railing? 2  -LM     To walk in hospital room? 2  -LM     AM-PAC 6 Clicks Score (PT) 14  -LM     Highest Level of Mobility Goal 4 --> Transfer to chair/commode  -       Row Name 04/30/25 1430 04/30/25 0941       Functional Assessment    Outcome Measure Options AM-PAC 6 Clicks Basic Mobility (PT)  - AM-PAC 6 Clicks Basic Mobility (PT)  -      Row Name 04/30/25 0935          Functional Assessment    Outcome Measure Options AM-PAC 6 Clicks Daily Activity (OT)  -               User Key  (r) = Recorded By, (t) = Taken By, (c) = Cosigned By      Initials Name Provider Type    Scarlet Castaneda, OTR/L Occupational Therapist    Shahid Sanchez, RN Registered Nurse    Nolan Prado, PT DPT Physical Therapist                                 Physical Therapy Education       Title: PT OT SLP Therapies (In Progress)       Topic: Physical Therapy (Done)       Point: Mobility training (Done)       Learning Progress Summary            Patient Acceptance, E, VU by AMIE at 4/30/2025 1500    Comment: HEP, importance of ther ex multiple times daily., d/c planning for family    Acceptance, E, VU by AMIE at 4/30/2025 1140    Comment: role of PT, ant. hip precautions, WBAT   Family Acceptance, E, VU by AMIE at 4/30/2025 1500    Comment: HEP, importance of ther ex multiple times daily., d/c planning for family                      Point: Home exercise program (Done)       Learning Progress Summary            Patient Acceptance, E, VU by AMIE at 4/30/2025 1500    Comment: HEP, importance of ther ex multiple times daily., d/c planning for family    Acceptance, E, VU by AMIE at 4/30/2025 1140    Comment: role of PT, ant. hip precautions, WBAT   Family Acceptance, E, VU by AMIE at  4/30/2025 1500    Comment: HEP, importance of ther ex multiple times daily., d/c planning for family                      Point: Body mechanics (Done)       Learning Progress Summary            Patient Acceptance, E, VU by  at 4/30/2025 1500    Comment: HEP, importance of ther ex multiple times daily., d/c planning for family    Acceptance, E, VU by AMIE at 4/30/2025 1140    Comment: role of PT, ant. hip precautions, WBAT   Family Acceptance, E, VU by  at 4/30/2025 1500    Comment: HEP, importance of ther ex multiple times daily., d/c planning for family                      Point: Precautions (Done)       Learning Progress Summary            Patient Acceptance, E, VU by AMIE at 4/30/2025 1500    Comment: HEP, importance of ther ex multiple times daily., d/c planning for family    Acceptance, E, VU by  at 4/30/2025 1140    Comment: role of PT, ant. hip precautions, WBAT   Family Acceptance, E, VU by  at 4/30/2025 1500    Comment: HEP, importance of ther ex multiple times daily., d/c planning for family                                      User Key       Initials Effective Dates Name Provider Type Discipline     08/15/24 -  Nolan Lao, PT DPT Physical Therapist PT                  PT Recommendation and Plan  Planned Therapy Interventions (PT): balance training, bed mobility training, gait training, home exercise program, patient/family education, transfer training, strengthening, ROM (range of motion)  Progress: no change  Outcome Evaluation: PT treatment complete. Pt recently returned to EOB with nurse aide as she had sat up most of the day. Pt reports she has had an increase in pain but does feel some improvement mobility wise. In sitting pt tolerated 2x5 of sitting hip flexion, long arc quads and educated to perform ankle pumps 2x10 while in bed. Pt was able to perform 5 sit to stands with requiring 5 second rest break after each rep due to fatigue. Once complete pt returned to supine with Min A and left  in fowlers positon. Pt educated to perform ther ex again this afternoon and OOB for meals. POC ongoing, Recommend IP Rehab.     Time Calculation:         PT Charges       Row Name 04/30/25 1430 04/30/25 0941          Time Calculation    Start Time 1430  -AJ 0941  -AJ     Stop Time 1455  -AJ 1012  +10 for chart review and attempted session at breakfast  -     Time Calculation (min) 25 min  -AJ 31 min  -AJ     PT Received On 04/30/25  -AJ 04/30/25  -AJ     PT Goal Re-Cert Due Date -- 05/10/25  -        Timed Charges    86494 - PT Therapeutic Exercise Minutes 25  -AJ --        Untimed Charges    PT Eval/Re-eval Minutes -- 41  -AJ        Total Minutes    Timed Charges Total Minutes 25  -AJ --     Untimed Charges Total Minutes -- 41  -AJ      Total Minutes 25  -AJ 41  -AJ               User Key  (r) = Recorded By, (t) = Taken By, (c) = Cosigned By      Initials Name Provider Type    AJ Nolan Lao, PT DPT Physical Therapist                  Therapy Charges for Today       Code Description Service Date Service Provider Modifiers Qty    89873039582 HC PT EVAL MOD COMPLEXITY 3 4/30/2025 Nolan Lao, PT DPT GP 1    15826726845 HC PT THER PROC EA 15 MIN 4/30/2025 Nolan Lao, PT DPT GP 2            PT G-Codes  Outcome Measure Options: AM-PAC 6 Clicks Basic Mobility (PT)  AM-PAC 6 Clicks Score (PT): 17  AM-PAC 6 Clicks Score (OT): 17  PT Discharge Summary  Anticipated Discharge Disposition (PT): inpatient rehabilitation facility    Nolan Lao PT DPT  4/30/2025

## 2025-04-30 NOTE — PLAN OF CARE
Goal Outcome Evaluation:  Plan of Care Reviewed With: patient           Outcome Evaluation: PT erna complete. Pt in fowlers position, AOx4 with complaints of tolerable R hip pain. Pt reports she was indep prior to fall and has goals of returning to indep soon due to upcoming family member graduation. Pt was educated on WBAT status and Anterior hip precautions and verbalized understanding. Prior to mobility, HR elevated between 100-120 and SpO2 reading between 84-88 but poor waveform and cold hands limited acurracy of reading. Pt did recover to 90% within 2 minutes and proper waveform was seen. Pt required Mod A to bring self to EOB and Dep A to don socks due to pain. Pt was able to stand and tolerated static standing balance for roughly 1 minute before fatigue and required return to sit for rest break. Once recovered, pt ambulated short distance to recliner 3' with Min A and FWW and left with needs in reach and educated to call for assist. Pt will benefit from skilled PT services to improve t/f, decrease fall risk, increasing mobility to return to PLOF. Recommend IP rehab when medically stable.    Anticipated Discharge Disposition (PT): inpatient rehabilitation facility

## 2025-04-30 NOTE — PLAN OF CARE
Goal Outcome Evaluation:   Pt off unit in surgery. Consents signed. Aox4 this shift. VSS on 2L nc. Voiding via PW. Pt medicated for pain q 2 hours prn. No c/o nausea/vomitting. Family at bedside. Call light within reach. Safety maintained. Care continues.

## 2025-04-30 NOTE — BRIEF OP NOTE
HIP HEMIARTHROPLASTY  Progress Note    Cheryl Taylor  4/29/2025    Pre-op Diagnosis:   Displaced varus impacted right subcapital femoral neck fracture       Post-Op Diagnosis Codes:  Displaced varus impacted right subcapital femoral neck fracture    Procedure(s):  Procedure(s):  HIP HEMIARTHROPLASTY RIGHT    Surgeon(s):  Isael Lopes MD    Anesthesia: Choice    Staff:   Circulator: Solitario Urrutia RN  Scrub Person: Lucy Sellers; Mari Mosley; Solitario Lujan  Vendor Representative: Marcus Azevedo     Estimated Blood Loss: 200ml    Urine Voided: * No values recorded between 4/29/2025  5:38 PM and 4/29/2025  7:08 PM *    Specimens:                None      Drains:   External Urinary Catheter (Active)   Daily Indications Strict bedrest 04/29/25 0930   Site Assessment Clean;Skin intact 04/29/25 0930   Application/Removal external catheter applied 04/29/25 0930   Collection Container Wall suction 04/29/25 0930   Wall suction (mmHG) 120 mmHG 04/29/25 0930   Catheter care complete Yes 04/29/25 0930   Output (mL) 100 mL 04/29/25 0404     Findings: Displaced varus impacted right subcapital femoral neck fracture    Complications: None    was responsible for performing the following activities: Retraction, Suction, Irrigation, Suturing, Closing, and Placing Dressing and their skilled assistance was necessary for the success of this case.    Isael Lopes MD     Date: 4/29/2025  Time: 19:12 CDT

## 2025-04-30 NOTE — THERAPY EVALUATION
Patient Name: Cheryl Taylor  : 1950    MRN: [id number]                              Today's Date: 2025       Admit Date: 2025    Visit Dx:     ICD-10-CM ICD-9-CM   1. Closed fracture of neck of right femur, initial encounter  S72.001A 820.8   2. Impaired mobility [Z74.09]  Z74.09 799.89     Patient Active Problem List   Diagnosis    Atypical chest pain    Hiatal hernia    Gastroesophageal reflux disease with esophagitis without hemorrhage    Essential hypertension    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     Tobacco abuse, in remission    Stage 3a chronic kidney disease    Obesity (BMI 30-39.9)    Chronic diastolic CHF (congestive heart failure)    Aortic stenosis, mild    Nonrheumatic aortic valve insufficiency    Chronic pain    Degeneration of lumbar intervertebral disc    Gastroesophageal reflux disease    Vasculitis    Iron deficiency anemia    Lumbar radiculopathy    Lumbosacral radiculitis    Shortness of breath    Spondylosis    Thoracic aortic aneurysm without rupture    Closed fracture of neck of right femur     Past Medical History:   Diagnosis Date    AAA (abdominal aortic aneurysm)     Arthritis     Asthma     Chronic kidney disease (CKD)     Confusion 2021    COPD (chronic obstructive pulmonary disease)     Gastroesophageal reflux disease with esophagitis without hemorrhage     Heart murmur     Hiatal hernia     Hypertension     Inflammation of arteries 2021    Thoracic aortic aneurysm      Past Surgical History:   Procedure Laterality Date    BACK SURGERY      CARDIAC CATHETERIZATION N/A 2022    Procedure: Left Heart Cath;  Surgeon: Lenard Eastman MD;  Location:  [facility];  Service: Cardiology;  Laterality: N/A;    EYE SURGERY Bilateral     FOOT FASCIOTOMY Bilateral     HIP HEMIARTHROPLASTY Right 2025    Procedure: HIP HEMIARTHROPLASTY RIGHT;  Surgeon: Isael Lopes MD;  Location:  [facility] OR;  Service: Orthopedics;  Laterality:  Right;    HYSTERECTOMY      LOWER LEG SOFT TISSUE TUMOR EXCISION      LUMBAR NERVE STIMLATOR INSERTION Right     LYMPH NODE DISSECTION      WRIST FRACTURE SURGERY Right     x2      General Information       Row Name 04/30/25 0941          Physical Therapy Time and Intention    Document Type evaluation  cc: fall with R hip pain. Dx: Closed fracture of neck of right femur. Pt now s/p R hip hemiarthroplasty, anterior approach on 4/29.  -AJ     Mode of Treatment physical therapy  -AJ       Row Name 04/30/25 0941          General Information    Patient Profile Reviewed yes  -AJ     Prior Level of Function independent:;community mobility;gait;transfer;all household mobility;home management;dependent:;driving  -AJ     Existing Precautions/Restrictions fall;right;other (see comments);hip, anterior  WBAT  -AJ     Barriers to Rehab medically complex;previous functional deficit  -AJ       Row Name 04/30/25 0941          Living Environment    Current Living Arrangements apartment  -AJ     People in Home alone  -AJ       Row Name 04/30/25 0941          Home Main Entrance    Number of Stairs, Main Entrance none  -AJ       Row Name 04/30/25 0941          Stairs Within Home, Primary    Number of Stairs, Within Home, Primary none  -AJ       Row Name 04/30/25 0941          Cognition    Orientation Status (Cognition) oriented x 4  -AJ       Row Name 04/30/25 0941          Safety Issues/Impairments Affecting Functional Mobility    Safety Issues Affecting Function (Mobility) safety precaution awareness;safety precautions follow-through/compliance;insight into deficits/self-awareness  -AJ     Impairments Affecting Function (Mobility) endurance/activity tolerance;shortness of breath;pain;strength;range of motion (ROM);balance  -AJ               User Key  (r) = Recorded By, (t) = Taken By, (c) = Cosigned By      Initials Name Provider Type    AJ Nolan Lao, PT DPT Physical Therapist                   Mobility       Row Name 04/30/25  0941          Bed Mobility    Bed Mobility rolling left;supine-sit  -AJ     Rolling Left [place] (Bed Mobility) minimum assist (75% patient effort);1 person assist  -AJ     Supine-Sit [place] (Bed Mobility) 1 person assist;moderate assist (50% patient effort)  -     Assistive Device (Bed Mobility) bed rails;head of bed elevated;repositioning sheet  -       Row Name 04/30/25 0941          Bed-Chair Transfer    Bed-Chair [place] (Transfers) minimum assist (75% patient effort);1 person assist  -     Assistive Device (Bed-Chair Transfers) walker, front-wheeled  -       Row Name 04/30/25 0941          Sit-Stand Transfer    Sit-Stand [place] (Transfers) minimum assist (75% patient effort);1 person assist  -     Assistive Device (Sit-Stand Transfers) walker, front-wheeled  -       Row Name 04/30/25 0941          Gait/Stairs (Locomotion)    [place] Level (Gait) minimum assist (75% patient effort);1 person assist  -     Assistive Device (Gait) walker, front-wheeled  -     Patient was able to Ambulate yes  -     Distance in Feet (Gait) 3  -AJ     Deviations/Abnormal Patterns (Gait) bilateral deviations;base of support, narrow;gait speed decreased;stride length decreased;weight shifting decreased  -     Bilateral Gait Deviations forward flexed posture  -       Row Name 04/30/25 0941          Mobility    Extremity Weight-bearing Status right lower extremity  -     Right Lower Extremity (Weight-bearing Status) weight-bearing as tolerated (WBAT)  -               User Key  (r) = Recorded By, (t) = Taken By, (c) = Cosigned By      Initials Name Provider Type    Nolan Prado PT DPT Physical Therapist                   Obj/Interventions       Row Name 04/30/25 0941          Range of Motion Comprehensive    General Range of Motion lower extremity range of motion deficits identified  -     Comment, General Range of Motion R knee ext limited to 75% but feels pulling across  incisions. All others remained WFL  -AJ       Row Name 04/30/25 0941          Strength Comprehensive (MMT)    General Manual Muscle Testing (MMT) Assessment lower extremity strength deficits identified  -     Comment, General Manual Muscle Testing (MMT) Assessment unable to formall test R LE likely 3- to 3/5. L LE remained 4+/5  -       Row Name 04/30/25 0941          Balance    Balance Assessment sitting static balance;sitting dynamic balance;standing static balance;standing dynamic balance  -AJ     Static Sitting Balance independent  -AJ     Dynamic Sitting Balance independent  -AJ     Position, Sitting Balance unsupported;sitting edge of bed  -AJ     Static Standing Balance contact guard  -AJ     Dynamic Standing Balance contact guard;minimal assist  -AJ     Position/Device Used, Standing Balance supported;walker, front-wheeled  -AJ     Balance Interventions sitting;standing;sit to stand;supported;static;dynamic  -       Row Name 04/30/25 0941          Sensory Assessment (Somatosensory)    Sensory Assessment (Somatosensory) other (see comments)  baseline neuropathy  -               User Key  (r) = Recorded By, (t) = Taken By, (c) = Cosigned By      Initials Name Provider Type    Nolan Prado, PT DPT Physical Therapist                   Goals/Plan       Row Name 04/30/25 0941          Bed Mobility Goal 1 (PT)    Activity/Assistive Device (Bed Mobility Goal 1, PT) bed mobility activities, all  -AJ     [place] Level/Cues Needed (Bed Mobility Goal 1, PT) standby assist  -AJ     Time Frame (Bed Mobility Goal 1, PT) long term goal (LTG);10 days  -AJ     Progress/Outcomes (Bed Mobility Goal 1, PT) new goal  -       Row Name 04/30/25 0941          Transfer Goal 1 (PT)    Activity/Assistive Device (Transfer Goal 1, PT) sit-to-stand/stand-to-sit;bed-to-chair/chair-to-bed;wheelchair transfer;toilet  -AJ     [place] Level/Cues Needed (Transfer Goal 1, PT) independent  -AJ     Time Frame (Transfer Goal  1, PT) long term goal (LTG);10 days  -AJ     Progress/Outcome (Transfer Goal 1, PT) new goal  -       Row Name 04/30/25 0941          Gait Training Goal 1 (PT)    Activity/Assistive Device (Gait Training Goal 1, PT) gait (walking locomotion);decrease asymmetrical patterns;decrease fall risk;diminish gait deviation;forward stepping;improve balance and speed;increase endurance/gait distance;increase energy conservation;assistive device use;walker, rolling  -AJ     [place] Level (Gait Training Goal 1, PT) contact guard required  -AJ     Distance (Gait Training Goal 1, PT) 75' with pain 5/10 or less  -AJ     Time Frame (Gait Training Goal 1, PT) long term goal (LTG);10 days  -AJ     Progress/Outcome (Gait Training Goal 1, PT) new goal  -       Row Name 04/30/25 0941          Balance Goal 1 (PT)    Activity/Assistive Device (Balance Goal) sitting static balance;sitting dynamic balance;sit to stand dynamic balance;standing static balance;standing dynamic balance;with functional activities/occupations;with functional mobility activities;with functional reaching activities;supported;walker, rolling  -AJ     [place] Level/Cues Needed (Balance Goal 1, PT) modified independence  -AJ     Time Frame (Balance Goal 1, PT) long-term goal (LTG);by discharge  -AJ     Progress/Outcomes (Balance Goal 1, PT) other (see comments)  new goal  -       Row Name 04/30/25 0941          Therapy Assessment/Plan (PT)    Planned Therapy Interventions (PT) balance training;bed mobility training;gait training;home exercise program;patient/family education;transfer training;strengthening;ROM (range of motion)  -AJ               User Key  (r) = Recorded By, (t) = Taken By, (c) = Cosigned By      Initials Name Provider Type    Nolan Prado, PT DPT Physical Therapist                   Clinical Impression       Row Name 04/30/25 0941          Pain    Pretreatment Pain Rating 6/10  -AJ     Posttreatment Pain Rating 6/10  -AJ     Pain  Location extremity  -AJ     Pain Side/Orientation right  -     Pain Management Interventions exercise or physical activity utilized;premedicated for activity;nursing notified  -     Response to Pain Interventions no change per patient report  -       Row Name 04/30/25 0941          Plan of Care Review    Plan of Care Reviewed With patient  -     Outcome Evaluation PT erna complete. Pt in fowlers position, AOx4 with complaints of tolerable R hip pain. Pt reports she was indep prior to fall and has goals of returning to [facility] soon due to upcoming family member graduation. Pt was educated on WBAT status and Anterior hip precautions and verbalized understanding. Prior to mobility, HR elevated between 100-120 and SpO2 reading between 84-88 but poor waveform and cold hands limited acurracy of reading. Pt did recover to 90% within 2 minutes and proper waveform was seen. Pt required Mod A to bring self to EOB and Dep A to don socks due to pain. Pt was able to stand and tolerated static standing balance for roughly 1 minute before fatigue and required return to sit for rest break. Once recovered, pt ambulated short distance to recliner 3' with Min A and FWW and left with needs in reach and educated to call for assist. Pt will benefit from skilled PT services to improve t/f, decrease fall risk, increasing mobility to return to [facility]. Recommend IP rehab when medically stable.  -       Row Name 04/30/25 0941          Therapy Assessment/Plan (PT)    Patient/Family Therapy Goals Statement (PT) take steps to walk  -     Rehab Potential (PT) good  -     Criteria for Skilled Interventions Met (PT) yes;meets criteria;skilled treatment is necessary  -     Therapy Frequency (PT) 2 times/day  -     Predicted Duration of Therapy Intervention (PT) until d/c  -       Row Name 04/30/25 0941          Positioning and Restraints    Pre-Treatment Position in bed  -     Post Treatment Position chair  -     In Chair  notified nsg;reclined;call light within reach;encouraged to call for assist;with family/caregiver  -AMIE               User Key  (r) = Recorded By, (t) = Taken By, (c) = Cosigned By      Initials Name Provider Type    Nolan Prado, PT DPT Physical Therapist                   Outcome Measures       Row Name 04/30/25 0941 04/30/25 0832       How much help from another person do you currently need...    Turning from your back to your side while in flat bed without using bedrails? 3  -AJ 3  -LM    Moving from lying on back to sitting on the side of a flat bed without bedrails? 3  -AJ 2  -LM    Moving to and from a bed to a chair (including a wheelchair)? 3  -AJ 2  -LM    Standing up from a chair using your arms (e.g., wheelchair, bedside chair)? 3  -AJ 3  -LM    Climbing 3-5 steps with a railing? 1  -AJ 2  -LM    To walk in hospital room? 3  -AJ 2  -LM    AM-PAC 6 Clicks Score (PT) 16  -AMIE 14  -LM    Highest Level of Mobility Goal 5 --> Static standing  -AJ 4 --> Transfer to chair/commode  -LM      Row Name 04/30/25 0941          Functional Assessment    Outcome Measure Options AM-PAC 6 Clicks Basic Mobility (PT)  -AMIE               User Key  (r) = Recorded By, (t) = Taken By, (c) = Cosigned By      Initials Name Provider Type    Shahid Sanchez, RN Registered Nurse    Nolan Prado, PT DPT Physical Therapist                                 Physical Therapy Education       Title: PT OT SLP Therapies (Done)       Topic: Physical Therapy (Done)       Point: Mobility training (Done)       Learning Progress Summary            Patient Acceptance, E, VU by AMIE at 4/30/2025 1140    Comment: role of PT, ant. hip precautions, WBAT                      Point: Home exercise program (Done)       Learning Progress Summary            Patient Acceptance, E, VU by AMIE at 4/30/2025 1140    Comment: role of PT, ant. hip precautions, WBAT                      Point: Body mechanics (Done)       Learning Progress Summary             Patient Acceptance, E, VU by AMIE at 4/30/2025 1140    Comment: role of PT, ant. hip precautions, WBAT                      Point: Precautions (Done)       Learning Progress Summary            Patient Acceptance, E, VU by AMIE at 4/30/2025 1140    Comment: role of PT, ant. hip precautions, WBAT                                      User Key       Initials Effective Dates Name Provider Type Discipline    AMIE 08/15/24 -  Nolan Lao, PT DPT Physical Therapist PT                  PT Recommendation and Plan  Planned Therapy Interventions (PT): balance training, bed mobility training, gait training, home exercise program, patient/family education, transfer training, strengthening, ROM (range of motion)  Outcome Evaluation: PT eval complete. Pt in fowlers position, AOx4 with complaints of tolerable R hip pain. Pt reports she was indep prior to fall and has goals of returning to indep soon due to upcoming family member graduation. Pt was educated on WBAT status and Anterior hip precautions and verbalized understanding. Prior to mobility, HR elevated between 100-120 and SpO2 reading between 84-88 but poor waveform and cold hands limited acurracy of reading. Pt did recover to 90% within 2 minutes and proper waveform was seen. Pt required Mod A to bring self to EOB and Dep A to don socks due to pain. Pt was able to stand and tolerated static standing balance for roughly 1 minute before fatigue and required return to sit for rest break. Once recovered, pt ambulated short distance to recliner 3' with Min A and FWW and left with needs in reach and educated to call for assist. Pt will benefit from skilled PT services to improve t/f, decrease fall risk, increasing mobility to return to PLOF. Recommend IP rehab when medically stable.     Time Calculation:         PT Charges       Row Name 04/30/25 0970             Time Calculation    Start Time 0941  -AMIE      Stop Time 1012  +10 for chart review and attempted session at breakfast   -AJ      Time Calculation (min) 31 min  -AJ      PT Received On 04/30/25  -AJ      PT Goal Re-Cert Due Date 05/10/25  -AJ         Untimed Charges    PT Eval/Re-eval Minutes 41  -AJ         Total Minutes    Untimed Charges Total Minutes 41  -AJ       Total Minutes 41  -AJ                User Key  (r) = Recorded By, (t) = Taken By, (c) = Cosigned By      Initials Name Provider Type    AJ Nolan Lao, PT DPT Physical Therapist                  Therapy Charges for Today       Code Description Service Date Service Provider Modifiers Qty    [id number] HC PT EVAL MOD COMPLEXITY 3 4/30/2025 Nolan Lao, PT DPT GP 1            PT G-Codes  Outcome Measure Options: AM-PAC 6 Clicks Basic Mobility (PT)  AM-PAC 6 Clicks Score (PT): 16  PT Discharge Summary  Anticipated Discharge Disposition (PT): inpatient rehabilitation facility    Nolan Lao PT DPT  4/30/2025

## 2025-04-30 NOTE — PLAN OF CARE
Goal Outcome Evaluation:  Plan of Care Reviewed With: (P) patient        Progress: (P) improving  Outcome Evaluation: (P) Alert and oriented x4. VSS on 2 liter nasal cannula. Complaints of pain, PRN pain medicine given, relief noted. IV anitbiotics given. Voiding via purwick. Bed alarms on. Call light within reach. Safety maintained.

## 2025-05-01 LAB
ALBUMIN SERPL-MCNC: 2.6 G/DL (ref 3.5–5.2)
ALBUMIN/GLOB SERPL: 0.8 G/DL
ALP SERPL-CCNC: 146 U/L (ref 39–117)
ALT SERPL W P-5'-P-CCNC: 8 U/L (ref 1–33)
ANION GAP SERPL CALCULATED.3IONS-SCNC: 12 MMOL/L (ref 5–15)
AST SERPL-CCNC: 39 U/L (ref 1–32)
BACTERIA SPEC AEROBE CULT: ABNORMAL
BACTERIA SPEC AEROBE CULT: ABNORMAL
BASOPHILS # BLD AUTO: 0.07 10*3/MM3 (ref 0–0.2)
BASOPHILS NFR BLD AUTO: 0.4 % (ref 0–1.5)
BILIRUB SERPL-MCNC: 0.3 MG/DL (ref 0–1.2)
BUN SERPL-MCNC: 18 MG/DL (ref 8–23)
BUN/CREAT SERPL: 15.5 (ref 7–25)
CALCIUM SPEC-SCNC: 8.9 MG/DL (ref 8.6–10.5)
CHLORIDE SERPL-SCNC: 102 MMOL/L (ref 98–107)
CO2 SERPL-SCNC: 20 MMOL/L (ref 22–29)
CREAT SERPL-MCNC: 1.16 MG/DL (ref 0.57–1)
DEPRECATED RDW RBC AUTO: 46.5 FL (ref 37–54)
EGFRCR SERPLBLD CKD-EPI 2021: 49.9 ML/MIN/1.73
EOSINOPHIL # BLD AUTO: 0.12 10*3/MM3 (ref 0–0.4)
EOSINOPHIL NFR BLD AUTO: 0.7 % (ref 0.3–6.2)
ERYTHROCYTE [DISTWIDTH] IN BLOOD BY AUTOMATED COUNT: 14.1 % (ref 12.3–15.4)
GLOBULIN UR ELPH-MCNC: 3.4 GM/DL
GLUCOSE SERPL-MCNC: 113 MG/DL (ref 65–99)
HCT VFR BLD AUTO: 30.1 % (ref 34–46.6)
HGB BLD-MCNC: 9.4 G/DL (ref 12–15.9)
IMM GRANULOCYTES # BLD AUTO: 0.16 10*3/MM3 (ref 0–0.05)
IMM GRANULOCYTES NFR BLD AUTO: 0.9 % (ref 0–0.5)
LYMPHOCYTES # BLD AUTO: 1.66 10*3/MM3 (ref 0.7–3.1)
LYMPHOCYTES NFR BLD AUTO: 9.8 % (ref 19.6–45.3)
MCH RBC QN AUTO: 28.4 PG (ref 26.6–33)
MCHC RBC AUTO-ENTMCNC: 31.2 G/DL (ref 31.5–35.7)
MCV RBC AUTO: 90.9 FL (ref 79–97)
MONOCYTES # BLD AUTO: 1.54 10*3/MM3 (ref 0.1–0.9)
MONOCYTES NFR BLD AUTO: 9.1 % (ref 5–12)
NEUTROPHILS NFR BLD AUTO: 13.42 10*3/MM3 (ref 1.7–7)
NEUTROPHILS NFR BLD AUTO: 79.1 % (ref 42.7–76)
NRBC BLD AUTO-RTO: 0.2 /100 WBC (ref 0–0.2)
PLATELET # BLD AUTO: 322 10*3/MM3 (ref 140–450)
PMV BLD AUTO: 10.6 FL (ref 6–12)
POTASSIUM SERPL-SCNC: 4.2 MMOL/L (ref 3.5–5.2)
PROT SERPL-MCNC: 6 G/DL (ref 6–8.5)
RBC # BLD AUTO: 3.31 10*6/MM3 (ref 3.77–5.28)
SODIUM SERPL-SCNC: 134 MMOL/L (ref 136–145)
WBC NRBC COR # BLD AUTO: 16.97 10*3/MM3 (ref 3.4–10.8)

## 2025-05-01 PROCEDURE — 80053 COMPREHEN METABOLIC PANEL: CPT | Performed by: HOSPITALIST

## 2025-05-01 PROCEDURE — 63710000001 ONDANSETRON ODT 4 MG TABLET DISPERSIBLE: Performed by: ORTHOPAEDIC SURGERY

## 2025-05-01 PROCEDURE — 97110 THERAPEUTIC EXERCISES: CPT

## 2025-05-01 PROCEDURE — 85025 COMPLETE CBC W/AUTO DIFF WBC: CPT | Performed by: HOSPITALIST

## 2025-05-01 PROCEDURE — 97535 SELF CARE MNGMENT TRAINING: CPT

## 2025-05-01 PROCEDURE — 97116 GAIT TRAINING THERAPY: CPT

## 2025-05-01 PROCEDURE — 25010000002 CEFTRIAXONE PER 250 MG: Performed by: ORTHOPAEDIC SURGERY

## 2025-05-01 RX ORDER — CYCLOBENZAPRINE HCL 10 MG
10 TABLET ORAL 3 TIMES DAILY PRN
Status: DISCONTINUED | OUTPATIENT
Start: 2025-05-01 | End: 2025-05-06 | Stop reason: HOSPADM

## 2025-05-01 RX ORDER — ARIPIPRAZOLE 5 MG/1
5 TABLET ORAL DAILY
Status: DISCONTINUED | OUTPATIENT
Start: 2025-05-01 | End: 2025-05-06 | Stop reason: HOSPADM

## 2025-05-01 RX ORDER — DULOXETIN HYDROCHLORIDE 30 MG/1
60 CAPSULE, DELAYED RELEASE ORAL DAILY
Status: DISCONTINUED | OUTPATIENT
Start: 2025-05-01 | End: 2025-05-06 | Stop reason: HOSPADM

## 2025-05-01 RX ORDER — CALCIUM CARBONATE 500 MG/1
2 TABLET, CHEWABLE ORAL ONCE AS NEEDED
Status: COMPLETED | OUTPATIENT
Start: 2025-05-01 | End: 2025-05-02

## 2025-05-01 RX ORDER — METOPROLOL SUCCINATE 25 MG/1
25 TABLET, EXTENDED RELEASE ORAL
Status: DISCONTINUED | OUTPATIENT
Start: 2025-05-01 | End: 2025-05-06 | Stop reason: HOSPADM

## 2025-05-01 RX ADMIN — HYDROCODONE BITARTRATE AND ACETAMINOPHEN 2 TABLET: 7.5; 325 TABLET ORAL at 14:33

## 2025-05-01 RX ADMIN — HYDROCODONE BITARTRATE AND ACETAMINOPHEN 1 TABLET: 7.5; 325 TABLET ORAL at 18:36

## 2025-05-01 RX ADMIN — HYDROCODONE BITARTRATE AND ACETAMINOPHEN 2 TABLET: 7.5; 325 TABLET ORAL at 03:05

## 2025-05-01 RX ADMIN — POLYETHYLENE GLYCOL 3350 17 G: 17 POWDER, FOR SOLUTION ORAL at 09:58

## 2025-05-01 RX ADMIN — ARIPIPRAZOLE 5 MG: 5 TABLET ORAL at 14:33

## 2025-05-01 RX ADMIN — METOPROLOL SUCCINATE 25 MG: 25 TABLET, EXTENDED RELEASE ORAL at 10:59

## 2025-05-01 RX ADMIN — HYDROCODONE BITARTRATE AND ACETAMINOPHEN 1 TABLET: 7.5; 325 TABLET ORAL at 09:58

## 2025-05-01 RX ADMIN — CEFTRIAXONE SODIUM 2000 MG: 2 INJECTION, POWDER, FOR SOLUTION INTRAMUSCULAR; INTRAVENOUS at 10:59

## 2025-05-01 RX ADMIN — APIXABAN 2.5 MG: 2.5 TABLET, FILM COATED ORAL at 20:38

## 2025-05-01 RX ADMIN — SENNOSIDES, DOCUSATE SODIUM 2 TABLET: 50; 8.6 TABLET, FILM COATED ORAL at 20:37

## 2025-05-01 RX ADMIN — ONDANSETRON 4 MG: 4 TABLET, ORALLY DISINTEGRATING ORAL at 21:58

## 2025-05-01 RX ADMIN — HYDROCODONE BITARTRATE AND ACETAMINOPHEN 2 TABLET: 7.5; 325 TABLET ORAL at 22:08

## 2025-05-01 RX ADMIN — CYCLOBENZAPRINE HYDROCHLORIDE 10 MG: 10 TABLET, FILM COATED ORAL at 21:58

## 2025-05-01 RX ADMIN — APIXABAN 2.5 MG: 2.5 TABLET, FILM COATED ORAL at 09:58

## 2025-05-01 RX ADMIN — DULOXETINE HYDROCHLORIDE 60 MG: 30 CAPSULE, DELAYED RELEASE ORAL at 14:33

## 2025-05-01 RX ADMIN — ONDANSETRON 4 MG: 4 TABLET, ORALLY DISINTEGRATING ORAL at 11:50

## 2025-05-01 RX ADMIN — Medication 10 ML: at 10:55

## 2025-05-01 NOTE — PLAN OF CARE
Goal Outcome Evaluation:  Plan of Care Reviewed With: patient        Progress: improving  Outcome Evaluation: Pt agreeable to OT tx. Pt transfers and ambulates with CGA and RW. Pt completed toileting with overall min A for hygiene and mod A for clothing management. Pt max A for LB dressing due to pain and SOB. Pt did experience increased WOB with task. Pt would benefit from acute rehab/swing bed prior to returning home. Continue OT POC    Anticipated Discharge Disposition (OT): inpatient rehabilitation facility

## 2025-05-01 NOTE — PLAN OF CARE
Goal Outcome Evaluation:  Plan of Care Reviewed With: (P) patient        Progress: (P) improving  Outcome Evaluation: (P) Alert and oriented x4. VSS on 2 liter nasal cannula. Complaints of pain reported, PRN pain medicine given, relief noted. Patient has gotten better sleep tonight. Voiding via purwick. Call light within reach. Safety maintained.

## 2025-05-01 NOTE — THERAPY TREATMENT NOTE
Acute Care - Occupational Therapy Treatment Note  [facility]     Patient Name: Cheryl Taylor  : 1950  MRN: [id number]  Today's Date: 2025             Admit Date: 2025       ICD-10-CM ICD-9-CM   1. Closed fracture of neck of right femur, initial encounter  S72.001A 820.8   2. Impaired mobility [Z74.09]  Z74.09 799.89     Patient Active Problem List   Diagnosis    Atypical chest pain    Hiatal hernia    Gastroesophageal reflux disease with esophagitis without hemorrhage    Essential hypertension    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     Tobacco abuse, in remission    Stage 3a chronic kidney disease    Obesity (BMI 30-39.9)    Chronic diastolic CHF (congestive heart failure)    Aortic stenosis, mild    Nonrheumatic aortic valve insufficiency    Chronic pain    Degeneration of lumbar intervertebral disc    Gastroesophageal reflux disease    Vasculitis    Iron deficiency anemia    Lumbar radiculopathy    Lumbosacral radiculitis    Shortness of breath    Spondylosis    Thoracic aortic aneurysm without rupture    Closed fracture of neck of right femur     Past Medical History:   Diagnosis Date    AAA (abdominal aortic aneurysm)     Arthritis     Asthma     Chronic kidney disease (CKD)     Confusion 2021    COPD (chronic obstructive pulmonary disease)     Gastroesophageal reflux disease with esophagitis without hemorrhage     Heart murmur     Hiatal hernia     Hypertension     Inflammation of arteries 2021    Thoracic aortic aneurysm      Past Surgical History:   Procedure Laterality Date    BACK SURGERY      CARDIAC CATHETERIZATION N/A 2022    Procedure: Left Heart Cath;  Surgeon: Lenard Eastman MD;  Location:  [facility];  Service: Cardiology;  Laterality: N/A;    EYE SURGERY Bilateral     FOOT FASCIOTOMY Bilateral     HIP HEMIARTHROPLASTY Right 2025    Procedure: HIP HEMIARTHROPLASTY RIGHT;  Surgeon: Isael Lopes MD;  Location: [facility] OR;  Service:  Orthopedics;  Laterality: Right;    HYSTERECTOMY      LOWER LEG SOFT TISSUE TUMOR EXCISION      LUMBAR NERVE STIMLATOR INSERTION Right     LYMPH NODE DISSECTION      WRIST FRACTURE SURGERY Right     x2         OT ASSESSMENT FLOWSHEET (Last 12 Hours)       OT Evaluation and Treatment       Row Name 05/01/25 0800                   OT Time and Intention    Subjective Information complains of;pain;dyspnea  -TS        Document Type therapy note (daily note)  -TS        Mode of Treatment occupational therapy  -TS        Patient Effort good  -TS        Comment COPD at baseline and room air at baseline  -TS           General Information    Existing Precautions/Restrictions fall;right;other (see comments);hip, anterior  -TS           Pain Assessment    Pain Location hip  -TS        Pain Side/Orientation right;lower  -TS        Pain Management Interventions exercise or physical activity utilized  -TS        Response to Pain Interventions activity participation with tolerable pain  -TS        Additional Documentation Pain Scale: FACES Pre/Post-Treatment (Group)  -TS           Pain Scale: FACES Pre/Post-Treatment    Pain: FACES Scale, Pretreatment 8-->hurts whole lot  -TS        Posttreatment Pain Rating 6-->hurts even more  -TS           Cognition    Orientation Status (Cognition) oriented x 4  -TS        Personal Safety Interventions fall prevention program maintained;gait belt;nonskid shoes/slippers when out of bed  -TS           Activities of Daily Living    BADL Assessment/Intervention lower body dressing;grooming;toileting  -TS           Lower Body Dressing Assessment/Training    [place] Level (Lower Body Dressing) don;socks;pants/bottoms;maximum assist (25% patient effort)  -TS        Position (Lower Body Dressing) unsupported sitting;supported standing  -TS           Grooming Assessment/Training    [place] Level (Grooming) wash face, hands;set up  -TS        Position (Grooming) unsupported sitting  -TS            Toileting Assessment/Training    [place] Level (Toileting) toileting skills;supervision;adjust/manage clothing;maximum assist (25% patient effort);perform perineal hygiene;minimum assist (75% patient effort)  -TS        Position (Toileting) unsupported sitting;supported standing  -TS           Bed Mobility    Supine-Sit [place] (Bed Mobility) minimum assist (75% patient effort)  -TS        Assistive Device (Bed Mobility) bed rails;head of bed elevated  -TS           Functional Mobility    Functional Mobility- Ind. Level contact guard assist  -TS        Functional Mobility- Device walker, front-wheeled  -TS        Functional Mobility- Comment EOB to BR  -TS           Transfer Assessment/Treatment    Transfers sit-stand transfer;stand-sit transfer;toilet transfer  -TS           Sit-Stand Transfer    Sit-Stand [place] (Transfers) contact guard  -TS        Assistive Device (Sit-Stand Transfers) walker, front-wheeled  -TS           Stand-Sit Transfer    Stand-Sit [place] (Transfers) contact guard  -TS        Assistive Device (Stand-Sit Transfers) walker, front-wheeled  -TS           Toilet Transfer    Type (Toilet Transfer) sit-stand;stand-sit  -TS        [place] Level (Toilet Transfer) contact guard  -TS        Assistive Device (Toilet Transfer) walker, front-wheeled;commode;grab bars/safety frame  -TS           Wound 04/29/25 1815 Right anterior greater trochanter Surgical Closed Surgical Incision    Wound - Properties Group Placement Date: 04/29/25  -CB Placement Time: 1815  -CB Side: Right  -CB Orientation: anterior  -CB Location: greater trochanter  -CB Primary Wound Type: Surgical  -CB Secondary Wound Type - Surgical: Closed Surgi  -CB    Retired Wound - Properties Group Placement Date: 04/29/25  -CB Placement Time: 1815  -CB Side: Right  -CB Orientation: anterior  -CB Location: greater trochanter  -CB    Retired Wound - Properties Group Placement Date: 04/29/25  -CB Placement Time: 1815   -CB Side: Right  -CB Orientation: anterior  -CB Location: greater trochanter  -CB    Retired Wound - Properties Group Date first assessed: 04/29/25  -CB Time first assessed: 1815  -CB Side: Right  -CB Location: greater trochanter  -CB       Plan of Care Review    Plan of Care Reviewed With patient  -TS        Progress improving  -TS        Outcome Evaluation Pt agreeable to OT tx. Pt transfers and ambulates with CGA and RW. Pt completed toileting with overall min A for hygiene and mod A for clothing management. Pt max A for LB dressing due to pain and SOB. Pt did experience increased WOB with task. Pt would benefit from acute rehab/swing bed prior to returning home. Continue OT POC  -TS           Vital Signs    Pre SpO2 (%) 91  -TS        O2 Delivery Pre Treatment room air  -TS        Intra SpO2 (%) 87  -TS        O2 Delivery Intra Treatment room air  -TS        Post SpO2 (%) 93  post activity with 2-3L of O2 during recovery period  -TS        O2 Delivery Post Treatment supplemental O2  -TS        Pre Patient Position Supine  -TS        Intra Patient Position Sitting  -TS        Post Patient Position Sitting  -TS           Positioning and Restraints    Pre-Treatment Position in bed  -TS        Post Treatment Position chair  -TS        In Chair call light within reach;encouraged to call for assist;reclined  -TS           Therapy Plan Review/Discharge Plan (OT)    Anticipated Discharge Disposition (OT) inpatient rehabilitation facility  -TS                  User Key  (r) = Recorded By, (t) = Taken By, (c) = Cosigned By      Initials Name Effective Dates    TS Jackelyn Russo COTA 02/03/23 -     CB Solitario Urrutia RN 04/28/22 -                      Occupational Therapy Education       Title: PT OT SLP Therapies (In Progress)       Topic: Occupational Therapy (In Progress)       Point: ADL training (Done)       Learning Progress Summary            Patient Acceptance, E, VU by TS at 5/1/2025 0917    Acceptance, E,  VU,NR by LS at 4/30/2025 1156                      Point: Precautions (Done)       Learning Progress Summary            Patient Acceptance, E, VU,NR by LS at 4/30/2025 1156                      Point: Body mechanics (Done)       Learning Progress Summary            Patient Acceptance, E, VU,NR by  at 4/30/2025 1156                                      User Key       Initials Effective Dates Name Provider Type Discipline     02/03/23 -  Jackelyn Russo COTA Occupational Therapist Assistant OT     06/20/22 -  Scarlet Mcguire OTR/L Occupational Therapist OT                      OT Recommendation and Plan     Plan of Care Review  Plan of Care Reviewed With: patient  Progress: improving  Outcome Evaluation: Pt agreeable to OT tx. Pt transfers and ambulates with CGA and RW. Pt completed toileting with overall min A for hygiene and mod A for clothing management. Pt max A for LB dressing due to pain and SOB. Pt did experience increased WOB with task. Pt would benefit from acute rehab/swing bed prior to returning home. Continue OT POC  Plan of Care Reviewed With: patient  Outcome Evaluation: Pt agreeable to OT tx. Pt transfers and ambulates with CGA and RW. Pt completed toileting with overall min A for hygiene and mod A for clothing management. Pt max A for LB dressing due to pain and SOB. Pt did experience increased WOB with task. Pt would benefit from acute rehab/swing bed prior to returning home. Continue OT POC     Outcome Measures       Row Name 05/01/25 0900             How much help from another is currently needed...    Putting on and taking off regular lower body clothing? 2  -TS      Bathing (including washing, rinsing, and drying) 2  -TS      Toileting (which includes using toilet bed pan or urinal) 3  -TS      Putting on and taking off regular upper body clothing 3  -TS      Taking care of personal grooming (such as brushing teeth) 3  -TS      Eating meals 4  -TS      AM-PAC 6 Clicks Score (OT) 17  -TS                 User Key  (r) = Recorded By, (t) = Taken By, (c) = Cosigned By      Initials Name Provider Type    Jackelyn Cole COTA Occupational Therapist Assistant                    Time Calculation:    Time Calculation- OT       Row Name 05/01/25 0916             Time Calculation- OT    OT Start Time 0800  -TS      OT Stop Time 0855  -TS      OT Time Calculation (min) 55 min  -TS      Total Timed Code Minutes- OT 55 minute(s)  -TS      OT Received On 05/01/25  -TS         Timed Charges    77953 - OT Self Care/Mgmt Minutes 55  -TS         Total Minutes    Timed Charges Total Minutes 55  -TS       Total Minutes 55  -TS                User Key  (r) = Recorded By, (t) = Taken By, (c) = Cosigned By      Initials Name Provider Type    aJckelyn Cole COTA Occupational Therapist Assistant                  Therapy Charges for Today       Code Description Service Date Service Provider Modifiers Qty    [id number] HC OT SELF CARE/MGMT/TRAIN EA 15 MIN 5/1/2025 Jackelyn Russo COTA GO 4                 DIGNA Jensen  5/1/2025

## 2025-05-01 NOTE — PROGRESS NOTES
[facility] Medicine Services  INPATIENT PROGRESS NOTE    Patient Name: Cheryl Taylor  Date of Admission: 4/28/2025  Today's Date: 05/01/25  Length of Stay: 3  Primary Care Physician: Den James DO    Subjective   Chief Complaint: fall R hip fracture     Fall     Leg Pain         Patient is a 75-year-old female with PMH of HTN, CKD who presents to the ER post fall. Patient reports that she was using her rollator and it rolled out from underneath her. Patient reports that she landed on her right leg. She is complaining of pain to her entire right leg, primarily to the right upper leg. Patient reports that she is unable to ambulate due to her pain in her right lower extremity. She is unable to raise her right lower extremity on exam. She denies any her head or losing consciousness. Denies pain elsewhere. She does have a small skin tear to her right elbow, but does have full range of motion of this.   In ED R hip fx, ortho contacted, wanted us to admit wbc 22 k, CXR -ve, will check UA no fever NPO after midnight, consult ortho, no abx for now , monitor wbc, identify reconcile restart home meds once reconciled, DVT prophylaxis after surgery per ortho     4/29  As before presented with fall right hip fracture found to have leukocytosis chest x-ray was unremarkable  UA positive.  But very poor sample will repeat UA clean-catch and start her on Rocephin  4/30   Postop day #1 status post right Bipolar [place] Hip Arthroplasty , white count is down repeat UA is showing UTI continue with IV antibiotics remains afebrile continue to trend H&H continue PT OT DVT prophylaxis per orthopedic surgery is on low-dose Eliquis continue current treatment also acute on chronic renal failure mildly improved  5/1  As before urine culture is showing E. coli pan sensitive continue PT OT DVT prophylaxis per orthopedic surgery white count is down creatinine better we will go ahead and Hep-Lock IV fluid  "restarted Toprol-XL , per  referral to [facility] rehab awaiting response  Review of Systems   All pertinent negatives and positives are as above. All other systems have been reviewed and are negative unless otherwise stated.     Objective    Temp:  [97.6 °F (36.4 °C)-98.9 °F (37.2 °C)] 97.6 °F (36.4 °C)  Heart Rate:  [] 100  Resp:  [16-18] 18  BP: (117-159)/(48-68) 159/59  Physical Exam  HENT:      Head: Normocephalic.      Nose: Nose normal.   Cardiovascular:      Rate and Rhythm: Normal rate.   Pulmonary:      Breath sounds: Wheezing present.   Abdominal:      General: Abdomen is flat.   Musculoskeletal:      Cervical back: Normal range of motion.      Comments: R hip rotated    Skin:     General: Skin is warm.      Capillary Refill: Capillary refill takes less than 2 seconds.   Neurological:      Mental Status: She is alert.       Results Review:  I have reviewed the labs, radiology results, and diagnostic studies.    Laboratory Data:   Results from last 7 days   Lab Units 05/01/25  0420 04/30/25  0336 04/29/25  0329   WBC 10*3/mm3 16.97* 20.66* 19.10*   HEMOGLOBIN g/dL 9.4* 10.4* 10.3*   HEMATOCRIT % 30.1* 34.8 33.0*   PLATELETS 10*3/mm3 322 379 381        Results from last 7 days   Lab Units 05/01/25  0420 04/30/25  0336 04/29/25  0329 04/28/25  1713   SODIUM mmol/L 134* 137 135* 136   POTASSIUM mmol/L 4.2 4.4 4.6 4.5   CHLORIDE mmol/L 102 101 100 102   CO2 mmol/L 20.0* 21.0* 23.0 18.0*   BUN mg/dL 18 20 21 20   CREATININE mg/dL 1.16* 1.30* 1.45* 1.38*   CALCIUM mg/dL 8.9 8.8 8.9 9.3   BILIRUBIN mg/dL 0.3  --   --  0.4   ALK PHOS U/L 146*  --   --  190*   ALT (SGPT) U/L 8  --   --  14   AST (SGOT) U/L 39*  --   --  18   GLUCOSE mg/dL 113* 89 96 115*       Culture Data:   No results found for: \"BLOODCX\", \"URINECX\", \"WOUNDCX\", \"MRSACX\", \"RESPCX\", \"STOOLCX\"    Radiology Data:   Imaging Results (Last 24 Hours)       ** No results found for the last 24 hours. **            I have reviewed the patient's " current medications.     Assessment/Plan   Assessment  Active Hospital Problems    Diagnosis     **Closed fracture of neck of right femur        Treatment Plan  In ED R hip fx, ortho contacted, wanted us to admit wbc 22 k, CXR -ve, will check UA no fever NPO after midnight, consult ortho, no abx for now , monitor wbc, identify reconcile restart home meds once reconciled, DVT prophylaxis after surgery per ortho      Medical Decision Making  Number and Complexity of problems: 2 acute   Differential Diagnosis: as above      Conditions and Status        Condition is at treatment goal.     [facility] Data  External documents reviewed: yes  Cardiac tracing (EKG, telemetry) interpretation: yes  Radiology interpretation: yes  Labs reviewed: yes  Any tests that were considered but not ordered: no     Decision rules/scores evaluated (example [id number], [place], etc): no     Discussed with: ED     Care Planning  Shared decision making: Patient apprised of current labs, vitals, imaging and treatment plan.  They are agreeable with proceeding with plans as discussed.   Code status and discussions: full      Disposition  Social Determinants of Health that impact treatment or disposition: no  Estimated length of stay is TBD.      I confirmed that the patient's advanced care plan is present, code status is documented, and a surrogate decision maker is listed in the patient's medical record.         Electronically signed by Ifeanyi Hercules MD, 05/01/25, 09:53 CDT.

## 2025-05-01 NOTE — THERAPY TREATMENT NOTE
Acute Care - Physical Therapy Treatment Note  [facility]     Patient Name: Cheryl Taylor  : 1950  MRN: [id number]  Today's Date: 2025      Visit Dx:     ICD-10-CM ICD-9-CM   1. Closed fracture of neck of right femur, initial encounter  S72.001A 820.8   2. Impaired mobility [Z74.09]  Z74.09 799.89     Patient Active Problem List   Diagnosis    Atypical chest pain    Hiatal hernia    Gastroesophageal reflux disease with esophagitis without hemorrhage    Essential hypertension    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     Tobacco abuse, in remission    Stage 3a chronic kidney disease    Obesity (BMI 30-39.9)    Chronic diastolic CHF (congestive heart failure)    Aortic stenosis, mild    Nonrheumatic aortic valve insufficiency    Chronic pain    Degeneration of lumbar intervertebral disc    Gastroesophageal reflux disease    Vasculitis    Iron deficiency anemia    Lumbar radiculopathy    Lumbosacral radiculitis    Shortness of breath    Spondylosis    Thoracic aortic aneurysm without rupture    Closed fracture of neck of right femur     Past Medical History:   Diagnosis Date    AAA (abdominal aortic aneurysm)     Arthritis     Asthma     Chronic kidney disease (CKD)     Confusion 2021    COPD (chronic obstructive pulmonary disease)     Gastroesophageal reflux disease with esophagitis without hemorrhage     Heart murmur     Hiatal hernia     Hypertension     Inflammation of arteries 2021    Thoracic aortic aneurysm      Past Surgical History:   Procedure Laterality Date    BACK SURGERY      CARDIAC CATHETERIZATION N/A 2022    Procedure: Left Heart Cath;  Surgeon: Lenard Eastman MD;  Location:  [facility];  Service: Cardiology;  Laterality: N/A;    EYE SURGERY Bilateral     FOOT FASCIOTOMY Bilateral     HIP HEMIARTHROPLASTY Right 2025    Procedure: HIP HEMIARTHROPLASTY RIGHT;  Surgeon: Isael Lopes MD;  Location: [facility] OR;  Service: Orthopedics;  Laterality:  Right;    HYSTERECTOMY      LOWER LEG SOFT TISSUE TUMOR EXCISION      LUMBAR NERVE STIMLATOR INSERTION Right     LYMPH NODE DISSECTION      WRIST FRACTURE SURGERY Right     x2     PT Assessment (Last 12 Hours)       PT Evaluation and Treatment       Row Name 05/01/25 1001          Physical Therapy Time and Intention    Subjective Information complains of;pain;dyspnea  -CIERRA     Document Type therapy note (daily note)  -CIERRA     Mode of Treatment physical therapy  -CIERRA       Row Name 05/01/25 1001          General Information    Existing Precautions/Restrictions fall;right;hip, anterior;oxygen therapy device and L/min  -CIERRA       Row Name 05/01/25 1001          Pain    Pretreatment Pain Rating 7/10  -CIERRA     Posttreatment Pain Rating 8/10  -CIERRA     Pain Location hip  -CIERRA     Pain Side/Orientation right  -CIERRA     Pain Management Interventions exercise or physical activity utilized  -CIERRA     Response to Pain Interventions activity participation with increased pain  -CIERRA     Pre/Posttreatment Pain Comment nsg gave pain pill  -CIERRA       Row Name 05/01/25 1001          Bed Mobility    Sit-Supine [place] (Bed Mobility) verbal cues;minimum assist (75% patient effort)  -       Row Name 05/01/25 1001          Sit-Stand Transfer    Sit-Stand [place] (Transfers) verbal cues;minimum assist (75% patient effort)  -CIERRA     Assistive Device (Sit-Stand Transfers) walker, front-wheeled  -CIERRA       Row Name 05/01/25 1001          Stand-Sit Transfer    Stand-Sit [place] (Transfers) verbal cues;contact guard  -     Assistive Device (Stand-Sit Transfers) walker, front-wheeled  -CIERRA       Row Name 05/01/25 1001          Gait/Stairs (Locomotion)    [place] Level (Gait) verbal cues;minimum assist (75% patient effort)  -CIERRA     Assistive Device (Gait) walker, front-wheeled  -     Distance in Feet (Gait) 10  -CIERRA     Pattern (Gait) step-to  -CIERRA     Deviations/Abnormal Patterns (Gait) gait speed decreased;stride length decreased;weight  shifting decreased  -CIERRA     Bilateral Gait Deviations forward flexed posture  -CIERRA     Comment, (Gait/Stairs) slow pace, increase in shortness of breath.  placed on 2L, diffitcult to get an accurate O2 reading.  placed a forehead sensor, reading improved showing O2 at 85% and HR in the 150's   -CIERRA       Row Name 05/01/25 1001          Motor Skills    Comments, Therapeutic Exercise sitting AROM LLJP, HAMZAHOM RLE X 15  -CIERRA     Additional Documentation Comments, Therapeutic Exercise (Row)  -CIERRA       Row Name             Wound 04/29/25 1815 Right anterior greater trochanter Surgical Closed Surgical Incision    Wound - Properties Group Placement Date: 04/29/25  -CB Placement Time: 1815  -CB Side: Right  -CB Orientation: anterior  -CB Location: greater trochanter  -CB Primary Wound Type: Surgical  -CB Secondary Wound Type - Surgical: Closed Surgi  -CB    Retired Wound - Properties Group Placement Date: 04/29/25  -CB Placement Time: 1815  -CB Side: Right  -CB Orientation: anterior  -CB Location: greater trochanter  -CB    Retired Wound - Properties Group Placement Date: 04/29/25  -CB Placement Time: 1815  -CB Side: Right  -CB Orientation: anterior  -CB Location: greater trochanter  -CB    Retired Wound - Properties Group Date first assessed: 04/29/25  -CB Time first assessed: 1815  -CB Side: Right  -CB Location: greater trochanter  -CB      Row Name 05/01/25 1001          Positioning and Restraints    Pre-Treatment Position sitting in chair/recliner  -CIERRA     Post Treatment Position bed  -CIERRA     In Bed fowlers;call light within reach;encouraged to call for assist;notified nsg;side rails up x2  -CIERRA               User Key  (r) = Recorded By, (t) = Taken By, (c) = Cosigned By      Initials Name Provider Type    Joseph Barnes PTA Physical Therapist Assistant    Solitario Xie, RN Registered Nurse                    Physical Therapy Education       Title: PT OT SLP Therapies (In Progress)       Topic: Physical Therapy  (Done)       Point: Mobility training (Done)       Learning Progress Summary            Patient Acceptance, E, VU by AJ at 4/30/2025 1500    Comment: HEP, importance of ther ex multiple times daily., d/c planning for family    Acceptance, E, VU by AJ at 4/30/2025 1140    Comment: role of PT, ant. hip precautions, WBAT   Family Acceptance, E, VU by AJ at 4/30/2025 1500    Comment: HEP, importance of ther ex multiple times daily., d/c planning for family                      Point: Home exercise program (Done)       Learning Progress Summary            Patient Acceptance, E, VU by AJ at 4/30/2025 1500    Comment: HEP, importance of ther ex multiple times daily., d/c planning for family    Acceptance, E, VU by AJ at 4/30/2025 1140    Comment: role of PT, ant. hip precautions, WBAT   Family Acceptance, E, VU by AJ at 4/30/2025 1500    Comment: HEP, importance of ther ex multiple times daily., d/c planning for family                      Point: Body mechanics (Done)       Learning Progress Summary            Patient Acceptance, E, VU by AJ at 4/30/2025 1500    Comment: HEP, importance of ther ex multiple times daily., d/c planning for family    Acceptance, E, VU by AJ at 4/30/2025 1140    Comment: role of PT, ant. hip precautions, WBAT   Family Acceptance, E, VU by AJ at 4/30/2025 1500    Comment: HEP, importance of ther ex multiple times daily., d/c planning for family                      Point: Precautions (Done)       Learning Progress Summary            Patient Acceptance, E, VU by AJ at 4/30/2025 1500    Comment: HEP, importance of ther ex multiple times daily., d/c planning for family    Acceptance, E, VU by AJ at 4/30/2025 1140    Comment: role of PT, ant. hip precautions, WBAT   Family Acceptance, E, VU by AJ at 4/30/2025 1500    Comment: HEP, importance of ther ex multiple times daily., d/c planning for family                                      User Key       Initials Effective Dates Name Provider Type  Discipline    AJ 08/15/24 -  Nolan Lao, PT DPT Physical Therapist PT                  PT Recommendation and Plan     Progress: improving  Outcome Evaluation: Pt was up in the chair, agreed to therapy.  Pt was on RA, but unable to get an accurate O2 reading.  Pt transferred sit to stand with min assist.  Amb 10' with RWX and min assist with step to gait pattern, pt had severe increase in shortness of breath with activity, placed on 2L O2.  When sensor did read, stated pt's O2 was 85% on 2L and HR in the 150's.  With rest, O2 increase to 95% and HR remained in the 120's.  Notified nsg.  Will continue to work with pt to increase strength and improve mobility.  Pt would benefit from inpatien rehab.   Outcome Measures       Row Name 05/01/25 1001 05/01/25 0900          How much help from another person do you currently need...    Turning from your back to your side while in flat bed without using bedrails? 3  -CIERRA --     Moving from lying on back to sitting on the side of a flat bed without bedrails? 3  -CIERRA --     Moving to and from a bed to a chair (including a wheelchair)? 3  -CIERRA --     Standing up from a chair using your arms (e.g., wheelchair, bedside chair)? 3  -CIERRA --     Climbing 3-5 steps with a railing? 2  -CIERRA --     To walk in hospital room? 3  -CIERRA --     AM-PAC 6 Clicks Score (PT) 17  -CIERRA --        How much help from another is currently needed...    Putting on and taking off regular lower body clothing? -- 2  -TS     Bathing (including washing, rinsing, and drying) -- 2  -TS     Toileting (which includes using toilet bed pan or urinal) -- 3  -TS     Putting on and taking off regular upper body clothing -- 3  -TS     Taking care of personal grooming (such as brushing teeth) -- 3  -TS     Eating meals -- 4  -TS     AM-PAC 6 Clicks Score (OT) -- 17  -TS        Functional Assessment    Outcome Measure Options AM-PAC 6 Clicks Basic Mobility (PT)  -CIERRA --               User Key  (r) = Recorded By, (t) = Taken By,  (c) = Cosigned By      Initials Name Provider Type    LIO SanchezKaranJackelyn smith COTA Occupational Therapist Assistant    Joseph Barnes PTA Physical Therapist Assistant                     Time Calculation:    PT Charges       Row Name 05/01/25 1001             Time Calculation    Start Time 1001  -CIERRA      Stop Time 1050  -CIERRA      Time Calculation (min) 49 min  -CIERRA      PT Received On 05/01/25  -CIERRA         Time Calculation- PT    Total Timed Code Minutes- PT 49 minute(s)  -CIERRA         Timed Charges    83038 - PT Therapeutic Exercise Minutes 20  -CIERRA      28524 - Gait Training Minutes  29  -CIERRA         Total Minutes    Timed Charges Total Minutes 49  -CIERRA       Total Minutes 49  -CIERRA                User Key  (r) = Recorded By, (t) = Taken By, (c) = Cosigned By      Initials Name Provider Type    Joseph Barnes PTA Physical Therapist Assistant                  Therapy Charges for Today       Code Description Service Date Service Provider Modifiers Qty    34146377707 HC GAIT TRAINING EA 15 MIN 5/1/2025 Joseph Adames PTA GP 2    06912112617 HC PT THER PROC EA 15 MIN 5/1/2025 Joseph Adames PTA GP 1            PT G-Codes  Outcome Measure Options: AM-PAC 6 Clicks Basic Mobility (PT)  AM-PAC 6 Clicks Score (PT): 17  AM-PAC 6 Clicks Score (OT): 17    Joseph Adames PTA  5/1/2025

## 2025-05-01 NOTE — PLAN OF CARE
Goal Outcome Evaluation:  Plan of Care Reviewed With: patient        Progress: improving  Outcome Evaluation: Pt was up in the chair, agreed to therapy.  Pt was on RA, but unable to get an accurate O2 reading.  Pt transferred sit to stand with min assist.  Amb 10' with RWX and min assist with step to gait pattern, pt had severe increase in shortness of breath with activity, placed on 2L O2.  When sensor did read, stated pt's O2 was 85% on 2L and HR in the 150's.  With rest, O2 increase to 95% and HR remained in the 120's.  Notified nsg.  Will continue to work with pt to increase strength and improve mobility.  Pt would benefit from inpatien rehab.

## 2025-05-01 NOTE — CASE MANAGEMENT/SOCIAL WORK
Continued Stay Note  [facility]     Patient Name: Cheryl Taylor  MRN: [id number]  Today's Date: 5/1/2025    Admit Date: 4/28/2025    Plan: Referral to [facility]   Discharge Plan       Row Name 05/01/25 0933       Plan    Plan Referral to [facility]    Plan Comments Patient would like to proceed with referral to [facility].  Referral made, awaiting response.                   Discharge Codes    No documentation.                       CLARKE García

## 2025-05-01 NOTE — CASE MANAGEMENT/SOCIAL WORK
Continued Stay Note  [facility]     Patient Name: Cheryl Taylor  MRN: [id number]  Today's Date: 5/1/2025    Admit Date: 4/28/2025    Plan: [facility] pending insurance approval   Discharge Plan       Row Name 05/01/25 1042       Plan    Plan [facility] pending insurance approval    Patient/Family in Agreement with Plan yes    Plan Comments [facility] has accepted patient for admission and is starting insurance precert.  Will advise of insurance's decision when known.      Row Name 05/01/25 0933       Plan    Plan Referral to [facility]    Plan Comments Patient would like to speak with referral to [facility].  Referral made, awaiting response.                   Discharge Codes    No documentation.                       CLARKE García

## 2025-05-01 NOTE — DISCHARGE PLACEMENT REQUEST
"To: [facility]    From: Paula CONTI [phone]              Lisbeth Julien (75 y.o. Female)       Date of Birth   1950    Social Security Number       Address   [address]  [place] [address]    Home Phone   [phone]    MRN   [id number]       [facility]   [place]    Marital Status                               Admission Date   4/28/2025    Admission Type   Emergency    Admitting Provider   Ifeanyi Hercules MD    Attending Provider   Ifeanyi Hercules MD    Department, Room/Bed   [facility] 3A, 346/1       Discharge Date       Discharge Disposition       Discharge Destination                                 Attending Provider: Ifeanyi Hercules MD    Allergies: Levaquin [Levofloxacin], Naprosyn [Naproxen], Sulfa Antibiotics    Isolation: None   Infection: None   Code Status: CPR    Ht: 160 cm (62.99\")   Wt: 80.4 kg (177 lb 4.8 oz)    Admission Cmt: None   Principal Problem: Closed fracture of neck of right femur [S72.001A]                   Active Insurance as of 4/28/2025       Primary Coverage       Payor Plan Insurance Group Employer/Plan Group    AETNA MEDICARE REPLACEMENT AETNA MEDICARE ADVANTAGE PPO [id number]       Payor Plan Address Payor Plan Phone Number Payor Plan Fax Number Effective Dates    [address] [phone]  1/1/2024 - None Entered    [facility] [facility] [address]         Subscriber Name Subscriber Birth Date Member ID       LISBETH JULIEN 1950 [id number]                     Emergency Contacts        (Rel.) Home Phone Work Phone Mobile Phone    Vivian Muniz (Daughter) [phone]    Cinthya Garvey (Daughter) [phone]              Insurance Information                  AETNA MEDICARE REPLACEMENT/AETNA MEDICARE ADVANTAGE PPO Phone: [phone]    Subscriber: Lisbeth Julien Subscriber#: [id number]    Group#: [id number] Precert#: --    Authorization#: [id number] Effective Date: --          "

## 2025-05-02 PROCEDURE — 25010000002 CEFTRIAXONE PER 250 MG: Performed by: ORTHOPAEDIC SURGERY

## 2025-05-02 PROCEDURE — 97116 GAIT TRAINING THERAPY: CPT

## 2025-05-02 PROCEDURE — 97535 SELF CARE MNGMENT TRAINING: CPT

## 2025-05-02 RX ADMIN — Medication 10 ML: at 08:55

## 2025-05-02 RX ADMIN — ANTACID TABLETS 2 TABLET: 500 TABLET, CHEWABLE ORAL at 16:04

## 2025-05-02 RX ADMIN — HYDROCODONE BITARTRATE AND ACETAMINOPHEN 2 TABLET: 7.5; 325 TABLET ORAL at 04:17

## 2025-05-02 RX ADMIN — ARIPIPRAZOLE 5 MG: 5 TABLET ORAL at 08:54

## 2025-05-02 RX ADMIN — DULOXETINE HYDROCHLORIDE 60 MG: 30 CAPSULE, DELAYED RELEASE ORAL at 08:54

## 2025-05-02 RX ADMIN — SENNOSIDES, DOCUSATE SODIUM 2 TABLET: 50; 8.6 TABLET, FILM COATED ORAL at 20:53

## 2025-05-02 RX ADMIN — HYDROCODONE BITARTRATE AND ACETAMINOPHEN 2 TABLET: 7.5; 325 TABLET ORAL at 20:53

## 2025-05-02 RX ADMIN — HYDROCODONE BITARTRATE AND ACETAMINOPHEN 2 TABLET: 7.5; 325 TABLET ORAL at 16:09

## 2025-05-02 RX ADMIN — APIXABAN 2.5 MG: 2.5 TABLET, FILM COATED ORAL at 08:54

## 2025-05-02 RX ADMIN — CYCLOBENZAPRINE HYDROCHLORIDE 10 MG: 10 TABLET, FILM COATED ORAL at 13:20

## 2025-05-02 RX ADMIN — Medication 10 ML: at 20:56

## 2025-05-02 RX ADMIN — CEFTRIAXONE SODIUM 2000 MG: 2 INJECTION, POWDER, FOR SOLUTION INTRAMUSCULAR; INTRAVENOUS at 11:07

## 2025-05-02 RX ADMIN — HYDROCODONE BITARTRATE AND ACETAMINOPHEN 1 TABLET: 7.5; 325 TABLET ORAL at 11:07

## 2025-05-02 RX ADMIN — POLYETHYLENE GLYCOL 3350 17 G: 17 POWDER, FOR SOLUTION ORAL at 08:55

## 2025-05-02 RX ADMIN — APIXABAN 2.5 MG: 2.5 TABLET, FILM COATED ORAL at 20:53

## 2025-05-02 NOTE — CASE MANAGEMENT/SOCIAL WORK
Continued Stay Note   [facility]     Patient Name: Cheryl Taylor  MRN: [id number]  Today's Date: 5/2/2025    Admit Date: 4/28/2025    Plan: Pending   Discharge Plan       Row Name 05/02/25 1605       Plan    Plan Pending    Plan Comments Patient's insurance had offered a peer to peer for [facility].  Physician advisor contacted insurance peer to peer line and was on hold for a lengthy amount of time, left a message requesting return call.  Awaiting decision from insurance.  Will follow up on Monday.                   Discharge Codes    No documentation.                       IMELDA GarcíaW

## 2025-05-02 NOTE — PLAN OF CARE
Problem: Adult Inpatient Plan of Care  Goal: Plan of Care Review  Outcome: Progressing  Flowsheets (Taken 5/2/2025 4247)  Progress: no change  Outcome Evaluation: Pt A&Ox4, VSS. C/o pain with some relief from PRN PO pain meds. 2L O2 NC, CPOX. Turned q 2 hours. Dressing to R hip CDI. Voiding, external catheter in place. Safety maintained, will continue to monitor.  Plan of Care Reviewed With: patient

## 2025-05-02 NOTE — PLAN OF CARE
Goal Outcome Evaluation:  Plan of Care Reviewed With: (P) patient, family        Progress: (P) no change  Outcome Evaluation: (P) Pt in bed upon entering room. o2 at 94% on 1.5l prior to activity. Supine to sit mod I independent. Sit to stand min A. Pt amb 10' CGA min A. Stand to sit CGA. Sit to supine Mod I. HR stayed in the 110s throughout treatment.

## 2025-05-02 NOTE — THERAPY TREATMENT NOTE
Acute Care - Physical Therapy Treatment Note  [facility]     Patient Name: Cheryl Taylor  : 1950  MRN: [id number]  Today's Date: 2025      Visit Dx:     ICD-10-CM ICD-9-CM   1. Closed fracture of neck of right femur, initial encounter  S72.001A 820.8   2. Impaired mobility [Z74.09]  Z74.09 799.89     Patient Active Problem List   Diagnosis    Atypical chest pain    Hiatal hernia    Gastroesophageal reflux disease with esophagitis without hemorrhage    Essential hypertension    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     Tobacco abuse, in remission    Stage 3a chronic kidney disease    Obesity (BMI 30-39.9)    Chronic diastolic CHF (congestive heart failure)    Aortic stenosis, mild    Nonrheumatic aortic valve insufficiency    Chronic pain    Degeneration of lumbar intervertebral disc    Gastroesophageal reflux disease    Vasculitis    Iron deficiency anemia    Lumbar radiculopathy    Lumbosacral radiculitis    Shortness of breath    Spondylosis    Thoracic aortic aneurysm without rupture    Closed fracture of neck of right femur     Past Medical History:   Diagnosis Date    AAA (abdominal aortic aneurysm)     Arthritis     Asthma     Chronic kidney disease (CKD)     Confusion 2021    COPD (chronic obstructive pulmonary disease)     Gastroesophageal reflux disease with esophagitis without hemorrhage     Heart murmur     Hiatal hernia     Hypertension     Inflammation of arteries 2021    Thoracic aortic aneurysm      Past Surgical History:   Procedure Laterality Date    BACK SURGERY      CARDIAC CATHETERIZATION N/A 2022    Procedure: Left Heart Cath;  Surgeon: Lenard Eastman MD;  Location:  [facility];  Service: Cardiology;  Laterality: N/A;    EYE SURGERY Bilateral     FOOT FASCIOTOMY Bilateral     HIP HEMIARTHROPLASTY Right 2025    Procedure: HIP HEMIARTHROPLASTY RIGHT;  Surgeon: Isael Lopes MD;  Location: [facility] OR;  Service: Orthopedics;  Laterality:  Right;    HYSTERECTOMY      LOWER LEG SOFT TISSUE TUMOR EXCISION      LUMBAR NERVE STIMLATOR INSERTION Right     LYMPH NODE DISSECTION      WRIST FRACTURE SURGERY Right     x2     PT Assessment (Last 12 Hours)       PT Evaluation and Treatment       Row Name 05/02/25 1308          Physical Therapy Time and Intention    Subjective Information complains of;pain (P)   -NR     Document Type therapy note (daily note) (P)   -NR     Mode of Treatment physical therapy (P)   -NR       Row Name 05/02/25 1308          General Information    Existing Precautions/Restrictions fall;right;hip, anterior;oxygen therapy device and L/min (P)   -NR       Row Name 05/02/25 1308          Pain    Pretreatment Pain Rating 6/10 (P)   -NR     Posttreatment Pain Rating 7/10 (P)   -NR     Pain Location hip (P)   -NR     Pain Side/Orientation right (P)   -NR     Pain Management Interventions exercise or physical activity utilized (P)   -NR     Response to Pain Interventions activity participation with increased pain (P)   -NR     Pre/Posttreatment Pain Comment Nsg gave pain medication before treatnent (P)   -NR       Row Name 05/02/25 1308          Bed Mobility    Bed Mobility sit-supine;supine-sit (P)   -NR     Supine-Sit [place] (Bed Mobility) modified independence (P)   no assistance, but increased time for completions  -NR     Sit-Supine [place] (Bed Mobility) modified independence (P)   no assistance, but increased time for completion  -NR     Assistive Device (Bed Mobility) bed rails;head of bed elevated (P)   -NR       Row Name 05/02/25 1308          Sit-Stand Transfer    Sit-Stand [place] (Transfers) minimum assist (75% patient effort) (P)   -NR     Assistive Device (Sit-Stand Transfers) walker, front-wheeled (P)   -NR       Row Name 05/02/25 1308          Stand-Sit Transfer    Stand-Sit [place] (Transfers) contact guard (P)   -NR     Assistive Device (Stand-Sit Transfers) walker, front-wheeled (P)   -NR       [facility]  Name 05/02/25 1308          Gait/Stairs (Locomotion)    [place] Level (Gait) verbal cues;minimum assist (75% patient effort) (P)   -NR     Assistive Device (Gait) walker, front-wheeled (P)   -NR     Patient was able to Ambulate yes (P)   -NR     Distance in Feet (Gait) 10 (P)   -NR     Pattern (Gait) step-to (P)   -NR     Deviations/Abnormal Patterns (Gait) gait speed decreased (P)   -NR     Bilateral Gait Deviations forward flexed posture (P)   -NR       Row Name             Wound 04/29/25 1815 Right anterior greater trochanter Surgical Closed Surgical Incision    Wound - Properties Group Placement Date: 04/29/25  -CB Placement Time: 1815  -CB Side: Right  -CB Orientation: anterior  -CB Location: greater trochanter  -CB Primary Wound Type: Surgical  -CB Secondary Wound Type - Surgical: Closed Surgi  -CB    Retired Wound - Properties Group Placement Date: 04/29/25  -CB Placement Time: 1815  -CB Side: Right  -CB Orientation: anterior  -CB Location: greater trochanter  -CB    Retired Wound - Properties Group Placement Date: 04/29/25  -CB Placement Time: 1815  -CB Side: Right  -CB Orientation: anterior  -CB Location: greater trochanter  -CB    Retired Wound - Properties Group Date first assessed: 04/29/25  -CB Time first assessed: 1815  -CB Side: Right  -CB Location: greater trochanter  -CB      Row Name 05/02/25 1308          Plan of Care Review    Plan of Care Reviewed With patient;family (P)   -NR     Progress no change (P)   -NR     Outcome Evaluation Pt in bed upon entering room. o2 at 94% on 1.5l prior to activity. Supine to sit mod I independent. Sit to stand min A. Pt amb 10' CGA min A. Stand to sit CGA. Sit to supine Mod I. HR stayed in the 110s throughout treatment. (P)   -NR       Row Name 05/02/25 1308          Vital Signs    Pre SpO2 (%) 94 (P)   -NR     O2 Delivery Pre Treatment nasal cannula (P)   1.5l  -NR     Intra SpO2 (%) 95 (P)   -NR     O2 Delivery Intra Treatment nasal cannula (P)   1.5l  -NR      Post SpO2 (%) 97 (P)   -NR     O2 Delivery Post Treatment nasal cannula (P)   1.5l  -NR     Pre Patient Position Supine (P)   -NR     Intra Patient Position Sitting (P)   -NR       Row Name 05/02/25 1308          Positioning and Restraints    Pre-Treatment Position in bed (P)   -NR     Post Treatment Position bed (P)   -NR     In Bed fowlers;with family/caregiver;call light within reach;encouraged to call for assist (P)   -NR               User Key  (r) = Recorded By, (t) = Taken By, (c) = Cosigned By      Initials Name Provider Type    CB Solitario Urrutia, RN Registered Nurse    NR Thaddeus Chavez, PTA Student PTA Student                    Physical Therapy Education       Title: PT OT SLP Therapies (In Progress)       Topic: Physical Therapy (Done)       Point: Mobility training (Done)       Learning Progress Summary            Patient Acceptance, E, VU by AMIE at 4/30/2025 1500    Comment: HEP, importance of ther ex multiple times daily., d/c planning for family    Acceptance, E, VU by AMIE at 4/30/2025 1140    Comment: role of PT, ant. hip precautions, WBAT   Family Acceptance, E, VU by AMIE at 4/30/2025 1500    Comment: HEP, importance of ther ex multiple times daily., d/c planning for family                      Point: Home exercise program (Done)       Learning Progress Summary            Patient Acceptance, E, VU by AMIE at 4/30/2025 1500    Comment: HEP, importance of ther ex multiple times daily., d/c planning for family    Acceptance, E, VU by AMIE at 4/30/2025 1140    Comment: role of PT, ant. hip precautions, WBAT   Family Acceptance, E, VU by AMIE at 4/30/2025 1500    Comment: HEP, importance of ther ex multiple times daily., d/c planning for family                      Point: Body mechanics (Done)       Learning Progress Summary            Patient Acceptance, E, VU by AMIE at 4/30/2025 1500    Comment: HEP, importance of ther ex multiple times daily., d/c planning for family    Acceptance, E, VU by AMIE at 4/30/2025  1140    Comment: role of PT, ant. hip precautions, WBAT   Family Acceptance, E, VU by AMIE at 4/30/2025 1500    Comment: HEP, importance of ther ex multiple times daily., d/c planning for family                      Point: Precautions (Done)       Learning Progress Summary            Patient Acceptance, E, VU by AMIE at 4/30/2025 1500    Comment: HEP, importance of ther ex multiple times daily., d/c planning for family    Acceptance, E, VU by AMIE at 4/30/2025 1140    Comment: role of PT, ant. hip precautions, WBAT   Family Acceptance, E, VU by AMIE at 4/30/2025 1500    Comment: HEP, importance of ther ex multiple times daily., d/c planning for family                                      User Key       Initials Effective Dates Name Provider Type Discipline     08/15/24 -  Nolan Lao, PT DPT Physical Therapist PT                  PT Recommendation and Plan     Plan of Care Reviewed With: (P) patient, family  Progress: (P) no change  Outcome Evaluation: (P) Pt in bed upon entering room. o2 at 94% on 1.5l prior to activity. Supine to sit mod I independent. Sit to stand min A. Pt amb 10' CGA min A. Stand to sit CGA. Sit to supine Mod I. HR stayed in the 110s throughout treatment.   Outcome Measures       Row Name 05/02/25 1300 05/01/25 1001 05/01/25 0900       How much help from another person do you currently need...    Turning from your back to your side while in flat bed without using bedrails? -- 3  -CIERRA --    Moving from lying on back to sitting on the side of a flat bed without bedrails? -- 3  -CIERRA --    Moving to and from a bed to a chair (including a wheelchair)? -- 3  -CIERRA --    Standing up from a chair using your arms (e.g., wheelchair, bedside chair)? -- 3  -CIERRA --    Climbing 3-5 steps with a railing? -- 2  -CIERRA --    To walk in hospital room? -- 3  -CIERRA --    AM-PAC 6 Clicks Score (PT) -- 17  -CIERRA --       How much help from another is currently needed...    Putting on and taking off regular lower body clothing? 3   -TS -- 2  -TS    Bathing (including washing, rinsing, and drying) 3  -TS -- 2  -TS    Toileting (which includes using toilet bed pan or urinal) 3  -TS -- 3  -TS    Putting on and taking off regular upper body clothing 4  -TS -- 3  -TS    Taking care of personal grooming (such as brushing teeth) 3  -TS -- 3  -TS    Eating meals 4  -TS -- 4  -TS    AM-PAC 6 Clicks Score (OT) 20  -TS -- 17  -TS       Functional Assessment    Outcome Measure Options -- AM-PAC 6 Clicks Basic Mobility (PT)  -CIER[place] --              User Key  (r) = Recorded By, (t) = Taken By, (c) = Cosigned By      Initials Name Provider Type    TS Jackelyn Russo COTA Occupational Therapist Assistant    Joseph Barnes, PTA Physical Therapist Assistant                     Time Calculation:    PT Charges       Row Name 05/02/25 1345             Time Calculation    Start Time 1308 (P)   -NR      Stop Time 1332 (P)   -NR      Time Calculation (min) 24 min (P)   -NR      PT Received On 05/02/25 (P)   -NR         Time Calculation- PT    Total Timed Code Minutes- PT 24 minute(s) (P)   -NR         Timed Charges    15311 - Gait Training Minutes  24 (P)   -NR         Total Minutes    Timed Charges Total Minutes 24 (P)   -NR       Total Minutes 24 (P)   -NR                User Key  (r) = Recorded By, (t) = Taken By, (c) = Cosigned By      Initials Name Provider Type    Thaddeus Campbell PTA Student PTA Student                      PT G-Codes  Outcome Measure Options: AM-PAC 6 Clicks Basic Mobility (PT)  AM-PAC 6 Clicks Score (PT): 17  AM-PAC 6 Clicks Score (OT): 20    Thaddeus Chavez PTA Student  5/2/2025

## 2025-05-02 NOTE — THERAPY TREATMENT NOTE
Acute Care - Occupational Therapy Treatment Note  [facility]     Patient Name: Cheryl Taylor  : 1950  MRN: [id number]  Today's Date: 2025             Admit Date: 2025       ICD-10-CM ICD-9-CM   1. Closed fracture of neck of right femur, initial encounter  S72.001A 820.8   2. Impaired mobility [Z74.09]  Z74.09 799.89     Patient Active Problem List   Diagnosis    Atypical chest pain    Hiatal hernia    Gastroesophageal reflux disease with esophagitis without hemorrhage    Essential hypertension    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     Tobacco abuse, in remission    Stage 3a chronic kidney disease    Obesity (BMI 30-39.9)    Chronic diastolic CHF (congestive heart failure)    Aortic stenosis, mild    Nonrheumatic aortic valve insufficiency    Chronic pain    Degeneration of lumbar intervertebral disc    Gastroesophageal reflux disease    Vasculitis    Iron deficiency anemia    Lumbar radiculopathy    Lumbosacral radiculitis    Shortness of breath    Spondylosis    Thoracic aortic aneurysm without rupture    Closed fracture of neck of right femur     Past Medical History:   Diagnosis Date    AAA (abdominal aortic aneurysm)     Arthritis     Asthma     Chronic kidney disease (CKD)     Confusion 2021    COPD (chronic obstructive pulmonary disease)     Gastroesophageal reflux disease with esophagitis without hemorrhage     Heart murmur     Hiatal hernia     Hypertension     Inflammation of arteries 2021    Thoracic aortic aneurysm      Past Surgical History:   Procedure Laterality Date    BACK SURGERY      CARDIAC CATHETERIZATION N/A 2022    Procedure: Left Heart Cath;  Surgeon: Lenard Eastman MD;  Location:  [facility]ATH [facility];  Service: Cardiology;  Laterality: N/A;    EYE SURGERY Bilateral     FOOT FASCIOTOMY Bilateral     HIP HEMIARTHROPLASTY Right 2025    Procedure: HIP HEMIARTHROPLASTY RIGHT;  Surgeon: Isael Lopes MD;  Location: [facility] OR;  Service:  Orthopedics;  Laterality: Right;    HYSTERECTOMY      LOWER LEG SOFT TISSUE TUMOR EXCISION      LUMBAR NERVE STIMLATOR INSERTION Right     LYMPH NODE DISSECTION      WRIST FRACTURE SURGERY Right     x2         OT ASSESSMENT FLOWSHEET (Last 12 Hours)       OT Evaluation and Treatment       Row Name 05/02/25 0919                   OT Time and Intention    Subjective Information no complaints  -TS        Document Type therapy note (daily note)  -TS        Mode of Treatment occupational therapy  -TS        Patient Effort good  -TS        Comment 1L during tx  -TS           General Information    Existing Precautions/Restrictions fall;right;hip, anterior;oxygen therapy device and L/min  -TS           Pain Assessment    Pretreatment Pain Rating 3/10  -TS        Posttreatment Pain Rating 3/10  -TS        Pain Location hip  -TS        Pain Side/Orientation right  -TS        Pain Management Interventions exercise or physical activity utilized  -TS        Response to Pain Interventions activity participation with tolerable pain  -TS           Cognition    Orientation Status (Cognition) oriented x 4  -TS        Personal Safety Interventions fall prevention program maintained;gait belt;nonskid shoes/slippers when out of bed  -TS           Activities of Daily Living    BADL Assessment/Intervention lower body dressing;toileting  -TS           Lower Body Dressing Assessment/Training    [place] Level (Lower Body Dressing) don;pants/bottoms;moderate assist (50% patient effort)  -TS        Position (Lower Body Dressing) unsupported sitting;supported standing  -TS           Toileting Assessment/Training    [place] Level (Toileting) perform perineal hygiene;minimum assist (75% patient effort)  -TS        Position (Toileting) unsupported sitting;supported standing  -TS           Bed Mobility    Supine-Sit [place] (Bed Mobility) standby assist  -TS        Assistive Device (Bed Mobility) bed rails;head of bed elevated  -TS            Functional Mobility    Functional Mobility- Ind. Level standby assist  -TS        Functional Mobility- Device walker, front-wheeled  -TS           Transfer Assessment/Treatment    Transfers sit-stand transfer;stand-sit transfer  -TS           Sit-Stand Transfer    Sit-Stand [place] (Transfers) standby assist  -TS        Assistive Device (Sit-Stand Transfers) walker, front-wheeled  -TS           Stand-Sit Transfer    Stand-Sit [place] (Transfers) standby assist  -TS        Assistive Device (Stand-Sit Transfers) walker, front-wheeled  -TS           Wound 04/29/25 1815 Right anterior greater trochanter Surgical Closed Surgical Incision    Wound - Properties Group Placement Date: 04/29/25  -CB Placement Time: 1815  -CB Side: Right  -CB Orientation: anterior  -CB Location: greater trochanter  -CB Primary Wound Type: Surgical  -CB Secondary Wound Type - Surgical: Closed Surgi  -CB    Retired Wound - Properties Group Placement Date: 04/29/25  -CB Placement Time: 1815  -CB Side: Right  -CB Orientation: anterior  -CB Location: greater trochanter  -CB    Retired Wound - Properties Group Placement Date: 04/29/25  -CB Placement Time: 1815  -CB Side: Right  -CB Orientation: anterior  -CB Location: greater trochanter  -CB    Retired Wound - Properties Group Date first assessed: 04/29/25  -CB Time first assessed: 1815  -CB Side: Right  -CB Location: greater trochanter  -CB       Plan of Care Review    Plan of Care Reviewed With patient  -TS        Progress improving  -TS           Positioning and Restraints    Pre-Treatment Position in bed  -TS        Post Treatment Position chair  -TS        In Chair reclined;call light within reach;encouraged to call for assist  -TS           Therapy Plan Review/Discharge Plan (OT)    Anticipated Discharge Disposition (OT) inpatient rehabilitation facility  -TS                  User Key  (r) = Recorded By, (t) = Taken By, (c) = Cosigned By      Initials Name Effective Dates     Jackelyn Cole COTA 02/03/23 -     Solitario Xie RN 04/28/22 -                      Occupational Therapy Education       Title: PT OT SLP Therapies (In Progress)       Topic: Occupational Therapy (In Progress)       Point: ADL training (Done)       Learning Progress Summary            Patient Acceptance, E, VU by TS at 5/1/2025 0917    Acceptance, E, VU,NR by LS at 4/30/2025 1156                      Point: Precautions (Done)       Learning Progress Summary            Patient Acceptance, E, VU,NR by LS at 4/30/2025 1156                      Point: Body mechanics (Done)       Learning Progress Summary            Patient Acceptance, E, VU,NR by LS at 4/30/2025 1156                                      User Key       Initials Effective Dates Name Provider Type Discipline    LIO 02/03/23 -  Jackelyn Russo COTA Occupational Therapist Assistant OT     06/20/22 -  Scarlet Mcguire OTR/L Occupational Therapist OT                      OT Recommendation and Plan     Plan of Care Review  Plan of Care Reviewed With: patient  Progress: improving  Outcome Evaluation: Pt agreeable to OT tx. Pt transfers and ambulates with CGA and RW. Pt completed toileting with overall min A for hygiene and mod A for clothing management. Pt max A for LB dressing due to pain and SOB. Pt did experience increased WOB with task. Pt would benefit from acute rehab/swing bed prior to returning home. Continue OT POC  Plan of Care Reviewed With: patient  Outcome Evaluation: Pt agreeable to OT tx. Pt transfers and ambulates with CGA and RW. Pt completed toileting with overall min A for hygiene and mod A for clothing management. Pt max A for LB dressing due to pain and SOB. Pt did experience increased WOB with task. Pt would benefit from acute rehab/swing bed prior to returning home. Continue OT POC     Outcome Measures       Row Name 05/02/25 1300 05/01/25 1001 05/01/25 0900       How much help from another person do you currently  need...    Turning from your back to your side while in flat bed without using bedrails? -- 3  -CIERRA --    Moving from lying on back to sitting on the side of a flat bed without bedrails? -- 3  -CIER[place] --    Moving to and from a bed to a chair (including a wheelchair)? -- 3  -[place] --    Standing up from a chair using your arms (e.g., wheelchair, bedside chair)? -- 3  -[place] --    Climbing 3-5 steps with a railing? -- 2  -CIER[place] --    To walk in hospital room? -- 3  -CIER[place] --    AM-PAC 6 Clicks Score (PT) -- 17  -[place]ER[place] --       How much help from another is currently needed...    Putting on and taking off regular lower body clothing? 3  -TS -- 2  -TS    Bathing (including washing, rinsing, and drying) 3  -TS -- 2  -TS    Toileting (which includes using toilet bed pan or urinal) 3  -TS -- 3  -TS    Putting on and taking off regular upper body clothing 4  -TS -- 3  -TS    Taking care of personal grooming (such as brushing teeth) 3  -TS -- 3  -TS    Eating meals 4  -TS -- 4  -TS    AM-PAC 6 Clicks Score (OT) 20  -TS -- 17  -TS       Functional Assessment    Outcome Measure Options -- AM-PAC 6 Clicks Basic Mobility (PT)  -[place] --              User Key  (r) = Recorded By, (t) = Taken By, (c) = Cosigned By      Initials Name Provider Type    Jackelyn Cole COTA Occupational Therapist Assistant    Joseph Barnes, DERIC Physical Therapist Assistant                    Time Calculation:    Time Calculation- OT       Row Name 05/02/25 1342             Time Calculation- OT    OT Start Time 0919  -TS      OT Stop Time 1000  -TS      OT Time Calculation (min) 41 min  -TS      Total Timed Code Minutes- OT 41 minute(s)  -TS      OT Received On 05/02/25  -TS         Timed Charges    05940 - OT Self Care/Mgmt Minutes 41  -TS         Total Minutes    Timed Charges Total Minutes 41  -TS       Total Minutes 41  -TS                User Key  (r) = Recorded By, (t) = Taken By, (c) = Cosigned By      Initials Name Provider Type    LIO Russo  DIGNA Hill Occupational Therapist Assistant                  Therapy Charges for Today       Code Description Service Date Service Provider Modifiers Qty    44931643798 HC OT SELF CARE/MGMT/TRAIN EA 15 MIN 5/1/2025 Jackelyn Russo COTA GO 4    48821827269 HC OT SELF CARE/MGMT/TRAIN EA 15 MIN 5/2/2025 Jackelyn Russo, DIGNA GO 3                 Jackelyn SAHA. DIGNA Russo  5/2/2025

## 2025-05-02 NOTE — PLAN OF CARE
Goal Outcome Evaluation:  Plan of Care Reviewed With: patient, child        Progress: improving   Pt A/O x4. VSS on 2L. C/o pain, PRNs given, relief observed. Up x1 to BR w/ walker and gait belt. Voiding/continent. Uses purewick at times. Surgical incision to R hip, dressing C/D/I. PT/OT are recommending rehab at d/c. Eliquis. No IV access. Daughter at bedside. Safety maintained. Call light in reach.

## 2025-05-03 PROCEDURE — 25010000002 CEFTRIAXONE PER 250 MG: Performed by: ORTHOPAEDIC SURGERY

## 2025-05-03 PROCEDURE — 97530 THERAPEUTIC ACTIVITIES: CPT

## 2025-05-03 PROCEDURE — 97116 GAIT TRAINING THERAPY: CPT

## 2025-05-03 RX ADMIN — CEFTRIAXONE SODIUM 2000 MG: 2 INJECTION, POWDER, FOR SOLUTION INTRAMUSCULAR; INTRAVENOUS at 10:41

## 2025-05-03 RX ADMIN — DULOXETINE HYDROCHLORIDE 60 MG: 30 CAPSULE, DELAYED RELEASE ORAL at 10:34

## 2025-05-03 RX ADMIN — CYCLOBENZAPRINE HYDROCHLORIDE 10 MG: 10 TABLET, FILM COATED ORAL at 19:46

## 2025-05-03 RX ADMIN — CYCLOBENZAPRINE HYDROCHLORIDE 10 MG: 10 TABLET, FILM COATED ORAL at 02:42

## 2025-05-03 RX ADMIN — HYDROCODONE BITARTRATE AND ACETAMINOPHEN 1 TABLET: 7.5; 325 TABLET ORAL at 18:21

## 2025-05-03 RX ADMIN — HYDROCODONE BITARTRATE AND ACETAMINOPHEN 2 TABLET: 7.5; 325 TABLET ORAL at 11:53

## 2025-05-03 RX ADMIN — HYDROCODONE BITARTRATE AND ACETAMINOPHEN 2 TABLET: 7.5; 325 TABLET ORAL at 02:42

## 2025-05-03 RX ADMIN — ARIPIPRAZOLE 5 MG: 5 TABLET ORAL at 10:34

## 2025-05-03 RX ADMIN — HYDROCODONE BITARTRATE AND ACETAMINOPHEN 2 TABLET: 7.5; 325 TABLET ORAL at 06:26

## 2025-05-03 RX ADMIN — HYDROCODONE BITARTRATE AND ACETAMINOPHEN 2 TABLET: 7.5; 325 TABLET ORAL at 22:27

## 2025-05-03 RX ADMIN — APIXABAN 2.5 MG: 2.5 TABLET, FILM COATED ORAL at 10:34

## 2025-05-03 RX ADMIN — CYCLOBENZAPRINE HYDROCHLORIDE 10 MG: 10 TABLET, FILM COATED ORAL at 11:53

## 2025-05-03 RX ADMIN — Medication 10 ML: at 10:39

## 2025-05-03 RX ADMIN — APIXABAN 2.5 MG: 2.5 TABLET, FILM COATED ORAL at 19:47

## 2025-05-03 RX ADMIN — Medication 10 ML: at 20:31

## 2025-05-03 RX ADMIN — POLYETHYLENE GLYCOL 3350 17 G: 17 POWDER, FOR SOLUTION ORAL at 10:34

## 2025-05-03 NOTE — THERAPY TREATMENT NOTE
Acute Care - Physical Therapy Treatment Note  [facility]     Patient Name: Cheryl Taylor  : 1950  MRN: [id number]  Today's Date: 5/3/2025      Visit Dx:     ICD-10-CM ICD-9-CM   1. Closed fracture of neck of right femur, initial encounter  S72.001A 820.8   2. Impaired mobility [Z74.09]  Z74.09 799.89     Patient Active Problem List   Diagnosis    Atypical chest pain    Hiatal hernia    Gastroesophageal reflux disease with esophagitis without hemorrhage    Essential hypertension    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     Tobacco abuse, in remission    Stage 3a chronic kidney disease    Obesity (BMI 30-39.9)    Chronic diastolic CHF (congestive heart failure)    Aortic stenosis, mild    Nonrheumatic aortic valve insufficiency    Chronic pain    Degeneration of lumbar intervertebral disc    Gastroesophageal reflux disease    Vasculitis    Iron deficiency anemia    Lumbar radiculopathy    Lumbosacral radiculitis    Shortness of breath    Spondylosis    Thoracic aortic aneurysm without rupture    Closed fracture of neck of right femur     Past Medical History:   Diagnosis Date    AAA (abdominal aortic aneurysm)     Arthritis     Asthma     Chronic kidney disease (CKD)     Confusion 2021    COPD (chronic obstructive pulmonary disease)     Gastroesophageal reflux disease with esophagitis without hemorrhage     Heart murmur     Hiatal hernia     Hypertension     Inflammation of arteries 2021    Thoracic aortic aneurysm      Past Surgical History:   Procedure Laterality Date    BACK SURGERY      CARDIAC CATHETERIZATION N/A 2022    Procedure: Left Heart Cath;  Surgeon: Lenard Eastman MD;  Location:  [facility];  Service: Cardiology;  Laterality: N/A;    EYE SURGERY Bilateral     FOOT FASCIOTOMY Bilateral     HIP HEMIARTHROPLASTY Right 2025    Procedure: HIP HEMIARTHROPLASTY RIGHT;  Surgeon: Isael Lopes MD;  Location: [facility] OR;  Service: Orthopedics;  Laterality:  Right;    HYSTERECTOMY      LOWER LEG SOFT TISSUE TUMOR EXCISION      LUMBAR NERVE STIMLATOR INSERTION Right     LYMPH NODE DISSECTION      WRIST FRACTURE SURGERY Right     x2     PT Assessment (Last 12 Hours)       PT Evaluation and Treatment       Row Name 05/03/25 0830          Physical Therapy Time and Intention    Subjective Information no complaints  -KJ     Document Type therapy note (daily note)  -KJ     Mode of Treatment physical therapy  -KJ     Patient Effort good  -KJ       Row Name 05/03/25 0830          General Information    Existing Precautions/Restrictions fall;hip;hip, anterior;right  -KJ       Row Name 05/03/25 0830          Pain    Pretreatment Pain Rating 5/10  -KJ     Posttreatment Pain Rating 5/10  -KJ     Pain Location hip  -KJ     Pain Side/Orientation right  -KJ       Row Name 05/03/25 0830          Mobility    Extremity Weight-bearing Status right lower extremity  -KJ     Right Lower Extremity (Weight-bearing Status) weight-bearing as tolerated (WBAT)  -KJ       Row Name 05/03/25 0830          Bed Mobility    Supine-Sit [place] (Bed Mobility) supervision;verbal cues  -KJ       Row Name 05/03/25 0830          Sit-Stand Transfer    Sit-Stand [place] (Transfers) verbal cues;minimum assist (75% patient effort)  -KJ     Assistive Device (Sit-Stand Transfers) walker, front-wheeled  -KJ       Row Name 05/03/25 0830          Stand-Sit Transfer    Stand-Sit [place] (Transfers) contact guard;verbal cues  -KJ       Row Name 05/03/25 0830          Toilet Transfer    Type (Toilet Transfer) sit-stand;stand-sit  -KJ     [place] Level (Toilet Transfer) contact guard;minimum assist (75% patient effort);verbal cues  -KJ       Row Name 05/03/25 0830          Gait/Stairs (Locomotion)    [place] Level (Gait) verbal cues;contact guard;minimum assist (75% patient effort)  -KJ     Assistive Device (Gait) walker, front-wheeled  -KJ     Distance in Feet (Gait) 12  x 2  -KJ      Deviations/Abnormal Patterns (Gait) gait speed decreased;stride length decreased  -KJ     Bilateral Gait Deviations forward flexed posture  -KJ     Comment, (Gait/Stairs) slow pace, several standing rests.  -KJ       Row Name             Wound 04/29/25 1815 Right anterior greater trochanter Surgical Closed Surgical Incision    Wound - Properties Group Placement Date: 04/29/25  -CB Placement Time: 1815  -CB Side: Right  -CB Orientation: anterior  -CB Location: greater trochanter  -CB Primary Wound Type: Surgical  -CB Secondary Wound Type - Surgical: Closed Surgi  -CB    Retired Wound - Properties Group Placement Date: 04/29/25  -CB Placement Time: 1815  -CB Side: Right  -CB Orientation: anterior  -CB Location: greater trochanter  -CB    Retired Wound - Properties Group Placement Date: 04/29/25  -CB Placement Time: 1815  -CB Side: Right  -CB Orientation: anterior  -CB Location: greater trochanter  -CB    Retired Wound - Properties Group Date first assessed: 04/29/25  -CB Time first assessed: 1815  -CB Side: Right  -CB Location: greater trochanter  -CB      Row Name 05/03/25 0830          Positioning and Restraints    Pre-Treatment Position in bed  -KJ     Post Treatment Position chair  -KJ     In Bed call light within reach;exit alarm on  -KJ               User Key  (r) = Recorded By, (t) = Taken By, (c) = Cosigned By      Initials Name Provider Type    Beverly Box PTA Physical Therapist Assistant    Solitario Xie, RN Registered Nurse                    Physical Therapy Education       Title: PT OT SLP Therapies (In Progress)       Topic: Physical Therapy (Done)       Point: Mobility training (Done)       Learning Progress Summary            Patient Acceptance, E, VU by AMIE at 4/30/2025 1500    Comment: HEP, importance of ther ex multiple times daily., d/c planning for family    Acceptance, E, VU by AMIE at 4/30/2025 1140    Comment: role of PT, ant. hip precautions, WBAT   Family Acceptance, E, VU by AMIE at  4/30/2025 1500    Comment: HEP, importance of ther ex multiple times daily., d/c planning for family                      Point: Home exercise program (Done)       Learning Progress Summary            Patient Acceptance, E, VU by AJ at 4/30/2025 1500    Comment: HEP, importance of ther ex multiple times daily., d/c planning for family    Acceptance, E, VU by AJ at 4/30/2025 1140    Comment: role of PT, ant. hip precautions, WBAT   Family Acceptance, E, VU by AJ at 4/30/2025 1500    Comment: HEP, importance of ther ex multiple times daily., d/c planning for family                      Point: Body mechanics (Done)       Learning Progress Summary            Patient Acceptance, E, VU by AJ at 4/30/2025 1500    Comment: HEP, importance of ther ex multiple times daily., d/c planning for family    Acceptance, E, VU by AJ at 4/30/2025 1140    Comment: role of PT, ant. hip precautions, WBAT   Family Acceptance, E, VU by AMIE at 4/30/2025 1500    Comment: HEP, importance of ther ex multiple times daily., d/c planning for family                      Point: Precautions (Done)       Learning Progress Summary            Patient Acceptance, E, VU by AJ at 4/30/2025 1500    Comment: HEP, importance of ther ex multiple times daily., d/c planning for family    Acceptance, E, VU by AJ at 4/30/2025 1140    Comment: role of PT, ant. hip precautions, WBAT   Family Acceptance, E, VU by AJ at 4/30/2025 1500    Comment: HEP, importance of ther ex multiple times daily., d/c planning for family                                      User Key       Initials Effective Dates Name Provider Type Discipline     08/15/24 -  Nolan Lao, PT DPT Physical Therapist PT                  PT Recommendation and Plan     Progress: improving  Outcome Evaluation: PT tx completed. Pt rates R hip pn 5/10. S sup>sit, sit<>stand CG/Julee, amb 10' x 2 utilizing r wx. Poor gait mechanics and posture. Several standing rests during walk due to fatigue/SOB. Pt wheezy.  Recommend cont PT   Outcome Measures       Row Name 05/02/25 1300 05/01/25 1001 05/01/25 0900       How much help from another person do you currently need...    Turning from your back to your side while in flat bed without using bedrails? -- 3  -CIERRA --    Moving from lying on back to sitting on the side of a flat bed without bedrails? -- 3  -CIERRA --    Moving to and from a bed to a chair (including a wheelchair)? -- 3  -CIERRA --    Standing up from a chair using your arms (e.g., wheelchair, bedside chair)? -- 3  -CIERRA --    Climbing 3-5 steps with a railing? -- 2  -CIERRA --    To walk in hospital room? -- 3  -CIERRA --    AM-PAC 6 Clicks Score (PT) -- 17  -CIERRA --       How much help from another is currently needed...    Putting on and taking off regular lower body clothing? 3  -TS -- 2  -TS    Bathing (including washing, rinsing, and drying) 3  -TS -- 2  -TS    Toileting (which includes using toilet bed pan or urinal) 3  -TS -- 3  -TS    Putting on and taking off regular upper body clothing 4  -TS -- 3  -TS    Taking care of personal grooming (such as brushing teeth) 3  -TS -- 3  -TS    Eating meals 4  -TS -- 4  -TS    AM-PAC 6 Clicks Score (OT) 20  -TS -- 17  -TS       Functional Assessment    Outcome Measure Options -- AM-PAC 6 Clicks Basic Mobility (PT)  -CIERRA --              User Key  (r) = Recorded By, (t) = Taken By, (c) = Cosigned By      Initials Name Provider Type    TS Jackelyn Russo COTA Occupational Therapist Assistant    Joseph Barnes, PTA Physical Therapist Assistant                     Time Calculation:    PT Charges       Row Name 05/03/25 0929             Time Calculation    Start Time 0830  -KJ      Stop Time 0855  -KJ      Time Calculation (min) 25 min  -KJ      PT Received On 05/03/25  -KJ      PT Goal Re-Cert Due Date 05/10/25  -KJ         Time Calculation- PT    Total Timed Code Minutes- PT 25 minute(s)  -KJ                User Key  (r) = Recorded By, (t) = Taken By, (c) = Cosigned By       Initials Name Provider Type    Beverly Box PTA Physical Therapist Assistant                  Therapy Charges for Today       Code Description Service Date Service Provider Modifiers Qty    22805554070 HC GAIT TRAINING EA 15 MIN 5/3/2025 Beverly Sandoval, DERIC GP 1    40412224967 HC PT THERAPEUTIC ACT EA 15 MIN 5/3/2025 Beverly Sandoval PTA GP 1            PT G-Codes  Outcome Measure Options: AM-PAC 6 Clicks Basic Mobility (PT)  AM-PAC 6 Clicks Score (PT): 17  AM-PAC 6 Clicks Score (OT): 20    Beverly Sandoval PTA  5/3/2025

## 2025-05-03 NOTE — PROGRESS NOTES
"  Orthopedic Surgery Right Hip Mingo-Arthroplasty Progress Note        Cheryl Taylor  5/3/2025  POD# 4 Days Post-Op    Subjective:     Interval: Pt. OOB and up to chair.  Reports minimal postop discomfort.  No complaints currently.    Objective:     General: A&O x3, NAD, No signs or symptoms of PE.   Wound: clean, dry, intact             Dressing: Clean, Dry, Intact   Extremity: Distal NVI, Soft throughout           DVT Exam: No evidence of DVT seen on physical exam.                   Data Review:  Vitals:  /48 (BP Location: Right arm, Patient Position: Lying)   Pulse 83   Temp 98.1 °F (36.7 °C) (Oral)   Resp 18   Ht 160 cm (62.99\")   Wt 80.4 kg (177 lb 4.8 oz)   SpO2 95%   BMI 31.42 kg/m²   Temp (24hrs), Av °F (36.7 °C), Min:97.9 °F (36.6 °C), Max:98.2 °F (36.8 °C)      I/O (24Hr):    Intake/Output Summary (Last 24 hours) at 5/3/2025 1056  Last data filed at 5/3/2025 0600  Gross per 24 hour   Intake 200 ml   Output 1650 ml   Net -1450 ml       Labs:  Lab Results (last 72 hours)       Procedure Component Value Units Date/Time    Urine Culture - Urine, Urine, Clean Catch [[id number]]  (Abnormal)  (Susceptibility) Collected: 25 1140    Specimen: Urine, Clean Catch Updated: 25 0928     Urine Culture >100,000 CFU/mL Escherichia coli    Narrative:      Colonization of the urinary tract without infection is common. Treatment is discouraged unless the patient is symptomatic, pregnant, or undergoing an invasive urologic procedure.    Susceptibility        Escherichia coli      SYLVIA      Amoxicillin + Clavulanate Susceptible      Ampicillin Susceptible      Ampicillin + Sulbactam Susceptible      Cefazolin (Urine) Susceptible      Cefepime Susceptible      Ceftazidime Susceptible      Ceftriaxone Susceptible      Gentamicin Susceptible      Levofloxacin Susceptible      Nitrofurantoin Susceptible      Piperacillin + Tazobactam Susceptible      Trimethoprim + Sulfamethoxazole Resistant             "               Urine Culture - Urine, Urine, Clean Catch [[id number]]  (Abnormal)  (Susceptibility) Collected: 04/29/25 0404    Specimen: Urine, Clean Catch Updated: 05/01/25 0927     Urine Culture >100,000 CFU/mL Escherichia coli    Narrative:      Colonization of the urinary tract without infection is common. Treatment is discouraged unless the patient is symptomatic, pregnant, or undergoing an invasive urologic procedure.    Susceptibility        Escherichia coli      SYLVIA      Amoxicillin + Clavulanate Susceptible      Ampicillin Susceptible      Ampicillin + Sulbactam Susceptible      Cefazolin (Urine) Susceptible      Cefepime Susceptible      Ceftazidime Susceptible      Ceftriaxone Susceptible      Gentamicin Susceptible      Levofloxacin Susceptible      Nitrofurantoin Susceptible      Piperacillin + Tazobactam Susceptible      Trimethoprim + Sulfamethoxazole Resistant                           Comprehensive Metabolic Panel [[id number]]  (Abnormal) Collected: 05/01/25 0420    Specimen: Blood Updated: 05/01/25 0506     Glucose 113 mg/dL      BUN 18 mg/dL      Creatinine 1.16 mg/dL      Sodium 134 mmol/L      Potassium 4.2 mmol/L      Chloride 102 mmol/L      CO2 20.0 mmol/L      Calcium 8.9 mg/dL      Total Protein 6.0 g/dL      Albumin 2.6 g/dL      ALT (SGPT) 8 U/L      AST (SGOT) 39 U/L      Alkaline Phosphatase 146 U/L      Total Bilirubin 0.3 mg/dL      Globulin 3.4 gm/dL      A/G Ratio 0.8 g/dL      BUN/Creatinine Ratio 15.5     Anion Gap 12.0 mmol/L      eGFR 49.9 mL/min/1.73     Narrative:      GFR Categories in Chronic Kidney Disease (CKD)              GFR Category          GFR (mL/min/1.73)    Interpretation  G1                    90 or greater        Normal or high (1)  G2                    60-89                Mild decrease (1)  G3a                   45-59                Mild to moderate decrease  G3b                   30-44                Moderate to severe decrease  G4                    15-29                 Severe decrease  G5                    14 or less           Kidney failure    (1)In the absence of evidence of kidney disease, neither GFR category G1 or G2 fulfill the criteria for CKD.    eGFR calculation 2021 CKD-EPI creatinine equation, which does not include race as a factor    CBC & Differential [[id number]]  (Abnormal) Collected: 05/01/25 0420    Specimen: Blood Updated: 05/01/25 0439    Narrative:      The following orders were created for panel order CBC & Differential.  Procedure                               Abnormality         Status                     ---------                               -----------         ------                     CBC Auto Differential[[id number]]        Abnormal            Final result                 Please view results for these tests on the individual orders.    CBC Auto Differential [[id number]]  (Abnormal) Collected: 05/01/25 0420    Specimen: Blood Updated: 05/01/25 0439     WBC 16.97 10*3/mm3      RBC 3.31 10*6/mm3      Hemoglobin 9.4 g/dL      Hematocrit 30.1 %      MCV 90.9 fL      MCH 28.4 pg      MCHC 31.2 g/dL      RDW 14.1 %      RDW-SD 46.5 fl      MPV 10.6 fL      Platelets 322 10*3/mm3      Neutrophil % 79.1 %      Lymphocyte % 9.8 %      Monocyte % 9.1 %      Eosinophil % 0.7 %      Basophil % 0.4 %      Immature Grans % 0.9 %      Neutrophils, Absolute 13.42 10*3/mm3      Lymphocytes, Absolute 1.66 10*3/mm3      Monocytes, Absolute 1.54 10*3/mm3      Eosinophils, Absolute 0.12 10*3/mm3      Basophils, Absolute 0.07 10*3/mm3      Immature Grans, Absolute 0.16 10*3/mm3      nRBC 0.2 /100 WBC             Assessment:     Closed fracture of neck of right femur  POD 4 Days Post-Op HIP HEMIARTHROPLASTY RIGHT (Right)    Plan:      1:  DVT prophylaxis, ICE, elevate  2:  Pain control  3:  Physical therapy/Occupation therapy  4:  Anticipate discharge once D/C arrangements confirmed and if pain well controlled  5:  WBAT operative RL extremity w/ anterior hip  precautions    Electronically signed by Isael Lopes MD on 5/3/2025 at 10:56 CDT

## 2025-05-03 NOTE — PLAN OF CARE
Goal Outcome Evaluation:  Plan of Care Reviewed With: patient        Progress: no change  Outcome Evaluation: AOx4, VSS on , BLE numbness, C/O pain R hip/leg PRN meds given. CDI, bruising around site. voiding per PW. Pt q2 turn, call light within reach, bed alarm on.

## 2025-05-03 NOTE — PROGRESS NOTES
[facility] Medicine Services  INPATIENT PROGRESS NOTE    Patient Name: Cheryl Taylor  Date of Admission: 4/28/2025  Today's Date: 05/03/25  Length of Stay: 5  Primary Care Physician: Den James DO    Subjective   Chief Complaint: fall R hip fracture     Fall     Leg Pain         Patient is a 75-year-old female with PMH of HTN, CKD who presents to the ER post fall. Patient reports that she was using her rollator and it rolled out from underneath her. Patient reports that she landed on her right leg. She is complaining of pain to her entire right leg, primarily to the right upper leg. Patient reports that she is unable to ambulate due to her pain in her right lower extremity. She is unable to raise her right lower extremity on exam. She denies any her head or losing consciousness. Denies pain elsewhere. She does have a small skin tear to her right elbow, but does have full range of motion of this.   In ED R hip fx, ortho contacted, wanted us to admit wbc 22 k, CXR -ve, will check UA no fever NPO after midnight, consult ortho, no abx for now , monitor wbc, identify reconcile restart home meds once reconciled, DVT prophylaxis after surgery per ortho     4/29  As before presented with fall right hip fracture found to have leukocytosis chest x-ray was unremarkable  UA positive.  But very poor sample will repeat UA clean-catch and start her on Rocephin  4/30   Postop day #1 status post right Bipolar Mingo Hip Arthroplasty , white count is down repeat UA is showing UTI continue with IV antibiotics remains afebrile continue to trend H&H continue PT OT DVT prophylaxis per orthopedic surgery is on low-dose Eliquis continue current treatment also acute on chronic renal failure mildly improved  5/1  As before urine culture is showing E. coli pan sensitive continue PT OT DVT prophylaxis per orthopedic surgery white count is down creatinine better we will go ahead and Hep-Lock IV fluid  restarted Toprol-XL , per  referral to [facility] [place] rehab awaiting response  5/2  Before QUINN resolving urine culture showing E. coli pansensitive responding nicely to treatment we did Hep-Lock her IV fluid awaiting SNF continue PT OT  5/3  As before she was not qualified for inpatient rehab so we did appeal her insurance continue current treatment awaiting SNF  Review of Systems   All pertinent negatives and positives are as above. All other systems have been reviewed and are negative unless otherwise stated.     Objective    Temp:  [97.9 °F (36.6 °C)-98.2 °F (36.8 °C)] 98.1 °F (36.7 °C)  Heart Rate:  [80-94] 83  Resp:  [18-20] 18  BP: ()/(48-64) 104/48  Physical Exam  HENT:      Head: Normocephalic.      Nose: Nose normal.   Cardiovascular:      Rate and Rhythm: Normal rate.   Pulmonary:      Breath sounds: Wheezing present.   Abdominal:      General: Abdomen is flat.   Musculoskeletal:      Cervical back: Normal range of motion.      Comments: R hip rotated    Skin:     General: Skin is warm.      Capillary Refill: Capillary refill takes less than 2 seconds.   Neurological:      Mental Status: She is alert.       Results Review:  I have reviewed the labs, radiology results, and diagnostic studies.    Laboratory Data:   Results from last 7 days   Lab Units 05/01/25  0420 04/30/25  0336 04/29/25  0329   WBC 10*3/mm3 16.97* 20.66* 19.10*   HEMOGLOBIN g/dL 9.4* 10.4* 10.3*   HEMATOCRIT % 30.1* 34.8 33.0*   PLATELETS 10*3/mm3 322 379 381        Results from last 7 days   Lab Units 05/01/25  0420 04/30/25  0336 04/29/25  0329 04/28/25  1713   SODIUM mmol/L 134* 137 135* 136   POTASSIUM mmol/L 4.2 4.4 4.6 4.5   CHLORIDE mmol/L 102 101 100 102   CO2 mmol/L 20.0* 21.0* 23.0 18.0*   BUN mg/dL 18 20 21 20   CREATININE mg/dL 1.16* 1.30* 1.45* 1.38*   CALCIUM mg/dL 8.9 8.8 8.9 9.3   BILIRUBIN mg/dL 0.3  --   --  0.4   ALK PHOS U/L 146*  --   --  190*   ALT (SGPT) U/L 8  --   --  14   AST (SGOT) U/L 39*  --   --  18  "  GLUCOSE mg/dL 113* 89 96 115*       Culture Data:   No results found for: \"BLOODCX\", \"URINECX\", \"WOUNDCX\", \"MRSACX\", \"RESPCX\", \"STOOLCX\"    Radiology Data:   Imaging Results (Last 24 Hours)       ** No results found for the last 24 hours. **            I have reviewed the patient's current medications.     Assessment/Plan   Assessment  Active Hospital Problems    Diagnosis     **Closed fracture of neck of right femur        Treatment Plan  In ED R hip fx, ortho contacted, wanted us to admit wbc 22 k, CXR -ve, will check UA no fever NPO after midnight, consult ortho, no abx for now , monitor wbc, identify reconcile restart home meds once reconciled, DVT prophylaxis after surgery per ortho      Medical Decision Making  Number and Complexity of problems: 2 acute   Differential Diagnosis: as above      Conditions and Status        Condition is at treatment goal.     MDM Data  External documents reviewed: yes  Cardiac tracing (EKG, telemetry) interpretation: yes  Radiology interpretation: yes  Labs reviewed: yes  Any tests that were considered but not ordered: no     Decision rules/scores evaluated (example [id number], [place], etc): no     Discussed with: ED     Care Planning  Shared decision making: Patient apprised of current labs, vitals, imaging and treatment plan.  They are agreeable with proceeding with plans as discussed.   Code status and discussions: full      Disposition  Social Determinants of Health that impact treatment or disposition: no  Estimated length of stay is TBD.      I confirmed that the patient's advanced care plan is present, code status is documented, and a surrogate decision maker is listed in the patient's medical record.         Electronically signed by Ifeanyi Hercules MD, 05/03/25, 09:53 CDT.  "

## 2025-05-03 NOTE — PLAN OF CARE
Goal Outcome Evaluation:  Plan of Care Reviewed With: patient        Progress: improving  Outcome Evaluation: PT tx completed. Pt rates R hip pn 5/10. S sup>sit, sit<>stand CG/Julee, amb 10' x 2 utilizing r wx. Poor gait mechanics and posture. Several standing rests during walk due to fatigue/SOB. Pt wheezy. Recommend cont PT

## 2025-05-03 NOTE — NURSING NOTE
Pt A/O x4. VSS on 2L. Incision to R Hip, drsg C/D/I. IV to L forearm, IID. Up x1 w/ walker and gait belt to BR. Voiding/Continent. Eliquis. CPOX on forehead. C/o pain, PRN meds given w/ relief observed. Awaiting insurance follow up for d/c to [facility] rehab Monday. Safety maintained. Call light in reach.

## 2025-05-04 PROCEDURE — 97116 GAIT TRAINING THERAPY: CPT

## 2025-05-04 RX ORDER — HYDROCODONE BITARTRATE AND ACETAMINOPHEN 7.5; 325 MG/1; MG/1
1 TABLET ORAL ONCE AS NEEDED
Refills: 0 | Status: COMPLETED | OUTPATIENT
Start: 2025-05-04 | End: 2025-05-04

## 2025-05-04 RX ADMIN — CYCLOBENZAPRINE HYDROCHLORIDE 10 MG: 10 TABLET, FILM COATED ORAL at 23:43

## 2025-05-04 RX ADMIN — APIXABAN 2.5 MG: 2.5 TABLET, FILM COATED ORAL at 08:04

## 2025-05-04 RX ADMIN — HYDROCODONE BITARTRATE AND ACETAMINOPHEN 2 TABLET: 7.5; 325 TABLET ORAL at 15:53

## 2025-05-04 RX ADMIN — APIXABAN 2.5 MG: 2.5 TABLET, FILM COATED ORAL at 21:14

## 2025-05-04 RX ADMIN — HYDROCODONE BITARTRATE AND ACETAMINOPHEN 1 TABLET: 7.5; 325 TABLET ORAL at 23:43

## 2025-05-04 RX ADMIN — Medication 10 ML: at 08:04

## 2025-05-04 RX ADMIN — HYDROCODONE BITARTRATE AND ACETAMINOPHEN 2 TABLET: 7.5; 325 TABLET ORAL at 11:06

## 2025-05-04 RX ADMIN — CYCLOBENZAPRINE HYDROCHLORIDE 10 MG: 10 TABLET, FILM COATED ORAL at 04:43

## 2025-05-04 RX ADMIN — CYCLOBENZAPRINE HYDROCHLORIDE 10 MG: 10 TABLET, FILM COATED ORAL at 15:54

## 2025-05-04 RX ADMIN — DULOXETINE HYDROCHLORIDE 60 MG: 30 CAPSULE, DELAYED RELEASE ORAL at 08:03

## 2025-05-04 RX ADMIN — METOPROLOL SUCCINATE 25 MG: 25 TABLET, EXTENDED RELEASE ORAL at 08:03

## 2025-05-04 RX ADMIN — HYDROCODONE BITARTRATE AND ACETAMINOPHEN 2 TABLET: 7.5; 325 TABLET ORAL at 04:43

## 2025-05-04 RX ADMIN — ARIPIPRAZOLE 5 MG: 5 TABLET ORAL at 08:03

## 2025-05-04 RX ADMIN — Medication 10 ML: at 21:14

## 2025-05-04 NOTE — PLAN OF CARE
Goal Outcome Evaluation:   Pt sitting upright in chair. Aox4. VSS on 2L nc. Pt upx1 with walker and gait belt to restroom. Voiding. Large bm this shift. Medicated for pain x1 this shift. No c/o nausea or vomiting. Pt in pleasant mood. Iv IID. Awaiting insurance approval for placement. Call light within reach. Safety maintained. Care continues.

## 2025-05-04 NOTE — PROGRESS NOTES
[facility] Medicine Services  INPATIENT PROGRESS NOTE    Patient Name: Cheryl Taylor  Date of Admission: 4/28/2025  Today's Date: 05/04/25  Length of Stay: 6  Primary Care Physician: Den James DO    Subjective   Chief Complaint: fall R hip fracture     Fall     Leg Pain         Patient is a 75-year-old female with PMH of HTN, CKD who presents to the ER post fall. Patient reports that she was using her rollator and it rolled out from underneath her. Patient reports that she landed on her right leg. She is complaining of pain to her entire right leg, primarily to the right upper leg. Patient reports that she is unable to ambulate due to her pain in her right lower extremity. She is unable to raise her right lower extremity on exam. She denies any her head or losing consciousness. Denies pain elsewhere. She does have a small skin tear to her right elbow, but does have full range of motion of this.   In ED R hip fx, ortho contacted, wanted us to admit wbc 22 k, CXR -ve, will check UA no fever NPO after midnight, consult ortho, no abx for now , monitor wbc, identify reconcile restart home meds once reconciled, DVT prophylaxis after surgery per ortho     4/29  As before presented with fall right hip fracture found to have leukocytosis chest x-ray was unremarkable  UA positive.  But very poor sample will repeat UA clean-catch and start her on Rocephin  4/30   Postop day #1 status post right Bipolar [place] Hip Arthroplasty , white count is down repeat UA is showing UTI continue with IV antibiotics remains afebrile continue to trend H&H continue PT OT DVT prophylaxis per orthopedic surgery is on low-dose Eliquis continue current treatment also acute on chronic renal failure mildly improved  5/1  As before urine culture is showing E. coli pan sensitive continue PT OT DVT prophylaxis per orthopedic surgery white count is down creatinine better we will go ahead and Hep-Lock IV fluid  restarted Toprol-XL , per  referral to [facility] rehab awaiting response  5/2  Before QUINN resolving urine culture showing E. coli pansensitive responding nicely to treatment we did Hep-Lock her IV fluid awaiting SNF continue PT OT  5/3  As before she was not qualified for inpatient rehab so we did appeal her insurance continue current treatment awaiting SNF  5/4  As before leukocytosis is getting better urine culture showing E. coli pansensitive responding to treatment remains afebrile DVT prophylaxis per Ortho postop day #5 was turned down by insurance to go to inpatient acute rehab we did rehab.  Possibly she will wind up going to SNF.  PT OT appreciate orthopedic surgery WBAT operative RL extremity w/ anterior hip precautions   Review of Systems   All pertinent negatives and positives are as above. All other systems have been reviewed and are negative unless otherwise stated.     Objective    Temp:  [97.7 °F (36.5 °C)-97.9 °F (36.6 °C)] 97.7 °F (36.5 °C)  Heart Rate:  [] 101  Resp:  [16-20] 16  BP: (124-156)/(58-67) 127/58  Physical Exam  HENT:      Head: Normocephalic.      Nose: Nose normal.   Cardiovascular:      Rate and Rhythm: Normal rate.   Pulmonary:      Breath sounds: Wheezing present.   Abdominal:      General: Abdomen is flat.   Musculoskeletal:      Cervical back: Normal range of motion.      Comments: R hip rotated    Skin:     General: Skin is warm.      Capillary Refill: Capillary refill takes less than 2 seconds.   Neurological:      Mental Status: She is alert.       Results Review:  I have reviewed the labs, radiology results, and diagnostic studies.    Laboratory Data:   Results from last 7 days   Lab Units 05/01/25 0420 04/30/25 0336 04/29/25 0329   WBC 10*3/mm3 16.97* 20.66* 19.10*   HEMOGLOBIN g/dL 9.4* 10.4* 10.3*   HEMATOCRIT % 30.1* 34.8 33.0*   PLATELETS 10*3/mm3 322 379 381        Results from last 7 days   Lab Units 05/01/25 0420 04/30/25 0336 04/29/25  0329  "04/28/25  1713   SODIUM mmol/L 134* 137 135* 136   POTASSIUM mmol/L 4.2 4.4 4.6 4.5   CHLORIDE mmol/L 102 101 100 102   CO2 mmol/L 20.0* 21.0* 23.0 18.0*   BUN mg/dL 18 20 21 20   CREATININE mg/dL 1.16* 1.30* 1.45* 1.38*   CALCIUM mg/dL 8.9 8.8 8.9 9.3   BILIRUBIN mg/dL 0.3  --   --  0.4   ALK PHOS U/L 146*  --   --  190*   ALT (SGPT) U/L 8  --   --  14   AST (SGOT) U/L 39*  --   --  18   GLUCOSE mg/dL 113* 89 96 115*       Culture Data:   No results found for: \"BLOODCX\", \"URINECX\", \"WOUNDCX\", \"MRSACX\", \"RESPCX\", \"STOOLCX\"    Radiology Data:   Imaging Results (Last 24 Hours)       ** No results found for the last 24 hours. **            I have reviewed the patient's current medications.     Assessment/Plan   Assessment  Active Hospital Problems    Diagnosis     **Closed fracture of neck of right femur        Treatment Plan  In ED R hip fx, ortho contacted, wanted us to admit wbc 22 k, CXR -ve, will check UA no fever NPO after midnight, consult ortho, no abx for now , monitor wbc, identify reconcile restart home meds once reconciled, DVT prophylaxis after surgery per ortho      Medical Decision Making  Number and Complexity of problems: 2 acute   Differential Diagnosis: as above      Conditions and Status        Condition is at treatment goal.     [facility] Data  External documents reviewed: yes  Cardiac tracing (EKG, telemetry) interpretation: yes  Radiology interpretation: yes  Labs reviewed: yes  Any tests that were considered but not ordered: no     Decision rules/scores evaluated (example [id number], [place], etc): no     Discussed with: ED     Care Planning  Shared decision making: Patient apprised of current labs, vitals, imaging and treatment plan.  They are agreeable with proceeding with plans as discussed.   Code status and discussions: full      Disposition  Social Determinants of Health that impact treatment or disposition: no  Estimated length of stay is TBD.      I confirmed that the patient's advanced care " plan is present, code status is documented, and a surrogate decision maker is listed in the patient's medical record.         Electronically signed by Ifeanyi Hercules MD, 05/04/25, 09:56 CDT.

## 2025-05-04 NOTE — PLAN OF CARE
Goal Outcome Evaluation:              Outcome Evaluation: Pt A&Ox4, up x1 WBAT RLE to bathroom and BSC voiding. Dressing to R hip CDI. Medications given per orders, PRNs given with moderate effect, ice aplied PRN to R hip. On 2L, attempt to wean to 1L, pt desaturated to high 80s placed back on 2L. IV to LAC IID. Safety maintained, call light within reach.                              [id number]

## 2025-05-04 NOTE — THERAPY TREATMENT NOTE
Acute Care - Physical Therapy Treatment Note  [facility]     Patient Name: Cheryl Taylor  : 1950  MRN: [id number]  Today's Date: 2025      Visit Dx:     ICD-10-CM ICD-9-CM   1. Closed fracture of neck of right femur, initial encounter  S72.001A 820.8   2. Impaired mobility [Z74.09]  Z74.09 799.89     Patient Active Problem List   Diagnosis    Atypical chest pain    Hiatal hernia    Gastroesophageal reflux disease with esophagitis without hemorrhage    Essential hypertension    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     Tobacco abuse, in remission    Stage 3a chronic kidney disease    Obesity (BMI 30-39.9)    Chronic diastolic CHF (congestive heart failure)    Aortic stenosis, mild    Nonrheumatic aortic valve insufficiency    Chronic pain    Degeneration of lumbar intervertebral disc    Gastroesophageal reflux disease    Vasculitis    Iron deficiency anemia    Lumbar radiculopathy    Lumbosacral radiculitis    Shortness of breath    Spondylosis    Thoracic aortic aneurysm without rupture    Closed fracture of neck of right femur     Past Medical History:   Diagnosis Date    AAA (abdominal aortic aneurysm)     Arthritis     Asthma     Chronic kidney disease (CKD)     Confusion 2021    COPD (chronic obstructive pulmonary disease)     Gastroesophageal reflux disease with esophagitis without hemorrhage     Heart murmur     Hiatal hernia     Hypertension     Inflammation of arteries 2021    Thoracic aortic aneurysm      Past Surgical History:   Procedure Laterality Date    BACK SURGERY      CARDIAC CATHETERIZATION N/A 2022    Procedure: Left Heart Cath;  Surgeon: Lenard Eastman MD;  Location:  [facility];  Service: Cardiology;  Laterality: N/A;    EYE SURGERY Bilateral     FOOT FASCIOTOMY Bilateral     HIP HEMIARTHROPLASTY Right 2025    Procedure: HIP HEMIARTHROPLASTY RIGHT;  Surgeon: Isael Lopes MD;  Location: [facility] OR;  Service: Orthopedics;  Laterality:  Right;    HYSTERECTOMY      LOWER LEG SOFT TISSUE TUMOR EXCISION      LUMBAR NERVE STIMLATOR INSERTION Right     LYMPH NODE DISSECTION      WRIST FRACTURE SURGERY Right     x2     PT Assessment (Last 12 Hours)       PT Evaluation and Treatment       Row Name 05/04/25 1133          Physical Therapy Time and Intention    Subjective Information no complaints  -KJ     Document Type therapy note (daily note)  -KJ     Mode of Treatment physical therapy  -KJ     Patient Effort good  -KJ       Row Name 05/04/25 1133          General Information    Existing Precautions/Restrictions fall;hip;hip, anterior;right;oxygen therapy device and L/min  -KJ       Row Name 05/04/25 1133          Pain    Pretreatment Pain Rating 5/10  -KJ     Posttreatment Pain Rating 5/10  -KJ     Pain Location hip  -KJ     Pain Side/Orientation right  -KJ       Row Name 05/04/25 1133          Mobility    Extremity Weight-bearing Status right lower extremity  -KJ     Right Lower Extremity (Weight-bearing Status) weight-bearing as tolerated (WBAT)  -KJ       Row Name 05/04/25 1133          Bed Mobility    Comment, (Bed Mobility) chair  -KJ       Row Name 05/04/25 1133          Sit-Stand Transfer    Sit-Stand [place] (Transfers) verbal cues;minimum assist (75% patient effort)  -KJ     Assistive Device (Sit-Stand Transfers) walker, front-wheeled  -KJ       Row Name 05/04/25 1133          Stand-Sit Transfer    Stand-Sit [place] (Transfers) contact guard;verbal cues  -KJ       Row Name 05/04/25 1133          Toilet Transfer    Type (Toilet Transfer) sit-stand;stand-sit  -KJ     [place] Level (Toilet Transfer) contact guard;minimum assist (75% patient effort);verbal cues  -KJ       Row Name 05/04/25 1133          Gait/Stairs (Locomotion)    [place] Level (Gait) verbal cues;contact guard;minimum assist (75% patient effort)  -KJ     Assistive Device (Gait) walker, front-wheeled  -KJ     Distance in Feet (Gait) 25  x 2  -KJ      Deviations/Abnormal Patterns (Gait) gait speed decreased;stride length decreased  -KJ     Bilateral Gait Deviations forward flexed posture  -KJ     Comment, (Gait/Stairs) slow pace  -KJ       Row Name             Wound 04/29/25 1815 Right anterior greater trochanter Surgical Closed Surgical Incision    Wound - Properties Group Placement Date: 04/29/25  -CB Placement Time: 1815  -CB Side: Right  -CB Orientation: anterior  -CB Location: greater trochanter  -CB Primary Wound Type: Surgical  -CB Secondary Wound Type - Surgical: Closed Surgi  -CB    Retired Wound - Properties Group Placement Date: 04/29/25  -CB Placement Time: 1815  -CB Side: Right  -CB Orientation: anterior  -CB Location: greater trochanter  -CB    Retired Wound - Properties Group Placement Date: 04/29/25  -CB Placement Time: 1815  -CB Side: Right  -CB Orientation: anterior  -CB Location: greater trochanter  -CB    Retired Wound - Properties Group Date first assessed: 04/29/25  -CB Time first assessed: 1815  -CB Side: Right  -CB Location: greater trochanter  -CB      Row Name 05/04/25 1133          Positioning and Restraints    Pre-Treatment Position sitting in chair/recliner  -KJ     Post Treatment Position chair  -KJ     In Bed call light within reach  -KJ               User Key  (r) = Recorded By, (t) = Taken By, (c) = Cosigned By      Initials Name Provider Type    Beverly Box PTA Physical Therapist Assistant    Solitario Xie, RN Registered Nurse                    Physical Therapy Education       Title: PT OT SLP Therapies (In Progress)       Topic: Physical Therapy (Done)       Point: Mobility training (Done)       Learning Progress Summary            Patient Acceptance, E, VU by AMIE at 4/30/2025 1500    Comment: HEP, importance of ther ex multiple times daily., d/c planning for family    Acceptance, E, VU by AMIE at 4/30/2025 1140    Comment: role of PT, ant. hip precautions, WBAT   Family Acceptance, E, VU by AMIE at 4/30/2025 1500     Comment: HEP, importance of ther ex multiple times daily., d/c planning for family                      Point: Home exercise program (Done)       Learning Progress Summary            Patient Acceptance, E, VU by AJ at 4/30/2025 1500    Comment: HEP, importance of ther ex multiple times daily., d/c planning for family    Acceptance, E, VU by AJ at 4/30/2025 1140    Comment: role of PT, ant. hip precautions, WBAT   Family Acceptance, E, VU by AJ at 4/30/2025 1500    Comment: HEP, importance of ther ex multiple times daily., d/c planning for family                      Point: Body mechanics (Done)       Learning Progress Summary            Patient Acceptance, E, VU by AJ at 4/30/2025 1500    Comment: HEP, importance of ther ex multiple times daily., d/c planning for family    Acceptance, E, VU by AJ at 4/30/2025 1140    Comment: role of PT, ant. hip precautions, WBAT   Family Acceptance, E, VU by AJ at 4/30/2025 1500    Comment: HEP, importance of ther ex multiple times daily., d/c planning for family                      Point: Precautions (Done)       Learning Progress Summary            Patient Acceptance, E, VU by AJ at 4/30/2025 1500    Comment: HEP, importance of ther ex multiple times daily., d/c planning for family    Acceptance, E, VU by AJ at 4/30/2025 1140    Comment: role of PT, ant. hip precautions, WBAT   Family Acceptance, E, VU by AJ at 4/30/2025 1500    Comment: HEP, importance of ther ex multiple times daily., d/c planning for family                                      User Key       Initials Effective Dates Name Provider Type Discipline     08/15/24 -  Nolan Lao, PT DPT Physical Therapist PT                  PT Recommendation and Plan     Progress: improving  Outcome Evaluation: PT tx completed. Pt rates R hip pn 5/10. S sup>sit, sit<>stand CG/Julee, amb 10' x 2 utilizing r wx. Poor gait mechanics and posture. Several standing rests during walk due to fatigue/SOB. Pt wheezy. Recommend cont PT    Outcome Measures       Row Name 05/02/25 1300             How much help from another is currently needed...    Putting on and taking off regular lower body clothing? 3  -TS      Bathing (including washing, rinsing, and drying) 3  -TS      Toileting (which includes using toilet bed pan or urinal) 3  -TS      Putting on and taking off regular upper body clothing 4  -TS      Taking care of personal grooming (such as brushing teeth) 3  -TS      Eating meals 4  -TS      AM-PAC 6 Clicks Score (OT) 20  -TS                User Key  (r) = Recorded By, (t) = Taken By, (c) = Cosigned By      Initials Name Provider Type    Jackelyn Cole COTA Occupational Therapist Assistant                     Time Calculation:    PT Charges       Row Name 05/04/25 1152             Time Calculation    Start Time 1133  -KJ      Stop Time 1156  -KJ      Time Calculation (min) 23 min  -KJ      PT Received On 05/04/25  -KJ      PT Goal Re-Cert Due Date 05/10/25  -KJ         Time Calculation- PT    Total Timed Code Minutes- PT 23 minute(s)  -KJ                User Key  (r) = Recorded By, (t) = Taken By, (c) = Cosigned By      Initials Name Provider Type    Beverly Box PTA Physical Therapist Assistant                  Therapy Charges for Today       Code Description Service Date Service Provider Modifiers Qty    34610990705 HC GAIT TRAINING EA 15 MIN 5/3/2025 Beverly Sandoval, PTA GP 1    91955331860 HC PT THERAPEUTIC ACT EA 15 MIN 5/3/2025 Beverly Sandoval, DERIC GP 1    01797916072 HC GAIT TRAINING EA 15 MIN 5/4/2025 Beverly Sandoval, DERIC GP 2            PT G-Codes  Outcome Measure Options: AM-PAC 6 Clicks Basic Mobility (PT)  AM-PAC 6 Clicks Score (PT): 18  AM-PAC 6 Clicks Score (OT): 20    Beverly Sandoval PTA  5/4/2025

## 2025-05-04 NOTE — PLAN OF CARE
Goal Outcome Evaluation:  Plan of Care Reviewed With: patient        Progress: no change  Outcome Evaluation: Pt AO x4 on 2L nc. Up x1 to bathroom with walker, SOB when ambulating. C/o pain, PRNs given. BM today. Pt up in chair, weight shifting and turning in bed. Incision is CDI. VSS, call light within reach

## 2025-05-05 LAB
ALBUMIN SERPL-MCNC: 2.9 G/DL (ref 3.5–5.2)
ALBUMIN/GLOB SERPL: 0.8 G/DL
ALP SERPL-CCNC: 202 U/L (ref 39–117)
ALT SERPL W P-5'-P-CCNC: 11 U/L (ref 1–33)
ANION GAP SERPL CALCULATED.3IONS-SCNC: 11 MMOL/L (ref 5–15)
AST SERPL-CCNC: 24 U/L (ref 1–32)
BASOPHILS # BLD AUTO: 0.15 10*3/MM3 (ref 0–0.2)
BASOPHILS NFR BLD AUTO: 1 % (ref 0–1.5)
BILIRUB SERPL-MCNC: 0.3 MG/DL (ref 0–1.2)
BUN SERPL-MCNC: 16 MG/DL (ref 8–23)
BUN/CREAT SERPL: 12.7 (ref 7–25)
CALCIUM SPEC-SCNC: 9.2 MG/DL (ref 8.6–10.5)
CHLORIDE SERPL-SCNC: 101 MMOL/L (ref 98–107)
CO2 SERPL-SCNC: 23 MMOL/L (ref 22–29)
CREAT SERPL-MCNC: 1.26 MG/DL (ref 0.57–1)
DEPRECATED RDW RBC AUTO: 48.7 FL (ref 37–54)
EGFRCR SERPLBLD CKD-EPI 2021: 45.2 ML/MIN/1.73
EOSINOPHIL # BLD AUTO: 0.79 10*3/MM3 (ref 0–0.4)
EOSINOPHIL NFR BLD AUTO: 5.3 % (ref 0.3–6.2)
ERYTHROCYTE [DISTWIDTH] IN BLOOD BY AUTOMATED COUNT: 14.6 % (ref 12.3–15.4)
GLOBULIN UR ELPH-MCNC: 3.5 GM/DL
GLUCOSE SERPL-MCNC: 96 MG/DL (ref 65–99)
HCT VFR BLD AUTO: 30.3 % (ref 34–46.6)
HGB BLD-MCNC: 9.2 G/DL (ref 12–15.9)
IMM GRANULOCYTES # BLD AUTO: 0.35 10*3/MM3 (ref 0–0.05)
IMM GRANULOCYTES NFR BLD AUTO: 2.3 % (ref 0–0.5)
LYMPHOCYTES # BLD AUTO: 2.52 10*3/MM3 (ref 0.7–3.1)
LYMPHOCYTES NFR BLD AUTO: 16.8 % (ref 19.6–45.3)
MCH RBC QN AUTO: 28.4 PG (ref 26.6–33)
MCHC RBC AUTO-ENTMCNC: 30.4 G/DL (ref 31.5–35.7)
MCV RBC AUTO: 93.5 FL (ref 79–97)
MONOCYTES # BLD AUTO: 1.29 10*3/MM3 (ref 0.1–0.9)
MONOCYTES NFR BLD AUTO: 8.6 % (ref 5–12)
NEUTROPHILS NFR BLD AUTO: 66 % (ref 42.7–76)
NEUTROPHILS NFR BLD AUTO: 9.91 10*3/MM3 (ref 1.7–7)
NRBC BLD AUTO-RTO: 0.3 /100 WBC (ref 0–0.2)
PLATELET # BLD AUTO: 465 10*3/MM3 (ref 140–450)
PMV BLD AUTO: 10.6 FL (ref 6–12)
POTASSIUM SERPL-SCNC: 4.1 MMOL/L (ref 3.5–5.2)
PROT SERPL-MCNC: 6.4 G/DL (ref 6–8.5)
RBC # BLD AUTO: 3.24 10*6/MM3 (ref 3.77–5.28)
SODIUM SERPL-SCNC: 135 MMOL/L (ref 136–145)
WBC NRBC COR # BLD AUTO: 15.01 10*3/MM3 (ref 3.4–10.8)

## 2025-05-05 PROCEDURE — 80053 COMPREHEN METABOLIC PANEL: CPT | Performed by: HOSPITALIST

## 2025-05-05 PROCEDURE — 25010000002 ONDANSETRON PER 1 MG: Performed by: ORTHOPAEDIC SURGERY

## 2025-05-05 PROCEDURE — 97535 SELF CARE MNGMENT TRAINING: CPT | Performed by: OCCUPATIONAL THERAPIST

## 2025-05-05 PROCEDURE — 85025 COMPLETE CBC W/AUTO DIFF WBC: CPT | Performed by: HOSPITALIST

## 2025-05-05 PROCEDURE — 25010000002 HYDROMORPHONE PER 4 MG

## 2025-05-05 PROCEDURE — 97116 GAIT TRAINING THERAPY: CPT

## 2025-05-05 RX ORDER — HYDROCODONE BITARTRATE AND ACETAMINOPHEN 7.5; 325 MG/1; MG/1
1 TABLET ORAL ONCE AS NEEDED
Refills: 0 | Status: DISCONTINUED | OUTPATIENT
Start: 2025-05-05 | End: 2025-05-05 | Stop reason: SDUPTHER

## 2025-05-05 RX ORDER — HYDROCODONE BITARTRATE AND ACETAMINOPHEN 7.5; 325 MG/1; MG/1
1 TABLET ORAL EVERY 6 HOURS PRN
Refills: 0 | Status: DISCONTINUED | OUTPATIENT
Start: 2025-05-05 | End: 2025-05-06 | Stop reason: HOSPADM

## 2025-05-05 RX ORDER — HYDROMORPHONE HYDROCHLORIDE 1 MG/ML
0.5 INJECTION, SOLUTION INTRAMUSCULAR; INTRAVENOUS; SUBCUTANEOUS ONCE AS NEEDED
Status: COMPLETED | OUTPATIENT
Start: 2025-05-05 | End: 2025-05-05

## 2025-05-05 RX ADMIN — ONDANSETRON 4 MG: 2 INJECTION INTRAMUSCULAR; INTRAVENOUS at 02:42

## 2025-05-05 RX ADMIN — CYCLOBENZAPRINE HYDROCHLORIDE 10 MG: 10 TABLET, FILM COATED ORAL at 20:09

## 2025-05-05 RX ADMIN — Medication 10 ML: at 08:38

## 2025-05-05 RX ADMIN — ACETAMINOPHEN 650 MG: 325 TABLET, FILM COATED ORAL at 23:37

## 2025-05-05 RX ADMIN — Medication 10 ML: at 20:10

## 2025-05-05 RX ADMIN — HYDROCODONE BITARTRATE AND ACETAMINOPHEN 1 TABLET: 7.5; 325 TABLET ORAL at 13:11

## 2025-05-05 RX ADMIN — APIXABAN 2.5 MG: 2.5 TABLET, FILM COATED ORAL at 20:10

## 2025-05-05 RX ADMIN — ARIPIPRAZOLE 5 MG: 5 TABLET ORAL at 08:35

## 2025-05-05 RX ADMIN — APIXABAN 2.5 MG: 2.5 TABLET, FILM COATED ORAL at 08:35

## 2025-05-05 RX ADMIN — CYCLOBENZAPRINE HYDROCHLORIDE 10 MG: 10 TABLET, FILM COATED ORAL at 10:24

## 2025-05-05 RX ADMIN — HYDROMORPHONE HYDROCHLORIDE 0.5 MG: 1 INJECTION, SOLUTION INTRAMUSCULAR; INTRAVENOUS; SUBCUTANEOUS at 02:42

## 2025-05-05 RX ADMIN — DULOXETINE HYDROCHLORIDE 60 MG: 30 CAPSULE, DELAYED RELEASE ORAL at 08:35

## 2025-05-05 RX ADMIN — HYDROCODONE BITARTRATE AND ACETAMINOPHEN 1 TABLET: 7.5; 325 TABLET ORAL at 19:25

## 2025-05-05 NOTE — PLAN OF CARE
Goal Outcome Evaluation:  Plan of Care Reviewed With: patient        Progress: no change  Outcome Evaluation: Pt was up in the chair, agreed to therapy.  Pt complaining of increase pain, stated it is the worst it has been since surgery.  Pt stated she is having spasms and cramps in RLE.  Also c/o increase swelling today in RLE.  Pt was able to transfer sit to stand with CGA/min assist.  Amb 25' with RWX and CGA/min assist with step to gait pattern.  Pt would benefit from inpatient rehab.

## 2025-05-05 NOTE — PLAN OF CARE
Goal Outcome Evaluation:      Patient A/O x4 this shift. Patient treated for pain per MAR. Patient had several loose b.m. so patient is requesting to hold miralax in the a.m. [place] dressing on right hip surgical site c/d/I. No unaddressed needs in the room this shift. Will update oncoming dayshift nurse at bedside shift report in the a.m. as appropriate. IPASS updated.     Tele: none.

## 2025-05-05 NOTE — CASE MANAGEMENT/SOCIAL WORK
Continued Stay Note   [place]     Patient Name: Cheryl Taylor  MRN: [id number]  Today's Date: 5/5/2025    Admit Date: 4/28/2025    Plan: Pending   Discharge Plan       Row Name 05/05/25 1111       Plan    Plan Pending    Plan Comments Patient's insurance has denied [facility].  Family is aware of option to appeal.  Other options are SNF vs swing bed at [facility].  RADHA spoke with patient's daughter, Cinthya, and she advised she would like to speak with her sister and will follow up with .                   Discharge Codes    No documentation.                 Expected Discharge Date and Time       Expected Discharge Date Expected Discharge Time    May 8, 2025               CLARKE García

## 2025-05-05 NOTE — PLAN OF CARE
Goal Outcome Evaluation:  Plan of Care Reviewed With: patient        Progress: no change  Outcome Evaluation: OT tx completed. Pt is AxO x 3 & pleasant.  DTR reports pt will DC home as they have been rejected by inpatient rehab.  OTR edu'd DTR in use of gaitbelt, safety with t/fs, safety strategies to reduce falls & how to assist pt with a LOB, & assist during bed mob.  Pt. and DTR agreeable and verbalize understanding.  Reviewed AE/DME needs. Cont OT tx.    Anticipated Discharge Disposition (OT): inpatient rehabilitation facility

## 2025-05-05 NOTE — NURSING NOTE
Patient called out to report pain 6-7/10 in her hip. Patient managed to get a very brief nap but the previous pain medication and muscle relaxer has already worn off. Patient is requesting something else for pain--will reach out to the provider for recommendations.

## 2025-05-05 NOTE — CASE MANAGEMENT/SOCIAL WORK
Continued Stay Note   [place]     Patient Name: Cheryl Taylor  MRN: [id number]  Today's Date: 5/5/2025    Admit Date: 4/28/2025    Plan: Home   Discharge Plan       Row Name 05/05/25 1427       Plan    Plan Home    Plan Comments SW received call from patient's daughter, Vivian, advising they would like to take patient home and for her to do outpatient therapy.  Family feels patient will get more physical activity by being home and getting out for outpatient therapy.    Final Discharge Disposition Code 01 - home or self-care                   Discharge Codes    No documentation.                 Expected Discharge Date and Time       Expected Discharge Date Expected Discharge Time    May 8, 2025               CLARKE García

## 2025-05-05 NOTE — PROGRESS NOTES
"1         [facility] Medicine Services  INPATIENT PROGRESS NOTE    Patient Name: Cheryl Taylor  Date of Admission: 4/28/2025  Today's Date: 05/05/25  Length of Stay: 7  Primary Care Physician: Den James DO    Subjective   Chief Complaint: Follow-up  HPI   As per therapist note on May 4, patient appears limited in over functional status  \" S sup>sit, sit<>stand CG/Julee, amb 10' x 2 utilizing r wx. Poor gait mechanics and posture. Several standing rests during walk due to fatigue/SOB. Pt wheezy. Recommend cont PT \"    Admitted for fall with resultant right hip fracture  Had mechanical fall.  Typically uses rollator.    Underwent operative repair on April 29 by Dr. Lopes: Right bipolar hemiarthroplasty.  Currently on apixaban for VTE prophylaxis.    Case management has seen on May 1.  Referral made to [facility] acute rehab.  Hemodynamically stable    Per prior progress note May 1 had E. coli pansensitive treated with antibiotic    Has had issue with elevated creatinine.  At this time, baseline not clear to me.      Review of Systems   All pertinent negatives and positives are as above. All other systems have been reviewed and are negative unless otherwise stated.     Objective    Temp:  [97.6 °F (36.4 °C)-98.7 °F (37.1 °C)] 98.1 °F (36.7 °C)  Heart Rate:  [84-93] 93  Resp:  [16-18] 16  BP: (110-141)/(43-69) 122/47  Physical Exam  Noted to be wearing oxygen; O2 saturation in the upper 90s.  No distress  Seated at bedside commode  Approached with permission  No distress  Lung sounds are clear  S1-S2 regular rate and rhythm no obvious murmur  No gross edema of distal extremities  Warm dry skin with good capillary refill  Appropriate affect  Grossly nonfocal exam      Results Review:  I have reviewed the labs, radiology results, and diagnostic studies.    Laboratory Data:   Results from last 7 days   Lab Units 05/05/25  0345 05/01/25  0420 04/30/25  0336   WBC 10*3/mm3 15.01* 16.97* 20.66* " "  HEMOGLOBIN g/dL 9.2* 9.4* 10.4*   HEMATOCRIT % 30.3* 30.1* 34.8   PLATELETS 10*3/mm3 465* 322 379        Results from last 7 days   Lab Units 05/05/25  0345 05/01/25  0420 04/30/25  0336 04/29/25  0329 04/28/25  1713   SODIUM mmol/L 135* 134* 137   < > 136   POTASSIUM mmol/L 4.1 4.2 4.4   < > 4.5   CHLORIDE mmol/L 101 102 101   < > 102   CO2 mmol/L 23.0 20.0* 21.0*   < > 18.0*   BUN mg/dL 16 18 20   < > 20   CREATININE mg/dL 1.26* 1.16* 1.30*   < > 1.38*   CALCIUM mg/dL 9.2 8.9 8.8   < > 9.3   BILIRUBIN mg/dL 0.3 0.3  --   --  0.4   ALK PHOS U/L 202* 146*  --   --  190*   ALT (SGPT) U/L 11 8  --   --  14   AST (SGOT) U/L 24 39*  --   --  18   GLUCOSE mg/dL 96 113* 89   < > 115*    < > = values in this interval not displayed.       Culture Data:   Urine Culture   Date Value Ref Range Status   04/29/2025 >100,000 CFU/mL Escherichia coli (A)  Final   04/29/2025 >100,000 CFU/mL Escherichia coli (A)  Final       Radiology Data:   Imaging Results (Last 24 Hours)       ** No results found for the last 24 hours. **            I have reviewed the patient's current medications.     Assessment/Plan   Assessment  Active Hospital Problems    Diagnosis     **Closed fracture of neck of right femur            Medical Decision Making  Number and Complexity of problems:   Problem list  Status post right bipolar hemiarthroplasty for subcapital femoral neck fracture  E. coli urinary tract infection  Chronic kidney disease suspected 3B baseline creatinine anywhere from 1.3-1.5  History of COPD with no reported exacerbation  Hypertension-continue on Toprol-XL  Functional decline secondary to above.  Leukocytosis (improving General)      Treatment Plan  Continue DVT prophylaxis, ice, elevate  Pain control  PT OT  Weightbearing as tolerated on operative right lower extremity with anterior hip precautions  Social service on discharge planning.  DC oxygen if room air SpO2 greater than 92%.  States in the past had oxygen \"more like for " "sleep apnea\".  Recommend follow-up with primary care provider.      Medications reviewed  apixaban, 2.5 mg, Oral, Q12H  ARIPiprazole, 5 mg, Oral, Daily  DULoxetine, 60 mg, Oral, Daily  metoprolol succinate XL, 25 mg, Oral, Q24H  polyethylene glycol, 17 g, Oral, Daily  sodium chloride, 10 mL, Intravenous, Q12H          Conditions and Status  Stable     MDM Data  External documents reviewed: reviewed epic  Cardiac tracing (EKG, telemetry) interpretation:   Results for orders placed during the hospital encounter of 03/17/23    Adult Transthoracic Echo Complete w/ Color, Spectral and Contrast if necessary per protocol    Interpretation Summary    Left ventricular systolic function is normal. Left ventricular ejection fraction appears to be 51 - 55%.    Left ventricular wall thickness is consistent with mild to moderate concentric hypertrophy.    Left ventricular diastolic function is consistent with (grade II w/high LAP) pseudonormalization.    The left atrial cavity is mildly dilated.    Moderate aortic valve regurgitation is present    There is mild calcification of the mitral valve anterior leaflet(s). Trace to mild mitral valve regurgitation is present     Radiology interpretation: y  Labs reviewed: y  Any tests that were considered but not ordered: none     Decision rules/scores evaluated (example [id number], Wells, etc): none     Discussed with: Patient     Care Planning  Shared decision making: Patient  Code status and discussions: Full code    Disposition  Social Determinants of Health that impact treatment or disposition: None identified at this time.  I expect the patient to be discharged to (looking towards rehab facility upon discharge)         Electronically signed by Rashid Mariscal MD, 05/05/25, 08:03 CDT.    "

## 2025-05-05 NOTE — PLAN OF CARE
Goal Outcome Evaluation:  Plan of Care Reviewed With: patient, child        Progress: no change   Pt A/O x4. VSS on 1L. Incision to R hip, drsg C/D/I. Eliquis. Voiding/incont at times. C/o pain, prn meds reordered by MD. Given w/ relief observed. IV to L forearm, IID. Safety maintained. Call light in reach.

## 2025-05-05 NOTE — THERAPY TREATMENT NOTE
Patient Name: Cheryl Taylor  : 1950    MRN: [id number]                              Today's Date: 2025       Admit Date: 2025    Visit Dx:     ICD-10-CM ICD-9-CM   1. Closed fracture of neck of right femur, initial encounter  S72.001A 820.8   2. Impaired mobility [Z74.09]  Z74.09 799.89     Patient Active Problem List   Diagnosis    Atypical chest pain    Hiatal hernia    Gastroesophageal reflux disease with esophagitis without hemorrhage    Essential hypertension    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     Tobacco abuse, in remission    Stage 3a chronic kidney disease    Obesity (BMI 30-39.9)    Chronic diastolic CHF (congestive heart failure)    Aortic stenosis, mild    Nonrheumatic aortic valve insufficiency    Chronic pain    Degeneration of lumbar intervertebral disc    Gastroesophageal reflux disease    Vasculitis    Iron deficiency anemia    Lumbar radiculopathy    Lumbosacral radiculitis    Shortness of breath    Spondylosis    Thoracic aortic aneurysm without rupture    Closed fracture of neck of right femur     Past Medical History:   Diagnosis Date    AAA (abdominal aortic aneurysm)     Arthritis     Asthma     Chronic kidney disease (CKD)     Confusion 2021    COPD (chronic obstructive pulmonary disease)     Gastroesophageal reflux disease with esophagitis without hemorrhage     Heart murmur     Hiatal hernia     Hypertension     Inflammation of arteries 2021    Thoracic aortic aneurysm      Past Surgical History:   Procedure Laterality Date    BACK SURGERY      CARDIAC CATHETERIZATION N/A 2022    Procedure: Left Heart Cath;  Surgeon: Lenard Eastman MD;  Location:  [facility] CATH INVASIVE LOCATION;  Service: Cardiology;  Laterality: N/A;    EYE SURGERY Bilateral     FOOT FASCIOTOMY Bilateral     HIP HEMIARTHROPLASTY Right 2025    Procedure: HIP HEMIARTHROPLASTY RIGHT;  Surgeon: Isael Lopes MD;  Location:  [facility] OR;  Service: Orthopedics;  Laterality:  Right;    HYSTERECTOMY      LOWER LEG SOFT TISSUE TUMOR EXCISION      LUMBAR NERVE STIMLATOR INSERTION Right     LYMPH NODE DISSECTION      WRIST FRACTURE SURGERY Right     x2      General Information       Row Name 05/05/25 1505          OT Time and Intention    Subjective Information complains of;pain  -     Document Type therapy note (daily note)  -     Mode of Treatment occupational therapy  -     Patient Effort good  -       Row Name 05/05/25 1505          General Information    Patient Profile Reviewed yes  -     Existing Precautions/Restrictions fall;hip, anterior;right;oxygen therapy device and L/min  -     Barriers to Rehab medically complex;physical barrier  -       Row Name 05/05/25 1505          Cognition    Orientation Status (Cognition) oriented x 4  -       Row Name 05/05/25 1505          Safety Issues/Impairments Affecting Functional Mobility    Safety Issues Affecting Function (Mobility) friction/shear risk  -     Impairments Affecting Function (Mobility) endurance/activity tolerance;shortness of breath;pain;strength;range of motion (ROM);balance  -               User Key  (r) = Recorded By, (t) = Taken By, (c) = Cosigned By      Initials Name Provider Type     Hermila Lozano, OTR/L Occupational Therapist                     Mobility/ADL's       Row Name 05/05/25 1505          Bed Mobility    Bed Mobility sit-supine  -     Sit-Supine [place] (Bed Mobility) contact guard;verbal cues;nonverbal cues (demo/gesture)  -     Bed Mobility, Safety Issues decreased use of legs for bridging/pushing  -       Row Name 05/05/25 1505          Transfers    Transfers sit-stand transfer;stand-sit transfer  -       Row Name 05/05/25 1505          Sit-Stand Transfer    Sit-Stand [place] (Transfers) verbal cues;contact guard  -     Assistive Device (Sit-Stand Transfers) walker, front-wheeled  -       Row Name 05/05/25 1505          Stand-Sit Transfer    Stand-Sit [place]  (Transfers) verbal cues;contact guard  -     Assistive Device (Stand-Sit Transfers) walker, front-wheeled  -       Row Name 05/05/25 1505          Functional Mobility    Functional Mobility- Ind. Level contact guard assist  -     Functional Mobility- Device walker, front-wheeled  -       Row Name 05/05/25 1505          Mobility    Extremity Weight-bearing Status right lower extremity  -     Right Lower Extremity (Weight-bearing Status) weight-bearing as tolerated (WBAT)  -               User Key  (r) = Recorded By, (t) = Taken By, (c) = Cosigned By      Initials Name Provider Type     Hermila Lozano, OTR/L Occupational Therapist                   Obj/Interventions       Row Name 05/05/25 1505          Balance    Balance Interventions sitting;standing;supported;sit to stand;static;dynamic;occupation based/functional task  -               User Key  (r) = Recorded By, (t) = Taken By, (c) = Cosigned By      Initials Name Provider Type     Hermila Lozano, OTR/L Occupational Therapist                   Goals/Plan    No documentation.                  Clinical Impression       Row Name 05/05/25 1505          Pain Assessment    Pretreatment Pain Rating 7/10  -     Posttreatment Pain Rating 7/10  -     Pain Side/Orientation right  -       Row Name 05/05/25 1505          Plan of Care Review    Plan of Care Reviewed With patient  -     Progress no change  -     Outcome Evaluation OT tx completed. Pt is AxO x 3 & pleasant.  DTR reports pt will DC home as they have been rejected by inpatient rehab.  OTR edu'd DTR in use of gaitbelt, safety with t/fs, safety strategies to reduce falls & how to assist pt with a LOB, & assist during bed mob.  Pt. and DTR agreeable and verbalize understanding.  Reviewed AE/DME needs. Cont OT tx.  -       Row Name 05/05/25 1507          Therapy Assessment/Plan (OT)    Patient/Family Therapy Goal Statement (OT) improve fxlly  -     Rehab Potential (OT) good  -      Criteria for Skilled Therapeutic Interventions Met (OT) yes;skilled treatment is necessary  -     Therapy Frequency (OT) 5 times/wk  -       Row Name 05/05/25 1505          Therapy Plan Review/Discharge Plan (OT)    Equipment Needs Upon Discharge (OT) walker, rolling  -     Anticipated Discharge Disposition (OT) inpatient rehabilitation facility  -       Row Name 05/05/25 1505          Positioning and Restraints    Pre-Treatment Position sitting in chair/recliner  -     Post Treatment Position bed  -     In Bed fowlers;call light within reach;encouraged to call for assist;side rails up x2;with family/caregiver;notified [facility] [place] [facility]  -               User Key  (r) = Recorded By, (t) = Taken By, (c) = Cosigned By      Initials Name Provider Type     Hermila Lozano, OTR/L Occupational Therapist                   Outcome Measures       Row Name 05/05/25 1505          How much help from another is currently needed...    Putting on and taking off regular lower body clothing? 3  -CH     Bathing (including washing, rinsing, and drying) 3  -CH     Toileting (which includes using toilet bed pan or urinal) 3  -CH     Putting on and taking off regular upper body clothing 4  -CH     Taking care of personal grooming (such as brushing teeth) 4  -CH     Eating meals 4  -CH     AM-PAC 6 Clicks Score (OT) 21  -       Row Name 05/05/25 1109 05/05/25 0835       How much help from another person do you currently need...    Turning from your back to your side while in flat bed without using bedrails? 4  -[place] 4  -[place]    Moving from lying on back to sitting on the side of a flat bed without bedrails? 3  -[place] 3  -[place]    Moving to and from a bed to a chair (including a wheelchair)? 3  -[place] 3  -[place]    Standing up from a chair using your arms (e.g., wheelchair, bedside chair)? 3  -[place] 3  -[place]    Climbing 3-5 steps with a railing? 2  -[place] 2  -[place]    To walk in hospital room? 3  -[place] 3  -[place]    AM-PAC 6 Clicks Score (PT) 18  -[place] 18  -[place]    Highest  Level of Mobility Goal 6 --> Walk 10 steps or more  -[place]ER[place] 6 --> Walk 10 steps or more  -[place]      Row Name 05/05/25 1505 05/05/25 1109       Functional Assessment    Outcome Measure Options AM-PAC 6 Clicks Daily Activity (OT)  - AM-PAC 6 Clicks Basic Mobility (PT)  -CIERRA              User Key  (r) = Recorded By, (t) = Taken By, (c) = Cosigned By      Initials Name Provider Type     Hermila Lozano, OTR/L Occupational Therapist    Joseph Barnes, PTA Physical Therapist Assistant    Danna Reyes RN Registered Nurse                    Occupational Therapy Education       Title: PT OT SLP Therapies (In Progress)       Topic: Occupational Therapy (In Progress)       Point: ADL training (Done)       Learning Progress Summary            Patient Acceptance, E,D, VU,NR by  at 5/5/2025 1536                      Point: Precautions (Done)       Learning Progress Summary            Patient Acceptance, E,D, VU,NR by  at 5/5/2025 1536                      Point: Body mechanics (Done)       Learning Progress Summary            Patient Acceptance, E,D, VU,NR by  at 5/5/2025 1536                                      User Key       Initials Effective Dates Name Provider Type [facility] 07/11/23 -  Hermila Lozano, OTR/L Occupational Therapist OT                  OT Recommendation and Plan  Therapy Frequency (OT): 5 times/wk  Plan of Care Review  Plan of Care Reviewed With: patient  Progress: no change  Outcome Evaluation: OT tx completed. Pt is AxO x 3 & pleasant.  DTR reports pt will DC home as they have been rejected by inpatient rehab.  OTR edu'd DTR in use of gaitbelt, safety with t/fs, safety strategies to reduce falls & how to assist pt with a LOB, & assist during bed mob.  Pt. and DTR agreeable and verbalize understanding.  Reviewed AE/DME needs. Cont OT tx.     Time Calculation:         Time Calculation- OT       Row Name 05/05/25 1505 05/05/25 1109          Time Calculation- OT    OT Start Time 1505  -  --     OT Stop Time 1531  - --     OT Time Calculation (min) 26 min  - --     Total Timed Code Minutes- OT 26 minute(s)  - --     OT Received On 05/05/25  - --        Timed Charges    97909 - Gait Training Minutes  -- 26  -[place]     39307 - OT Self Care/Mgmt Minutes 26  - --        Total Minutes    Timed Charges Total Minutes 26  -CH 26  -[place]      Total Minutes 26  -CH 26  -[place]               User Key  (r) = Recorded By, (t) = Taken By, (c) = Cosigned By      Initials Name Provider Type     Hermila Lozano, OTR/L Occupational Therapist    [place] Joseph Adames, PTA Physical Therapist Assistant                  Therapy Charges for Today       Code Description Service Date Service Provider Modifiers Qty    [id number]  OT SELF CARE/MGMT/TRAIN EA 15 MIN 5/5/2025 Hermila Lozano OTR/L GO 2                 Hermila Lozano OTR/L  5/5/2025

## 2025-05-05 NOTE — THERAPY TREATMENT NOTE
Acute Care - Physical Therapy Treatment Note  [facility]     Patient Name: Cheryl Taylor  : 1950  MRN: [id number]  Today's Date: 2025      Visit Dx:     ICD-10-CM ICD-9-CM   1. Closed fracture of neck of right femur, initial encounter  S72.001A 820.8   2. Impaired mobility [Z74.09]  Z74.09 799.89     Patient Active Problem List   Diagnosis    Atypical chest pain    Hiatal hernia    Gastroesophageal reflux disease with esophagitis without hemorrhage    Essential hypertension    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     Tobacco abuse, in remission    Stage 3a chronic kidney disease    Obesity (BMI 30-39.9)    Chronic diastolic CHF (congestive heart failure)    Aortic stenosis, mild    Nonrheumatic aortic valve insufficiency    Chronic pain    Degeneration of lumbar intervertebral disc    Gastroesophageal reflux disease    Vasculitis    Iron deficiency anemia    Lumbar radiculopathy    Lumbosacral radiculitis    Shortness of breath    Spondylosis    Thoracic aortic aneurysm without rupture    Closed fracture of neck of right femur     Past Medical History:   Diagnosis Date    AAA (abdominal aortic aneurysm)     Arthritis     Asthma     Chronic kidney disease (CKD)     Confusion 2021    COPD (chronic obstructive pulmonary disease)     Gastroesophageal reflux disease with esophagitis without hemorrhage     Heart murmur     Hiatal hernia     Hypertension     Inflammation of arteries 2021    Thoracic aortic aneurysm      Past Surgical History:   Procedure Laterality Date    BACK SURGERY      CARDIAC CATHETERIZATION N/A 2022    Procedure: Left Heart Cath;  Surgeon: Lenard Eastman MD;  Location:  [facility];  Service: Cardiology;  Laterality: N/A;    EYE SURGERY Bilateral     FOOT FASCIOTOMY Bilateral     HIP HEMIARTHROPLASTY Right 2025    Procedure: HIP HEMIARTHROPLASTY RIGHT;  Surgeon: Isael Lopes MD;  Location: [facility] OR;  Service: Orthopedics;  Laterality:  Right;    HYSTERECTOMY      LOWER LEG SOFT TISSUE TUMOR EXCISION      LUMBAR NERVE STIMLATOR INSERTION Right     LYMPH NODE DISSECTION      WRIST FRACTURE SURGERY Right     x2     PT Assessment (Last 12 Hours)       PT Evaluation and Treatment       Row Name 05/05/25 1109          Physical Therapy Time and Intention    Subjective Information complains of;pain  -CIERRA     Document Type therapy note (daily note)  -CIERRA     Mode of Treatment physical therapy  -CIERRA       Row Name 05/05/25 1109          General Information    Existing Precautions/Restrictions fall;hip, anterior;right;oxygen therapy device and L/min  -CIERRA       Row Name 05/05/25 1109          Pain    Pretreatment Pain Rating 7/10  -CIERRA     Posttreatment Pain Rating 7/10  -CIERRA     Pain Location hip  -CIERRA     Pain Side/Orientation right  -CIERRA     Pain Management Interventions exercise or physical activity utilized  -CIERRA     Response to Pain Interventions activity participation with increased pain  -CIERRA       Row Name 05/05/25 1109          Bed Mobility    Sit-Supine [place] (Bed Mobility) standby assist  -CIERRA       Row Name 05/05/25 1109          Sit-Stand Transfer    Sit-Stand [place] (Transfers) verbal cues;minimum assist (75% patient effort)  -CIERRA     Assistive Device (Sit-Stand Transfers) walker, front-wheeled  -CIERRA       Row Name 05/05/25 1109          Stand-Sit Transfer    Stand-Sit [place] (Transfers) verbal cues;contact guard  -     Assistive Device (Stand-Sit Transfers) walker, front-wheeled  -CIERRA       Row Name 05/05/25 1109          Gait/Stairs (Locomotion)    [place] Level (Gait) verbal cues;contact guard;minimum assist (75% patient effort)  -     Assistive Device (Gait) walker, front-wheeled  -     Distance in Feet (Gait) 25  -CIERRA     Pattern (Gait) step-to  -CIERRA     Deviations/Abnormal Patterns (Gait) gait speed decreased;stride length decreased;weight shifting decreased  -CIERRA     Bilateral Gait Deviations forward flexed posture  -CIERRA        Row Name             Wound 04/29/25 1815 Right anterior greater trochanter Surgical Closed Surgical Incision    Wound - Properties Group Placement Date: 04/29/25  -CB Placement Time: 1815 -CB Side: Right  -CB Orientation: anterior  -CB Location: greater trochanter  -CB Primary Wound Type: Surgical  -CB Secondary Wound Type - Surgical: Closed Surgi  -CB    Retired Wound - Properties Group Placement Date: 04/29/25  -CB Placement Time: 1815  -CB Side: Right  -CB Orientation: anterior  -CB Location: greater trochanter  -CB    Retired Wound - Properties Group Placement Date: 04/29/25  -CB Placement Time: 1815  -CB Side: Right  -CB Orientation: anterior  -CB Location: greater trochanter  -CB    Retired Wound - Properties Group Date first assessed: 04/29/25  -CB Time first assessed: 1815  -CB Side: Right  -CB Location: greater trochanter  -CB      Row Name 05/05/25 1109          Positioning and Restraints    Pre-Treatment Position sitting in chair/recliner  -CIERRA     Post Treatment Position bed  -CIERRA     In Bed fowlers;call light within reach;encouraged to call for assist;side rails up x2  -CIERRA               User Key  (r) = Recorded By, (t) = Taken By, (c) = Cosigned By      Initials Name Provider Type    Joseph Barnes PTA Physical Therapist Assistant    CB Solitario Urrutia, RN Registered Nurse                    Physical Therapy Education       Title: PT OT SLP Therapies (In Progress)       Topic: Physical Therapy (Done)       Point: Mobility training (Done)       Learning Progress Summary            Patient Acceptance, E, VU by AMIE at 4/30/2025 1500    Comment: HEP, importance of ther ex multiple times daily., d/c planning for family    Acceptance, E, VU by AMIE at 4/30/2025 1140    Comment: role of PT, ant. hip precautions, WBAT   Family Acceptance, E, VU by AMIE at 4/30/2025 1500    Comment: HEP, importance of ther ex multiple times daily., d/c planning for family                      Point: Home exercise program  (Done)       Learning Progress Summary            Patient Acceptance, E, VU by AMIE at 4/30/2025 1500    Comment: HEP, importance of ther ex multiple times daily., d/c planning for family    Acceptance, E, VU by AMIE at 4/30/2025 1140    Comment: role of PT, ant. hip precautions, WBAT   Family Acceptance, E, VU by AJ at 4/30/2025 1500    Comment: HEP, importance of ther ex multiple times daily., d/c planning for family                      Point: Body mechanics (Done)       Learning Progress Summary            Patient Acceptance, E, VU by AJ at 4/30/2025 1500    Comment: HEP, importance of ther ex multiple times daily., d/c planning for family    Acceptance, E, VU by AJ at 4/30/2025 1140    Comment: role of PT, ant. hip precautions, WBAT   Family Acceptance, E, VU by AMIE at 4/30/2025 1500    Comment: HEP, importance of ther ex multiple times daily., d/c planning for family                      Point: Precautions (Done)       Learning Progress Summary            Patient Acceptance, E, VU by AMIE at 4/30/2025 1500    Comment: HEP, importance of ther ex multiple times daily., d/c planning for family    Acceptance, E, VU by AJ at 4/30/2025 1140    Comment: role of PT, ant. hip precautions, WBAT   Family Acceptance, E, VU by AMIE at 4/30/2025 1500    Comment: HEP, importance of ther ex multiple times daily., d/c planning for family                                      User Key       Initials Effective Dates Name Provider Type Discipline     08/15/24 -  Nolan Lao, PT DPT Physical Therapist PT                  PT Recommendation and Plan     Progress: no change  Outcome Evaluation: Pt was up in the chair, agreed to therapy.  Pt complaining of increase pain, stated it is the worst it has been since surgery.  Pt stated she is having spasms and cramps in RLE.  Also c/o increase swelling today in RLE.  Pt was able to transfer sit to stand with CGA/min assist.  Amb 25' with RWX and CGA/min assist with step to gait pattern.  Pt  would benefit from inpatient rehab.   Outcome Measures       Row Name 05/05/25 1109             How much help from another person do you currently need...    Turning from your back to your side while in flat bed without using bedrails? 4  -CIERRA      Moving from lying on back to sitting on the side of a flat bed without bedrails? 3  -CIERRA      Moving to and from a bed to a chair (including a wheelchair)? 3  -CIERRA      Standing up from a chair using your arms (e.g., wheelchair, bedside chair)? 3  -CIERRA      Climbing 3-5 steps with a railing? 2  -CIERRA      To walk in hospital room? 3  -CIERRA      AM-PAC 6 Clicks Score (PT) 18  -CIERRA         Functional Assessment    Outcome Measure Options AM-PAC 6 Clicks Basic Mobility (PT)  -CIERRA                User Key  (r) = Recorded By, (t) = Taken By, (c) = Cosigned By      Initials Name Provider Type    Joseph Barnes PTA Physical Therapist Assistant                     Time Calculation:    PT Charges       Row Name 05/05/25 1109             Time Calculation    Start Time 1109  -CIERRA      Stop Time 1135  -CIERRA      Time Calculation (min) 26 min  -CIERRA      PT Received On 05/05/25  -CIERRA         Time Calculation- PT    Total Timed Code Minutes- PT 26 minute(s)  -CIERRA         Timed Charges    13452 - Gait Training Minutes  26  -CIERRA         Total Minutes    Timed Charges Total Minutes 26  -CIERRA       Total Minutes 26  -CIERRA                User Key  (r) = Recorded By, (t) = Taken By, (c) = Cosigned By      Initials Name Provider Type    Joseph Barnes PTA Physical Therapist Assistant                  Therapy Charges for Today       Code Description Service Date Service Provider Modifiers Qty    34643949869 HC GAIT TRAINING EA 15 MIN 5/5/2025 Joseph Adames PTA GP 2            PT G-Codes  Outcome Measure Options: AM-PAC 6 Clicks Basic Mobility (PT)  AM-PAC 6 Clicks Score (PT): 18  AM-PAC 6 Clicks Score (OT): 20    Joseph Adames PTA  5/5/2025

## 2025-05-06 ENCOUNTER — READMISSION MANAGEMENT (OUTPATIENT)
Dept: CALL CENTER | Facility: HOSPITAL | Age: 75
End: 2025-05-06
Payer: MEDICARE

## 2025-05-06 VITALS
RESPIRATION RATE: 20 BRPM | HEART RATE: 72 BPM | TEMPERATURE: 97.8 F | SYSTOLIC BLOOD PRESSURE: 131 MMHG | HEIGHT: 63 IN | BODY MASS INDEX: 31.41 KG/M2 | DIASTOLIC BLOOD PRESSURE: 57 MMHG | WEIGHT: 177.3 LBS | OXYGEN SATURATION: 96 %

## 2025-05-06 PROCEDURE — 97116 GAIT TRAINING THERAPY: CPT

## 2025-05-06 PROCEDURE — 97110 THERAPEUTIC EXERCISES: CPT

## 2025-05-06 RX ORDER — HYDROCODONE BITARTRATE AND ACETAMINOPHEN 7.5; 325 MG/1; MG/1
1 TABLET ORAL EVERY 6 HOURS PRN
Qty: 12 TABLET | Refills: 0 | Status: SHIPPED | OUTPATIENT
Start: 2025-05-06

## 2025-05-06 RX ORDER — POLYETHYLENE GLYCOL 3350 17 G/17G
17 POWDER, FOR SOLUTION ORAL DAILY PRN
Qty: 5 PACKET | Refills: 0 | Status: SHIPPED | OUTPATIENT
Start: 2025-05-06

## 2025-05-06 RX ADMIN — DULOXETINE HYDROCHLORIDE 60 MG: 30 CAPSULE, DELAYED RELEASE ORAL at 08:07

## 2025-05-06 RX ADMIN — HYDROCODONE BITARTRATE AND ACETAMINOPHEN 1 TABLET: 7.5; 325 TABLET ORAL at 08:07

## 2025-05-06 RX ADMIN — APIXABAN 2.5 MG: 2.5 TABLET, FILM COATED ORAL at 08:07

## 2025-05-06 RX ADMIN — ARIPIPRAZOLE 5 MG: 5 TABLET ORAL at 08:07

## 2025-05-06 RX ADMIN — HYDROCODONE BITARTRATE AND ACETAMINOPHEN 1 TABLET: 7.5; 325 TABLET ORAL at 01:41

## 2025-05-06 RX ADMIN — Medication 10 ML: at 08:09

## 2025-05-06 RX ADMIN — CYCLOBENZAPRINE HYDROCHLORIDE 10 MG: 10 TABLET, FILM COATED ORAL at 13:25

## 2025-05-06 NOTE — THERAPY TREATMENT NOTE
Acute Care - Physical Therapy Treatment Note  [facility]     Patient Name: Cheryl Taylor  : 1950  MRN: [id number]  Today's Date: 2025      Visit Dx:     ICD-10-CM ICD-9-CM   1. Closed fracture of neck of right femur, initial encounter  S72.001A 820.8   2. Impaired mobility [Z74.09]  Z74.09 799.89     Patient Active Problem List   Diagnosis    Atypical chest pain    Hiatal hernia    Gastroesophageal reflux disease with esophagitis without hemorrhage    Essential hypertension    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     Tobacco abuse, in remission    Stage 3a chronic kidney disease    Obesity (BMI 30-39.9)    Chronic diastolic CHF (congestive heart failure)    Aortic stenosis, mild    Nonrheumatic aortic valve insufficiency    Chronic pain    Degeneration of lumbar intervertebral disc    Gastroesophageal reflux disease    Vasculitis    Iron deficiency anemia    Lumbar radiculopathy    Lumbosacral radiculitis    Shortness of breath    Spondylosis    Thoracic aortic aneurysm without rupture    Closed fracture of neck of right femur     Past Medical History:   Diagnosis Date    AAA (abdominal aortic aneurysm)     Arthritis     Asthma     Chronic kidney disease (CKD)     Confusion 2021    COPD (chronic obstructive pulmonary disease)     Gastroesophageal reflux disease with esophagitis without hemorrhage     Heart murmur     Hiatal hernia     Hypertension     Inflammation of arteries 2021    Thoracic aortic aneurysm      Past Surgical History:   Procedure Laterality Date    BACK SURGERY      CARDIAC CATHETERIZATION N/A 2022    Procedure: Left Heart Cath;  Surgeon: Lenard Eastman MD;  Location:  [facility];  Service: Cardiology;  Laterality: N/A;    EYE SURGERY Bilateral     FOOT FASCIOTOMY Bilateral     HIP HEMIARTHROPLASTY Right 2025    Procedure: HIP HEMIARTHROPLASTY RIGHT;  Surgeon: Isael Lopes MD;  Location: [facility] OR;  Service: Orthopedics;  Laterality:  Right;    HYSTERECTOMY      LOWER LEG SOFT TISSUE TUMOR EXCISION      LUMBAR NERVE STIMLATOR INSERTION Right     LYMPH NODE DISSECTION      WRIST FRACTURE SURGERY Right     x2     PT Assessment (Last 12 Hours)       PT Evaluation and Treatment       Row Name 05/06/25 0854          Physical Therapy Time and Intention    Subjective Information complains of;pain  -CIERRA     Document Type therapy note (daily note)  -CIERRA     Mode of Treatment physical therapy  -CIERRA       Row Name 05/06/25 0854          General Information    Existing Precautions/Restrictions fall;hip, anterior;right  -CIERRA       Row Name 05/06/25 0854          Pain    Pretreatment Pain Rating 5/10  -CIERRA     Posttreatment Pain Rating 5/10  -CIERRA     Pain Location hip  -CIERRA     Pain Side/Orientation right  -CIERRA     Pain Management Interventions exercise or physical activity utilized  -CIERRA     Response to Pain Interventions activity participation with tolerable pain  -CIERRA       Row Name 05/06/25 0854          Bed Mobility    Comment, (Bed Mobility) chair  -CIERRA       Row Name 05/06/25 0854          Sit-Stand Transfer    Sit-Stand [place] (Transfers) verbal cues;contact guard  -CIERRA     Assistive Device (Sit-Stand Transfers) walker, front-wheeled  -CIERRA       Row Name 05/06/25 0854          Stand-Sit Transfer    Stand-Sit [place] (Transfers) verbal cues;contact guard  -CIERRA     Assistive Device (Stand-Sit Transfers) walker, front-wheeled  -CIERRA       Row Name 05/06/25 0854          Gait/Stairs (Locomotion)    [place] Level (Gait) verbal cues;contact guard  -CIERRA     Assistive Device (Gait) walker, front-wheeled  -CIERRA     Distance in Feet (Gait) 40  -CIERRA     Pattern (Gait) step-to  -CIERRA     Deviations/Abnormal Patterns (Gait) gait speed decreased;stride length decreased;weight shifting decreased  -CIERRA     Bilateral Gait Deviations forward flexed posture  -CIERRA       Row Name 05/06/25 0854          Motor Skills    Comments, Therapeutic Exercise sitting AROM BLE X 10  -CIERRA        Row Name             Wound 04/29/25 1815 Right anterior greater trochanter Surgical Closed Surgical Incision    Wound - Properties Group Placement Date: 04/29/25  -CB Placement Time: 1815 -CB Side: Right  -CB Orientation: anterior  -CB Location: greater trochanter  -CB Primary Wound Type: Surgical  -CB Secondary Wound Type - Surgical: Closed Surgi  -CB    Retired Wound - Properties Group Placement Date: 04/29/25  -CB Placement Time: 1815  -CB Side: Right  -CB Orientation: anterior  -CB Location: greater trochanter  -CB    Retired Wound - Properties Group Placement Date: 04/29/25  -CB Placement Time: 1815  -CB Side: Right  -CB Orientation: anterior  -CB Location: greater trochanter  -CB    Retired Wound - Properties Group Date first assessed: 04/29/25  -CB Time first assessed: 1815  -CB Side: Right  -CB Location: greater trochanter  -CB      Row Name 05/06/25 0854          Positioning and Restraints    Pre-Treatment Position sitting in chair/recliner  -CIERRA     Post Treatment Position chair  -CIERRA     In Chair reclined;call light within reach;encouraged to call for assist  -CIERRA               User Key  (r) = Recorded By, (t) = Taken By, (c) = Cosigned By      Initials Name Provider Type    Joseph Barnes PTA Physical Therapist Assistant    Solitario Xie, RN Registered Nurse                    Physical Therapy Education       Title: PT OT SLP Therapies (In Progress)       Topic: Physical Therapy (Done)       Point: Mobility training (Done)       Learning Progress Summary            Patient Acceptance, E, VU by AMIE at 4/30/2025 1500    Comment: HEP, importance of ther ex multiple times daily., d/c planning for family    Acceptance, E, VU by AMIE at 4/30/2025 1140    Comment: role of PT, ant. hip precautions, WBAT   Family Acceptance, E, VU by AMIE at 4/30/2025 1500    Comment: HEP, importance of ther ex multiple times daily., d/c planning for family                      Point: Home exercise program (Done)        Learning Progress Summary            Patient Acceptance, E, VU by AMIE at 4/30/2025 1500    Comment: HEP, importance of ther ex multiple times daily., d/c planning for family    Acceptance, E, VU by AMIE at 4/30/2025 1140    Comment: role of PT, ant. hip precautions, WBAT   Family Acceptance, E, VU by AJ at 4/30/2025 1500    Comment: HEP, importance of ther ex multiple times daily., d/c planning for family                      Point: Body mechanics (Done)       Learning Progress Summary            Patient Acceptance, E, VU by AJ at 4/30/2025 1500    Comment: HEP, importance of ther ex multiple times daily., d/c planning for family    Acceptance, E, VU by AMIE at 4/30/2025 1140    Comment: role of PT, ant. hip precautions, WBAT   Family Acceptance, E, VU by AMIE at 4/30/2025 1500    Comment: HEP, importance of ther ex multiple times daily., d/c planning for family                      Point: Precautions (Done)       Learning Progress Summary            Patient Acceptance, E, VU by AMIE at 4/30/2025 1500    Comment: HEP, importance of ther ex multiple times daily., d/c planning for family    Acceptance, E, VU by AJ at 4/30/2025 1140    Comment: role of PT, ant. hip precautions, WBAT   Family Acceptance, E, VU by AMIE at 4/30/2025 1500    Comment: HEP, importance of ther ex multiple times daily., d/c planning for family                                      User Key       Initials Effective Dates Name Provider Type Discipline     08/15/24 -  Nolan Lao, PT DPT Physical Therapist PT                  PT Recommendation and Plan     Progress: improving  Outcome Evaluation: Pt was up in the chair, pt had pain medication prior and stated to be feeling better.  Placed pt on RA, O2 was at 97%.  Performed sit to stand with CGA.  Amb 40' with RWX And CGA, pt amb with step to gait, slow pace, fatigues quickly.  Pt's O2 after amb was 93% on RA.  Educated pt on home safety and progress of mobility.  Pt is requesting a RWX and BSC for at  home.   Outcome Measures       Row Name 05/06/25 0854 05/05/25 1109          How much help from another person do you currently need...    Turning from your back to your side while in flat bed without using bedrails? 3  -CIERRA 4  -CIERRA     Moving from lying on back to sitting on the side of a flat bed without bedrails? 3  -CIERRA 3  -CIERRA     Moving to and from a bed to a chair (including a wheelchair)? 3  -CIERRA 3  -CIERRA     Standing up from a chair using your arms (e.g., wheelchair, bedside chair)? 3  -CIERRA 3  -CIERRA     Climbing 3-5 steps with a railing? 2  -CIERRA 2  -CIERRA     To walk in hospital room? 3  -CIERRA 3  -CIERRA     AM-PAC 6 Clicks Score (PT) 17  -CIERRA 18  -CIERRA        Functional Assessment    Outcome Measure Options AM-PAC 6 Clicks Basic Mobility (PT)  -CIERRA AM-PAC 6 Clicks Basic Mobility (PT)  -CIERRA               User Key  (r) = Recorded By, (t) = Taken By, (c) = Cosigned By      Initials Name Provider Type    Joseph Barnes PTA Physical Therapist Assistant                     Time Calculation:    PT Charges       Row Name 05/06/25 0854             Time Calculation    Start Time 0854  -CIERRA      Stop Time 0917  -CIERRA      Time Calculation (min) 23 min  -CIERRA      PT Received On 05/06/25  -CIERRA         Time Calculation- PT    Total Timed Code Minutes- PT 23 minute(s)  -CIERRA         Timed Charges    31346 - PT Therapeutic Exercise Minutes 10  -CIERRA      92156 - Gait Training Minutes  13  -CIERRA         Total Minutes    Timed Charges Total Minutes 23  -CIERRA       Total Minutes 23  -CIERRA                User Key  (r) = Recorded By, (t) = Taken By, (c) = Cosigned By      Initials Name Provider Type    Joseph Barnes PTA Physical Therapist Assistant                  Therapy Charges for Today       Code Description Service Date Service Provider Modifiers Qty    16048758903 HC GAIT TRAINING EA 15 MIN 5/5/2025 oJseph Adames PTA GP 2    83454796182 HC GAIT TRAINING EA 15 MIN 5/6/2025 Joseph Adames PTA GP 1    43919285942 HC PT THER PROC EA 15 MIN  5/6/2025 Joseph Adames, PTA GP 1            PT G-Codes  Outcome Measure Options: AM-PAC 6 Clicks Basic Mobility (PT)  AM-PAC 6 Clicks Score (PT): 17  AM-PAC 6 Clicks Score (OT): 21    Joseph Adames, DERIC  5/6/2025

## 2025-05-06 NOTE — PLAN OF CARE
Goal Outcome Evaluation:  Plan of Care Reviewed With: patient        Progress: improving  Outcome Evaluation: Pt was up in the chair, pt had pain medication prior and stated to be feeling better.  Placed pt on RA, O2 was at 97%.  Performed sit to stand with CGA.  Amb 40' with RWX And CGA, pt amb with step to gait, slow pace, fatigues quickly.  Pt's O2 after amb was 93% on RA.  Educated pt on home safety and progress of mobility.  Pt is requesting a RWX and BSC for at home.

## 2025-05-06 NOTE — DISCHARGE SUMMARY
[facility] Medicine Services  DISCHARGE SUMMARY       Date of Admission: 4/28/2025  Date of Discharge:  5/6/2025  Primary Care Physician: Den James DO    Presenting Problem/History of Present Illness:  Right hip fracture following fall.    Final Discharge Diagnoses:  Status post right bipolar hemiarthroplasty for subcapital femoral neck fracture  E. coli urinary tract infection  Chronic kidney disease suspected 3B baseline creatinine anywhere from 1.3-1.5  History of COPD with no reported exacerbation  Hypertension  Functional decline secondary to above -improving  Leukocytosis (improving)    Consults:   Orthopedic service    Procedures Performed:     DATE of SURGERY: 4/29/2025     PREOPERATIVE DIAGNOSIS:  right acute varus angulated subcapital femoral neck fracture     POSTOPERATIVE DIAGNOSIS:  right  acute varus angulated subcapital femoral neck fracture     PROCEDURE:  right Bipolar [place] Hip Arthroplasty     SURGEON:  Isael Lopes MD     ASSISTANT:  None     ANESTHESIA:  General     ESTIMATED BLOOD LOSS:  200 mL    Pertinent Test Results:   Results for orders placed during the hospital encounter of 03/17/23    Adult Transthoracic Echo Complete w/ Color, Spectral and Contrast if necessary per protocol    Interpretation Summary    Left ventricular systolic function is normal. Left ventricular ejection fraction appears to be 51 - 55%.    Left ventricular wall thickness is consistent with mild to moderate concentric hypertrophy.    Left ventricular diastolic function is consistent with (grade II w/high LAP) pseudonormalization.    The left atrial cavity is mildly dilated.    Moderate aortic valve regurgitation is present    There is mild calcification of the mitral valve anterior leaflet(s). Trace to mild mitral valve regurgitation is present      Imaging Results (All)       Procedure Component Value Units Date/Time    XR Hip With or Without Pelvis 2 - 3 View Right  [[id number]] Collected: 04/29/25 2104     Updated: 04/29/25 2108    Narrative:      XR HIP W OR WO PELVIS 2-3 VIEW RIGHT- 4/29/2025 5:02 PM     HISTORY: right hip fx; S72.001A-Fracture of unspecified part of neck of  right femur, initial encounter for closed fracture     COMPARISON: None     FLUOROSCOPY TIME: 0.413 minutes     FLUOROSCOPY DOSE: 3.8424 mGy     NUMBER OF IMAGES: 2.0       Impression:         Intraoperative fluoroscopic images during right total hip arthroplasty.     Please refer to the operative note for more details.     This report was signed and finalized on 4/29/2025 9:05 PM by Alfie Valentin.       FL C Arm During Surgery [[id number]] Resulted: 04/29/25 2059     Updated: 04/29/25 2059    Narrative:      This procedure was auto-finalized with no dictation required.    XR Hip With or Without Pelvis 2 - 3 View Right [[id number]] Collected: 04/29/25 2030     Updated: 04/29/25 2034    Narrative:      XR HIP W OR WO PELVIS 2-3 VIEW RIGHT- 4/29/2025 7:12 PM     HISTORY: Post-Op; S72.001A-Fracture of unspecified part of neck of right  femur, initial encounter for closed fracture     COMPARISON: 4/28/2025     FINDINGS:     Frontal views of the hip were obtained in internal and external  rotation.     Patient is status post right total hip arthroplasty for subcapital right  femoral neck fracture. There are postoperative changes in the soft  tissues. No hardware complication is seen. There is lower lumbar  spondylosis and SI joint arthropathy.       Impression:      1.  Status post right total hip arthroplasty and postoperative changes  in the soft tissues.        This report was signed and finalized on 4/29/2025 8:31 PM by Alfie Valentin.       XR Chest 1 View [[id number]] Collected: 04/28/25 1710     Updated: 04/28/25 1713    Narrative:      XR CHEST 1 VW- 4/28/2025 4:06 PM     HISTORY: pre op; S72.001A-Fracture of unspecified part of neck of right  femur, initial encounter for closed fracture        COMPARISON: 8/4/2023     FINDINGS:  Upright frontal radiograph of the chest was obtained     Pulmonary fibrosis. Lungs are well expanded. No consolidation or pleural  effusion. Mild cardiomegaly which is stable. Pulmonary vasculature are  nondilated. Partially imaged dorsal column stimulator. No acute bony  abnormality.       Impression:      1.  Stable chest exam without acute process.     This report was signed and finalized on 4/28/2025 5:10 PM by Dr Gonzalez Leone.       XR Foot 3+ View Right [[id number]] Collected: 04/28/25 1630     Updated: 04/28/25 1634    Narrative:      XR FOOT 3+ VW RIGHT- 4/28/2025 3:23 PM     HISTORY: fall     COMPARISON: None      FINDINGS:  Frontal, lateral and oblique radiographs of the right foot were provided  for review.     There is no fracture or joint subluxation. Lisfranc alignment is  anatomic. Joint spaces are fairly well maintained throughout the  forefoot and midfoot. Fifth metatarsal base is intact. No discrete soft  tissue abnormality.       Impression:      1. No acute fracture or malalignment.     This report was signed and finalized on 4/28/2025 4:31 PM by Dr Gonzalez Leone.       XR Tibia Fibula 2 View Right [[id number]] Collected: 04/28/25 1630     Updated: 04/28/25 1633    Narrative:      XR TIBIA FIBULA 2 VW RIGHT-     HISTORY: fall     COMPARISON: None     FINDINGS: Frontal and lateral views of the right tibia and fibula are  obtained.     Moderate arthritic changes at the level of the knee. Osteopenia. No  fracture or dislocation identified. Scattered vascular calcification.       Impression:      1. No acute osseous injury identified of the right tibia or fibula.  Arthritic changes at the level of the knee. Osteopenia.        This report was signed and finalized on 4/28/2025 4:30 PM by Dr. Lucy Mccloud MD.       XR Knee 3 View Right [[id number]] Collected: 04/28/25 1628     Updated: 04/28/25 1632    Narrative:      XR KNEE 3 VW RIGHT-     HISTORY: fall      COMPARISON: None     FINDINGS: 3 views of the right knee are obtained.     Osteopenia. Tricompartmental osteoarthritis with joint space narrowing  moderate bony spurring. No acute fracture or dislocation. Anterior soft  tissue swelling. Scattered vascular calcification.       Impression:      1. Osteopenia with tricompartmental osteoarthritis. No acute fracture or  dislocation identified.        This report was signed and finalized on 4/28/2025 4:29 PM by Dr. Lucy Mccloud MD.       XR Ankle 2 View Right [[id number]] Collected: 04/28/25 1627     Updated: 04/28/25 1632    Narrative:      XR ANKLE 2 VW RIGHT- 4/28/2025 3:23 PM     HISTORY: fall       COMPARISON: None     FINDINGS:  2 views of the right ankle were provided for review.     There is no evidence of acute fracture or malalignment. The ankle  mortise is congruent. The talar dome is intact. No ankle joint capsular  distention. Subtalar joint is unremarkable.       Impression:      1. No acute fracture or malalignment at the ankle.           This report was signed and finalized on 4/28/2025 4:29 PM by Dr Gonzalez Leone.       XR Hip With or Without Pelvis 2 - 3 View Right [[id number]] Collected: 04/28/25 1628     Updated: 04/28/25 1631    Narrative:      XR HIP W OR WO PELVIS 2-3 VIEW RIGHT-     HISTORY: fall     COMPARISON: None     FINDINGS: Frontal view of the pelvis as well as frontal and frog-leg  lateral views right hip 18.     Right subcapital femoral neck fracture with impaction. Osteopenia. Mild  bilateral hip joint space narrowing. Vascular calcification.  Degenerative change lower lumbar spine.       Impression:      1. Impacted right subcapital femoral neck fracture. Osteopenia.        This report was signed and finalized on 4/28/2025 4:28 PM by Dr. Lucy Mccloud MD.       XR Femur 2 View Right [[id number]] Collected: 04/28/25 1626     Updated: 04/28/25 1631    Narrative:      XR FEMUR 2 VW RIGHT-     HISTORY: fall     COMPARISON: None      FINDINGS: Frontal and lateral views of the right femur are obtained.     There is a proximal right subcapital femoral neck fracture with  impaction. The mid and distal right femur appear intact. Osteopenia with  arthritic changes of the knee. Scattered vascular calcifications.       Impression:      1. Impacted right subcapital femoral neck fracture.        This report was signed and finalized on 4/28/2025 4:27 PM by Dr. Lucy Mccloud MD.             LAB RESULTS:      Lab 05/05/25 0345 05/01/25  0420 04/30/25  0336   WBC 15.01* 16.97* 20.66*   HEMOGLOBIN 9.2* 9.4* 10.4*   HEMATOCRIT 30.3* 30.1* 34.8   PLATELETS 465* 322 379   NEUTROS ABS 9.91* 13.42* 15.89*   IMMATURE GRANS (ABS) 0.35* 0.16* 0.21*   LYMPHS ABS 2.52 1.66 2.35   MONOS ABS 1.29* 1.54* 2.03*   EOS ABS 0.79* 0.12 0.10   MCV 93.5 90.9 95.3         Lab 05/05/25 0345 05/01/25  0420 04/30/25  0336   SODIUM 135* 134* 137   POTASSIUM 4.1 4.2 4.4   CHLORIDE 101 102 101   CO2 23.0 20.0* 21.0*   ANION GAP 11.0 12.0 15.0   BUN 16 18 20   CREATININE 1.26* 1.16* 1.30*   EGFR 45.2* 49.9* 43.5*   GLUCOSE 96 113* 89   CALCIUM 9.2 8.9 8.8         Lab 05/05/25 0345 05/01/25  0420   TOTAL PROTEIN 6.4 6.0   ALBUMIN 2.9* 2.6*   GLOBULIN 3.5 3.4   ALT (SGPT) 11 8   AST (SGOT) 24 39*   BILIRUBIN 0.3 0.3   ALK PHOS 202* 146*                     Brief Urine Lab Results  (Last result in the past 365 days)        Color   Clarity   Blood   Leuk Est   Nitrite   Protein   CREAT   Urine HCG        04/29/25 1140 Yellow   Clear   Negative   Small (1+)   Positive   Negative                 Microbiology Results (last 10 days)       Procedure Component Value - Date/Time    Urine Culture - Urine, Urine, Clean Catch [[id number]]  (Abnormal)  (Susceptibility) Collected: 04/29/25 1140    Lab Status: Final result Specimen: Urine, Clean Catch Updated: 05/01/25 0928     Urine Culture >100,000 CFU/mL Escherichia coli    Narrative:      Colonization of the urinary tract without infection is  common. Treatment is discouraged unless the patient is symptomatic, pregnant, or undergoing an invasive urologic procedure.    Susceptibility        Escherichia coli      SYLVIA      Amoxicillin + Clavulanate Susceptible      Ampicillin Susceptible      Ampicillin + Sulbactam Susceptible      Cefazolin (Urine) Susceptible      Cefepime Susceptible      Ceftazidime Susceptible      Ceftriaxone Susceptible      Gentamicin Susceptible      Levofloxacin Susceptible      Nitrofurantoin Susceptible      Piperacillin + Tazobactam Susceptible      Trimethoprim + Sulfamethoxazole Resistant                           Urine Culture - Urine, Urine, Clean Catch [[id number]]  (Abnormal)  (Susceptibility) Collected: 04/29/25 0404    Lab Status: Final result Specimen: Urine, Clean Catch Updated: 05/01/25 0927     Urine Culture >100,000 CFU/mL Escherichia coli    Narrative:      Colonization of the urinary tract without infection is common. Treatment is discouraged unless the patient is symptomatic, pregnant, or undergoing an invasive urologic procedure.    Susceptibility        Escherichia coli      SYLVIA      Amoxicillin + Clavulanate Susceptible      Ampicillin Susceptible      Ampicillin + Sulbactam Susceptible      Cefazolin (Urine) Susceptible      Cefepime Susceptible      Ceftazidime Susceptible      Ceftriaxone Susceptible      Gentamicin Susceptible      Levofloxacin Susceptible      Nitrofurantoin Susceptible      Piperacillin + Tazobactam Susceptible      Trimethoprim + Sulfamethoxazole Resistant                                   Hospital Course:   [facility]  Social service working with patient and family.  They have decided to go home with outpatient therapy.  Necessary DME has been ordered.  Patient is doing better and believed to be medically stable and appropriate for discharge.    She has stable creatinine  White count has trended down.  At some point during hospitalization, she  "received antibiotic for E. coli urinary tract infection.  She had operative repair of hip fracture.  Ortho service recommends weightbearing as tolerated on operative right lower extremity with anterior hip precautions. DVT prophylaxis in place.  Pain is adequately controlled.  Therapist recommends rolling walker and bedside commode for home.  Patient ambulated 40 feet with rolling walker and contact-guard assist.  She has slow pace and fatigues quickly.      She did not have any COPD exacerbation.    Physical Exam on Discharge:  /57 (BP Location: Left arm, Patient Position: Lying)   Pulse 72   Temp 97.8 °F (36.6 °C) (Oral)   Resp 20   Ht 160 cm (62.99\")   Wt 80.4 kg (177 lb 4.8 oz)   SpO2 96%   BMI 31.42 kg/m²   Physical Exam    GEN: Awake, alert, interactive, in NAD  HEENT: Atraumatic, PERRLA, EOMI, Anicteric, Trachea midline  Lungs: CTAB, no wheezing/rales/rhonchi  Heart: RRR, +S1/s2, no rub  ABD: soft, nt/nd, +BS, no guarding/rebound  Extremities: atraumatic, no cyanosis, no edema  Skin: no rashes or lesions  Neuro: AAOx3, no focal deficits  Psych: normal mood & affect  Condition on Discharge: Stable    Discharge Disposition:  Home or Self Care    Discharge Medications:     Discharge Medications        New Medications        Instructions Start Date   apixaban 2.5 MG tablet tablet  Commonly known as: ELIQUIS   2.5 mg, Oral, Every 12 Hours Scheduled      HYDROcodone-acetaminophen 7.5-325 MG per tablet  Commonly known as: NORCO  Replaces: HYDROcodone-acetaminophen  MG per tablet   1 tablet, Oral, Every 6 Hours PRN      naloxone 4 MG/0.1ML nasal spray  Commonly known as: NARCAN   Call 911. Don't prime. [place] in 1 nostril for overdose. Repeat in 2-3 minutes in other nostril if no or minimal breathing/responsiveness.      polyethylene glycol 17 g packet  Commonly known as: MIRALAX   17 g, Oral, Daily PRN             Continue These Medications        Instructions Start Date   amLODIPine 2.5 MG " tablet  Commonly known as: NORVASC   2.5 mg, Oral, Daily      ARIPiprazole 5 MG tablet  Commonly known as: ABILIFY   5 mg, Oral, Every Night at Bedtime      aspirin 81 MG chewable tablet   81 mg, Daily      atorvastatin 80 MG tablet  Commonly known as: LIPITOR   80 mg, Daily      Buprenorphine HCl film  Commonly known as: BELBUCA   Apply 1 film to cheek Every 12 (Twelve) Hours.      calcium carbonate 500 MG chewable tablet  Commonly known as: TUMS   1 tablet, Daily      cyclobenzaprine 10 MG tablet  Commonly known as: FLEXERIL   10 mg, Oral, Every 12 Hours      DULoxetine 60 MG capsule  Commonly known as: CYMBALTA   60 mg, 2 Times Daily      ferrous sulfate 325 (65 FE) MG tablet   325 mg, Oral, Daily, OTC      metoprolol succinate XL 25 MG 24 hr tablet  Commonly known as: TOPROL-XL   25 mg, Oral, Daily      Vitamin D 50 MCG (2000 UT) capsule   1 capsule, Daily             Stop These Medications      HYDROcodone-acetaminophen  MG per tablet  Commonly known as: NORCO  Replaced by: HYDROcodone-acetaminophen 7.5-325 MG per tablet              This patient has current or prior documentation of an left ventricular ejection fraction (LVEF) of less than or equal to 40%.-Applicable  Results for orders placed during the hospital encounter of 03/17/23    Adult Transthoracic Echo Complete w/ Color, Spectral and Contrast if necessary per protocol    Interpretation Summary    Left ventricular systolic function is normal. Left ventricular ejection fraction appears to be 51 - 55%.    Left ventricular wall thickness is consistent with mild to moderate concentric hypertrophy.    Left ventricular diastolic function is consistent with (grade II w/high LAP) pseudonormalization.    The left atrial cavity is mildly dilated.    Moderate aortic valve regurgitation is present    There is mild calcification of the mitral valve anterior leaflet(s). Trace to mild mitral valve regurgitation is present          Discharge Diet:   Diet  Instructions       Diet: Cardiac Diets, Renal Diets; Healthy Heart (2-3 Na+); Thin (IDDSI 0); Low Phosphorus      Discharge Diet:  Cardiac Diets  Renal Diets       Cardiac Diet: Healthy Heart (2-3 Na+)    Fluid Consistency: Thin (IDDSI 0)    Renal Diet: Low Phosphorus            Activity at Discharge:   Activity Instructions       Gradually Increase Activity Until at Pre-Hospitalization Level              Follow-up Appointments:   PCP within 1 week  Dr. Lopes per his recommendation    Future Appointments   Date Time Provider Department Center   5/26/2025 11:15 AM  [facility]   8/25/2025 11:15 AM  [facility]   11/24/2025 11:15 AM  [facility]   2/18/2026 11:15 AM  [facility]   2/25/2026 11:15 AM Racheal Ruiz APRN [facility]       Test Results Pending at Discharge: None    Electronically signed by Rashid Mariscal MD, 05/06/25, 12:53 CDT.    Time: Greater than 30 minutes minutes.

## 2025-05-06 NOTE — PLAN OF CARE
Goal Outcome Evaluation:  Plan of Care Reviewed With: patient        Progress: no change  Outcome Evaluation: Pt A/Ox4. Pain medication given as needed per pt request. Safety maintained. Call light in reach.

## 2025-05-06 NOTE — PLAN OF CARE
Goal Outcome Evaluation:         Patient A&Ox4, VSS, RA, No c/o pain, PT walked patient in lance today and tolerated well. Patient to be discharged home with daughter. Patient to have out patient OT and PT. Discharge forms completed and discussed with patient and daughter and both verbalized understanding.       3373 patient leaving floor per w/c going to main entrance going home with daughter in personal car.

## 2025-05-06 NOTE — THERAPY DISCHARGE NOTE
Acute Care - Physical Therapy Discharge Summary  [facility]       Patient Name: Cheryl Taylor  : 1950  MRN: [id number]    Today's Date: 2025                 Admit Date: 2025      PT Recommendation and Plan    Visit Dx:    ICD-10-CM ICD-9-CM   1. Closed fracture of neck of right femur, initial encounter  S72.001A 820.8   2. Impaired mobility [Z74.09]  Z74.09 799.89        Outcome Measures       Row Name 25 0854 25 1109          How much help from another person do you currently need...    Turning from your back to your side while in flat bed without using bedrails? 3  -CIERRA 4  -CIERRA     Moving from lying on back to sitting on the side of a flat bed without bedrails? 3  -CIERRA 3  -CIERRA     Moving to and from a bed to a chair (including a wheelchair)? 3  -CIERRA 3  -CIERRA     Standing up from a chair using your arms (e.g., wheelchair, bedside chair)? 3  -CIERRA 3  -CIERRA     Climbing 3-5 steps with a railing? 2  -CIERRA 2  -CIERRA     To walk in hospital room? 3  -CIERRA 3  -CIERRA     AM-PAC 6 Clicks Score (PT) 17  -CIERRA 18  -CIERRA        Functional Assessment    Outcome Measure Options AM-PAC 6 Clicks Basic Mobility (PT)  -CIERRA AM-PAC 6 Clicks Basic Mobility (PT)  -CIERRA               User Key  (r) = Recorded By, (t) = Taken By, (c) = Cosigned By      Initials Name Provider Type    Joseph Barnes, DERIC Physical Therapist Assistant                     PT Charges       Row Name 25 0854             Time Calculation    Start Time 0854  -CIERRA      Stop Time 0917  -CIERRA      Time Calculation (min) 23 min  -CIERRA      PT Received On 25  -CIERRA         Time Calculation- PT    Total Timed Code Minutes- PT 23 minute(s)  -CIERRA         Timed Charges    02829 - PT Therapeutic Exercise Minutes 10  -CIERRA      71402 - Gait Training Minutes  13  -CIERRA         Total Minutes    Timed Charges Total Minutes 23  -CIERRA       Total Minutes 23  -CIERRA                User Key  (r) = Recorded By, (t) = Taken By, (c) = Cosigned By      Initials Name Provider Type    CIERRA  Joseph Adames, PTA Physical Therapist Assistant                     PT Rehab Goals       Row Name 05/06/25 1400             Bed Mobility Goal 1 (PT)    Activity/Assistive Device (Bed Mobility Goal 1, PT) bed mobility activities, all  -AB      [place] Level/Cues Needed (Bed Mobility Goal 1, PT) standby assist  -AB      Time Frame (Bed Mobility Goal 1, PT) long term goal (LTG);10 days  -AB      Progress/Outcomes (Bed Mobility Goal 1, PT) goal not met  -AB         Transfer Goal 1 (PT)    Activity/Assistive Device (Transfer Goal 1, PT) sit-to-stand/stand-to-sit;bed-to-chair/chair-to-bed;wheelchair transfer;toilet  -AB      [place] Level/Cues Needed (Transfer Goal 1, PT) independent  -AB      Time Frame (Transfer Goal 1, PT) long term goal (LTG);10 days  -AB      Progress/Outcome (Transfer Goal 1, PT) goal not met  -AB         Gait Training Goal 1 (PT)    Activity/Assistive Device (Gait Training Goal 1, PT) gait (walking locomotion);decrease asymmetrical patterns;decrease fall risk;diminish gait deviation;forward stepping;improve balance and speed;increase endurance/gait distance;increase energy conservation;assistive device use;walker, rolling  -AB      [place] Level (Gait Training Goal 1, PT) contact guard required  -AB      Distance (Gait Training Goal 1, PT) 75' with pain 5/10 or less  -AB      Time Frame (Gait Training Goal 1, PT) long term goal (LTG);10 days  -AB      Progress/Outcome (Gait Training Goal 1, PT) goal partially met  -AB         Balance Goal 1 (PT)    Activity/Assistive Device (Balance Goal) sitting static balance;sitting dynamic balance;sit to stand dynamic balance;standing static balance;standing dynamic balance;with functional activities/occupations;with functional mobility activities;with functional reaching activities;supported;walker, rolling  -AB      [place] Level/Cues Needed (Balance Goal 1, PT) modified independence  -AB      Time Frame (Balance Goal 1, PT) long-term  goal (LTG);by discharge  -AB      Progress/Outcomes (Balance Goal 1, PT) goal not met  -AB                User Key  (r) = Recorded By, (t) = Taken By, (c) = Cosigned By      Initials Name Provider Type Discipline    Cinthya Pretty, PTA Physical Therapist Assistant PT                        PT Discharge Summary  Anticipated Discharge Disposition (PT): inpatient rehabilitation facility  Reason for Discharge: Discharge from facility  Outcomes Achieved: Refer to plan of care for updates on goals achieved  Discharge Destination: Home with assist      Cinthya Fam PTA   5/6/2025

## 2025-05-06 NOTE — DISCHARGE PLACEMENT REQUEST
"To: [facility]      From: Paula CONTI [phone]          Lisbeth Julien (75 y.o. Female)       Date of Birth   1950    Social Security Number       Address   [address]  [place] [address]    Home Phone   [phone]    MRN   [id number]       [facility]    Marital Status                               Admission Date   4/28/2025    Admission Type   Emergency    Admitting Provider   Rashid Mariscal MD    Attending Provider   Rashid Mariscal MD    Department, Room/Bed   [facility] 3A, 346/1       Discharge Date       Discharge Disposition   Home or Self Care    Discharge Destination                                 Attending Provider: Rashid Mariscal MD    Allergies: Levaquin [Levofloxacin], Naprosyn [Naproxen], Sulfa Antibiotics    Isolation: None   Infection: None   Code Status: CPR    Ht: 160 cm (62.99\")   Wt: 80.4 kg (177 lb 4.8 oz)    Admission Cmt: None   Principal Problem: Closed fracture of neck of right femur [S72.001A]                   Active Insurance as of 4/28/2025       Primary Coverage       Payor Plan Insurance Group Employer/Plan Group    AETNA MEDICARE REPLACEMENT AETNA MEDICARE ADVANTAGE PPO [id number]       Payor Plan Address Payor Plan Phone Number Payor Plan Fax Number Effective Dates    [address] [phone]  1/1/2024 - None Entered    [facility] [id number]         Subscriber Name Subscriber Birth Date Member ID       LISBETH JULIEN 1950 [id number]                     Emergency Contacts        (Rel.) Home Phone Work Phone Mobile Phone    Vivian Muniz (Daughter) [phone]    Cinthya Garvey (Daughter) [phone]                 History & Physical        Ifeanyi Hercules MD at 04/28/25 1730              [facility] Medicine Services  HISTORY AND PHYSICAL    Date of Admission: 4/28/2025  Primary Care Physician: " Den James, DO    Subjective   Primary Historian: patient    Chief Complaint: fall R hip fracture    Fall    Leg Pain       Patient is a 75-year-old female with PMH of HTN, CKD who presents to the ER post fall. Patient reports that she was using her rollator and it rolled out from underneath her. Patient reports that she landed on her right leg. She is complaining of pain to her entire right leg, primarily to the right upper leg. Patient reports that she is unable to ambulate due to her pain in her right lower extremity. She is unable to raise her right lower extremity on exam. She denies any her head or losing consciousness. Denies pain elsewhere. She does have a small skin tear to her right elbow, but does have full range of motion of this.   In ED R hip fx, ortho contacted, wanted us to admit wbc 22 k, CXR -ve, will check UA no fever NPO after midnight, consult ortho, no abx for now , monitor wbc, identify reconcile restart home meds once reconciled, DVT prophylaxis after surgery per ortho       Review of Systems   Otherwise complete ROS reviewed and negative except as mentioned in the HPI.    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    Thoracic aortic aneurysm      Past Surgical History:  Past Surgical History:   Procedure Laterality Date    BACK SURGERY      CARDIAC CATHETERIZATION N/A 5/6/2022    Procedure: Left Heart Cath;  Surgeon: Lenard Eastman MD;  Location:  [facility] CATH INVASIVE LOCATION;  Service: Cardiology;  Laterality: N/A;    EYE SURGERY Bilateral     FOOT FASCIOTOMY Bilateral     HYSTERECTOMY      LOWER LEG SOFT TISSUE TUMOR EXCISION      LUMBAR  NERVE STIMLATOR INSERTION Right     LYMPH NODE DISSECTION      WRIST FRACTURE SURGERY Right     x2     Social History:  reports that she quit smoking about 2 years ago. Her smoking use included cigarettes. She started smoking about 61 years ago. She has a 177.7 pack-year smoking history. She has never used smokeless tobacco. She reports that she does not drink alcohol and does not use drugs.    Family History: family history includes Hypertension in her mother.       Allergies:  Allergies   Allergen Reactions    Levaquin [Levofloxacin] Hives    Naprosyn [Naproxen] Hives    Sulfa Antibiotics Hives       Medications:  Prior to Admission medications    Medication Sig Start Date End Date Taking? Authorizing Provider   amLODIPine (NORVASC) 2.5 MG tablet Take 1 tablet by mouth Daily. 4/12/23   Minnie Paige MD   ARIPiprazole (ABILIFY) 5 MG tablet Take 1 tablet by mouth every night at bedtime.    Minnie Paige MD   aspirin 81 MG chewable tablet Chew 1 tablet Daily.    Minnie Paige MD   atorvastatin (LIPITOR) 80 MG tablet Take 1 tablet by mouth Daily.    Minnie Paige MD   Belbuca film PLACE 1 FILM IN CHEEK EVERY 12 HOURS AS DIRECTED 1/15/24   Minnie Paige MD   calcium carbonate (TUMS) 500 MG chewable tablet Chew 1 tablet Daily.    Minnie Paige MD   Cholecalciferol (Vitamin D) 50 MCG (2000 UT) capsule Take  by mouth.    Minnie Paige MD   cyclobenzaprine (FLEXERIL) 10 MG tablet Take 1 tablet by mouth 3 (Three) Times a Day As Needed. 6/29/22   Minnie Paige MD   DULoxetine (CYMBALTA) 60 MG capsule Take 1 capsule by mouth Daily.    Minnie Paige MD   famotidine (PEPCID) 20 MG tablet Take 1 tablet by mouth At Night As Needed for Indigestion or Heartburn.    Minnie Paige MD   ferrous sulfate 325 (65 FE) MG tablet Take 1 tablet by mouth Daily. OTC    Minnie Paige MD   HYDROcodone-acetaminophen (NORCO)  MG per tablet Take 1 tablet  "by mouth Every 6 (Six) Hours As Needed. for pain 4/12/23   Minnie Paige MD   ketoconazole (NIZORAL) 2 % cream Apply 1 Application topically to the appropriate area as directed Daily. 5/17/24   Minnie Paige MD   metoprolol succinate XL (TOPROL-XL) 25 MG 24 hr tablet Take 1 tablet by mouth Daily. 9/8/22   Minnie Paige MD   triamcinolone (KENALOG) 0.1 % cream Apply 1 Application topically to the appropriate area as directed 3 (Three) Times a Day. 5/17/24   Minnie Paige MD     I have utilized all available immediate resources to obtain, update, or review the patient's current medications (including all prescriptions, over-the-counter products, herbals, cannabis/cannabidiol products, and vitamin/mineral/dietary (nutritional) supplements).    Objective     Vital Signs: /63   Pulse 78   Temp 97.9 °F (36.6 °C) (Oral)   Resp 19   Ht 160 cm (63\")   Wt 77.1 kg (170 lb)   SpO2 97%   BMI 30.11 kg/m²   Physical Exam  HENT:      Head: Normocephalic.      Nose: Nose normal.   Cardiovascular:      Rate and Rhythm: Normal rate.   Pulmonary:      Breath sounds: Wheezing present.   Abdominal:      General: Abdomen is flat.   Musculoskeletal:      Cervical back: Normal range of motion.      Comments: R hip rotated    Skin:     General: Skin is warm.      Capillary Refill: Capillary refill takes less than 2 seconds.   Neurological:      Mental Status: She is alert.              Results Reviewed:  Lab Results (last 24 hours)       Procedure Component Value Units Date/Time    Protime-INR [[id number]]  (Normal) Collected: 04/28/25 1713    Specimen: Blood Updated: 04/28/25 1730     Protime 14.3 Seconds      INR 1.06    aPTT [[id number]]  (Normal) Collected: 04/28/25 1713    Specimen: Blood Updated: 04/28/25 1730     PTT 25.6 seconds     Narrative:      PTT = The equivalent PTT values for the therapeutic range of heparin levels at 0.3 to 0.7 U/ml are 77 - 99 seconds.    CBC & Differential " [[id number]]  (Abnormal) Collected: 04/28/25 1713    Specimen: Blood Updated: 04/28/25 1719    Narrative:      The following orders were created for panel order CBC & Differential.  Procedure                               Abnormality         Status                     ---------                               -----------         ------                     CBC Auto Differential[[id number]]        Abnormal            Final result                 Please view results for these tests on the individual orders.    CBC Auto Differential [[id number]]  (Abnormal) Collected: 04/28/25 1713    Specimen: Blood Updated: 04/28/25 1719     WBC 25.67 10*3/mm3      RBC 4.08 10*6/mm3      Hemoglobin 11.7 g/dL      Hematocrit 36.8 %      MCV 90.2 fL      MCH 28.7 pg      MCHC 31.8 g/dL      RDW 14.1 %      RDW-SD 46.9 fl      MPV 9.7 fL      Platelets 440 10*3/mm3      Neutrophil % 86.9 %      Lymphocyte % 5.6 %      Monocyte % 6.0 %      Eosinophil % 0.2 %      Basophil % 0.4 %      Immature Grans % 0.9 %      Neutrophils, Absolute 22.32 10*3/mm3      Lymphocytes, Absolute 1.44 10*3/mm3      Monocytes, Absolute 1.54 10*3/mm3      Eosinophils, Absolute 0.04 10*3/mm3      Basophils, Absolute 0.09 10*3/mm3      Immature Grans, Absolute 0.24 10*3/mm3      nRBC 0.0 /100 WBC     Comprehensive Metabolic Panel [[id number]] Collected: 04/28/25 1713    Specimen: Blood Updated: 04/28/25 1717          Imaging Results (Last 24 Hours)       Procedure Component Value Units Date/Time    XR Chest 1 View [[id number]] Collected: 04/28/25 1710     Updated: 04/28/25 1713    Narrative:      XR CHEST 1 VW- 4/28/2025 4:06 PM     HISTORY: pre op; S72.001A-Fracture of unspecified part of neck of right  femur, initial encounter for closed fracture       COMPARISON: 8/4/2023     FINDINGS:  Upright frontal radiograph of the chest was obtained     Pulmonary fibrosis. Lungs are well expanded. No consolidation or pleural  effusion. Mild cardiomegaly which is stable.  Pulmonary vasculature are  nondilated. Partially imaged dorsal column stimulator. No acute bony  abnormality.       Impression:      1.  Stable chest exam without acute process.     This report was signed and finalized on 4/28/2025 5:10 PM by Dr Gonzalez Leone.       XR Foot 3+ View Right [[id number]] Collected: 04/28/25 1630     Updated: 04/28/25 1634    Narrative:      XR FOOT 3+ VW RIGHT- 4/28/2025 3:23 PM     HISTORY: fall     COMPARISON: None      FINDINGS:  Frontal, lateral and oblique radiographs of the right foot were provided  for review.     There is no fracture or joint subluxation. Lisfranc alignment is  anatomic. Joint spaces are fairly well maintained throughout the  forefoot and midfoot. Fifth metatarsal base is intact. No discrete soft  tissue abnormality.       Impression:      1. No acute fracture or malalignment.     This report was signed and finalized on 4/28/2025 4:31 PM by Dr Gonzalez Leone.       XR Tibia Fibula 2 View Right [[id number]] Collected: 04/28/25 1630     Updated: 04/28/25 1633    Narrative:      XR TIBIA FIBULA 2 VW RIGHT-     HISTORY: fall     COMPARISON: None     FINDINGS: Frontal and lateral views of the right tibia and fibula are  obtained.     Moderate arthritic changes at the level of the knee. Osteopenia. No  fracture or dislocation identified. Scattered vascular calcification.       Impression:      1. No acute osseous injury identified of the right tibia or fibula.  Arthritic changes at the level of the knee. Osteopenia.        This report was signed and finalized on 4/28/2025 4:30 PM by Dr. Lucy Mccloud MD.       XR Knee 3 View Right [[id number]] Collected: 04/28/25 1628     Updated: 04/28/25 1632    Narrative:      XR KNEE 3 VW RIGHT-     HISTORY: fall     COMPARISON: None     FINDINGS: 3 views of the right knee are obtained.     Osteopenia. Tricompartmental osteoarthritis with joint space narrowing  moderate bony spurring. No acute fracture or dislocation. Anterior  soft  tissue swelling. Scattered vascular calcification.       Impression:      1. Osteopenia with tricompartmental osteoarthritis. No acute fracture or  dislocation identified.        This report was signed and finalized on 4/28/2025 4:29 PM by Dr. Lucy Mccloud MD.       XR Ankle 2 View Right [[id number]] Collected: 04/28/25 1627     Updated: 04/28/25 1632    Narrative:      XR ANKLE 2 VW RIGHT- 4/28/2025 3:23 PM     HISTORY: fall       COMPARISON: None     FINDINGS:  2 views of the right ankle were provided for review.     There is no evidence of acute fracture or malalignment. The ankle  mortise is congruent. The talar dome is intact. No ankle joint capsular  distention. Subtalar joint is unremarkable.       Impression:      1. No acute fracture or malalignment at the ankle.           This report was signed and finalized on 4/28/2025 4:29 PM by Dr Gonzalez Leone.       XR Hip With or Without Pelvis 2 - 3 View Right [[id number]] Collected: 04/28/25 1628     Updated: 04/28/25 1631    Narrative:      XR HIP W OR WO PELVIS 2-3 VIEW RIGHT-     HISTORY: fall     COMPARISON: None     FINDINGS: Frontal view of the pelvis as well as frontal and frog-leg  lateral views right hip 18.     Right subcapital femoral neck fracture with impaction. Osteopenia. Mild  bilateral hip joint space narrowing. Vascular calcification.  Degenerative change lower lumbar spine.       Impression:      1. Impacted right subcapital femoral neck fracture. Osteopenia.        This report was signed and finalized on 4/28/2025 4:28 PM by Dr. Lucy Mccloud MD.       XR Femur 2 View Right [[id number]] Collected: 04/28/25 1626     Updated: 04/28/25 1631    Narrative:      XR FEMUR 2 VW RIGHT-     HISTORY: fall     COMPARISON: None     FINDINGS: Frontal and lateral views of the right femur are obtained.     There is a proximal right subcapital femoral neck fracture with  impaction. The mid and distal right femur appear intact. Osteopenia  with  arthritic changes of the knee. Scattered vascular calcifications.       Impression:      1. Impacted right subcapital femoral neck fracture.        This report was signed and finalized on 4/28/2025 4:27 PM by Dr. Lucy Mccloud MD.             I have personally reviewed and interpreted the radiology studies and ECG obtained at time of admission.     Assessment / Plan   Assessment:   Active Hospital Problems    Diagnosis     **Closed fracture of neck of right femur        Treatment Plan  The patient will be admitted to my service here at [facility].   In ED R hip fx, ortho contacted, wanted us to admit wbc 22 k, CXR -ve, will check UA no fever NPO after midnight, consult ortho, no abx for now , monitor wbc, identify reconcile restart home meds once reconciled, DVT prophylaxis after surgery per ortho     Medical Decision Making  Number and Complexity of problems: 2 acute   Differential Diagnosis: as above     Conditions and Status        Condition is at treatment goal.     [facility] Data  External documents reviewed: yes  Cardiac tracing (EKG, telemetry) interpretation: yes  Radiology interpretation: yes  Labs reviewed: yes  Any tests that were considered but not ordered: no     Decision rules/scores evaluated (example [id number], Wells, etc): no     Discussed with: ED     Care Planning  Shared decision making: Patient apprised of current labs, vitals, imaging and treatment plan.  They are agreeable with proceeding with plans as discussed.   Code status and discussions: full     Disposition  Social Determinants of Health that impact treatment or disposition: no  Estimated length of stay is TBD.     I confirmed that the patient's advanced care plan is present, code status is documented, and a surrogate decision maker is listed in the patient's medical record.       The patient was seen and examined by me on 1740 at [facility] .    Electronically signed by Ifeanyi Hercules MD, 04/28/25, 17:30  CDT.              Electronically signed by Ifeanyi Hercules MD at 25 1005          Operative/Procedure Notes (all)        Isael Lopes MD at 25 1655  Version 1 of 1         Patient Name: Amanda  : 1950  MRN: [id number]    DATE of SURGERY: 2025     PREOPERATIVE DIAGNOSIS:  right acute varus angulated subcapital femoral neck fracture    POSTOPERATIVE DIAGNOSIS:  right  acute varus angulated subcapital femoral neck fracture     PROCEDURE:  right Bipolar Mingo Hip Arthroplasty     SURGEON:  Isael Lopes MD     ASSISTANT:  None     ANESTHESIA:  General     ESTIMATED BLOOD LOSS:  200 mL     COMPLICATIONS:  None.     CONDITION:  Stable.     IMPLANTS:  Contents FirstIS System with the following components:               46 mm diameter + 8.5 mm metal bipolar femoral head              Size 9 ACTIS cementless femoral press-fit stem     OPERATIVE INDICATIONS: 75 y.o. female who now presents following traumatic acute onset of right hip pain following a mechanical fall with imaging demonstrating a varus impacted acute angulated right subcapital femoral neck fracture.  Given the patient's fracture morphology and co-morbidities, we decided to move forward with bipolar hip mingo arthroplasty.  Risks include, but are not limited to, bleeding, infection, pain, damage to neurovascular structures, leg length inequality, hip dislocation, blood clots, intraoperative death. Risks, benefits, and alternatives were discussed, and the patient wished to proceed.  The patient agrees and now presents for right bipolar hip hemiarthroplasty.     PROCEDURE:  After informed consent, the patient was given 2 g of Ancef, 1 g of Tranxene acid and underwent anesthetic induction.  The patient was placed on the [place] table.  The right hip was then prepped and draped in the usual sterile fashion.  A 10 cm incision starting lateral to the ASIS extending it distally just anterior to the greater trochanter.  The TFL fascia was then  incised.  The TFL was taken laterally, while the sartorius and rectus were taken medially.  The ascending branch of the lateral femoral circumflex vessels were identified and cauterized.  An anterior capsulotomy was then performed acute rush of fracture hematoma.  The neck resection was made at the equator of the femoral head and in the saddle of the neck effectively creating a wafer osteotomy with the subcapital fracture line in the retained neck portion of the femoral head to facilitate extraction.  Corkscrew extractor was advanced into the retained femoral head and the femoral head was disarticulated from the acetabulum.  The femoral head was sized, and the back table noted to be 46 mm in diameter.       At this point, the leg was externally rotated and extended.  Continued capsular excision was performed, but ultimately the proximal extent of the incision was extended to help with exposure.  Under fluoroscopic guidance the canal was entered with a canal finder.  Sequential broaching was then performed up to a size 9 broach followed by the use of a calcar planer.  A trial reduction was performed.  C-arm fluoroscopy images were then taken of the pelvis in the AP trajectory to include visualization of the bilateral lesser trochanters to ensure appropriate restoration of leg length.  Finally, a final size 9 ACTIS stem with 130° neck shaft angle was press-fit down the proximal femoral canal.  A final 46 mm + 8.5 mm metal bipolar head was placed on the Mane taper.  It was impacted into place and noted to be firmly fixated.  The hip was reduced with excellent stability.  Anterior maneuvers including hyperextension with external rotation and abduction were performed with no evidence of anterior instability.  Fluoroscopic views revealed excellent alignment of the femoral and acetabular components.  The wound was then copiously irrigated with 2 L of irrigation.  TFL fascia was then closed with an 0 Vicryl suture.  2-0  Vicryl suture was then used for the subcutaneous closure of the tissues.  A final 3-0 Vicryl and a running subcuticular fashion was placed.  Finally, a pernio Dermabond adhesive skin dressing was applied to the incision without tension.  A Mepilex dressing was applied thereafter.      The patient was awakened by anesthesia transported to the PACU in stable condition.       POSTOPERATIVE PLAN: Return to floor postoperatively, PT/OT, 6 weeks of DVT prophylaxis.  Weight bear as tolerated with anterior hip precautions.    Isael Lopes MD     Date: 4/29/2025  Time: 17:37 CDT    Please note that portions of this note were completed with a voice recognition program.      Electronically signed by Isael Lopes MD at 04/29/25 1913       Isael Lopes MD at 04/29/25 1815  Version 1 of 1         HIP HEMIARTHROPLASTY  Progress Note    Cheryl C Brandon  4/29/2025    Pre-op Diagnosis:   Displaced varus impacted right subcapital femoral neck fracture       Post-Op Diagnosis Codes:  Displaced varus impacted right subcapital femoral neck fracture    Procedure(s):  Procedure(s):  HIP HEMIARTHROPLASTY RIGHT    Surgeon(s):  Isael Lopes MD    Anesthesia: Choice    Staff:   Circulator: Solitario Urrutia RN  Scrub Person: Lucy Sellers; Mari Mosley; Solitario Lujan  Vendor Representative: Marcus Azevedo     Estimated Blood Loss: 200ml    Urine Voided: * No values recorded between 4/29/2025  5:38 PM and 4/29/2025  7:08 PM *    Specimens:                None      Drains:   External Urinary Catheter (Active)   Daily Indications Strict bedrest 04/29/25 0930   Site Assessment Clean;Skin intact 04/29/25 0930   Application/Removal external catheter applied 04/29/25 0930   Collection Container Wall suction 04/29/25 0930   Wall suction (mmHG) 120 mmHG 04/29/25 0930   Catheter care complete Yes 04/29/25 0930   Output (mL) 100 mL 04/29/25 0404     Findings: Displaced varus impacted right subcapital femoral neck  fracture    Complications: None    was responsible for performing the following activities: Retraction, Suction, Irrigation, Suturing, Closing, and Placing Dressing and their skilled assistance was necessary for the success of this case.    Isael Lopes MD     Date: 2025  Time: 19:12 CDT          Electronically signed by Isael Lopes MD at 25 1913          Physical Therapy Notes (most recent note)        Joseph Adames, DERIC at 25 1009  Version 1 of 1         Acute Care - Physical Therapy Treatment Note  [facility]     Patient Name: Cheryl Taylor  : 1950  MRN: [id number]  Today's Date: 2025      Visit Dx:     ICD-10-CM ICD-9-CM   1. Closed fracture of neck of right femur, initial encounter  S72.001A 820.8   2. Impaired mobility [Z74.09]  Z74.09 799.89     Patient Active Problem List   Diagnosis    Atypical chest pain    Hiatal hernia    Gastroesophageal reflux disease with esophagitis without hemorrhage    Essential hypertension    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     Tobacco abuse, in remission    Stage 3a chronic kidney disease    Obesity (BMI 30-39.9)    Chronic diastolic CHF (congestive heart failure)    Aortic stenosis, mild    Nonrheumatic aortic valve insufficiency    Chronic pain    Degeneration of lumbar intervertebral disc    Gastroesophageal reflux disease    Vasculitis    Iron deficiency anemia    Lumbar radiculopathy    Lumbosacral radiculitis    Shortness of breath    Spondylosis    Thoracic aortic aneurysm without rupture    Closed fracture of neck of right femur     Past Medical History:   Diagnosis Date    AAA (abdominal aortic aneurysm)     Arthritis     Asthma     Chronic kidney disease (CKD)     Confusion 2021    COPD (chronic obstructive pulmonary disease)     Gastroesophageal reflux disease with esophagitis without hemorrhage     Heart murmur     Hiatal hernia     Hypertension     Inflammation of arteries 2021    Thoracic aortic  aneurysm      Past Surgical History:   Procedure Laterality Date    BACK SURGERY      CARDIAC CATHETERIZATION N/A 5/6/2022    Procedure: Left Heart Cath;  Surgeon: Lenard Eastman MD;  Location:  [facility] CATH INVASIVE LOCATION;  Service: Cardiology;  Laterality: N/A;    EYE SURGERY Bilateral     FOOT FASCIOTOMY Bilateral     HIP HEMIARTHROPLASTY Right 4/29/2025    Procedure: HIP HEMIARTHROPLASTY RIGHT;  Surgeon: Isael Lopes MD;  Location:  [facility] OR;  Service: Orthopedics;  Laterality: Right;    HYSTERECTOMY      LOWER LEG SOFT TISSUE TUMOR EXCISION      LUMBAR NERVE STIMLATOR INSERTION Right     LYMPH NODE DISSECTION      WRIST FRACTURE SURGERY Right     x2     PT Assessment (Last 12 Hours)       PT Evaluation and Treatment       Row Name 05/06/25 0854          Physical Therapy Time and Intention    Subjective Information complains of;pain  -CIERRA     Document Type therapy note (daily note)  -[facility]     Mode of Treatment physical therapy  -[facility]       Row Name 05/06/25 0854          General Information    Existing Precautions/Restrictions fall;hip, anterior;right  -[facility]       Row Name 05/06/25 0854          Pain    Pretreatment Pain Rating 5/10  -[facility]     Posttreatment Pain Rating 5/10  -[facility]     Pain Location hip  -[facility]     Pain Side/Orientation right  -[facility]     Pain Management Interventions exercise or physical activity utilized  -[facility]     Response to Pain Interventions activity participation with tolerable pain  -[facility]       Row Name 05/06/25 0854          Bed Mobility    Comment, (Bed Mobility) chair  -[facility]       Row Name 05/06/25 0854          Sit-Stand Transfer    Sit-Stand [place] (Transfers) verbal cues;contact guard  -[facility]     Assistive Device (Sit-Stand Transfers) walker, front-wheeled  -[facility]       Row Name 05/06/25 0854          Stand-Sit Transfer    Stand-Sit [place] (Transfers) verbal cues;contact guard  -[facility]     Assistive Device (Stand-Sit Transfers) walker, front-wheeled  -[facility]       Row Name 05/06/25 0864           Gait/Stairs (Locomotion)    [place] Level (Gait) verbal cues;contact guard  -[facility]     Assistive Device (Gait) walker, front-wheeled  -[facility]     Distance in Feet (Gait) 40  -[facility]     Pattern (Gait) step-to  -[facility]     Deviations/Abnormal Patterns (Gait) gait speed decreased;stride length decreased;weight shifting decreased  -[facility]     Bilateral Gait Deviations forward flexed posture  -[facility]       Row Name 05/06/25 0854          Motor Skills    Comments, Therapeutic Exercise sitting AROM BLE X 10  -[facility]       Row Name             Wound 04/29/25 1815 Right anterior greater trochanter Surgical Closed Surgical Incision    Wound - Properties Group Placement Date: 04/29/25  -CB Placement Time: 1815  -CB Side: Right  -CB Orientation: anterior  -CB Location: greater trochanter  -CB Primary Wound Type: Surgical  -CB Secondary Wound Type - Surgical: Closed Surgi  -CB    Retired Wound - Properties Group Placement Date: 04/29/25  -CB Placement Time: 1815  -CB Side: Right  -CB Orientation: anterior  -CB Location: greater trochanter  -CB    Retired Wound - Properties Group Placement Date: 04/29/25  -CB Placement Time: 1815  -CB Side: Right  -CB Orientation: anterior  -CB Location: greater trochanter  -CB    Retired Wound - Properties Group Date first assessed: 04/29/25  -CB Time first assessed: 1815  -CB Side: Right  -CB Location: greater trochanter  -CB      Row Name 05/06/25 0854          Positioning and Restraints    Pre-Treatment Position sitting in chair/recliner  -[facility]     Post Treatment Position chair  -[facility]     In Chair reclined;call light within reach;encouraged to call for assist  -[facility]               User Key  (r) = Recorded By, (t) = Taken By, (c) = Cosigned By      Initials Name Provider Type    Joseph Barnes PTA Physical Therapist Assistant    Solitario Xie, RN Registered Nurse                    Physical Therapy Education       Title: PT OT SLP Therapies (In Progress)       Topic: Physical Therapy (Done)       Point:  Mobility training (Done)       Learning Progress Summary            Patient Acceptance, E, VU by AMIE at 4/30/2025 1500    Comment: HEP, importance of ther ex multiple times daily., d/c planning for family    Acceptance, E, VU by AMIE at 4/30/2025 1140    Comment: role of PT, ant. hip precautions, WBAT   Family Acceptance, E, VU by AMIE at 4/30/2025 1500    Comment: HEP, importance of ther ex multiple times daily., d/c planning for family                      Point: Home exercise program (Done)       Learning Progress Summary            Patient Acceptance, E, VU by AMIE at 4/30/2025 1500    Comment: HEP, importance of ther ex multiple times daily., d/c planning for family    Acceptance, E, VU by AMIE at 4/30/2025 1140    Comment: role of PT, ant. hip precautions, WBAT   Family Acceptance, E, VU by AMIE at 4/30/2025 1500    Comment: HEP, importance of ther ex multiple times daily., d/c planning for family                      Point: Body mechanics (Done)       Learning Progress Summary            Patient Acceptance, E, VU by AMIE at 4/30/2025 1500    Comment: HEP, importance of ther ex multiple times daily., d/c planning for family    Acceptance, E, VU by AMIE at 4/30/2025 1140    Comment: role of PT, ant. hip precautions, WBAT   Family Acceptance, E, VU by AMIE at 4/30/2025 1500    Comment: HEP, importance of ther ex multiple times daily., d/c planning for family                      Point: Precautions (Done)       Learning Progress Summary            Patient Acceptance, E, VU by AMIE at 4/30/2025 1500    Comment: HEP, importance of ther ex multiple times daily., d/c planning for family    Acceptance, E, VU by AMIE at 4/30/2025 1140    Comment: role of PT, ant. hip precautions, WBAT   Family Acceptance, E, VU by AMIE at 4/30/2025 1500    Comment: HEP, importance of ther ex multiple times daily., d/c planning for family                                      User Key       Initials Effective Dates Name Provider Type Discipline    AMIE 08/15/24  -  Nolan Lao, PT DPT Physical Therapist PT                  PT Recommendation and Plan     Progress: improving  Outcome Evaluation: Pt was up in the chair, pt had pain medication prior and stated to be feeling better.  Placed pt on RA, O2 was at 97%.  Performed sit to stand with CGA.  Amb 40' with RWX And CGA, pt amb with step to gait, slow pace, fatigues quickly.  Pt's O2 after amb was 93% on RA.  Educated pt on home safety and progress of mobility.  Pt is requesting a RWX and BSC for at home.   Outcome Measures       Row Name 05/06/25 0854 05/05/25 1109          How much help from another person do you currently need...    Turning from your back to your side while in flat bed without using bedrails? 3  -[facility] 4  -[facility]     Moving from lying on back to sitting on the side of a flat bed without bedrails? 3  -[facility] 3  -[facility]     Moving to and from a bed to a chair (including a wheelchair)? 3  -[facility] 3  -[facility]     Standing up from a chair using your arms (e.g., wheelchair, bedside chair)? 3  -[facility] 3  -[facility]     Climbing 3-5 steps with a railing? 2  -[facility] 2  -[facility]     To walk in hospital room? 3  -[facility] 3  -[facility]     AM-PAC 6 Clicks Score (PT) 17  -[facility] 18  -[facility]        Functional Assessment    Outcome Measure Options AM-PAC 6 Clicks Basic Mobility (PT)  -[facility] AM-PAC 6 Clicks Basic Mobility (PT)  -[facility]               User Key  (r) = Recorded By, (t) = Taken By, (c) = Cosigned By      Initials Name Provider Type    Joseph Barnes, PTA Physical Therapist Assistant                     Time Calculation:    PT Charges       Row Name 05/06/25 0854             Time Calculation    Start Time 0854  -[facility]      Stop Time 0917  -[facility]      Time Calculation (min) 23 min  -[facility]      PT Received On 05/06/25  -[facility]         Time Calculation- PT    Total Timed Code Minutes- PT 23 minute(s)  -[facility]         Timed Charges    27512 - PT Therapeutic Exercise Minutes 10  -[facility]      55677 - Gait Training Minutes  13  -[facility]         Total Minutes    Timed Charges Total Minutes 23  -[facility]        Total Minutes 23  -[facility]                User Key  (r) = Recorded By, (t) = Taken By, (c) = Cosigned By      Initials Name Provider Type    Joseph Barnes PTA Physical Therapist Assistant                  Therapy Charges for Today       Code Description Service Date Service Provider Modifiers Qty    03442007568 HC GAIT TRAINING EA 15 MIN 5/5/2025 Joseph Adames, PTA GP 2    20398485104 HC GAIT TRAINING EA 15 MIN 5/6/2025 Joseph Adames, PTA GP 1    60256722991 HC PT THER PROC EA 15 MIN 5/6/2025 Joseph Adames, PTA GP 1            PT G-Codes  Outcome Measure Options: AM-PAC 6 Clicks Basic Mobility (PT)  AM-PAC 6 Clicks Score (PT): 17  AM-PAC 6 Clicks Score (OT): 21    Joseph Adames PTA  5/6/2025      Electronically signed by Joseph Adames PTA at 05/06/25 1009         Order History  Outpatient  Date/Time Action Taken User Additional Information   05/06/25 1103 Sign Meghan Mra RN Ordering Mode: Telephone with readback     Order Details    Frequency Duration Priority Order Class   None None Routine External     Start Date/Time    Start Date   05/06/25     Order Information    Order Date Service Start Date Start Time   05/06/25 Medicine 05/06/25      Reference Links    Associated Reports   View Encounter     Order Questions    Question Answer   Reason for Commode Confined to One Level of Home; Level Does Not Have Toilet   Reason for Confinement hip fracture   Type  Bedside Commode with Fixed Arms   Length of Need 99 Months = Lifetime            Verbal / Cosign Order Info    Action Created on Order Mode Entered by Responsible Provider Signed by Signed on   Ordering 05/06/25 1103 Telephone with readback Meghan Mar RN Puertollano, Glenn Riego, MD       Source Order Set / Preference List    Preference List   AMB SUPPLIES [[id number]]             Clinical Indications     ICD-10-CM ICD-9-CM   Closed fracture of neck of right femur, initial encounter  - Primary    S72.001A 820.8                              Reprint Order Requisition    Commode Chair  Bedside Commode with Fixed Arms (Order #[id number]) on 5/6/25         Encounter    View Encounter                Order Provider Info        Phone Pager E-mail   Ordering User  Meghan Mar RN  -- -- --   Authorizing Provider  Rashid Mariscal MD  [phone] (Office Phone) -- --   Attending Provider  Rashid Mariscal MD  [phone] (Office Phone) -- --   Entered By  Meghan Mar RN  -- -- --   Ordering Provider  Rashid Mariscal MD  [phone] (Office Phone) -- --     Tracking Reports    Cosign Tracking Order Transmittal Tracking     Authorized by:  Rashid Mariscal MD  (NPI: [id number])                Lab Component SmartPhrase Guide    Commode Chair  Bedside Commode with Fixed Arms (Order #[id number]) on 5/6/25     Walker  Walker Folding with Wheels [] (Order [id number])  Order  Date: 5/6/2025 Department: [facility] Ordering/Authorizing: Rashid Mariscal MD     Order History  Outpatient  Date/Time Action Taken User Additional Information   05/06/25 1103 Sign Meghan Mar RN Ordering Mode: Telephone with readback     Order Details    Frequency Duration Priority Order Class   None None Routine External     Start Date/Time    Start Date   05/06/25     Order Information    Order Date Service Start Date Start Time   05/06/25 Medicine 05/06/25      Reference Links    Associated Reports   View Encounter     Order Questions    Question Answer   Mobility Limitation Prevents Accomplishing MRADLs   Safety Able to Safely Use Equipment   Mobility Deficit Mobility Deficit Can Be Sufficiently Resolved By Use of Equipment   Equipment  Walker Folding with Wheels   Length of Need 99 Months = Lifetime            Verbal / Cosign Order Info    Action Created on Order Mode Entered by Responsible Provider Signed by Signed on   Ordering 05/06/25 1103 Telephone with readback  Meghan Mar RN Puertollano, Glenn Riego, MD       Source Order Set / Preference List    Preference List   [facility] FACILITY PROCEDURES [[id number]]             Clinical Indications     ICD-10-CM ICD-9-CM   Closed fracture of neck of right femur, initial encounter  - Primary    S72.001A 820.8                             Reprint Order Requisition    Walker  Walker Folding with Wheels (Order #[id number]) on 5/6/25         Encounter    View Encounter                Order Provider Info        Phone Pager E-mail   Ordering User  Meghan Mar RN  -- -- --   Authorizing Provider  Rashid Mariscal MD  [phone] (Office Phone) -- --   Attending Provider  Rashid Mariscal MD  [phone] (Office Phone) -- --   Entered By  Meghan Mar RN  -- -- --   Ordering Provider  Rashid Mariscal MD  [phone] (Office Phone) -- --     Tracking Reports    Cosign Tracking Order Transmittal Tracking     Authorized by:  Rashid Mariscla MD  (NPI: [id number])                Lab Component SmartPhrase Guide    Walker  Walker Folding with Wheels (Order #[id number]) on 5/6/25

## 2025-05-06 NOTE — CASE MANAGEMENT/SOCIAL WORK
Continued Stay Note  [facility]     Patient Name: Cheryl Taylor  MRN: [id number]  Today's Date: 5/6/2025    Admit Date: 4/28/2025    Plan: Home   Discharge Plan       Row Name 05/06/25 1113       Plan    Plan Home    Plan Comments Orders for DME, RW and BSC, received.  Spoke with patient's daughter, Vivian, who advised they had no preference as to DME provider but would like the items delivered to patient's home.  DME ordered from [facility] for delivery to patient's home.                   Discharge Codes    No documentation.                 Expected Discharge Date and Time       Expected Discharge Date Expected Discharge Time    May 6, 2025               CLARKE García

## 2025-05-07 NOTE — THERAPY DISCHARGE NOTE
Acute Care - Occupational Therapy Discharge Summary  [facility]     Patient Name: Cheryl Taylor  : 1950  MRN: [id number]    Today's Date: 2025                 Admit Date: 2025        OT Recommendation and Plan    Visit Dx:    ICD-10-CM ICD-9-CM   1. Closed fracture of neck of right femur, initial encounter  S72.001A 820.8   2. Impaired mobility [Z74.09]  Z74.09 799.89                OT Rehab Goals       Row Name 25 1000             Transfer Goal 1 (OT)    Activity/Assistive Device (Transfer Goal 1, OT) toilet;shower chair  -CS      [place] Level/Cues Needed (Transfer Goal 1, OT) standby assist  -CS      Time Frame (Transfer Goal 1, OT) long term goal (LTG);10 days  -CS      Progress/Outcome (Transfer Goal 1, OT) goal not met  -CS         Dressing Goal 1 (OT)    Activity/Device (Dressing Goal 1, OT) dressing skills, all  -CS      [place]/Cues Needed (Dressing Goal 1, OT) minimum assist (75% or more patient effort)  -CS      Time Frame (Dressing Goal 1, OT) long term goal (LTG);10 days  -CS      Progress/Outcome (Dressing Goal 1, OT) goal not met  -CS         Toileting Goal 1 (OT)    Activity/Device (Toileting Goal 1, OT) toileting skills, all  -CS      [place] Level/Cues Needed (Toileting Goal 1, OT) standby assist  -CS      Time Frame (Toileting Goal 1, OT) long term goal (LTG);10 days  -CS      Progress/Outcome (Toileting Goal 1, OT) goal not met  -CS         Problem Specific Goal 1 (OT)    Problem Specific Goal 1 (OT) Pt will independently implement one pain management technique to decrease pain and improve functional adl performance.  -CS      Time Frame (Problem Specific Goal 1, OT) long term goal (LTG);by discharge  -CS      Progress/Outcome (Problem Specific Goal 1, OT) goal not met  -CS                User Key  (r) = Recorded By, (t) = Taken By, (c) = Cosigned By      Initials Name Provider Type Discipline    CS Carissa Hernandez, OTR/L, CNT Occupational Therapist OT                      Outcome Measures       Row Name 05/06/25 0854 05/05/25 1109          How much help from another person do you currently need...    Turning from your back to your side while in flat bed without using bedrails? 3  -[facility]ER[facility] 4  -CIERRA     Moving from lying on back to sitting on the side of a flat bed without bedrails? 3  -CIERRA 3  -CIERRA     Moving to and from a bed to a chair (including a wheelchair)? 3  -CIERRA 3  -CIERRA     Standing up from a chair using your arms (e.g., wheelchair, bedside chair)? 3  -CIERRA 3  -CIERRA     Climbing 3-5 steps with a railing? 2  -CIERRA 2  -CIERRA     To walk in hospital room? 3  -CIERRA 3  -CIERRA     AM-PAC 6 Clicks Score (PT) 17  -CIERRA 18  -CIERRA        Functional Assessment    Outcome Measure Options AM-PAC 6 Clicks Basic Mobility (PT)  -CIERRA AM-PAC 6 Clicks Basic Mobility (PT)  -CIERRA               User Key  (r) = Recorded By, (t) = Taken By, (c) = Cosigned By      Initials Name Provider Type    Joseph Barnes, PTA Physical Therapist Assistant                    Timed Therapy Charges  Total Units: 2      Suggested Charges  Total Units: 2      Procedure Name Documented Minutes Units Code    HC OT SELF CARE/MGMT/TRAIN EA 15 MIN 26 2   02543 (CPT®)                 Documented Minutes  Total Minutes: 26      Therapy Provided Minutes    93066 - OT Self Care/Mgmt Minutes 26                        OT Discharge Summary  Anticipated Discharge Disposition (OT): inpatient rehabilitation facility  Reason for Discharge: Discharge from facility  Outcomes Achieved: Refer to plan of care for updates on goals achieved  Discharge Destination: Inpatient rehabilitation facility      TOMASA Pink/L, CNT  5/7/2025

## 2025-05-07 NOTE — PAYOR COMM NOTE
"[facility] 5-6-25    Lisbeth Julien (75 y.o. Female)       Date of Birth   1950    Social Security Number       Address   [address]  [place] [address]    Home Phone   [phone]    MRN   [id number]       [facility]    Marital Status                               Admission Date   4/28/2025    Admission Type   Emergency    Admitting Provider   Rashid Mariscal MD    Attending Provider       Department, Room/Bed   [facility] 3A, 346/1       Discharge Date   5/6/2025    Discharge Disposition   Home or Self Care    Discharge Destination                                 Attending Provider: (none)   Allergies: Levaquin [Levofloxacin], Naprosyn [Naproxen], Sulfa Antibiotics    Isolation: None   Infection: None   Code Status: Prior    Ht: 160 cm (62.99\")   Wt: 80.4 kg (177 lb 4.8 oz)    Admission Cmt: None   Principal Problem: Closed fracture of neck of right femur [S72.001A]                   Active Insurance as of 4/28/2025       Primary Coverage       Payor Plan Insurance Group Employer/Plan Group    AETNA MEDICARE REPLACEMENT AETNA MEDICARE [place] [id number]       Payor Plan Address Payor Plan Phone Number Payor Plan Fax Number Effective Dates    [address] [phone]  1/1/2024 - None Entered    [facility] [id number]         Subscriber Name Subscriber Birth Date Member ID       LISBETH JULIEN 1950 [id number]                     Emergency Contacts        (Rel.) Home Phone Work Phone Mobile Phone    Vivian Muniz (Daughter) [phone]    Cinthya Garvey (Daughter) [phone]                 Discharge Summary        Rashid Mariscal MD at 05/06/25 [facility]                [facility]  DISCHARGE SUMMARY       Date of Admission: 4/28/2025  Date of Discharge:  5/6/2025  Primary Care Physician: Den James,     Presenting " Problem/History of Present Illness:  Right hip fracture following fall.    Final Discharge Diagnoses:  Status post right bipolar hemiarthroplasty for subcapital femoral neck fracture  E. coli urinary tract infection  Chronic kidney disease suspected 3B baseline creatinine anywhere from 1.3-1.5  History of COPD with no reported exacerbation  Hypertension  Functional decline secondary to above -improving  Leukocytosis (improving)    Consults:   Orthopedic service    Procedures Performed:     DATE of SURGERY: 4/29/2025     PREOPERATIVE DIAGNOSIS:  right acute varus angulated subcapital femoral neck fracture     POSTOPERATIVE DIAGNOSIS:  right  acute varus angulated subcapital femoral neck fracture     PROCEDURE:  right Bipolar [place] Hip Arthroplasty     SURGEON:  Isael Lopes MD     ASSISTANT:  None     ANESTHESIA:  General     ESTIMATED BLOOD LOSS:  200 mL    Pertinent Test Results:   Results for orders placed during the hospital encounter of 03/17/23    Adult Transthoracic Echo Complete w/ Color, Spectral and Contrast if necessary per protocol    Interpretation Summary    Left ventricular systolic function is normal. Left ventricular ejection fraction appears to be 51 - 55%.    Left ventricular wall thickness is consistent with mild to moderate concentric hypertrophy.    Left ventricular diastolic function is consistent with (grade II w/high LAP) pseudonormalization.    The left atrial cavity is mildly dilated.    Moderate aortic valve regurgitation is present    There is mild calcification of the mitral valve anterior leaflet(s). Trace to mild mitral valve regurgitation is present      Imaging Results (All)       Procedure Component Value Units Date/Time    XR Hip With or Without Pelvis 2 - 3 View Right [[id number]] Collected: 04/29/25 2104     Updated: 04/29/25 2108    Narrative:      XR HIP W OR WO PELVIS 2-3 VIEW RIGHT- 4/29/2025 5:02 PM     HISTORY: right hip fx; S72.001A-Fracture of unspecified part of neck  of  right femur, initial encounter for closed fracture     COMPARISON: None     FLUOROSCOPY TIME: 0.413 minutes     FLUOROSCOPY DOSE: 3.8424 mGy     NUMBER OF IMAGES: 2.0       Impression:         Intraoperative fluoroscopic images during right total hip arthroplasty.     Please refer to the operative note for more details.     This report was signed and finalized on 4/29/2025 9:05 PM by Alfie Valentin.       FL C Arm During Surgery [[id number]] Resulted: 04/29/25 2059     Updated: 04/29/25 2059    Narrative:      This procedure was auto-finalized with no dictation required.    XR Hip With or Without Pelvis 2 - 3 View Right [[id number]] Collected: 04/29/25 2030     Updated: 04/29/25 2034    Narrative:      XR HIP W OR WO PELVIS 2-3 VIEW RIGHT- 4/29/2025 7:12 PM     HISTORY: Post-Op; S72.001A-Fracture of unspecified part of neck of right  femur, initial encounter for closed fracture     COMPARISON: 4/28/2025     FINDINGS:     Frontal views of the hip were obtained in internal and external  rotation.     Patient is status post right total hip arthroplasty for subcapital right  femoral neck fracture. There are postoperative changes in the soft  tissues. No hardware complication is seen. There is lower lumbar  spondylosis and SI joint arthropathy.       Impression:      1.  Status post right total hip arthroplasty and postoperative changes  in the soft tissues.        This report was signed and finalized on 4/29/2025 8:31 PM by Alfie Valentin.       XR Chest 1 View [[id number]] Collected: 04/28/25 1710     Updated: 04/28/25 1713    Narrative:      XR CHEST 1 VW- 4/28/2025 4:06 PM     HISTORY: pre op; S72.001A-Fracture of unspecified part of neck of right  femur, initial encounter for closed fracture       COMPARISON: 8/4/2023     FINDINGS:  Upright frontal radiograph of the chest was obtained     Pulmonary fibrosis. Lungs are well expanded. No consolidation or pleural  effusion. Mild cardiomegaly which is stable.  Pulmonary vasculature are  nondilated. Partially imaged dorsal column stimulator. No acute bony  abnormality.       Impression:      1.  Stable chest exam without acute process.     This report was signed and finalized on 4/28/2025 5:10 PM by Dr Gonzalez Leone.       XR Foot 3+ View Right [[id number]] Collected: 04/28/25 1630     Updated: 04/28/25 1634    Narrative:      XR FOOT 3+ VW RIGHT- 4/28/2025 3:23 PM     HISTORY: fall     COMPARISON: None      FINDINGS:  Frontal, lateral and oblique radiographs of the right foot were provided  for review.     There is no fracture or joint subluxation. Lisfranc alignment is  anatomic. Joint spaces are fairly well maintained throughout the  forefoot and midfoot. Fifth metatarsal base is intact. No discrete soft  tissue abnormality.       Impression:      1. No acute fracture or malalignment.     This report was signed and finalized on 4/28/2025 4:31 PM by Dr Gonzalez Leone.       XR Tibia Fibula 2 View Right [[id number]] Collected: 04/28/25 1630     Updated: 04/28/25 1633    Narrative:      XR TIBIA FIBULA 2 VW RIGHT-     HISTORY: fall     COMPARISON: None     FINDINGS: Frontal and lateral views of the right tibia and fibula are  obtained.     Moderate arthritic changes at the level of the knee. Osteopenia. No  fracture or dislocation identified. Scattered vascular calcification.       Impression:      1. No acute osseous injury identified of the right tibia or fibula.  Arthritic changes at the level of the knee. Osteopenia.        This report was signed and finalized on 4/28/2025 4:30 PM by Dr. Lucy Mccloud MD.       XR Knee 3 View Right [[id number]] Collected: 04/28/25 1628     Updated: 04/28/25 1632    Narrative:      XR KNEE 3 VW RIGHT-     HISTORY: fall     COMPARISON: None     FINDINGS: 3 views of the right knee are obtained.     Osteopenia. Tricompartmental osteoarthritis with joint space narrowing  moderate bony spurring. No acute fracture or dislocation. Anterior  soft  tissue swelling. Scattered vascular calcification.       Impression:      1. Osteopenia with tricompartmental osteoarthritis. No acute fracture or  dislocation identified.        This report was signed and finalized on 4/28/2025 4:29 PM by Dr. Lucy Mccloud MD.       XR Ankle 2 View Right [[id number]] Collected: 04/28/25 1627     Updated: 04/28/25 1632    Narrative:      XR ANKLE 2 VW RIGHT- 4/28/2025 3:23 PM     HISTORY: fall       COMPARISON: None     FINDINGS:  2 views of the right ankle were provided for review.     There is no evidence of acute fracture or malalignment. The ankle  mortise is congruent. The talar dome is intact. No ankle joint capsular  distention. Subtalar joint is unremarkable.       Impression:      1. No acute fracture or malalignment at the ankle.           This report was signed and finalized on 4/28/2025 4:29 PM by Dr Gonzalez Leone.       XR Hip With or Without Pelvis 2 - 3 View Right [[id number]] Collected: 04/28/25 1628     Updated: 04/28/25 1631    Narrative:      XR HIP W OR WO PELVIS 2-3 VIEW RIGHT-     HISTORY: fall     COMPARISON: None     FINDINGS: Frontal view of the pelvis as well as frontal and frog-leg  lateral views right hip 18.     Right subcapital femoral neck fracture with impaction. Osteopenia. Mild  bilateral hip joint space narrowing. Vascular calcification.  Degenerative change lower lumbar spine.       Impression:      1. Impacted right subcapital femoral neck fracture. Osteopenia.        This report was signed and finalized on 4/28/2025 4:28 PM by Dr. Lucy Mccloud MD.       XR Femur 2 View Right [[id number]] Collected: 04/28/25 1626     Updated: 04/28/25 1631    Narrative:      XR FEMUR 2 VW RIGHT-     HISTORY: fall     COMPARISON: None     FINDINGS: Frontal and lateral views of the right femur are obtained.     There is a proximal right subcapital femoral neck fracture with  impaction. The mid and distal right femur appear intact. Osteopenia  with  arthritic changes of the knee. Scattered vascular calcifications.       Impression:      1. Impacted right subcapital femoral neck fracture.        This report was signed and finalized on 4/28/2025 4:27 PM by Dr. Lucy Mccloud MD.             LAB RESULTS:      Lab 05/05/25 0345 05/01/25  0420 04/30/25  0336   WBC 15.01* 16.97* 20.66*   HEMOGLOBIN 9.2* 9.4* 10.4*   HEMATOCRIT 30.3* 30.1* 34.8   PLATELETS 465* 322 379   NEUTROS ABS 9.91* 13.42* 15.89*   IMMATURE GRANS (ABS) 0.35* 0.16* 0.21*   LYMPHS ABS 2.52 1.66 2.35   MONOS ABS 1.29* 1.54* 2.03*   EOS ABS 0.79* 0.12 0.10   MCV 93.5 90.9 95.3         Lab 05/05/25 0345 05/01/25  0420 04/30/25  0336   SODIUM 135* 134* 137   POTASSIUM 4.1 4.2 4.4   CHLORIDE 101 102 101   CO2 23.0 20.0* 21.0*   ANION GAP 11.0 12.0 15.0   BUN 16 18 20   CREATININE 1.26* 1.16* 1.30*   EGFR 45.2* 49.9* 43.5*   GLUCOSE 96 113* 89   CALCIUM 9.2 8.9 8.8         Lab 05/05/25 0345 05/01/25  0420   TOTAL PROTEIN 6.4 6.0   ALBUMIN 2.9* 2.6*   GLOBULIN 3.5 3.4   ALT (SGPT) 11 8   AST (SGOT) 24 39*   BILIRUBIN 0.3 0.3   ALK PHOS 202* 146*                     Brief Urine Lab Results  (Last result in the past 365 days)        Color   Clarity   Blood   Leuk Est   Nitrite   Protein   CREAT   Urine HCG        04/29/25 1140 Yellow   Clear   Negative   Small (1+)   Positive   Negative                 Microbiology Results (last 10 days)       Procedure Component Value - Date/Time    Urine Culture - Urine, Urine, Clean Catch [[id number]]  (Abnormal)  (Susceptibility) Collected: 04/29/25 1140    Lab Status: Final result Specimen: Urine, Clean Catch Updated: 05/01/25 0928     Urine Culture >100,000 CFU/mL Escherichia coli    Narrative:      Colonization of the urinary tract without infection is common. Treatment is discouraged unless the patient is symptomatic, pregnant, or undergoing an invasive urologic procedure.    Susceptibility        Escherichia coli      SYLVIA      Amoxicillin + Clavulanate  Susceptible      Ampicillin Susceptible      Ampicillin + Sulbactam Susceptible      Cefazolin (Urine) Susceptible      Cefepime Susceptible      Ceftazidime Susceptible      Ceftriaxone Susceptible      Gentamicin Susceptible      Levofloxacin Susceptible      Nitrofurantoin Susceptible      Piperacillin + Tazobactam Susceptible      Trimethoprim + Sulfamethoxazole Resistant                           Urine Culture - Urine, Urine, Clean Catch [[id number]]  (Abnormal)  (Susceptibility) Collected: 04/29/25 0404    Lab Status: Final result Specimen: Urine, Clean Catch Updated: 05/01/25 0900     Urine Culture >100,000 CFU/mL Escherichia coli    Narrative:      Colonization of the urinary tract without infection is common. Treatment is discouraged unless the patient is symptomatic, pregnant, or undergoing an invasive urologic procedure.    Susceptibility        Escherichia coli      SYLVIA      Amoxicillin + Clavulanate Susceptible      Ampicillin Susceptible      Ampicillin + Sulbactam Susceptible      Cefazolin (Urine) Susceptible      Cefepime Susceptible      Ceftazidime Susceptible      Ceftriaxone Susceptible      Gentamicin Susceptible      Levofloxacin Susceptible      Nitrofurantoin Susceptible      Piperacillin + Tazobactam Susceptible      Trimethoprim + Sulfamethoxazole Resistant                                   Hospital Course:   Patient has not been approved for [facility] rehab.  Social service working with patient and family.  They have decided to go home with outpatient therapy.  Necessary DME has been ordered.  Patient is doing better and believed to be medically stable and appropriate for discharge.    She has stable creatinine  White count has trended down.  At some point during hospitalization, she received antibiotic for E. coli urinary tract infection.  She had operative repair of hip fracture.  Ortho service recommends weightbearing as tolerated on operative right lower extremity with anterior hip  "precautions. DVT prophylaxis in place.  Pain is adequately controlled.  Therapist recommends rolling walker and bedside commode for home.  Patient ambulated 40 feet with rolling walker and contact-guard assist.  She has slow pace and fatigues quickly.      She did not have any COPD exacerbation.    Physical Exam on Discharge:  /57 (BP Location: Left arm, Patient Position: Lying)   Pulse 72   Temp 97.8 °F (36.6 °C) (Oral)   Resp 20   Ht 160 cm (62.99\")   Wt 80.4 kg (177 lb 4.8 oz)   SpO2 96%   BMI 31.42 kg/m²   Physical Exam    GEN: Awake, alert, interactive, in NAD  HEENT: Atraumatic, PERRLA, EOMI, Anicteric, Trachea midline  Lungs: CTAB, no wheezing/rales/rhonchi  Heart: RRR, +S1/s2, no rub  ABD: soft, nt/nd, +BS, no guarding/rebound  Extremities: atraumatic, no cyanosis, no edema  Skin: no rashes or lesions  Neuro: AAOx3, no focal deficits  Psych: normal mood & affect  Condition on Discharge: Stable    Discharge Disposition:  Home or Self Care    Discharge Medications:     Discharge Medications        New Medications        Instructions Start Date   apixaban 2.5 MG tablet tablet  Commonly known as: ELIQUIS   2.5 mg, Oral, Every 12 Hours Scheduled      HYDROcodone-acetaminophen 7.5-325 MG per tablet  Commonly known as: NORCO  Replaces: HYDROcodone-acetaminophen  MG per tablet   1 tablet, Oral, Every 6 Hours PRN      naloxone 4 MG/0.1ML nasal spray  Commonly known as: NARCAN   Call 911. Don't prime. Swoope in 1 nostril for overdose. Repeat in 2-3 minutes in other nostril if no or minimal breathing/responsiveness.      polyethylene glycol 17 g packet  Commonly known as: MIRALAX   17 g, Oral, Daily PRN             Continue These Medications        Instructions Start Date   amLODIPine 2.5 MG tablet  Commonly known as: NORVASC   2.5 mg, Oral, Daily      ARIPiprazole 5 MG tablet  Commonly known as: ABILIFY   5 mg, Oral, Every Night at Bedtime      aspirin 81 MG chewable tablet   81 mg, Daily    "   atorvastatin 80 MG tablet  Commonly known as: LIPITOR   80 mg, Daily      Buprenorphine HCl film  Commonly known as: BELBUCA   Apply 1 film to cheek Every 12 (Twelve) Hours.      calcium carbonate 500 MG chewable tablet  Commonly known as: TUMS   1 tablet, Daily      cyclobenzaprine 10 MG tablet  Commonly known as: FLEXERIL   10 mg, Oral, Every 12 Hours      DULoxetine 60 MG capsule  Commonly known as: CYMBALTA   60 mg, 2 Times Daily      ferrous sulfate 325 (65 FE) MG tablet   325 mg, Oral, Daily, OTC      metoprolol succinate XL 25 MG 24 hr tablet  Commonly known as: TOPROL-XL   25 mg, Oral, Daily      Vitamin D 50 MCG (2000 UT) capsule   1 capsule, Daily             Stop These Medications      HYDROcodone-acetaminophen  MG per tablet  Commonly known as: NORCO  Replaced by: HYDROcodone-acetaminophen 7.5-325 MG per tablet              This patient has current or prior documentation of an left ventricular ejection fraction (LVEF) of less than or equal to 40%.-Applicable  Results for orders placed during the hospital encounter of 03/17/23    Adult Transthoracic Echo Complete w/ Color, Spectral and Contrast if necessary per protocol    Interpretation Summary    Left ventricular systolic function is normal. Left ventricular ejection fraction appears to be 51 - 55%.    Left ventricular wall thickness is consistent with mild to moderate concentric hypertrophy.    Left ventricular diastolic function is consistent with (grade II w/high LAP) pseudonormalization.    The left atrial cavity is mildly dilated.    Moderate aortic valve regurgitation is present    There is mild calcification of the mitral valve anterior leaflet(s). Trace to mild mitral valve regurgitation is present          Discharge Diet:   Diet Instructions       Diet: Cardiac Diets, Renal Diets; Healthy Heart (2-3 Na+); Thin (IDDSI 0); Low Phosphorus      Discharge Diet:  Cardiac Diets  Renal Diets       Cardiac Diet: Healthy Heart (2-3 Na+)    Fluid  Consistency: Thin (IDDSI 0)    Renal Diet: Low Phosphorus            Activity at Discharge:   Activity Instructions       Gradually Increase Activity Until at Pre-Hospitalization Level              Follow-up Appointments:   PCP within 1 week  Dr. Lopes per his recommendation    Future Appointments   Date Time Provider Department Center   5/26/2025 11:15 AM  [facility]   8/25/2025 11:15 AM  [facility]   11/24/2025 11:15 AM  [facility]   2/18/2026 11:15 AM  [facility]   2/25/2026 11:15 AM Racheal Ruiz APRN [facility]       Test Results Pending at Discharge: None    Electronically signed by Rashid Mariscal MD, 05/06/25, 12:53 CDT.    Time: Greater than 30 minutes minutes.           Electronically signed by Rashid Mariscal MD at 05/06/25 1257

## 2025-05-07 NOTE — OUTREACH NOTE
Prep Survey      Flowsheet Row Responses   Denominational facility patient discharged from? [place]   Is LACE score < 7 ? No   Eligibility Readm Mgmt   Discharge diagnosis Status post right bipolar hemiarthroplasty for subcapital femoral neck fracture  E. coli urinary tract infection   Does the patient have one of the following disease processes/diagnoses(primary or secondary)? General Surgery   Does the patient have Home health ordered? No   Is there a DME ordered? Yes   What DME was ordered? [facility]   Prep survey completed? Yes            LASHON MCGOVERN - Registered Nurse

## 2025-05-13 ENCOUNTER — OFFICE VISIT (OUTPATIENT)
Age: 75
End: 2025-05-13

## 2025-05-13 ENCOUNTER — READMISSION MANAGEMENT (OUTPATIENT)
Dept: CALL CENTER | Facility: HOSPITAL | Age: 75
End: 2025-05-13
Payer: MEDICARE

## 2025-05-13 VITALS — BODY MASS INDEX: 29.77 KG/M2 | HEIGHT: 63 IN | WEIGHT: 168 LBS

## 2025-05-13 DIAGNOSIS — Z48.89 AFTERCARE FOLLOWING SURGERY: ICD-10-CM

## 2025-05-13 DIAGNOSIS — Z96.641 HISTORY OF RIGHT HIP REPLACEMENT: Primary | ICD-10-CM

## 2025-05-13 PROCEDURE — 99024 POSTOP FOLLOW-UP VISIT: CPT | Performed by: PHYSICIAN ASSISTANT

## 2025-05-13 RX ORDER — HYDROCODONE BITARTRATE AND ACETAMINOPHEN 10; 325 MG/1; MG/1
1 TABLET ORAL EVERY 12 HOURS PRN
COMMUNITY
Start: 2025-04-21

## 2025-05-13 RX ORDER — BUPRENORPHINE HYDROCHLORIDE 300 UG/1
FILM, SOLUBLE BUCCAL
COMMUNITY
Start: 2025-04-07

## 2025-05-13 RX ORDER — ATORVASTATIN CALCIUM 80 MG/1
80 TABLET, FILM COATED ORAL DAILY
COMMUNITY

## 2025-05-13 RX ORDER — ARIPIPRAZOLE 5 MG/1
5 TABLET ORAL NIGHTLY
COMMUNITY

## 2025-05-13 RX ORDER — PSYLLIUM HUSK 0.4 G
1000 CAPSULE ORAL DAILY
COMMUNITY

## 2025-05-13 RX ORDER — ASPIRIN 81 MG/1
81 TABLET, CHEWABLE ORAL DAILY
COMMUNITY

## 2025-05-13 RX ORDER — DULOXETIN HYDROCHLORIDE 60 MG/1
60 CAPSULE, DELAYED RELEASE ORAL 2 TIMES DAILY
COMMUNITY

## 2025-05-13 RX ORDER — AMLODIPINE BESYLATE 2.5 MG/1
2.5 TABLET ORAL DAILY
COMMUNITY
Start: 2025-04-07

## 2025-05-13 RX ORDER — CYCLOBENZAPRINE HCL 10 MG
10 TABLET ORAL EVERY 12 HOURS
COMMUNITY

## 2025-05-13 RX ORDER — CALCIUM CARBONATE 500 MG/1
1 TABLET, CHEWABLE ORAL DAILY
COMMUNITY

## 2025-05-13 ASSESSMENT — ENCOUNTER SYMPTOMS
COLOR CHANGE: 0
BACK PAIN: 0

## 2025-05-13 NOTE — ASSESSMENT & PLAN NOTE
Updated imaging today in office of the right hip demonstrates a seemingly stable right hemiarthroplasty with no obvious sign of subsidence, disassociation, dislocation, fracture.    The patient is to continue working with physical therapy using anterior hip precautions and we will see her back in 4 weeks to see how she is progressing.

## 2025-05-13 NOTE — PROGRESS NOTES
[facility]  [facility]  [address] [place] [address]  Dept: [phone]  Dept Fax: [phone]  Loc: [phone]    Emma Quesada is a 75 y.o. female who presents today for her medical conditions/complaints as noted below.  Emma Quesada is complaining of Post-Op Check (Right Hip)        HPI:   Patient is a pleasant 75-year-old female presenting to the clinic today about 2 weeks out following a right hemiarthroplasty following a subcapital femoral fracture.  She states that she has been pretty sore but started working with physical therapy yesterday and is overall doing pretty well.        History reviewed. No pertinent past medical history.    No past surgical history on file.    No family history on file.    Social History     Tobacco Use    Smoking status: Every Day     Current packs/day: 0.50     Types: Cigarettes    Smokeless tobacco: Never   Substance Use Topics    Alcohol use: Not on file        Current Outpatient Medications   Medication Sig Dispense Refill    amLODIPine (NORVASC) 2.5 MG tablet Take 1 tablet by mouth daily      apixaban (ELIQUIS) 2.5 MG TABS tablet Take 1 tablet by mouth every 12 hours      ARIPiprazole (ABILIFY) 5 MG tablet Take 1 tablet by mouth nightly      aspirin 81 MG chewable tablet Take 1 tablet by mouth daily      atorvastatin (LIPITOR) 80 MG tablet Take 1 tablet by mouth daily      BELBUCA 300 MCG FILM PLACE 1 FILM INSIDE THE CHEEK EVERY 12 HOURS.      calcium carbonate (TUMS) 500 MG chewable tablet Take 1 tablet by mouth daily      vitamin D (CHOLECALCIFEROL) 50 MCG (2000 UT) CAPS capsule Take 1 capsule by mouth daily      cyclobenzaprine (FLEXERIL) 10 MG tablet Take 1 tablet by mouth in the morning and 1 tablet in the evening.      DULoxetine (CYMBALTA) 60 MG extended release capsule Take 1 capsule by mouth 2 times daily      HYDROcodone-acetaminophen (NORCO)  MG per tablet Take 1 tablet by mouth every

## 2025-05-13 NOTE — OUTREACH NOTE
General Surgery Week 1 Survey      Flowsheet Row Responses   [facility] facility patient discharged from? [place]   Does the patient have one of the following disease processes/diagnoses(primary or secondary)? General Surgery   Week 1 attempt successful? No   Unsuccessful attempts Attempt 1            Marli Eduardo Registered Nurse

## 2025-05-27 ENCOUNTER — LAB (OUTPATIENT)
Dept: LAB | Facility: HOSPITAL | Age: 75
End: 2025-05-27
Payer: MEDICARE

## 2025-05-27 DIAGNOSIS — D63.1 ANEMIA DUE TO STAGE 3B CHRONIC KIDNEY DISEASE: ICD-10-CM

## 2025-05-27 DIAGNOSIS — N18.32 ANEMIA DUE TO STAGE 3B CHRONIC KIDNEY DISEASE: ICD-10-CM

## 2025-05-27 DIAGNOSIS — D50.9 IRON DEFICIENCY ANEMIA, UNSPECIFIED IRON DEFICIENCY ANEMIA TYPE: ICD-10-CM

## 2025-05-27 LAB
ALBUMIN SERPL-MCNC: 3.3 G/DL (ref 3.5–5.2)
ALBUMIN/GLOB SERPL: 0.9 G/DL
ALP SERPL-CCNC: 197 U/L (ref 39–117)
ALT SERPL W P-5'-P-CCNC: 8 U/L (ref 1–33)
ANION GAP SERPL CALCULATED.3IONS-SCNC: 12 MMOL/L (ref 5–15)
AST SERPL-CCNC: 13 U/L (ref 1–32)
BASOPHILS # BLD AUTO: 0.09 10*3/MM3 (ref 0–0.2)
BASOPHILS NFR BLD AUTO: 0.8 % (ref 0–1.5)
BILIRUB SERPL-MCNC: 0.2 MG/DL (ref 0–1.2)
BUN SERPL-MCNC: 16.9 MG/DL (ref 8–23)
BUN/CREAT SERPL: 12 (ref 7–25)
CALCIUM SPEC-SCNC: 9 MG/DL (ref 8.6–10.5)
CHLORIDE SERPL-SCNC: 102 MMOL/L (ref 98–107)
CO2 SERPL-SCNC: 22 MMOL/L (ref 22–29)
CREAT SERPL-MCNC: 1.41 MG/DL (ref 0.57–1)
DEPRECATED RDW RBC AUTO: 51.9 FL (ref 37–54)
EGFRCR SERPLBLD CKD-EPI 2021: 39.5 ML/MIN/1.73
EOSINOPHIL # BLD AUTO: 0.41 10*3/MM3 (ref 0–0.4)
EOSINOPHIL NFR BLD AUTO: 3.6 % (ref 0.3–6.2)
ERYTHROCYTE [DISTWIDTH] IN BLOOD BY AUTOMATED COUNT: 15.9 % (ref 12.3–15.4)
FERRITIN SERPL-MCNC: 83.94 NG/ML (ref 13–150)
GLOBULIN UR ELPH-MCNC: 3.6 GM/DL
GLUCOSE SERPL-MCNC: 115 MG/DL (ref 65–99)
HCT VFR BLD AUTO: 32.2 % (ref 34–46.6)
HGB BLD-MCNC: 9.1 G/DL (ref 12–15.9)
IMM GRANULOCYTES # BLD AUTO: 0.14 10*3/MM3 (ref 0–0.05)
IMM GRANULOCYTES NFR BLD AUTO: 1.2 % (ref 0–0.5)
IRON 24H UR-MRATE: 19 MCG/DL (ref 37–145)
IRON SATN MFR SERPL: 5 % (ref 20–50)
LYMPHOCYTES # BLD AUTO: 1.99 10*3/MM3 (ref 0.7–3.1)
LYMPHOCYTES NFR BLD AUTO: 17.3 % (ref 19.6–45.3)
MCH RBC QN AUTO: 25.5 PG (ref 26.6–33)
MCHC RBC AUTO-ENTMCNC: 28.3 G/DL (ref 31.5–35.7)
MCV RBC AUTO: 90.2 FL (ref 79–97)
MONOCYTES # BLD AUTO: 1.02 10*3/MM3 (ref 0.1–0.9)
MONOCYTES NFR BLD AUTO: 8.9 % (ref 5–12)
NEUTROPHILS NFR BLD AUTO: 68.2 % (ref 42.7–76)
NEUTROPHILS NFR BLD AUTO: 7.87 10*3/MM3 (ref 1.7–7)
NRBC BLD AUTO-RTO: 0 /100 WBC (ref 0–0.2)
PLATELET # BLD AUTO: 547 10*3/MM3 (ref 140–450)
PMV BLD AUTO: 9.5 FL (ref 6–12)
POTASSIUM SERPL-SCNC: 4 MMOL/L (ref 3.5–5.2)
PROT SERPL-MCNC: 6.9 G/DL (ref 6–8.5)
RBC # BLD AUTO: 3.57 10*6/MM3 (ref 3.77–5.28)
SODIUM SERPL-SCNC: 136 MMOL/L (ref 136–145)
TIBC SERPL-MCNC: 383 MCG/DL (ref 298–536)
TRANSFERRIN SERPL-MCNC: 257 MG/DL (ref 200–360)
WBC NRBC COR # BLD AUTO: 11.52 10*3/MM3 (ref 3.4–10.8)

## 2025-05-27 PROCEDURE — 80053 COMPREHEN METABOLIC PANEL: CPT

## 2025-05-27 PROCEDURE — 83540 ASSAY OF IRON: CPT

## 2025-05-27 PROCEDURE — 84466 ASSAY OF TRANSFERRIN: CPT

## 2025-05-27 PROCEDURE — 36415 COLL VENOUS BLD VENIPUNCTURE: CPT

## 2025-05-27 PROCEDURE — 85025 COMPLETE CBC W/AUTO DIFF WBC: CPT

## 2025-05-27 PROCEDURE — 82728 ASSAY OF FERRITIN: CPT

## 2025-05-28 ENCOUNTER — READMISSION MANAGEMENT (OUTPATIENT)
Dept: CALL CENTER | Facility: HOSPITAL | Age: 75
End: 2025-05-28
Payer: MEDICARE

## 2025-05-28 NOTE — OUTREACH NOTE
General Surgery Week 3 Survey      Flowsheet Row Responses   [facility] patient discharged from? [place]   Does the patient have one of the following disease processes/diagnoses(primary or secondary)? General Surgery   Week 3 attempt successful? Yes   Call start time 1002   Unsuccessful attempts Attempt 1   Call end time 1008   Discharge diagnosis Status post right bipolar hemiarthroplasty for subcapital femoral neck fracture  E. coli urinary tract infection   Medication alerts for this patient ELIQUIS   Meds reviewed with patient/caregiver? Yes   Is the patient having any side effects they believe may be caused by any medication additions or changes? No   Does the patient have all medications related to this admission filled (includes all antibiotics, pain medications, etc.) Yes   Is the patient taking all medications as directed (includes completed medication regime)? Yes   Does the patient have a follow up appointment scheduled with their surgeon? Yes   Has the patient kept scheduled appointments due by today? N/A   Has home health visited the patient within 72 hours of discharge? N/A   Home health comments pt is attending OP PT twice weekly   DME comments pt using walker for ambulation   Psychosocial issues? No   Did the patient receive a copy of their discharge instructions? Yes   Nursing interventions Reviewed instructions with patient   What is the patient's perception of their health status since discharge? Improving  [Pt feels she is doing very well, ambulating with walker, no falls and attending OP PT.  Staples still intact and has surgical appt scheduled.  No s/s infection with incision.  Pt has no questions today.]   Nursing interventions Nurse provided patient education   Is the patient /caregiver able to teach back basic post-op care? No tub bath, swimming, or hot tub until instructed by MD, Keep incision areas clean,dry and protected, Lifting as instructed by MD in discharge instructions   Is  the patient/caregiver able to teach back signs and symptoms of incisional infection? Increased drainage or bleeding, Incisional warmth, Pus or odor from incision, Fever, Increased redness, swelling or pain at the incisonal site  [reviewed]   Is the patient/caregiver able to teach back the hierarchy of who to call/visit for symptoms/problems? PCP, Specialist, Home health nurse, Urgent Care, ED, 911 Yes   Week 3 call completed? Yes   Graduated Yes   Call end time 1008            KATE Eduardo Registered Nurse

## 2025-06-10 ENCOUNTER — OFFICE VISIT (OUTPATIENT)
Age: 75
End: 2025-06-10

## 2025-06-10 VITALS — BODY MASS INDEX: 29.76 KG/M2 | HEIGHT: 63 IN

## 2025-06-10 DIAGNOSIS — Z96.641 HISTORY OF RIGHT HIP REPLACEMENT: ICD-10-CM

## 2025-06-10 DIAGNOSIS — Z48.89 AFTERCARE FOLLOWING SURGERY: Primary | ICD-10-CM

## 2025-06-10 DIAGNOSIS — S72.011D SUBCAPITAL FRACTURE OF NECK OF FEMUR, RIGHT, CLOSED, WITH ROUTINE HEALING, SUBSEQUENT ENCOUNTER: ICD-10-CM

## 2025-06-10 PROCEDURE — 99024 POSTOP FOLLOW-UP VISIT: CPT | Performed by: ORTHOPAEDIC SURGERY

## 2025-06-10 RX ORDER — EZETIMIBE 10 MG/1
10 TABLET ORAL DAILY
COMMUNITY
Start: 2025-05-20

## 2025-06-10 RX ORDER — CLINDAMYCIN HYDROCHLORIDE 300 MG/1
300 CAPSULE ORAL 2 TIMES DAILY
Qty: 14 CAPSULE | Refills: 0 | Status: SHIPPED | OUTPATIENT
Start: 2025-06-10 | End: 2025-06-17

## 2025-06-10 RX ORDER — ALPRAZOLAM 0.5 MG
TABLET ORAL
COMMUNITY
Start: 2025-05-24

## 2025-06-10 RX ORDER — CYCLOBENZAPRINE HCL 10 MG
10 TABLET ORAL 3 TIMES DAILY PRN
Qty: 21 TABLET | Refills: 0 | Status: SHIPPED | OUTPATIENT
Start: 2025-06-10 | End: 2025-06-20

## 2025-06-10 RX ORDER — METHYLPREDNISOLONE 4 MG/1
TABLET ORAL
Qty: 1 KIT | Refills: 0 | Status: SHIPPED | OUTPATIENT
Start: 2025-06-10 | End: 2025-06-16

## 2025-06-10 ASSESSMENT — ENCOUNTER SYMPTOMS
EYES NEGATIVE: 1
GASTROINTESTINAL NEGATIVE: 1
ALLERGIC/IMMUNOLOGIC NEGATIVE: 1
RESPIRATORY NEGATIVE: 1

## 2025-06-10 NOTE — PROGRESS NOTES
Constitutional: Negative.    HENT: Negative.     Eyes: Negative.    Respiratory: Negative.     Cardiovascular: Negative.    Gastrointestinal: Negative.    Skin: Negative.    Allergic/Immunologic: Negative.    Neurological: Negative.    Psychiatric/Behavioral: Negative.         Physical Exam  Integumentary: No redness or odor noted at the surgical site.    Musculoskeletal:  Right hip: The patient does not report any groin discomfort with seated or supine logroll.  Muscle compartments are soft compressible.  Sensation is intact distally.  She is ambulating with the use of a rollator walker.  Examination of the patient's left hip does yield mild discomfort with seated and supine logroll of the groin.  She does report pain radiating from the groin to the knee.                 An electronic signature was used to authenticate this note.    --Nitish Correia MD   The patient (or guardian, if applicable) and other individuals in attendance with the patient were advised that Artificial Intelligence will be utilized during this visit to record, process the conversation to generate a clinical note, and support improvement of the AI technology. The patient (or guardian, if applicable) and other individuals in attendance at the appointment consented to the use of AI, including the recording.

## 2025-06-17 ENCOUNTER — RESULTS FOLLOW-UP (OUTPATIENT)
Dept: LAB | Facility: HOSPITAL | Age: 75
End: 2025-06-17

## 2025-06-20 PROBLEM — K90.9 INTESTINAL MALABSORPTION, UNSPECIFIED: Status: ACTIVE | Noted: 2025-06-20

## 2025-06-23 RX ORDER — FAMOTIDINE 10 MG/ML
20 INJECTION, SOLUTION INTRAVENOUS AS NEEDED
Status: CANCELLED | OUTPATIENT
Start: 2025-06-26

## 2025-06-23 RX ORDER — DIPHENHYDRAMINE HYDROCHLORIDE 50 MG/ML
50 INJECTION, SOLUTION INTRAMUSCULAR; INTRAVENOUS AS NEEDED
Status: CANCELLED | OUTPATIENT
Start: 2025-06-26

## 2025-06-23 RX ORDER — FAMOTIDINE 10 MG/ML
20 INJECTION, SOLUTION INTRAVENOUS AS NEEDED
OUTPATIENT
Start: 2025-07-03

## 2025-06-23 RX ORDER — HYDROCORTISONE SODIUM SUCCINATE 100 MG/2ML
100 INJECTION INTRAMUSCULAR; INTRAVENOUS AS NEEDED
OUTPATIENT
Start: 2025-07-10

## 2025-06-23 RX ORDER — SODIUM CHLORIDE 9 MG/ML
20 INJECTION, SOLUTION INTRAVENOUS ONCE
Status: CANCELLED | OUTPATIENT
Start: 2025-06-26

## 2025-06-23 RX ORDER — SODIUM CHLORIDE 9 MG/ML
20 INJECTION, SOLUTION INTRAVENOUS ONCE
OUTPATIENT
Start: 2025-07-03

## 2025-06-23 RX ORDER — DIPHENHYDRAMINE HYDROCHLORIDE 50 MG/ML
50 INJECTION, SOLUTION INTRAMUSCULAR; INTRAVENOUS AS NEEDED
OUTPATIENT
Start: 2025-07-10

## 2025-06-23 RX ORDER — HYDROCORTISONE SODIUM SUCCINATE 100 MG/2ML
100 INJECTION INTRAMUSCULAR; INTRAVENOUS AS NEEDED
Status: CANCELLED | OUTPATIENT
Start: 2025-06-26

## 2025-06-23 RX ORDER — HYDROCORTISONE SODIUM SUCCINATE 100 MG/2ML
100 INJECTION INTRAMUSCULAR; INTRAVENOUS AS NEEDED
OUTPATIENT
Start: 2025-07-03

## 2025-06-23 RX ORDER — SODIUM CHLORIDE 9 MG/ML
20 INJECTION, SOLUTION INTRAVENOUS ONCE
OUTPATIENT
Start: 2025-07-10

## 2025-06-23 RX ORDER — DIPHENHYDRAMINE HYDROCHLORIDE 50 MG/ML
50 INJECTION, SOLUTION INTRAMUSCULAR; INTRAVENOUS AS NEEDED
OUTPATIENT
Start: 2025-07-03

## 2025-06-23 RX ORDER — FAMOTIDINE 10 MG/ML
20 INJECTION, SOLUTION INTRAVENOUS AS NEEDED
OUTPATIENT
Start: 2025-07-10

## 2025-06-26 ENCOUNTER — INFUSION (OUTPATIENT)
Dept: ONCOLOGY | Facility: HOSPITAL | Age: 75
End: 2025-06-26
Payer: MEDICARE

## 2025-06-26 VITALS
SYSTOLIC BLOOD PRESSURE: 124 MMHG | HEART RATE: 58 BPM | RESPIRATION RATE: 18 BRPM | DIASTOLIC BLOOD PRESSURE: 43 MMHG | OXYGEN SATURATION: 100 % | TEMPERATURE: 97.8 F

## 2025-06-26 DIAGNOSIS — K90.9 INTESTINAL MALABSORPTION, UNSPECIFIED TYPE: Primary | ICD-10-CM

## 2025-06-26 DIAGNOSIS — D50.9 IRON DEFICIENCY ANEMIA, UNSPECIFIED IRON DEFICIENCY ANEMIA TYPE: ICD-10-CM

## 2025-06-26 PROCEDURE — 25010000002 IRON SUCROSE PER 1 MG: Performed by: NURSE PRACTITIONER

## 2025-06-26 PROCEDURE — 25810000003 SODIUM CHLORIDE 0.9 % SOLUTION: Performed by: NURSE PRACTITIONER

## 2025-06-26 PROCEDURE — 96374 THER/PROPH/DIAG INJ IV PUSH: CPT

## 2025-06-26 PROCEDURE — 96365 THER/PROPH/DIAG IV INF INIT: CPT

## 2025-06-26 RX ORDER — HYDROCORTISONE SODIUM SUCCINATE 100 MG/2ML
100 INJECTION INTRAMUSCULAR; INTRAVENOUS AS NEEDED
Status: DISCONTINUED | OUTPATIENT
Start: 2025-06-26 | End: 2025-06-26 | Stop reason: HOSPADM

## 2025-06-26 RX ORDER — DIPHENHYDRAMINE HYDROCHLORIDE 50 MG/ML
50 INJECTION, SOLUTION INTRAMUSCULAR; INTRAVENOUS AS NEEDED
Status: DISCONTINUED | OUTPATIENT
Start: 2025-06-26 | End: 2025-06-26 | Stop reason: HOSPADM

## 2025-06-26 RX ORDER — FAMOTIDINE 10 MG/ML
20 INJECTION, SOLUTION INTRAVENOUS AS NEEDED
Status: DISCONTINUED | OUTPATIENT
Start: 2025-06-26 | End: 2025-06-26 | Stop reason: HOSPADM

## 2025-06-26 RX ORDER — SODIUM CHLORIDE 9 MG/ML
20 INJECTION, SOLUTION INTRAVENOUS ONCE
Status: COMPLETED | OUTPATIENT
Start: 2025-06-26 | End: 2025-06-26

## 2025-06-26 RX ADMIN — IRON SUCROSE 200 MG: 20 INJECTION, SOLUTION INTRAVENOUS at 12:39

## 2025-06-26 RX ADMIN — SODIUM CHLORIDE 20 ML/HR: 9 INJECTION, SOLUTION INTRAVENOUS at 12:38

## 2025-07-03 ENCOUNTER — INFUSION (OUTPATIENT)
Dept: ONCOLOGY | Facility: HOSPITAL | Age: 75
End: 2025-07-03
Payer: MEDICARE

## 2025-07-03 VITALS
RESPIRATION RATE: 18 BRPM | DIASTOLIC BLOOD PRESSURE: 60 MMHG | HEART RATE: 57 BPM | OXYGEN SATURATION: 97 % | TEMPERATURE: 97.4 F | SYSTOLIC BLOOD PRESSURE: 98 MMHG

## 2025-07-03 DIAGNOSIS — K90.9 INTESTINAL MALABSORPTION, UNSPECIFIED TYPE: Primary | ICD-10-CM

## 2025-07-03 DIAGNOSIS — D50.9 IRON DEFICIENCY ANEMIA, UNSPECIFIED IRON DEFICIENCY ANEMIA TYPE: ICD-10-CM

## 2025-07-03 PROCEDURE — 25010000002 IRON SUCROSE PER 1 MG: Performed by: NURSE PRACTITIONER

## 2025-07-03 PROCEDURE — 96365 THER/PROPH/DIAG IV INF INIT: CPT

## 2025-07-03 PROCEDURE — 25810000003 SODIUM CHLORIDE 0.9 % SOLUTION: Performed by: NURSE PRACTITIONER

## 2025-07-03 RX ORDER — HYDROCORTISONE SODIUM SUCCINATE 100 MG/2ML
100 INJECTION INTRAMUSCULAR; INTRAVENOUS AS NEEDED
Status: DISCONTINUED | OUTPATIENT
Start: 2025-07-03 | End: 2025-07-03 | Stop reason: HOSPADM

## 2025-07-03 RX ORDER — DIPHENHYDRAMINE HYDROCHLORIDE 50 MG/ML
50 INJECTION, SOLUTION INTRAMUSCULAR; INTRAVENOUS AS NEEDED
Status: DISCONTINUED | OUTPATIENT
Start: 2025-07-03 | End: 2025-07-03 | Stop reason: HOSPADM

## 2025-07-03 RX ORDER — SODIUM CHLORIDE 9 MG/ML
20 INJECTION, SOLUTION INTRAVENOUS ONCE
Status: COMPLETED | OUTPATIENT
Start: 2025-07-03 | End: 2025-07-03

## 2025-07-03 RX ORDER — FAMOTIDINE 10 MG/ML
20 INJECTION, SOLUTION INTRAVENOUS AS NEEDED
Status: DISCONTINUED | OUTPATIENT
Start: 2025-07-03 | End: 2025-07-03 | Stop reason: HOSPADM

## 2025-07-03 RX ADMIN — SODIUM CHLORIDE 20 ML/HR: 9 INJECTION, SOLUTION INTRAVENOUS at 13:19

## 2025-07-03 RX ADMIN — IRON SUCROSE 200 MG: 20 INJECTION, SOLUTION INTRAVENOUS at 13:20

## 2025-07-10 ENCOUNTER — INFUSION (OUTPATIENT)
Dept: ONCOLOGY | Facility: HOSPITAL | Age: 75
End: 2025-07-10
Payer: MEDICARE

## 2025-07-10 VITALS
OXYGEN SATURATION: 99 % | HEART RATE: 55 BPM | SYSTOLIC BLOOD PRESSURE: 135 MMHG | TEMPERATURE: 96.9 F | BODY MASS INDEX: 30.12 KG/M2 | WEIGHT: 170 LBS | DIASTOLIC BLOOD PRESSURE: 46 MMHG | RESPIRATION RATE: 18 BRPM

## 2025-07-10 DIAGNOSIS — D50.9 IRON DEFICIENCY ANEMIA, UNSPECIFIED IRON DEFICIENCY ANEMIA TYPE: ICD-10-CM

## 2025-07-10 DIAGNOSIS — K90.9 INTESTINAL MALABSORPTION, UNSPECIFIED TYPE: Primary | ICD-10-CM

## 2025-07-10 PROCEDURE — 96365 THER/PROPH/DIAG IV INF INIT: CPT

## 2025-07-10 PROCEDURE — 25010000002 IRON SUCROSE PER 1 MG: Performed by: NURSE PRACTITIONER

## 2025-07-10 PROCEDURE — 25810000003 SODIUM CHLORIDE 0.9 % SOLUTION: Performed by: NURSE PRACTITIONER

## 2025-07-10 RX ORDER — HYDROCORTISONE SODIUM SUCCINATE 100 MG/2ML
100 INJECTION INTRAMUSCULAR; INTRAVENOUS AS NEEDED
Status: DISCONTINUED | OUTPATIENT
Start: 2025-07-10 | End: 2025-07-10 | Stop reason: HOSPADM

## 2025-07-10 RX ORDER — SODIUM CHLORIDE 9 MG/ML
20 INJECTION, SOLUTION INTRAVENOUS ONCE
Status: COMPLETED | OUTPATIENT
Start: 2025-07-10 | End: 2025-07-10

## 2025-07-10 RX ORDER — DIPHENHYDRAMINE HYDROCHLORIDE 50 MG/ML
50 INJECTION, SOLUTION INTRAMUSCULAR; INTRAVENOUS AS NEEDED
Status: DISCONTINUED | OUTPATIENT
Start: 2025-07-10 | End: 2025-07-10 | Stop reason: HOSPADM

## 2025-07-10 RX ORDER — FAMOTIDINE 10 MG/ML
20 INJECTION, SOLUTION INTRAVENOUS AS NEEDED
Status: DISCONTINUED | OUTPATIENT
Start: 2025-07-10 | End: 2025-07-10 | Stop reason: HOSPADM

## 2025-07-10 RX ADMIN — SODIUM CHLORIDE 200 MG: 9 INJECTION, SOLUTION INTRAVENOUS at 13:40

## 2025-07-10 RX ADMIN — SODIUM CHLORIDE 20 ML/HR: 9 INJECTION, SOLUTION INTRAVENOUS at 13:36

## 2025-08-05 ENCOUNTER — OFFICE VISIT (OUTPATIENT)
Age: 75
End: 2025-08-05
Payer: MEDICARE

## 2025-08-05 VITALS — BODY MASS INDEX: 29.77 KG/M2 | HEIGHT: 63 IN | WEIGHT: 168 LBS

## 2025-08-05 DIAGNOSIS — S72.011D SUBCAPITAL FRACTURE OF NECK OF FEMUR, RIGHT, CLOSED, WITH ROUTINE HEALING, SUBSEQUENT ENCOUNTER: Primary | ICD-10-CM

## 2025-08-05 PROCEDURE — 1123F ACP DISCUSS/DSCN MKR DOCD: CPT | Performed by: ORTHOPAEDIC SURGERY

## 2025-08-05 PROCEDURE — 99213 OFFICE O/P EST LOW 20 MIN: CPT | Performed by: ORTHOPAEDIC SURGERY

## 2025-08-05 PROCEDURE — 1159F MED LIST DOCD IN RCRD: CPT | Performed by: ORTHOPAEDIC SURGERY

## 2025-08-05 ASSESSMENT — ENCOUNTER SYMPTOMS
RESPIRATORY NEGATIVE: 1
GASTROINTESTINAL NEGATIVE: 1
ALLERGIC/IMMUNOLOGIC NEGATIVE: 1
EYES NEGATIVE: 1

## 2025-08-25 ENCOUNTER — LAB (OUTPATIENT)
Dept: LAB | Facility: HOSPITAL | Age: 75
End: 2025-08-25
Payer: MEDICARE

## 2025-08-25 DIAGNOSIS — D47.2 MGUS (MONOCLONAL GAMMOPATHY OF UNKNOWN SIGNIFICANCE): ICD-10-CM

## 2025-08-25 DIAGNOSIS — D50.9 IRON DEFICIENCY ANEMIA, UNSPECIFIED IRON DEFICIENCY ANEMIA TYPE: ICD-10-CM

## 2025-08-25 DIAGNOSIS — N18.32 ANEMIA DUE TO STAGE 3B CHRONIC KIDNEY DISEASE: ICD-10-CM

## 2025-08-25 DIAGNOSIS — D63.1 ANEMIA DUE TO STAGE 3B CHRONIC KIDNEY DISEASE: ICD-10-CM

## 2025-08-25 LAB
ALBUMIN SERPL-MCNC: 3.5 G/DL (ref 3.5–5.2)
ALBUMIN/GLOB SERPL: 1.1 G/DL
ALP SERPL-CCNC: 195 U/L (ref 39–117)
ALT SERPL W P-5'-P-CCNC: 11 U/L (ref 1–33)
ANION GAP SERPL CALCULATED.3IONS-SCNC: 14 MMOL/L (ref 5–15)
AST SERPL-CCNC: 19 U/L (ref 1–32)
B2 MICROGLOB SERPL-MCNC: 7.2 MG/L (ref 0.8–2.2)
BASOPHILS # BLD AUTO: 0.11 10*3/MM3 (ref 0–0.2)
BASOPHILS NFR BLD AUTO: 1.1 % (ref 0–1.5)
BILIRUB SERPL-MCNC: 0.2 MG/DL (ref 0–1.2)
BUN SERPL-MCNC: 22.3 MG/DL (ref 8–23)
BUN/CREAT SERPL: 14.1 (ref 7–25)
CALCIUM SPEC-SCNC: 9.2 MG/DL (ref 8.6–10.5)
CHLORIDE SERPL-SCNC: 102 MMOL/L (ref 98–107)
CO2 SERPL-SCNC: 18 MMOL/L (ref 22–29)
CREAT SERPL-MCNC: 1.58 MG/DL (ref 0.57–1)
DEPRECATED RDW RBC AUTO: 63.6 FL (ref 37–54)
EGFRCR SERPLBLD CKD-EPI 2021: 34.4 ML/MIN/1.73
EOSINOPHIL # BLD AUTO: 0.48 10*3/MM3 (ref 0–0.4)
EOSINOPHIL NFR BLD AUTO: 4.8 % (ref 0.3–6.2)
ERYTHROCYTE [DISTWIDTH] IN BLOOD BY AUTOMATED COUNT: 19.5 % (ref 12.3–15.4)
FERRITIN SERPL-MCNC: 78.67 NG/ML (ref 13–150)
GLOBULIN UR ELPH-MCNC: 3.2 GM/DL
GLUCOSE SERPL-MCNC: 129 MG/DL (ref 65–99)
HCT VFR BLD AUTO: 37.5 % (ref 34–46.6)
HGB BLD-MCNC: 11.5 G/DL (ref 12–15.9)
IMM GRANULOCYTES # BLD AUTO: 0.03 10*3/MM3 (ref 0–0.05)
IMM GRANULOCYTES NFR BLD AUTO: 0.3 % (ref 0–0.5)
IRON 24H UR-MRATE: 50 MCG/DL (ref 37–145)
IRON SATN MFR SERPL: 13 % (ref 20–50)
LYMPHOCYTES # BLD AUTO: 2.21 10*3/MM3 (ref 0.7–3.1)
LYMPHOCYTES NFR BLD AUTO: 22.3 % (ref 19.6–45.3)
MCH RBC QN AUTO: 27.6 PG (ref 26.6–33)
MCHC RBC AUTO-ENTMCNC: 30.7 G/DL (ref 31.5–35.7)
MCV RBC AUTO: 90.1 FL (ref 79–97)
MONOCYTES # BLD AUTO: 0.81 10*3/MM3 (ref 0.1–0.9)
MONOCYTES NFR BLD AUTO: 8.2 % (ref 5–12)
NEUTROPHILS NFR BLD AUTO: 6.26 10*3/MM3 (ref 1.7–7)
NEUTROPHILS NFR BLD AUTO: 63.3 % (ref 42.7–76)
NRBC BLD AUTO-RTO: 0.3 /100 WBC (ref 0–0.2)
PLATELET # BLD AUTO: 372 10*3/MM3 (ref 140–450)
PMV BLD AUTO: 10.1 FL (ref 6–12)
POTASSIUM SERPL-SCNC: 4.5 MMOL/L (ref 3.5–5.2)
PROT SERPL-MCNC: 6.7 G/DL (ref 6–8.5)
RBC # BLD AUTO: 4.16 10*6/MM3 (ref 3.77–5.28)
SODIUM SERPL-SCNC: 134 MMOL/L (ref 136–145)
TIBC SERPL-MCNC: 398 MCG/DL (ref 298–536)
TRANSFERRIN SERPL-MCNC: 267 MG/DL (ref 200–360)
WBC NRBC COR # BLD AUTO: 9.9 10*3/MM3 (ref 3.4–10.8)

## 2025-08-25 PROCEDURE — 83521 IG LIGHT CHAINS FREE EACH: CPT

## 2025-08-25 PROCEDURE — 82784 ASSAY IGA/IGD/IGG/IGM EACH: CPT

## 2025-08-25 PROCEDURE — 86334 IMMUNOFIX E-PHORESIS SERUM: CPT

## 2025-08-25 PROCEDURE — 82232 ASSAY OF BETA-2 PROTEIN: CPT

## 2025-08-25 PROCEDURE — 84466 ASSAY OF TRANSFERRIN: CPT

## 2025-08-25 PROCEDURE — 85025 COMPLETE CBC W/AUTO DIFF WBC: CPT

## 2025-08-25 PROCEDURE — 84165 PROTEIN E-PHORESIS SERUM: CPT

## 2025-08-25 PROCEDURE — 80053 COMPREHEN METABOLIC PANEL: CPT

## 2025-08-25 PROCEDURE — 36415 COLL VENOUS BLD VENIPUNCTURE: CPT

## 2025-08-25 PROCEDURE — 83540 ASSAY OF IRON: CPT

## 2025-08-25 PROCEDURE — 82728 ASSAY OF FERRITIN: CPT

## 2025-08-27 LAB
ALBUMIN SERPL ELPH-MCNC: 3.3 G/DL (ref 2.9–4.4)
ALBUMIN/GLOB SERPL: 1.1 {RATIO} (ref 0.7–1.7)
ALPHA1 GLOB SERPL ELPH-MCNC: 0.2 G/DL (ref 0–0.4)
ALPHA2 GLOB SERPL ELPH-MCNC: 0.8 G/DL (ref 0.4–1)
B-GLOBULIN SERPL ELPH-MCNC: 1.2 G/DL (ref 0.7–1.3)
GAMMA GLOB SERPL ELPH-MCNC: 1 G/DL (ref 0.4–1.8)
GLOBULIN SER-MCNC: 3.2 G/DL (ref 2.2–3.9)
IGA SERPL-MCNC: 524 MG/DL (ref 64–422)
IGG SERPL-MCNC: 964 MG/DL (ref 586–1602)
IGM SERPL-MCNC: 327 MG/DL (ref 26–217)
INTERPRETATION SERPL IEP-IMP: ABNORMAL
KAPPA LC FREE SER-MCNC: 100.1 MG/L (ref 3.3–19.4)
KAPPA LC FREE/LAMBDA FREE SER: 1.85 {RATIO} (ref 0.26–1.65)
LABORATORY COMMENT REPORT: ABNORMAL
LAMBDA LC FREE SERPL-MCNC: 54 MG/L (ref 5.7–26.3)
M PROTEIN SERPL ELPH-MCNC: 0.3 G/DL
PROT SERPL-MCNC: 6.5 G/DL (ref 6–8.5)

## (undated) DEVICE — SPK10183 ORTHOPEDIC FRACTURE AND TRAUMA KIT: Brand: SPK10183 ORTHOPEDIC FRACTURE AND TRAUMA KIT

## (undated) DEVICE — TR BAND RADIAL ARTERY COMPRESSION DEVICE: Brand: TR BAND

## (undated) DEVICE — MODEL BT2000 P/N 700287-012KIT CONTENTS: MANIFOLD WITH SALINE AND CONTRAST PORTS, SALINE TUBING WITH SPIKE AND HAND SYRINGE, TRANSDUCER: Brand: BT2000 AUTOMATED MANIFOLD KIT

## (undated) DEVICE — CLTH CLENS READYCLEANSE PERI CARE PK/5

## (undated) DEVICE — PK HIP TOTL ANT 30

## (undated) DEVICE — A2000 MULTI-USE SYRINGE KIT, P/N 701277-003KIT CONTENTS: 100ML CONTRAST RESERVOIR AND TUBING WITH CONTRAST SPIKE AND CLAMP: Brand: A2000 MULTI-USE SYRINGE KIT

## (undated) DEVICE — CATH F5 INF JR 4 100CM: Brand: INFINITI

## (undated) DEVICE — CVR BRD ARM 13X30

## (undated) DEVICE — PK CATH CARD 30 CA/4

## (undated) DEVICE — DRAPE,U/ SHT,SPLIT,PLAS,STERIL: Brand: MEDLINE

## (undated) DEVICE — GLV SURG DERMASSURE GRN LF PF 8.5

## (undated) DEVICE — DUAL CUT SAGITTAL BLADE

## (undated) DEVICE — 4-PORT MANIFOLD: Brand: NEPTUNE 2

## (undated) DEVICE — DRP SURG U/DRP INVISISHIELD PA 48X52IN

## (undated) DEVICE — SHORT LENS-STERILE

## (undated) DEVICE — SOLIDIFIER LIQUI LOC PLUS 2000CC

## (undated) DEVICE — SUP ARMBRD ART/LINE BLU

## (undated) DEVICE — SOL IRR NACL 0.9PCT BT 1000ML

## (undated) DEVICE — ANTIBACTERIAL UNDYED BRAIDED (POLYGLACTIN 910), SYNTHETIC ABSORBABLE SUTURE: Brand: COATED VICRYL

## (undated) DEVICE — LT SCINTILLANT W/SXN

## (undated) DEVICE — ELECTRD BLD EZ CLN STD 4IN

## (undated) DEVICE — PAD, DEFIB, ADULT, RADIOTRANS, PHYSIO: Brand: MEDLINE

## (undated) DEVICE — OPTIFOAM GENTLE SA, POSTOP, 4X8: Brand: MEDLINE

## (undated) DEVICE — GLIDESHEATH SLENDER STAINLESS STEEL KIT: Brand: GLIDESHEATH SLENDER

## (undated) DEVICE — PATIENT RETURN ELECTRODE, SINGLE-USE, CONTACT QUALITY MONITORING, ADULT, WITH 9FT CORD, FOR PATIENTS WEIGING OVER 33LBS. (15KG): Brand: MEGADYNE

## (undated) DEVICE — TIBURON BRACHIAL ANGIOGRAPHY DRAPE: Brand: CONVERTORS

## (undated) DEVICE — GLV SURG SENSICARE PI ORTHO SZ8.5 LF STRL

## (undated) DEVICE — 3M™ IOBAN™ 2 ANTIMICROBIAL INCISE DRAPE 6650EZ: Brand: IOBAN™ 2

## (undated) DEVICE — RADIFOCUS OPTITORQUE ANGIOGRAPHIC CATHETER: Brand: OPTITORQUE

## (undated) DEVICE — CANN NASL ETCO2 LO/FLO A/

## (undated) DEVICE — NEEDLE,22GX1.5",REG,BEVEL: Brand: MEDLINE

## (undated) DEVICE — SYS SKIN EXOFIN WND CLS 4X22CM

## (undated) DEVICE — CONTAINER,SPECIMEN,OR STERILE,4OZ: Brand: MEDLINE

## (undated) DEVICE — DRSNG SURESITE WNDW 4X4.5

## (undated) DEVICE — GW STARTER FXD CORE J .035 3X150CM 3MM

## (undated) DEVICE — MODEL AT P65, P/N 701554-001KIT CONTENTS: HAND CONTROLLER, 3-WAY HIGH-PRESSURE STOPCOCK WITH ROTATING END AND PREMIUM HIGH-PRESSURE TUBING: Brand: ANGIOTOUCH® KIT